# Patient Record
Sex: MALE | Race: WHITE | NOT HISPANIC OR LATINO | Employment: FULL TIME | URBAN - METROPOLITAN AREA
[De-identification: names, ages, dates, MRNs, and addresses within clinical notes are randomized per-mention and may not be internally consistent; named-entity substitution may affect disease eponyms.]

---

## 2017-01-03 ENCOUNTER — OFFICE VISIT (OUTPATIENT)
Dept: OPHTHALMOLOGY | Facility: CLINIC | Age: 67
End: 2017-01-03
Payer: COMMERCIAL

## 2017-01-03 DIAGNOSIS — H25.13 NUCLEAR SCLEROSIS, BILATERAL: ICD-10-CM

## 2017-01-03 DIAGNOSIS — H43.813 PVD (POSTERIOR VITREOUS DETACHMENT), BILATERAL: Primary | ICD-10-CM

## 2017-01-03 PROCEDURE — 92226 PR SPECIAL EYE EXAM, SUBSEQUENT: CPT | Mod: RT,S$GLB,, | Performed by: OPHTHALMOLOGY

## 2017-01-03 PROCEDURE — 99999 PR PBB SHADOW E&M-EST. PATIENT-LVL II: CPT | Mod: PBBFAC,,, | Performed by: OPHTHALMOLOGY

## 2017-01-03 PROCEDURE — 92014 COMPRE OPH EXAM EST PT 1/>: CPT | Mod: S$GLB,,, | Performed by: OPHTHALMOLOGY

## 2017-01-03 NOTE — MR AVS SNAPSHOT
[facility]  [address]  [facility] [address]  Phone: [phone]  Fax: [phone]                  Anthony Reynaga   1/3/2017 3:00 PM   Office Visit    Description:  Male : 1950   Provider:  CHRISTIANE Fam MD   Department:  [facility]           Reason for Visit     Eye Problem           Diagnoses this Visit        Comments    PVD (posterior vitreous detachment), bilateral    -  Primary     Nuclear sclerosis, bilateral                To Do List           Future Appointments        Provider Department Dept Phone    2017 8:45 AM Curt Pedro, PharmD [facility] [phone]      Goals (5 Years of Data)     None      Follow-Up and Disposition     Return in about 2 years (around 1/3/2019).      [facility]     [facility] On Call Nurse Care Line -  Assistance  Registered nurses in the [facility] On Call Center provide clinical advisement, health education, appointment booking, and other advisory services.  Call for this free service at [phone].             Medications           Message regarding Medications     Verify the changes and/or additions to your medication regime listed below are the same as discussed with your clinician today.  If any of these changes or additions are incorrect, please notify your healthcare provider.             Verify that the below list of medications is an accurate representation of the medications you are currently taking.  If none reported, the list may be blank. If incorrect, please contact your healthcare provider. Carry this list with you in case of emergency.           Current Medications     alfuzosin (UROXATRAL) 10 mg Tb24 Take 1 tablet by mouth once daily    aspirin (ECOTRIN) 81 MG EC tablet Take 81 mg by mouth once daily.    finasteride (PROSCAR) 5 mg tablet Take 1 tablet (5 mg total) by mouth once daily.    FLUVIRIN [id number] 45 mcg (15 mcg x 3)/0.5 mL Susp ADM 0.5ML IM UTD     lorazepam (ATIVAN) 1 MG tablet Take 1 mg by mouth every 6 (six) hours as needed (Patient states he takes this medication only a couple times a month, to sleep.).     metoprolol succinate (TOPROL-XL) 200 MG 24 hr tablet Take 200 mg by mouth once daily.     simvastatin (ZOCOR) 20 MG tablet Take 20 mg by mouth every evening.     warfarin (COUMADIN) 2 MG tablet Take 2 mg by mouth once daily. Resumption 8/25. Take as directed by the Coumadin Clinic.           Clinical Reference Information           Allergies as of 1/3/2017     No Known Allergies      Immunizations Administered on Date of Encounter - 1/3/2017     None

## 2017-02-01 ENCOUNTER — ANTI-COAG VISIT (OUTPATIENT)
Dept: CARDIOLOGY | Facility: CLINIC | Age: 67
End: 2017-02-01
Payer: COMMERCIAL

## 2017-02-01 DIAGNOSIS — Z79.01 LONG TERM CURRENT USE OF ANTICOAGULANT THERAPY: Primary | ICD-10-CM

## 2017-02-01 LAB — INR PPP: 2.2 (ref 2–3)

## 2017-02-01 PROCEDURE — 85610 PROTHROMBIN TIME: CPT | Mod: QW,S$GLB,, | Performed by: PHARMACIST

## 2017-02-01 NOTE — PROGRESS NOTES
Patient denies any changes in diet, medications, or health that would effect warfarin therapy.  Patient was re-educated on situations that would require placing a call to the coumadin clinic, including bleeding or unusual bruising issues, changes in health, diet or medications, upcoming procedures that require warfarin interruption, and missed coumadin dose(s). Patient expressed understanding that avoidance of consistency with these parameters could cause fluctuations in INR, leading to more frequent visits and increase risk of adverse events.

## 2017-03-16 RX ORDER — FINASTERIDE 5 MG/1
TABLET, FILM COATED ORAL
Qty: 30 TABLET | Refills: 0 | Status: SHIPPED | OUTPATIENT
Start: 2017-03-16 | End: 2017-04-25 | Stop reason: SDUPTHER

## 2017-03-29 ENCOUNTER — ANTI-COAG VISIT (OUTPATIENT)
Dept: CARDIOLOGY | Facility: CLINIC | Age: 67
End: 2017-03-29
Payer: COMMERCIAL

## 2017-03-29 DIAGNOSIS — Z79.01 LONG TERM CURRENT USE OF ANTICOAGULANT THERAPY: ICD-10-CM

## 2017-03-29 LAB — INR PPP: 2.4 (ref 2–3)

## 2017-03-29 PROCEDURE — 85610 PROTHROMBIN TIME: CPT | Mod: QW,S$GLB,, | Performed by: PHARMACIST

## 2017-04-25 RX ORDER — FINASTERIDE 5 MG/1
TABLET, FILM COATED ORAL
Qty: 30 TABLET | Refills: 0 | Status: SHIPPED | OUTPATIENT
Start: 2017-04-25 | End: 2017-05-21 | Stop reason: SDUPTHER

## 2017-05-21 RX ORDER — FINASTERIDE 5 MG/1
TABLET, FILM COATED ORAL
Qty: 30 TABLET | Refills: 0 | Status: SHIPPED | OUTPATIENT
Start: 2017-05-21 | End: 2017-06-27 | Stop reason: SDUPTHER

## 2017-05-24 ENCOUNTER — ANTI-COAG VISIT (OUTPATIENT)
Dept: CARDIOLOGY | Facility: CLINIC | Age: 67
End: 2017-05-24
Payer: COMMERCIAL

## 2017-05-24 DIAGNOSIS — Z79.01 LONG TERM CURRENT USE OF ANTICOAGULANT THERAPY: Primary | ICD-10-CM

## 2017-05-24 LAB — INR PPP: 2.5 (ref 2–3)

## 2017-05-24 PROCEDURE — 85610 PROTHROMBIN TIME: CPT | Mod: QW,S$GLB,, | Performed by: PHARMACIST

## 2017-05-24 PROCEDURE — 99211 OFF/OP EST MAY X REQ PHY/QHP: CPT | Mod: 25,S$GLB,, | Performed by: PHARMACIST

## 2017-05-24 NOTE — PROGRESS NOTES
Patient therapeutic. Patient confirmed dose. Denied changes in medication or health. Endorsed eating fewer salads recently but planned to restart his usual routine. Denied overt complications. Has upcoming dental work for crown, no expected bleeding. Patient instructed about situations in which to call clinic for bleeding concern. Patient will follow-up in 8 weeks.

## 2017-06-12 DIAGNOSIS — I35.1 AORTIC VALVE REGURGITATION, UNSPECIFIED ETIOLOGY: Primary | ICD-10-CM

## 2017-06-27 RX ORDER — FINASTERIDE 5 MG/1
5 TABLET, FILM COATED ORAL DAILY
Qty: 30 TABLET | Refills: 11 | Status: SHIPPED | OUTPATIENT
Start: 2017-06-27 | End: 2018-08-20

## 2017-06-27 RX ORDER — FINASTERIDE 5 MG/1
TABLET, FILM COATED ORAL
Qty: 30 TABLET | Refills: 0 | Status: SHIPPED | OUTPATIENT
Start: 2017-06-27 | End: 2017-06-27 | Stop reason: SDUPTHER

## 2017-07-16 ENCOUNTER — PATIENT MESSAGE (OUTPATIENT)
Dept: ELECTROPHYSIOLOGY | Facility: CLINIC | Age: 67
End: 2017-07-16

## 2017-07-19 ENCOUNTER — ANTI-COAG VISIT (OUTPATIENT)
Dept: CARDIOLOGY | Facility: CLINIC | Age: 67
End: 2017-07-19
Payer: COMMERCIAL

## 2017-07-19 DIAGNOSIS — Z79.01 LONG TERM CURRENT USE OF ANTICOAGULANT THERAPY: Primary | ICD-10-CM

## 2017-07-19 LAB — INR PPP: 2.1 (ref 2–3)

## 2017-07-19 PROCEDURE — 85610 PROTHROMBIN TIME: CPT | Mod: QW,S$GLB,,

## 2017-07-19 PROCEDURE — 99211 OFF/OP EST MAY X REQ PHY/QHP: CPT | Mod: 25,S$GLB,,

## 2017-07-19 NOTE — PROGRESS NOTES
INR remains therapeutic.  Patient reports medication changes of flonase and omeprazole; do not expect significant DDIs with warfarin.  He reports likely upcoming polyp removal from vocal cord and will keep us advised of date once scheduled.  I anticipate holding warfarin 3-5 days prior without lovenox (CHADS-VASC=2 for age and htn).  Continue warfarin dose and INR in 8 weeks.  Patient advised to contact clinic with any changes, questions, or concerns.

## 2017-08-03 ENCOUNTER — OFFICE VISIT (OUTPATIENT)
Dept: OTOLARYNGOLOGY | Facility: CLINIC | Age: 67
End: 2017-08-03
Payer: COMMERCIAL

## 2017-08-03 VITALS
WEIGHT: 185.44 LBS | TEMPERATURE: 98 F | DIASTOLIC BLOOD PRESSURE: 63 MMHG | SYSTOLIC BLOOD PRESSURE: 124 MMHG | BODY MASS INDEX: 25.15 KG/M2 | HEART RATE: 56 BPM

## 2017-08-03 DIAGNOSIS — J38.3 VOCAL FOLD ATROPHY: ICD-10-CM

## 2017-08-03 DIAGNOSIS — R49.9 VOICE DISTURBANCE: Primary | ICD-10-CM

## 2017-08-03 DIAGNOSIS — J38.3 GLOTTIC INSUFFICIENCY: ICD-10-CM

## 2017-08-03 DIAGNOSIS — F44.4 HYPERFUNCTIONAL DYSPHONIA: ICD-10-CM

## 2017-08-03 DIAGNOSIS — J38.3 VOCAL FOLD ATROPHY: Primary | ICD-10-CM

## 2017-08-03 PROCEDURE — 3008F BODY MASS INDEX DOCD: CPT | Mod: S$GLB,,, | Performed by: OTOLARYNGOLOGY

## 2017-08-03 PROCEDURE — 99203 OFFICE O/P NEW LOW 30 MIN: CPT | Mod: 25,S$GLB,, | Performed by: OTOLARYNGOLOGY

## 2017-08-03 PROCEDURE — 99999 PR PBB SHADOW E&M-EST. PATIENT-LVL II: CPT | Mod: PBBFAC,,, | Performed by: OTOLARYNGOLOGY

## 2017-08-03 PROCEDURE — 99499 UNLISTED E&M SERVICE: CPT | Mod: S$GLB,,, | Performed by: OTOLARYNGOLOGY

## 2017-08-03 PROCEDURE — 31579 LARYNGOSCOPY TELESCOPIC: CPT | Mod: S$GLB,,, | Performed by: OTOLARYNGOLOGY

## 2017-08-03 PROCEDURE — 3074F SYST BP LT 130 MM HG: CPT | Mod: S$GLB,,, | Performed by: OTOLARYNGOLOGY

## 2017-08-03 PROCEDURE — 3078F DIAST BP <80 MM HG: CPT | Mod: S$GLB,,, | Performed by: OTOLARYNGOLOGY

## 2017-08-03 PROCEDURE — 99999 PR PBB SHADOW E&M-EST. PATIENT-LVL III: CPT | Mod: PBBFAC,,, | Performed by: OTOLARYNGOLOGY

## 2017-08-03 NOTE — PATIENT INSTRUCTIONS
VOCAL CORD BOWING    What is bowing?    Bowed vocal folds are vocal folds that look like two bows joined at the front and the back. While the front and back of the two cords come together, the middle stays open. The opening in the middle allows air to leak out during voice use, causing symptoms including weak voice and vocal fatigue.    Bowing is most often caused by presbylarynx, which means an aging larynx. However, it can also occur in young persons. Often, bowing comes from a lack of nerve input to the vocal folds, resulting in poor closure in the middle part of the cords. Over time, the muscle bulk in the vocal folds can be lost, adding to greater difficulty with closure.     How is bowing treated?    The first line of treatment for bowing is voice therapy. Therapy is used to exercise the vocal folds and to bulk them up to improve the closure. Therapy exercises may also work to reduce over-activity of the surrounding muscles, which often get over-used to compensate when the vocal folds arent closing well.    In extreme cases of vocal fold bowing, one of both vocal folds may be injected with material to add bulk to improve closure.                           WHAT TO EXPECT FROM VOICE THERAPY    Purpose  The purpose of voice therapy is to help you find a better, more efficient way to use your voice or to reduce symptoms such as coughing, throat clearing, or difficulty breathing.  Depending on your symptoms, you may learn how to produce clearer voice quality, how to reduce fatigue or pain associated with speaking, how to take care of your voice, and how to eliminate chronic coughing or throat clearing.      Process: Evaluation  First, you may go through some initial testing.  In most cases, a videostroboscopy will be performed in order to allow your speech pathologist and your physician to look at your vocal cords to aid in deciding if you would benefit from voice therapy.  Next, you may work with the speech  pathologist to assess the current capabilities of your voice.  Following evaluation, your speech pathologist will design a therapeutic plan to improve your voice as well as other symptoms that may bother you.  At the time of evaluation, your speech pathologist may provide you with exercises to try at home.      Process: Therapy  Most patients benefit from 2-8 sessions over 1-3 months.  Voice therapy involves changing the behavior of your vocal cords and speaking habits, so it is very important that you attend your appointments and do home practices as instructed by your speech pathologist.  Home practice and participation in therapy are critical to meeting your desired voice goals, so of course, we are able to work with you to schedule appointments that are convenient for you.

## 2017-08-03 NOTE — PROGRESS NOTES
[facility]    Subjective:       Patient ID: Anthony Reynaga is a 67 y.o. male.    Chief Complaint: Consult (polyp on vocal cord)    HPI   Anthony Reynaga is a 67 y.o. male who presents for evaluation of a recently detected vocal fold polyp. He complains of dryness in his mouth that prompted a visit with Dr. Box. She noted a TVF polyp and started him on Prevacid. 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 He complains of postnasal drip, but denies sleep apnea although there is some snoring. He notes some irritation in the roof of his mouth. He does not discretely describe hoarseness or voice change. He notes a globus sensation and admits to vocal fatigue (a colleague noted that his voice weakened over a 2.5 hour lecture). He does not sing, and he ultimately states that his voice seems deeper than normal. There are not periodic pitch breaks or cracks. He denies dysphagia to solids or liquids. He admits that he is not particularly sensitive to changes in his voice. He notes some strain in which he would have to cough or take a sip of water during conversations this summer.  He denies odynophagia, throat pain, and otalgia. There is not hemoptysis or hematemesis. He does clear his throat frequently, and there is not a morning cough.    He denies heartburn and cathie reflux. He denies a globus sensation. He is currently taking prevacid for GERD/LPR, but he has not noticed a change in his symptoms. He thinks that the flonase is helping his postnasal drip.  He has not seen a Gastroenterologist and has not had an EGD. He does not occasionally experience a metallic taste in his mouth - he previously experienced a transient change in overall taste that resolved with a change in toothpaste. He drinks about 4 glasses of water a day, along with about 12-15 ounces of coffee daily (2-2.5 cups).    Past Medical History:   Diagnosis Date    Anticoagulant long-term use     Atrial fibrillation      BPH (benign prostatic hyperplasia)     Cataract     Elevated PSA     Floaters     Hernia     bilateral inquinal    Hypertension     Inguinal hernia     Kidney stone     Knee injury        Past Surgical History:   Procedure Laterality Date    APPENDECTOMY      BLADDER SURGERY      polyp removed benign    COLONOSCOPY N/A 6/22/2016    Procedure: COLONOSCOPY;  Surgeon: Joaquin Francis MD;  Location: [facility] ([address] [place] [address]);  Service: Endoscopy;  Laterality: N/A;    Thanks for letting us know.  I would approve him to hold coumadin x 3 days prior, without lovenox.  We will see him for an INR on 6/8 and will provide him with procedure instructions at that time., per Coumadin Clinic    CYSTOSCOPY      FINGER SURGERY      HERNIA REPAIR      KNEE SURGERY      LITHOTRIPSY      TONSILLECTOMY, ADENOIDECTOMY           Current Outpatient Prescriptions:     alfuzosin (UROXATRAL) 10 mg Tb24, Take 1 tablet by mouth once daily, Disp: 90 tablet, Rfl: 3    aspirin (ECOTRIN) 81 MG EC tablet, Take 81 mg by mouth once daily., Disp: , Rfl:     finasteride (PROSCAR) 5 mg tablet, Take 1 tablet (5 mg total) by mouth once daily., Disp: 30 tablet, Rfl: 11    FLUVIRIN [id number] 45 mcg (15 mcg x 3)/0.5 mL Susp, ADM 0.5ML IM UTD, Disp: , Rfl: 0    lorazepam (ATIVAN) 1 MG tablet, Take 1 mg by mouth every 6 (six) hours as needed (Patient states he takes this medication only a couple times a month, to sleep.). , Disp: , Rfl: 0    metoprolol succinate (TOPROL-XL) 200 MG 24 hr tablet, Take 200 mg by mouth once daily. , Disp: , Rfl:     simvastatin (ZOCOR) 20 MG tablet, Take 20 mg by mouth every evening. , Disp: , Rfl:     warfarin (COUMADIN) 2 MG tablet, Take 2 mg by mouth once daily. Resumption 8/25. Take as directed by the Coumadin Clinic., Disp: , Rfl:     Review of patient's allergies indicates:  No Known Allergies    Social History     Social History    Marital status: Single     Spouse name: N/A    Number of children:  N/A    Years of education: N/A     Occupational History    Not on file.     Social History Main Topics    Smoking status: Never Smoker    Smokeless tobacco: Never Used    Alcohol use 0.6 oz/week     1 Cans of beer per week      Comment: social    Drug use: No    Sexual activity: Yes     Partners: Female     Other Topics Concern    Not on file     Social History Narrative    No narrative on file       Family History   Problem Relation Age of Onset    Cancer Mother     Benign prostatic hyperplasia Neg Hx      Review of Systems   Constitutional: Negative for fatigue, fever and unexpected weight change.   HENT: Negative for ear discharge, facial swelling, hearing loss, mouth sores, rhinorrhea, sore throat, tinnitus, trouble swallowing and voice change.    Eyes: Negative for pain and visual disturbance.   Respiratory: Negative for cough and shortness of breath.    Cardiovascular: Negative for chest pain and palpitations.   Gastrointestinal: Negative for abdominal pain, constipation and diarrhea.   Genitourinary: Negative for difficulty urinating and dysuria.   Musculoskeletal: Positive for arthralgias. Negative for back pain and neck pain.   Skin: Negative for color change and rash.   Neurological: Negative for dizziness, seizures and headaches.   Hematological: Negative for adenopathy. Does not bruise/bleed easily.   Psychiatric/Behavioral: Negative for agitation. The patient is not nervous/anxious.        Objective:      Physical Exam    Assessment:       No diagnosis found.    Plan:     Dr. Moeller to see the patient, as this is more appropriate

## 2017-08-07 ENCOUNTER — CLINICAL SUPPORT (OUTPATIENT)
Dept: SPEECH THERAPY | Facility: HOSPITAL | Age: 67
End: 2017-08-07
Attending: OTOLARYNGOLOGY
Payer: COMMERCIAL

## 2017-08-07 DIAGNOSIS — R49.9 VOICE DISTURBANCE: ICD-10-CM

## 2017-08-07 DIAGNOSIS — F44.4 HYPERFUNCTIONAL DYSPHONIA: ICD-10-CM

## 2017-08-07 DIAGNOSIS — J38.3 GLOTTIC INSUFFICIENCY: Primary | ICD-10-CM

## 2017-08-07 DIAGNOSIS — J38.3 VOCAL FOLD ATROPHY: ICD-10-CM

## 2017-08-07 PROCEDURE — G9172 VOICE GOAL STATUS: HCPCS | Mod: GN,CH | Performed by: SPEECH-LANGUAGE PATHOLOGIST

## 2017-08-07 PROCEDURE — 92524 BEHAVRAL QUALIT ANALYS VOICE: CPT | Mod: GN | Performed by: SPEECH-LANGUAGE PATHOLOGIST

## 2017-08-07 PROCEDURE — 92520 LARYNGEAL FUNCTION STUDIES: CPT | Mod: GN | Performed by: SPEECH-LANGUAGE PATHOLOGIST

## 2017-08-07 PROCEDURE — G9171 VOICE CURRENT STATUS: HCPCS | Mod: GN,CJ | Performed by: SPEECH-LANGUAGE PATHOLOGIST

## 2017-08-07 NOTE — PROGRESS NOTES
Referring provider: Dr. Saqib Moeller  Reason for visit:  Behavioral and qualitative analysis of voice and resonance (CPT 84261)  Laryngeal function studies (CPT 59365)    Subjective / History    Anthony Reynaga is a 67 y.o. male referred for voice evaluation (CPT 37920, 93561) by Dr. Moeller.  He presented to Dr. Moeller with concerns that he may have a vocal cord polyp; Dr. 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  The patient also endorses: coughing with prolonged coughing and reduced volume.  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   Amplification: yes   Swallowing: mild intermittent   Breathing: no c/o    Smokin packs/day   Caffeine: 2-3 cups/day   Reflux: no  Water: 4-5 cups/day     Past Medical History:   Diagnosis Date    Anticoagulant long-term use     Atrial fibrillation     BPH (benign prostatic hyperplasia)     Cataract     Elevated PSA     Floaters     Hernia     bilateral inquinal    Hypertension     Inguinal hernia     Kidney stone     Knee injury      Current Outpatient Prescriptions on File Prior to Visit   Medication Sig Dispense Refill    alfuzosin (UROXATRAL) 10 mg Tb24 Take 1 tablet by mouth once daily 90 tablet 3    aspirin (ECOTRIN) 81 MG EC tablet Take 81 mg by mouth once daily.      finasteride (PROSCAR) 5 mg tablet Take 1 tablet (5 mg total) by mouth once daily. 30 tablet 11    FLUVIRIN [id number] 45 mcg (15 mcg x 3)/0.5 mL Susp ADM 0.5ML IM UTD  0    lorazepam (ATIVAN) 1 MG tablet Take 1 mg by mouth every 6 (six) hours as needed (Patient states he takes this medication only a couple times a month, to sleep.).   0     "metoprolol succinate (TOPROL-XL) 200 MG 24 hr tablet Take 200 mg by mouth once daily.       simvastatin (ZOCOR) 20 MG tablet Take 20 mg by mouth every evening.       warfarin (COUMADIN) 2 MG tablet Take 2 mg by mouth once daily. Resumption 8/25. Take as directed by the Coumadin Clinic.       No current facility-administered medications on file prior to visit.        Objective    Perceptual/behavioral assessment  -CAPE-V Overall Score: 2  -Quality: mildly strained  -Volume: appropriate for age and gender  -Pitch: appropriate for age and gender  -Flexibility: appropriate for age and gender  -Habitual respiratory pattern: diaphragmatic    Acoustic/aerodynamic assessment  Multi-Dimensional Voice Program (MDVP) Analysis (sustained "ah")   Baseline Gender-matched norms (M)   Average fundamental frequency (Fo) 126.316 Hz Norm/SD: 145.2 / 23.41     Relative average perturbation 0.557% Norm/SD: 0.345 / 0.33     Shimmer percent 6.161% Norm/SD: 2.52 / 0.997     Noise to harmonic ratio 0.17 Norm/SD: 0.12 / 0.014     Voice turbulence index 0.051 Norm/SD: 0.05 / 0.016       Phonatory Aerodynamic System (PAS) Analysis (running speech)  Max SPL 81.1 dB    (Calculated) Mean Airflow During Voicing 2.34/10.24 = 0.2285 liters/sec Total volume of expiratory airflow accompanied by voicing divided by the total time during which voicing occurred   Duration 20.8 seconds    Number of breaths 4      Education / Stimulability Trials   Discussed importance of vocal hygiene including: hydration. Patient was stimulable for improved voice using PhoRTE exercises.    Phonation resistance training exercises (PhoRTE [Carla & Laura, 2013]): to be practiced 2x/day, 7 days/week  - Sustain the vowel /a/ with a loud, energized voice for as long as possible.  - Glide from modal pitch to high pitch on /a/ using a loud, energized voice.  - Glide from modal pitch to low pitch on the vowel /a/ using a loud, energized voice.  - Shout selected functional " phrases using a loud and higher-pitched voice, like calling over a fence.  - Shout selected functional phrases using a strong, authoritative voice at a low pitch level.  Pt performed all these exercises correctly and was provided with a handout to guide his home practice.     Functional goals  Length Status Goal   Long term Initiated Patient and clinician will facilitate changes in vocal function in order to restore functional use of voice for daily occupational, social, and emotional demands.    Long term Initiated Patient will achieve improved/normal voice assessed with perceptual scales, acoustic and/or aerodynamic measures within 12 weeks.   Long term Initiated Patient will improve coordination of respiration and phonation for efficient vocal production at a conversational level.    Short term Initiated Patient will reduce vocal effort and fatigue by decreasing upper body tension as evidenced by a decrease in symptoms and lack of observable/palpable signs of hyperkinetic muscular behaviors.   Short term Initiated Patient will complete PhoRTE exercises 2x daily to strengthen and balance the intrinsic laryngeal musculature and maximize glottic closure without medial hyperfunction.   Short term Initiated Patient will improve the quality, efficiency, and ease of phonation through resonant and/or airflow exercises from the syllable to conversation level with 80% accuracy.   Short term Initiated Patient will discriminate between easy and strained phonation with 80% accuracy.    Short term Initiated Patient will identify the sensations associated with muscle relaxation in the abdominal, thoracic, neck and facial areas during efficient phonation with minimal clinician cue.        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 Moeller.  Prognosis for continued improvement is good.     G-Codes for Voice  Current status:   - CJ   Projected status:  -  CH     Recommendations / POC    Recommend 2-4 sessions of voice/speech therapy over 4-6 weeks with a speech-language pathologist to optimize glottal postures for improved vocal function, vocal efficiency, and ease of phonation.  He should continue the exercises as discussed in session and should contact me with any further questions.

## 2017-08-07 NOTE — PROGRESS NOTES
[facility]  Department of Otorhinolaryngology and Communication Sciences    Anthony Reynaga is a 67 y.o. male who presents to the [facility] for consultation at the kind request of Dr. Hamilton and Dr. Box for further management of dysphonia.    He complains of dryness in his mouth that prompted a visit with Dr. Box. She noted a very small vocal fold polyp and started him on Prevacid. 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 He complains of postnasal drip. He notes some irritation in the roof of his mouth. He does not discretely describe hoarseness or voice change. He notes a globus sensation and admits to vocal fatigue (a colleague noted that his voice weakened over a 2.5 hour lecture). He does not sing, and he ultimately states that his voice seems deeper than normal. There are not periodic pitch breaks or cracks. He denies dysphagia to solids or liquids. He admits that he is not particularly sensitive to changes in his voice. He notes some strain in which he would have to cough or take a sip of water during conversations this summer.  He denies odynophagia, throat pain, and otalgia. There is not hemoptysis or hematemesis. He does clear his throat frequently, and there is not a morning cough.     He denies heartburn and cathie reflux. He denies a globus sensation. He is currently taking prevacid for GERD/LPR, but he has not noticed a change in his symptoms. He thinks that the flonase is helping his postnasal drip.  He has not seen a Gastroenterologist and has not had an EGD. He drinks about 4 glasses of water a day, along with about 12-15 ounces of coffee daily (2-2.5 cups).    Past Medical History  He has a past medical history of Anticoagulant long-term use; Atrial fibrillation; BPH (benign prostatic hyperplasia); Cataract; Elevated PSA; Floaters; Hernia; Hypertension; Inguinal hernia; Kidney stone; and Knee injury.    Past Surgical History  He has a past surgical history that includes  TONSILLECTOMY, ADENOIDECTOMY; Appendectomy; Cystoscopy; Bladder surgery; Finger surgery; Lithotripsy; Colonoscopy (N/A, 6/22/2016); Knee surgery; and Hernia repair.    Family History  His family history includes Cancer in his mother.    Social History  He reports that he has never smoked. He has never used smokeless tobacco. He reports that he drinks about 0.6 oz of alcohol per week . He reports that he does not use drugs.    Allergies  He has No Known Allergies.    Medications  He has a current medication list which includes the following prescription(s): alfuzosin, aspirin, finasteride, fluvirin 3056-8350, lorazepam, metoprolol succinate, simvastatin, and warfarin.    Review of Systems  Constitutional: Negative for fatigue, fever and unexpected weight change.   HENT: Negative for ear discharge, facial swelling, hearing loss, mouth sores, rhinorrhea, sore throat, tinnitus, trouble swallowing and voice change.    Eyes: Negative for pain and visual disturbance.   Respiratory: Negative for cough and shortness of breath.    Cardiovascular: Negative for chest pain and palpitations.   Gastrointestinal: Negative for abdominal pain, constipation and diarrhea.   Genitourinary: Negative for difficulty urinating and dysuria.   Musculoskeletal: Positive for arthralgias. Negative for back pain and neck pain.   Skin: Negative for color change and rash.   Neurological: Negative for dizziness, seizures and headaches.   Hematological: Negative for adenopathy. Does not bruise/bleed easily.   Psychiatric/Behavioral: Negative for agitation. The patient is not nervous/anxious.       Objective:     VS reviewed, per RN/MA notes    Physical Exam    Constitutional: comfortable, well dressed  Psychiatric: appropriate affect  Respiratory: comfortably breathing, symmetric chest rise, no stridor  Voice: mild, variable breathiness and roughness; no strain; low volume  Cardiovascular: upper extremities non-edematous  Lymphatic: no cervical  lymphadenopathy  Neurologic: alert and oriented to time, place, person, and situation; cranial nerves 3-12 grossly intact  Head: normocephalic  Eyes: conjunctivae and sclerae clear  Ears: normal pinnae, normal external auditory canals, tympanic membranes intact  Nose: mucosa pink and noncongested, no masses, no mucopurulence, no polyps  Oral cavity / oropharynx: no mucosal lesions  Neck: soft, full range of motion, laryngotracheal complex palpable with appropriate landmarks, larynx elevates on swallowing  Indirect laryngoscopy: limited due to gag    Procedure  Rigid Laryngeal Videostroboscopy (35454): Laryngeal videostroboscopy is indicated to assess the laryngeal vibratory biomechanics and vocal fold oscillation, which cannot be assessed with a plain light examination. This was carried out with a 70 degree endoscope. After verbal consent was obtained, the patient was positioned and the tongue was gently secured with a gauze sponge. The endoscope was passed transorally and positioned to image the larynx and hypopharynx in detail. The following featured were examined: laryngeal and hypopharyngeal masses; vocal fold range and symmetry of motion; laryngeal mucosal edema, erythema, inflammation, and hydration; salivary pooling; and gross laryngeal sensation. During phonation, the vocal folds were assesses for glottal closure; mucosal wave; vocal fold lesions; vibratory periodicity, amplitude, and phase symmetry; and vertical height match. The equipment was removed. The patient tolerated the procedure well without complication. All findings were normal except:  - bilateral symmetric vocal fold atrophy, mild-moderate  - glottal insufficiency across all frequencies; increased open:closed phase of vibration  - compensatory supraglottic hyperfunction, primarily A/P      Assessment:     Anthony Reynaga is a 67 y.o. 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       Plan:        I had a discussion with the patient  regarding his condition and the further workup and management options. We discussed both SLP voice therapy and vocal fold injection augmentation. We decided on the former. Voice evaluation and subsequent therapy sessions are medically necessary for restoration of laryngeal function. We will arrange for this to occur here at the [facility] in the coming weeks.    He will follow up with me in 2 month(s), or sooner if needed.    All questions were answered, and the patient is in agreement with the above.     Saqib Moeller M.D.  [facility]  Department of Otorhinolaryngology and Communication Sciences

## 2017-08-16 ENCOUNTER — TELEPHONE (OUTPATIENT)
Dept: ELECTROPHYSIOLOGY | Facility: CLINIC | Age: 67
End: 2017-08-16

## 2017-08-16 NOTE — TELEPHONE ENCOUNTER
----- Message from Mariam Hickman RN sent at 8/15/2017  3:34 PM CDT -----  Contact: self      ----- Message -----  From: Kaylynn Marin MA  Sent: 8/15/2017   3:07 PM  To: Patricia SEN Staff    Pt. made his own appt. thru [facility] on 9/13@ 10 am. Thinks he  scheduled it wrong,new patient in slot  and he is an old pt. of  pt. Please call to correct it or change it.

## 2017-08-22 ENCOUNTER — PATIENT MESSAGE (OUTPATIENT)
Dept: SPEECH THERAPY | Facility: HOSPITAL | Age: 67
End: 2017-08-22

## 2017-08-22 ENCOUNTER — CLINICAL SUPPORT (OUTPATIENT)
Dept: SPEECH THERAPY | Facility: HOSPITAL | Age: 67
End: 2017-08-22
Attending: OTOLARYNGOLOGY
Payer: COMMERCIAL

## 2017-08-22 DIAGNOSIS — F44.4 HYPERFUNCTIONAL DYSPHONIA: ICD-10-CM

## 2017-08-22 DIAGNOSIS — J38.3 GLOTTIC INSUFFICIENCY: Primary | ICD-10-CM

## 2017-08-22 DIAGNOSIS — R49.9 VOICE DISTURBANCE: ICD-10-CM

## 2017-08-22 DIAGNOSIS — J38.3 VOCAL FOLD ATROPHY: ICD-10-CM

## 2017-08-22 PROCEDURE — 92507 TX SP LANG VOICE COMM INDIV: CPT | Mod: GN | Performed by: SPEECH-LANGUAGE PATHOLOGIST

## 2017-08-22 PROCEDURE — G9171 VOICE CURRENT STATUS: HCPCS | Mod: GN,CJ | Performed by: SPEECH-LANGUAGE PATHOLOGIST

## 2017-08-22 PROCEDURE — G9172 VOICE GOAL STATUS: HCPCS | Mod: GN,CH | Performed by: SPEECH-LANGUAGE PATHOLOGIST

## 2017-08-22 NOTE — PROGRESS NOTES
"Referring provider: Dr. Saqib Moeller  Reason for visit:  Voice treatment (CPT 64107)  Session #1    History / Subjective   I had the pleasure of seeing Anthony Reynaga for his first treatment session following complete voice evaluation on 8/7/17.  During that time, improvements were noted with PhoRTE exercises.  Since evaluation, he has been doing his exercises.  He does not endorse any issues with the exercises.  He does have concerns as classes begin next week, and he has noticed a "tickle" sensation in his throat that causes him to cough after about 15 minutes of talking. Sometimes roof of mouth feels dry/irritated.     Objective   The primary goal of todays session was to practice home program.  This was targeted using PhoRTE treatment modalities.    Perceptual/behavioral assessment  -CAPE-V Overall Score: 4  -Quality: appropriate for age and gender, mild strain  -Volume: appropriate for age and gender  -Pitch: appropriate for age and gender  -Flexibility: appropriate for age and gender  -Habitual respiratory pattern: breath holding    Data  Patient completed the following voice exercises.    Phonation resistance training exercises (PhoRTE [Carla & Laura, 2013]): to be practiced 2x/day, 7 days/week.  Patient demonstrated all of the following and was provided with feedback for adjustments of home practice  - Sustain the vowel /a/ with a loud, energized voice for as long as possible.  - Glide from modal pitch to high pitch on /a/ using a loud, energized voice.  - Glide from modal pitch to low pitch on the vowel /a/ using a loud, energized voice.  - Shout selected functional phrases using a loud and higher-pitched voice, like calling over a fence.  - Shout selected functional phrases using a strong, authoritative voice at a low pitch level.  For home practice, clinician reviewed home exercises as practiced during the session with the patient. Pt with several questions today on PND, increased sensation of " mucus, dry mouth.  Provided strategies for increasing hydration as well as for reducing throat clearing.      Functional goals  Length Status Goal   Long term Ongoing Patient and clinician will facilitate changes in vocal function in order to restore functional use of voice for daily occupational, social, and emotional demands.    Long term Ongoing Patient will achieve improved/normal voice assessed with perceptual scales, acoustic and/or aerodynamic measures within 12 weeks.   Long term Ongoing Patient will improve coordination of respiration and phonation for efficient vocal production at a conversational level.    Short term Ongoing Patient will reduce vocal effort and fatigue by decreasing upper body tension as evidenced by a decrease in symptoms and lack of observable/palpable signs of hyperkinetic muscular behaviors.   Short term Ongoing Patient will complete PhoRTE exercises 2x daily to strengthen and balance the intrinsic laryngeal musculature and maximize glottic closure without medial hyperfunction.   Short term Ongoing Patient will improve the quality, efficiency, and ease of phonation through resonant and/or airflow exercises from the syllable to conversation level with 80% accuracy.   Short term Ongoing Patient will discriminate between easy and strained phonation with 80% accuracy.    Short term Ongoing Patient will identify the sensations associated with muscle relaxation in the abdominal, thoracic, neck and facial areas during efficient phonation with minimal clinician cue.        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 Moeller.  Prognosis for continued improvement is good.     G-Codes for Voice  Current status:   - CJ   Projected status:  - CH     Recommendations / POC    Recommend 2-4 sessions of voice/speech therapy over 4-6 weeks with a speech-language pathologist to optimize glottal postures for improved vocal  function, vocal efficiency, and ease of phonation.  He should continue the exercises as discussed in session and should contact me with any further questions.

## 2017-08-24 ENCOUNTER — HOSPITAL ENCOUNTER (OUTPATIENT)
Dept: CARDIOLOGY | Facility: CLINIC | Age: 67
Discharge: HOME | End: 2017-08-24
Payer: COMMERCIAL

## 2017-08-24 ENCOUNTER — OFFICE VISIT (OUTPATIENT)
Dept: CARDIOLOGY | Facility: CLINIC | Age: 67
End: 2017-08-24
Payer: COMMERCIAL

## 2017-08-24 VITALS
BODY MASS INDEX: 25.53 KG/M2 | DIASTOLIC BLOOD PRESSURE: 65 MMHG | SYSTOLIC BLOOD PRESSURE: 159 MMHG | OXYGEN SATURATION: 97 % | HEART RATE: 63 BPM | WEIGHT: 188.5 LBS | HEIGHT: 72 IN

## 2017-08-24 DIAGNOSIS — I10 ESSENTIAL HYPERTENSION: ICD-10-CM

## 2017-08-24 DIAGNOSIS — E78.2 MIXED HYPERLIPIDEMIA: ICD-10-CM

## 2017-08-24 DIAGNOSIS — I35.1 NONRHEUMATIC AORTIC VALVE INSUFFICIENCY: ICD-10-CM

## 2017-08-24 DIAGNOSIS — I35.1 AORTIC VALVE REGURGITATION, UNSPECIFIED ETIOLOGY: ICD-10-CM

## 2017-08-24 DIAGNOSIS — I48.0 PAROXYSMAL ATRIAL FIBRILLATION: Primary | ICD-10-CM

## 2017-08-24 LAB
AORTIC VALVE REGURGITATION: NORMAL
ESTIMATED PA SYSTOLIC PRESSURE: 25
MITRAL VALVE REGURGITATION: NORMAL
RETIRED EF AND QEF - SEE NOTES: 55 (ref 55–65)
TRICUSPID VALVE REGURGITATION: NORMAL

## 2017-08-24 PROCEDURE — 99214 OFFICE O/P EST MOD 30 MIN: CPT | Mod: S$GLB,,, | Performed by: INTERNAL MEDICINE

## 2017-08-24 PROCEDURE — 3008F BODY MASS INDEX DOCD: CPT | Mod: S$GLB,,, | Performed by: INTERNAL MEDICINE

## 2017-08-24 PROCEDURE — 3078F DIAST BP <80 MM HG: CPT | Mod: S$GLB,,, | Performed by: INTERNAL MEDICINE

## 2017-08-24 PROCEDURE — 93320 DOPPLER ECHO COMPLETE: CPT | Mod: S$GLB,,, | Performed by: PEDIATRICS

## 2017-08-24 PROCEDURE — 3077F SYST BP >= 140 MM HG: CPT | Mod: S$GLB,,, | Performed by: INTERNAL MEDICINE

## 2017-08-24 PROCEDURE — 93325 DOPPLER ECHO COLOR FLOW MAPG: CPT | Mod: S$GLB,,, | Performed by: PEDIATRICS

## 2017-08-24 PROCEDURE — 93303 ECHO TRANSTHORACIC: CPT | Mod: S$GLB,,, | Performed by: PEDIATRICS

## 2017-08-24 PROCEDURE — 99999 PR PBB SHADOW E&M-EST. PATIENT-LVL III: CPT | Mod: PBBFAC,,, | Performed by: INTERNAL MEDICINE

## 2017-08-24 RX ORDER — CHOLECALCIFEROL (VITAMIN D3) 25 MCG
2000 TABLET ORAL DAILY
COMMUNITY

## 2017-08-24 NOTE — ASSESSMENT & PLAN NOTE
He is followed by Dr. Gomez. He has a CHADVasc2 of 1 and is on coumadin and metoprolol. He has not had an episode of A fib in ~ 2 yrs

## 2017-08-24 NOTE — ASSESSMENT & PLAN NOTE
Tricuspid AV with Stable on Echo today and normal size aortic root. We discussed there is no limitations for exercise.

## 2017-08-24 NOTE — PROGRESS NOTES
Subjective:   Patient ID:  Anthony Reynaga is a 67 y.o. male who presents for evaluation of Valvular Heart Disease      HPI   Patient is seen in our Adult Cong[facility]ital Heart Defect Clinic.  Patient last seen in our clinic in 2014. He presents today for ECHO and f/u of AI. He has PMHx of A Fib (on Toprol XL and coumadin) follows with Dr. Gomez and has not had an episode in ~2 yrs. He denies chest pain, syncope, palpitations, SOB, JANSEN. He does not do vigorous exercise for fear of going into A Fib, however, he does yoga regularly. He states he has white coat syndrome and that his BP is well controlled (SBP 120s) at home. He is cautious of adding another BP agent for fear of hypotension as he is on alfuzosin.     Congenital Heart History:  Orginally sent to clinic for evaluation of AI and aortic valve. ECHO in 2015 read as Bicuspid valve, after further review and with today ECHO AV is tricuspid.     Other Medical Problems  Paroxysmal atrial fibrillation    Cardiac Testing:  Echo 8/24/2017:  The tricuspid valve appears normal in structure with mild to moderate tricuspid insufficiency at velocity suggesting right ventricular pressure 25 mmHg plus right atrial pressure.  Right ventricle appears normal in size with qualitatively good function area  The left atrial volume index is mildly enlarged, measuring 35.90 cc/m2.   Mitral valve appears normal in structure with mild insufficiency.  LV mass index was increased at 122.8 g/m2 consistent with concentric left ventricular hypertrophy  Qualitatively good left ventricular systolic function with SF=39 % and EF estimated 55 - 60% from apical four chamber views  Difficult to distinguish structure of aortic valve secondary to 1-1.5 cm relatively immobile calcification most closely associated with the non-coronary cusp ? basic structure is trileaflet.  Peak velocity across the aortic valve <1.8 m/s with mean gradient <7 mmHg and mild estimated regurgitant volume arising from  small central jet      Review of Systems   Constitution: Negative for chills, diaphoresis, fever, weakness, weight gain and weight loss.   HENT: Negative for headaches and sore throat.    Eyes: Negative for blurred vision, vision loss in left eye, vision loss in right eye and visual disturbance.   Cardiovascular: Negative for chest pain, claudication, dyspnea on exertion, leg swelling, near-syncope, orthopnea, palpitations, paroxysmal nocturnal dyspnea and syncope.   Respiratory: Negative for cough, hemoptysis, shortness of breath, sputum production and wheezing.    Endocrine: Negative for cold intolerance and heat intolerance.   Hematologic/Lymphatic: Negative for adenopathy. Does not bruise/bleed easily.   Skin: Negative for rash.   Musculoskeletal: Negative for falls, muscle weakness and myalgias.   Gastrointestinal: Negative for abdominal pain, change in bowel habit, constipation, diarrhea, melena and nausea.   Genitourinary: Negative for bladder incontinence.   Neurological: Negative for dizziness, focal weakness, light-headedness and numbness.   Psychiatric/Behavioral: Negative for altered mental status.         Allergies and current medications updated and reviewed:  Review of patient's allergies indicates:  No Known Allergies  Current Outpatient Prescriptions   Medication Sig Dispense Refill    alfuzosin (UROXATRAL) 10 mg Tb24 Take 1 tablet by mouth once daily 90 tablet 3    aspirin (ECOTRIN) 81 MG EC tablet Take 81 mg by mouth once daily.      finasteride (PROSCAR) 5 mg tablet Take 1 tablet (5 mg total) by mouth once daily. 30 tablet 11    lorazepam (ATIVAN) 1 MG tablet Take 1 mg by mouth every 6 (six) hours as needed (Patient states he takes this medication only a couple times a month, to sleep.).   0    metoprolol succinate (TOPROL-XL) 200 MG 24 hr tablet Take 200 mg by mouth once daily.       simvastatin (ZOCOR) 20 MG tablet Take 20 mg by mouth every evening.       vitamin D 1000 units Tab Take  1,000 Units by mouth once daily.      warfarin (COUMADIN) 2 MG tablet Take 2 mg by mouth once daily. Resumption . Take as directed by the Coumadin Clinic.       No current facility-administered medications for this visit.        Objective:   Right Arm BP - Sittin/71 (17 0940)  Left Arm BP - Sittin/65 (17 0940)  BP (!) 159/65 (BP Location: Left arm, Patient Position: Sitting, BP Method: Large (Automatic))   Pulse 63   Ht 6' (1.829 m)   Wt 85.5 kg (188 lb 7.9 oz)   SpO2 97%   BMI 25.56 kg/m²     Body surface area is 2.08 meters squared.  Physical Exam   Constitutional: He is oriented to person, place, and time. Vital signs are normal. He appears well-developed and well-nourished.   BP (!) 159/65 (BP Location: Left arm, Patient Position: Sitting, BP Method: Large (Automatic))   Pulse 63   Ht 6' (1.829 m)   Wt 85.5 kg (188 lb 7.9 oz)   SpO2 97%   BMI 25.56 kg/m²   @FLOWWTIME([id number],[id number])@           HENT:   Head: Normocephalic and atraumatic.   Mouth/Throat: Uvula is midline and mucous membranes are normal. Mucous membranes are not pale, not dry and not cyanotic.   Eyes: Conjunctivae, EOM and lids are normal. Pupils are equal, round, and reactive to light.   Neck: Neck supple. No hepatojugular reflux and no JVD present. Carotid bruit is not present. No tracheal deviation present. No thyromegaly present.   Cardiovascular: Normal rate, regular rhythm, S1 normal, S2 normal, intact distal pulses and normal pulses.  PMI is not displaced.  Exam reveals no gallop and no friction rub.    Murmur heard.   Harsh midsystolic murmur is present with a grade of 1/6  at the upper right sternal border radiating to the neck  High-pitched blowing decrescendo early diastolic murmur is present with a grade of 1/6  at the upper right sternal border radiating to the apex  Pulses:       Carotid pulses are 2+ on the right side, and 2+ on the left side.        Radial pulses are 2+ on the right side, and 2+ on the left side.        Femoral pulses are 2+ on the right side, and 2+ on the left side.       Posterior tibial pulses are 2+ on the right side, and 2+ on the left side.   Pulmonary/Chest: Effort normal and breath sounds normal. No respiratory distress. He has no wheezes. He has no rales. He exhibits no tenderness.   No kyphosis or scloiosis   Abdominal: Soft. Normal appearance and bowel sounds are normal. He exhibits no distension and no mass. There is no hepatosplenomegaly or hepatomegaly. There is no tenderness.   Musculoskeletal: Normal range of motion. He exhibits no edema or tenderness.        Right upper leg: He exhibits no edema.        Left upper leg: He exhibits no edema.        Right lower leg: He exhibits no edema.        Left lower leg: He exhibits no edema.   No limitations in exercise ability.      Neurological: He is alert and oriented to person, place, and time. He has normal strength.   Skin: Skin is warm, dry and intact. No rash noted. No cyanosis. Nails show no clubbing.   Psychiatric: He has a normal mood and affect. His speech is normal and behavior is normal.       Chemistry        Component Value Date/Time     06/12/2015 0855    K 4.3 06/12/2015 0855     06/12/2015 0855    CO2 27 06/12/2015 0855    BUN 11 06/12/2015 0855    CREATININE 1.0 06/12/2015 0855     (H) 06/12/2015 0855        Component Value Date/Time    CALCIUM 9.8 06/12/2015 0855    ESTGFRAFRICA >60.0 06/12/2015 0855    EGFRNONAA >60.0 06/12/2015 0855                    Recent Labs  Lab 02/01/17  0845 03/29/17  0832 05/24/17  0839 07/19/17  0810   INR 2.2 2.4 2.5 2.1          Test(s) Reviewed  I have reviewed the following in detail:  [] Stress test   [] Angiography   [x] Echocardiogram   [x] Labs ; from PCP  Chol 130; TG 91; hdl 51; ldl 71;  Cr 0.9 k 4.4   [] Other:         Assessment/Plan:   Atrial fibrillation  He is followed by Dr. Gomez. He has a CHADVasc2 of  "1 and is on coumadin and metoprolol. He has not had an episode of A fib in ~ 2 yrs     Mixed hyperlipidemia  On zocor. He was placed on this many years ago for primary prevention.     Aortic regurgitation  Tricuspid AV with Stable on Echo today and normal size aortic root. We discussed there is no limitations for exercise.    HTN (hypertension)  On Toprol . Pt states he experiences "white coat syndrome" and though he does not have a BP log it is reportedly 120s at home after Metoprolol. We discussed on today's echo and prior echo there have been cardiac changes such as hypertrophy, LAE and increase in the TR. Could consider starting low dose amlodipine with his PCP so he can be followed up frequently for side effects he is concerned about such as hypotension.      Orders Placed This Encounter at follow up   Procedures    EKG 12-lead    2D Echo w/ Color Flow Doppler         Return in about 3 years (around 8/24/2020). with Echo and EKG    Please send this note and echo report to her PCP      30 minutes face to face time with Counseling More Than 50% of the Appointment Time     "

## 2017-08-24 NOTE — ASSESSMENT & PLAN NOTE
"On Toprol . Pt states he experiences "white coat syndrome" and though he does not have a BP log it is reportedly 120s at home after Metoprolol. We discussed on today's echo and prior echo there have been cardiac changes such as hypertrophy, LAE and increase in the TR. Could consider starting low dose amlodipine with his PCP so he can be followed up frequently for side effects he is concerned about such as hypotension.  "

## 2017-08-28 ENCOUNTER — PATIENT MESSAGE (OUTPATIENT)
Dept: CARDIOLOGY | Facility: CLINIC | Age: 67
End: 2017-08-28

## 2017-09-11 ENCOUNTER — PATIENT MESSAGE (OUTPATIENT)
Dept: SPEECH THERAPY | Facility: HOSPITAL | Age: 67
End: 2017-09-11

## 2017-09-13 ENCOUNTER — ANTI-COAG VISIT (OUTPATIENT)
Dept: CARDIOLOGY | Facility: CLINIC | Age: 67
End: 2017-09-13
Payer: COMMERCIAL

## 2017-09-13 DIAGNOSIS — Z79.01 LONG TERM CURRENT USE OF ANTICOAGULANT THERAPY: Primary | ICD-10-CM

## 2017-09-13 LAB — INR PPP: 2.3 (ref 2–3)

## 2017-09-13 PROCEDURE — 85610 PROTHROMBIN TIME: CPT | Mod: QW,S$GLB,, | Performed by: PHARMACIST

## 2017-09-20 ENCOUNTER — OFFICE VISIT (OUTPATIENT)
Dept: ELECTROPHYSIOLOGY | Facility: CLINIC | Age: 67
End: 2017-09-20
Payer: COMMERCIAL

## 2017-09-20 ENCOUNTER — LAB VISIT (OUTPATIENT)
Dept: LAB | Facility: HOSPITAL | Age: 67
End: 2017-09-20
Attending: UROLOGY
Payer: COMMERCIAL

## 2017-09-20 VITALS
BODY MASS INDEX: 25.06 KG/M2 | WEIGHT: 185 LBS | DIASTOLIC BLOOD PRESSURE: 61 MMHG | HEART RATE: 56 BPM | HEIGHT: 72 IN | SYSTOLIC BLOOD PRESSURE: 149 MMHG

## 2017-09-20 DIAGNOSIS — N40.1 BENIGN PROSTATIC HYPERPLASIA WITH URINARY OBSTRUCTION: ICD-10-CM

## 2017-09-20 DIAGNOSIS — Z79.01 LONG TERM CURRENT USE OF ANTICOAGULANT THERAPY: ICD-10-CM

## 2017-09-20 DIAGNOSIS — I48.0 PAROXYSMAL ATRIAL FIBRILLATION: Primary | ICD-10-CM

## 2017-09-20 DIAGNOSIS — N13.8 BENIGN PROSTATIC HYPERPLASIA WITH URINARY OBSTRUCTION: ICD-10-CM

## 2017-09-20 DIAGNOSIS — I10 ESSENTIAL HYPERTENSION: ICD-10-CM

## 2017-09-20 LAB — COMPLEXED PSA SERPL-MCNC: 2.1 NG/ML

## 2017-09-20 PROCEDURE — 93000 ELECTROCARDIOGRAM COMPLETE: CPT | Mod: S$GLB,,, | Performed by: INTERNAL MEDICINE

## 2017-09-20 PROCEDURE — 99999 PR PBB SHADOW E&M-EST. PATIENT-LVL III: CPT | Mod: PBBFAC,,, | Performed by: INTERNAL MEDICINE

## 2017-09-20 PROCEDURE — 84153 ASSAY OF PSA TOTAL: CPT

## 2017-09-20 PROCEDURE — 3077F SYST BP >= 140 MM HG: CPT | Mod: S$GLB,,, | Performed by: INTERNAL MEDICINE

## 2017-09-20 PROCEDURE — 36415 COLL VENOUS BLD VENIPUNCTURE: CPT

## 2017-09-20 PROCEDURE — 3078F DIAST BP <80 MM HG: CPT | Mod: S$GLB,,, | Performed by: INTERNAL MEDICINE

## 2017-09-20 PROCEDURE — 3008F BODY MASS INDEX DOCD: CPT | Mod: S$GLB,,, | Performed by: INTERNAL MEDICINE

## 2017-09-20 PROCEDURE — 99214 OFFICE O/P EST MOD 30 MIN: CPT | Mod: S$GLB,,, | Performed by: INTERNAL MEDICINE

## 2017-09-20 RX ORDER — FLECAINIDE ACETATE 150 MG/1
TABLET ORAL
Qty: 10 TABLET | Refills: 3 | Status: SHIPPED | OUTPATIENT
Start: 2017-09-20 | End: 2018-08-20 | Stop reason: SDUPTHER

## 2017-09-20 NOTE — PROGRESS NOTES
"Subjective:    Patient ID:  Anthony Reynaga is a 67 y.o. male who presents for evaluation of Atrial Fibrillation      Atrial Fibrillation   Symptoms include palpitations. Symptoms are negative for chest pain, dizziness, shortness of breath, syncope and weakness. Past medical history includes atrial fibrillation.   67 y.o. M neuroscience professor  Elevated PSA  MVP, AI  PAF on coumadin (declined NOACs in past)  HTN on meds  question of bicuspid AoV in past; reviewed by Dr Mcclellan; tristen.    "PAF" onset was in 1996. Spontaneously back to SR, after meds given.   1997: Required DCCV for another episode.  Since, seems that he gets an episode about q 6 months. Always seems to be triggered by episodes of fast HR: exertion, nightmare...  Was having AF q year; 2 episodes: 3/15 and 8/15. In 8/15, lasted 62 hours (longer than usual); ECG in EPIC confirms AF.    Hasn't had AF x 2 years. Occ palpitations; nothing longterm.  Last week, had a few episodes of 15 sec palpitations; none x 4 days. No LH with that. Uses biofeedback with some effect.    Echo 55%, mild LAE  Event monitor: SR, including with palps, few short NSAT.    My interpretation of today's ECG is SB at 56 bpm.    Review of Systems   Constitution: Negative. Negative for weakness and malaise/fatigue.   HENT: Negative.  Negative for ear pain and tinnitus.    Eyes: Negative for blurred vision.   Cardiovascular: Positive for palpitations. Negative for chest pain, dyspnea on exertion, near-syncope and syncope.   Respiratory: Negative.  Negative for shortness of breath.    Endocrine: Negative.  Negative for polyuria.   Hematologic/Lymphatic: Does not bruise/bleed easily.   Skin: Negative.  Negative for rash.   Musculoskeletal: Negative.  Negative for joint pain and muscle weakness.   Gastrointestinal: Negative.  Negative for abdominal pain and change in bowel habit.   Genitourinary: Negative for frequency.   Neurological: Negative.  Negative for dizziness. "   Psychiatric/Behavioral: Negative.  Negative for depression. The patient is not nervous/anxious.    Allergic/Immunologic: Negative for environmental allergies.        Objective:    Physical Exam   Constitutional: He is oriented to person, place, and time. He appears well-developed and well-nourished.   HENT:   Head: Normocephalic and atraumatic.   Eyes: Conjunctivae, EOM and lids are normal. No scleral icterus.   Neck: Normal range of motion. No JVD present. No tracheal deviation present. No thyromegaly present.   Cardiovascular: Normal rate, regular rhythm and intact distal pulses.   No extrasystoles are present. PMI is not displaced.  Exam reveals no gallop and no friction rub.    Murmur heard.   Harsh midsystolic murmur is present with a grade of 2/6  at the upper right sternal border radiating to the neck  Pulses:       Radial pulses are 2+ on the right side, and 2+ on the left side.   Pulmonary/Chest: Effort normal and breath sounds normal. No accessory muscle usage. No tachypnea. No respiratory distress. He has no wheezes. He has no rales.   Abdominal: Soft. Bowel sounds are normal. He exhibits no distension. There is no hepatosplenomegaly. There is no tenderness.   Musculoskeletal: Normal range of motion. He exhibits no edema.   Neurological: He is alert and oriented to person, place, and time. He has normal reflexes. He exhibits normal muscle tone.   Skin: Skin is warm and dry. No rash noted.   Psychiatric: He has a normal mood and affect. His behavior is normal.   Nursing note and vitals reviewed.        Assessment:       1. Paroxysmal atrial fibrillation    2. Essential hypertension    3. Long term current use of anticoagulant therapy         Plan:       AF:  Minimal sx and very infrequent.   Discussed options for anticoagulation, including ASA vs coumadin vs dabigatran/rivaroxaban (novel anticoagulants). Discussed risks and benefits of each, including dosing, side effects, risk (including no reversal  agent for dabigatran), and cost. Answered all questions. This process took about 15 minutes.  Given HTN and 67 years old, CHADS-VASc=2.  He'd like to continue coumadin for now, but is considering changing to pradaxa (likes the existence of reversal agent). He'll call future if he'd like to change.  No change to BP meds at present, given home readings.    Again discussed pill-in-the-pocket approach to AF. Gave pt instructions to present to ER if sustained AF is felt, for the first trial of that approach. Provided reference to [employer] [facility] article (Tabatha et al. N Engl J Med 2004;351:2384-91). If flecainide is tolerated and effective, thereafter patient would be able to self-direct pill-in-the-pocket treatment.    F/u 1 year with echo, or earlier prn.  Discussed the indications and possible side effects of the medications prescribed to the patient by me.

## 2017-10-05 ENCOUNTER — OFFICE VISIT (OUTPATIENT)
Dept: UROLOGY | Facility: CLINIC | Age: 67
End: 2017-10-05
Payer: COMMERCIAL

## 2017-10-05 VITALS
WEIGHT: 187.38 LBS | BODY MASS INDEX: 25.38 KG/M2 | HEIGHT: 72 IN | SYSTOLIC BLOOD PRESSURE: 155 MMHG | DIASTOLIC BLOOD PRESSURE: 72 MMHG | HEART RATE: 68 BPM

## 2017-10-05 DIAGNOSIS — N20.0 BILATERAL KIDNEY STONES: ICD-10-CM

## 2017-10-05 DIAGNOSIS — N13.8 BENIGN PROSTATIC HYPERPLASIA WITH URINARY OBSTRUCTION: Primary | ICD-10-CM

## 2017-10-05 DIAGNOSIS — R97.20 ELEVATED PSA: ICD-10-CM

## 2017-10-05 DIAGNOSIS — N40.1 BENIGN PROSTATIC HYPERPLASIA WITH URINARY OBSTRUCTION: Primary | ICD-10-CM

## 2017-10-05 PROCEDURE — 99999 PR PBB SHADOW E&M-EST. PATIENT-LVL III: CPT | Mod: PBBFAC,,, | Performed by: UROLOGY

## 2017-10-05 PROCEDURE — 99214 OFFICE O/P EST MOD 30 MIN: CPT | Mod: S$GLB,,, | Performed by: UROLOGY

## 2017-10-05 NOTE — PROGRESS NOTES
Clinic Note  10/5/2017      Subjective:         Chief Complaint:   HPI  Anthony Reynaga is a 67 y.o. male history of BPH and elevated PSA. S/p biopsy (B9) in 2 /11. Moderate symptoms, minimal bother.  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 History of kidney stones and prostatitis. Is on  Uroxatrol and proscar (started in June).Nocturia stable at 4x/noc.  Hernia repair in July 2016.  LUTS: bothered by nocturia 4-6x /noc. Also bothered by decreased ejaculate and decreased orgasm intensity.  PSA (corrected PSA 4.2).      Lab Results   Component Value Date    PSA 3.80 10/04/2012    PSA 4.10 (H) 03/01/2012    PSA 3.2 10/13/2011    PSADIAG 2.1 09/20/2017    PSADIAG 3.0 09/14/2016    PSADIAG 4.9 (H) 03/11/2016    PSADIAG 4.6 (H) 10/20/2015    PSADIAG 3.2 10/14/2014    PSADIAG 3.5 10/08/2013      Past Medical History:   Diagnosis Date    Anticoagulant long-term use     Atrial fibrillation     BPH (benign prostatic hyperplasia)     Cataract     Elevated PSA     Floaters     Hernia     bilateral inquinal    Hypertension     Inguinal hernia     Kidney stone     Knee injury      Family History   Problem Relation Age of Onset    Cancer Mother     Benign prostatic hyperplasia Neg Hx      Social History     Social History    Marital status: Single     Spouse name: N/A    Number of children: N/A    Years of education: N/A     Occupational History    Not on file.     Social History Main Topics    Smoking status: Never Smoker    Smokeless tobacco: Never Used    Alcohol use 0.6 oz/week     1 Cans of beer per week      Comment: social    Drug use: No    Sexual activity: Yes     Partners: Female     Other Topics Concern    Not on file     Social History Narrative    No narrative on file     Past Surgical History:   Procedure Laterality Date    APPENDECTOMY      BLADDER SURGERY      polyp removed benign    COLONOSCOPY N/A 6/22/2016    Procedure: COLONOSCOPY;  Surgeon: Joaquin Francis MD;  Location: [facility]  FERMIN (4TH FLR);  Service: Endoscopy;  Laterality: N/A;    Thanks for letting us know.  I would approve him to hold coumadin x 3 days prior, without lovenox.  We will see him for an INR on 6/8 and will provide him with procedure instructions at that time., per [facility]in Clinic    CYSTOSCOPY      FINGER SURGERY      HERNIA REPAIR      KNEE SURGERY      LITHOTRIPSY      TONSILLECTOMY, ADENOIDECTOMY       Patient Active Problem List   Diagnosis    Elevated PSA    Atrial fibrillation    PVD (posterior vitreous detachment)    High myopia    Nuclear sclerosis    BPH (benign prostatic hypertrophy) with urinary obstruction    Long term current use of anticoagulant therapy    Aortic regurgitation    Elevated blood pressure    HTN (hypertension)    Right ureteral stone    Bilateral kidney stones    Kidney stone on right side    Prostatitis    Voice disturbance    Vocal fold atrophy    Glottic insufficiency    Hyperfunctional dysphonia    Mixed hyperlipidemia     Review of Systems   Constitutional: Negative for appetite change, chills, fatigue, fever and unexpected weight change.   HENT: Negative for nosebleeds.    Respiratory: Negative for shortness of breath and wheezing.    Cardiovascular: Negative for chest pain, palpitations and leg swelling.   Gastrointestinal: Negative for abdominal distention, abdominal pain, constipation, diarrhea, nausea and vomiting.   Genitourinary: Positive for nocturia. Negative for dysuria and hematuria.   Musculoskeletal: Positive for arthralgias. Negative for back pain.   Skin: Negative for pallor.   Neurological: Negative for dizziness, seizures and syncope.   Hematological: Negative for adenopathy.   Psychiatric/Behavioral: Negative for dysphoric mood.         Objective:      BP (!) 155/72 (BP Location: Right arm, Patient Position: Sitting, BP Method: Medium (Automatic))   Pulse 68   Ht 6' (1.829 m)   Wt 85 kg (187 lb 6.3 oz)   BMI 25.41 kg/m²   Estimated body mass  index is 25.41 kg/m² as calculated from the following:    Height as of this encounter: 6' (1.829 m).    Weight as of this encounter: 85 kg (187 lb 6.3 oz).  Physical Exam   Constitutional: He is oriented to person, place, and time. He appears well-developed and well-nourished. No distress.   HENT:   Head: Atraumatic.   Neck: No tracheal deviation present.   Cardiovascular: Normal rate.    Pulmonary/Chest: Effort normal. No respiratory distress. He has no wheezes.   Abdominal: Soft. Bowel sounds are normal. He exhibits no distension and no mass. There is no tenderness. There is no rebound and no guarding.   Genitourinary: Rectum normal. Rectal exam shows no external hemorrhoid, no internal hemorrhoid, no mass and no tenderness. Prostate is enlarged.   Genitourinary Comments: 30 grams   Neurological: He is alert and oriented to person, place, and time.   Skin: Skin is warm and dry. He is not diaphoretic.     Psychiatric: He has a normal mood and affect. His behavior is normal. Judgment and thought content normal.         Assessment and Plan:           Problem List Items Addressed This Visit     None      Visit Diagnoses    None.         Follow up:   Reviewed BPH treatment options.  1 year from PSA.  KUB to F/U stone.    Livan Lowry

## 2017-10-12 DIAGNOSIS — N40.0 BENIGN NON-NODULAR PROSTATIC HYPERPLASIA WITHOUT LOWER URINARY TRACT SYMPTOMS: ICD-10-CM

## 2017-10-13 RX ORDER — ALFUZOSIN HYDROCHLORIDE 10 MG/1
TABLET, EXTENDED RELEASE ORAL
Qty: 90 TABLET | Refills: 3 | Status: SHIPPED | OUTPATIENT
Start: 2017-10-13 | End: 2018-09-16 | Stop reason: SDUPTHER

## 2017-11-08 ENCOUNTER — ANTI-COAG VISIT (OUTPATIENT)
Dept: CARDIOLOGY | Facility: CLINIC | Age: 67
End: 2017-11-08
Payer: COMMERCIAL

## 2017-11-08 DIAGNOSIS — Z79.01 LONG TERM CURRENT USE OF ANTICOAGULANT THERAPY: Primary | ICD-10-CM

## 2017-11-08 LAB — INR PPP: 2.4 (ref 2–3)

## 2017-11-08 PROCEDURE — 85610 PROTHROMBIN TIME: CPT | Mod: QW,S$GLB,, | Performed by: PHARMACIST

## 2018-01-01 ENCOUNTER — PATIENT MESSAGE (OUTPATIENT)
Dept: ELECTROPHYSIOLOGY | Facility: CLINIC | Age: 68
End: 2018-01-01

## 2018-01-02 NOTE — TELEPHONE ENCOUNTER
Called Pt to find out if he has used the pill in the pocket method to help with these episodes he has been experiencing. No answer. Left message.

## 2018-01-03 ENCOUNTER — TELEPHONE (OUTPATIENT)
Dept: ELECTROPHYSIOLOGY | Facility: CLINIC | Age: 68
End: 2018-01-03

## 2018-01-03 ENCOUNTER — ANTI-COAG VISIT (OUTPATIENT)
Dept: CARDIOLOGY | Facility: CLINIC | Age: 68
End: 2018-01-03
Payer: COMMERCIAL

## 2018-01-03 DIAGNOSIS — Z79.01 LONG TERM CURRENT USE OF ANTICOAGULANT THERAPY: Primary | ICD-10-CM

## 2018-01-03 LAB — INR PPP: 1.9 (ref 2–3)

## 2018-01-03 PROCEDURE — 99211 OFF/OP EST MAY X REQ PHY/QHP: CPT | Mod: 25,S$GLB,,

## 2018-01-03 PROCEDURE — 85610 PROTHROMBIN TIME: CPT | Mod: QW,S$GLB,,

## 2018-01-03 NOTE — PROGRESS NOTES
INR low today. Patient states that he had extra Vitamin K foods over the holidays. He also states that he took half a dose on 12/19 due to a nose bleed. Advised patient to notify us of any bleeding. Patient states that he is no longer taking finasteride. No DDI. He denies any bruising or other changes. We will gently boost his dose today then resume his weekly dosing. Follow up in 8 weeks. Advised patient to notify us of any changes or concerns.

## 2018-01-03 NOTE — TELEPHONE ENCOUNTER
Returned Pt's call in reference to e-mail he sent. He stated his heart has been going in an out of rhythm for the last 2 weeks. He stated for the last couple days it has been normal. When asked if he uses pill in the pockets he stated he never had because his fast heart rhythm never stays long enough to go to the ER. He stated at his last visit Dr. Gomez spoke of a monitor he could wear if his heart started acting up again. Advised that wouldn't be a problem. He wants to wait until his heart rate starts to give him problems again. Advised for him to just give us a call. Pt voiced understanding.      ----- Message from Shane Godwin sent at 1/2/2018  3:59 PM CST -----  Contact: patient  Please call pt at [phone]. Returning your call     Thank you

## 2018-01-04 ENCOUNTER — TELEPHONE (OUTPATIENT)
Dept: ELECTROPHYSIOLOGY | Facility: CLINIC | Age: 68
End: 2018-01-04

## 2018-01-04 ENCOUNTER — HOSPITAL ENCOUNTER (EMERGENCY)
Facility: HOSPITAL | Age: 68
Discharge: STILL ADMITTED OR ADMINISTRATIVE | End: 2018-01-04
Payer: COMMERCIAL

## 2018-01-04 VITALS
WEIGHT: 185 LBS | HEIGHT: 72 IN | SYSTOLIC BLOOD PRESSURE: 192 MMHG | OXYGEN SATURATION: 99 % | BODY MASS INDEX: 25.06 KG/M2 | TEMPERATURE: 99 F | RESPIRATION RATE: 20 BRPM | HEART RATE: 64 BPM | DIASTOLIC BLOOD PRESSURE: 80 MMHG

## 2018-01-04 DIAGNOSIS — Z53.21 PATIENT LEFT WITHOUT BEING SEEN: ICD-10-CM

## 2018-01-04 DIAGNOSIS — I48.19 PERSISTENT ATRIAL FIBRILLATION: Primary | ICD-10-CM

## 2018-01-04 DIAGNOSIS — R06.02 SOB (SHORTNESS OF BREATH): ICD-10-CM

## 2018-01-04 PROCEDURE — 99900041 HC LEFT WITHOUT BEING SEEN- EMERGENCY: Mod: 25

## 2018-01-04 PROCEDURE — 93005 ELECTROCARDIOGRAM TRACING: CPT

## 2018-01-04 PROCEDURE — 99900 PR LEFT WITHOUTBEING SEEN-EMERGENCY: CPT | Mod: ,,, | Performed by: EMERGENCY MEDICINE

## 2018-01-04 PROCEDURE — 93010 ELECTROCARDIOGRAM REPORT: CPT | Mod: ,,, | Performed by: INTERNAL MEDICINE

## 2018-01-04 NOTE — TELEPHONE ENCOUNTER
Pt came to the  today. He went to there ER this morning with an abnormal HR. He was in normal rhythm by the time it was captured. He is here wondering if he could get the 48 hour monitor that Dr Gomez recommended. Appt scheduled.

## 2018-01-04 NOTE — ED NOTES
"Pt returned from [facility] with instructions to wear holter monitor. Pt states "Cardiology took care of everything. I have to wear a monitor and I don't need to be seen here."   "

## 2018-01-04 NOTE — PROVIDER PROGRESS NOTES - EMERGENCY DEPT.
Encounter Date: 1/4/2018    ED Physician Progress Notes         EKG - STEMI Decision  Initial Reading: No STEMI present.    I, Jason Del Rio, am scribing for, and in the presence of, Dr. Armstrong. I performed the above scribed service and the documentation accurately describes the services I performed. I attest to the accuracy of the note.

## 2018-01-05 ENCOUNTER — CLINICAL SUPPORT (OUTPATIENT)
Dept: ELECTROPHYSIOLOGY | Facility: CLINIC | Age: 68
End: 2018-01-05
Payer: COMMERCIAL

## 2018-01-05 DIAGNOSIS — I48.19 PERSISTENT ATRIAL FIBRILLATION: ICD-10-CM

## 2018-01-05 PROCEDURE — 93224 XTRNL ECG REC UP TO 48 HRS: CPT | Mod: S$GLB,,, | Performed by: INTERNAL MEDICINE

## 2018-01-17 ENCOUNTER — PATIENT MESSAGE (OUTPATIENT)
Dept: ELECTROPHYSIOLOGY | Facility: CLINIC | Age: 68
End: 2018-01-17

## 2018-01-18 ENCOUNTER — HOSPITAL ENCOUNTER (OUTPATIENT)
Dept: CARDIOLOGY | Facility: CLINIC | Age: 68
Discharge: HOME | End: 2018-01-18
Payer: COMMERCIAL

## 2018-01-18 ENCOUNTER — OFFICE VISIT (OUTPATIENT)
Dept: ELECTROPHYSIOLOGY | Facility: CLINIC | Age: 68
End: 2018-01-18
Payer: COMMERCIAL

## 2018-01-18 VITALS
BODY MASS INDEX: 25.12 KG/M2 | SYSTOLIC BLOOD PRESSURE: 116 MMHG | DIASTOLIC BLOOD PRESSURE: 68 MMHG | HEIGHT: 72 IN | WEIGHT: 185.44 LBS | HEART RATE: 58 BPM

## 2018-01-18 DIAGNOSIS — I48.91 ATRIAL FIBRILLATION, UNSPECIFIED TYPE: ICD-10-CM

## 2018-01-18 DIAGNOSIS — Z79.01 LONG TERM CURRENT USE OF ANTICOAGULANT THERAPY: ICD-10-CM

## 2018-01-18 DIAGNOSIS — I48.91 ATRIAL FIBRILLATION, UNSPECIFIED TYPE: Primary | ICD-10-CM

## 2018-01-18 DIAGNOSIS — I35.1 NONRHEUMATIC AORTIC VALVE INSUFFICIENCY: ICD-10-CM

## 2018-01-18 DIAGNOSIS — I49.3 PVC (PREMATURE VENTRICULAR CONTRACTION): ICD-10-CM

## 2018-01-18 DIAGNOSIS — I48.19 PERSISTENT ATRIAL FIBRILLATION: Primary | ICD-10-CM

## 2018-01-18 DIAGNOSIS — E78.2 MIXED HYPERLIPIDEMIA: ICD-10-CM

## 2018-01-18 DIAGNOSIS — I10 ESSENTIAL HYPERTENSION: ICD-10-CM

## 2018-01-18 PROCEDURE — 93000 ELECTROCARDIOGRAM COMPLETE: CPT | Mod: S$GLB,,, | Performed by: INTERNAL MEDICINE

## 2018-01-18 PROCEDURE — 99999 PR PBB SHADOW E&M-EST. PATIENT-LVL III: CPT | Mod: PBBFAC,,, | Performed by: INTERNAL MEDICINE

## 2018-01-18 PROCEDURE — 99214 OFFICE O/P EST MOD 30 MIN: CPT | Mod: S$GLB,,, | Performed by: INTERNAL MEDICINE

## 2018-01-18 NOTE — PROGRESS NOTES
"Subjective:    Patient ID:  Anthony Reynaga is a 67 y.o. male who presents for evaluation of Paroxysmal atrial fibrillation      Atrial Fibrillation   Symptoms include palpitations. Symptoms are negative for chest pain, dizziness, shortness of breath, syncope and weakness. Past medical history includes atrial fibrillation.   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    "PAF" onset was in 1996. Spontaneously back to SR, after meds given.   1997: Required DCCV for another episode.  Since, seems that he gets an episode about q 6 months. Always seems to be triggered by episodes of fast HR: exertion, nightmare...  Was having AF q year; 2 episodes: 3/15 and 8/15. In 8/15, lasted 62 hours (longer than usual); ECG in EPIC confirms AF.    Hasn't had AF x 2 years. Occ palpitations; nothing longterm.  In past 2 wk, had a few episodes of 15 sec palpitations. No LH with that. Uses biofeedback with some effect. Episodes too short to go to ER to use Pill-In-Pocket.  48 hour Holter requested, which revealed SR,  bpm, with 514 PVCs. No sx reported.  Has had feelings of single skipped beats and extra beats.    Echo 55%, mild LAE  Event monitor: SR, including with palps, few short NSAT.    My interpretation of today's ECG is SB at 58 bpm. No ectopy.    Review of Systems   Constitution: Negative. Negative for weakness and malaise/fatigue.   HENT: Negative.  Negative for ear pain and tinnitus.    Eyes: Negative for blurred vision.   Cardiovascular: Positive for palpitations. Negative for chest pain, dyspnea on exertion, near-syncope and syncope.   Respiratory: Negative.  Negative for shortness of breath.    Endocrine: Negative.  Negative for polyuria.   Hematologic/Lymphatic: Does not bruise/bleed easily.   Skin: Negative.  Negative for rash.   Musculoskeletal: Negative.  Negative for " joint pain and muscle weakness.   Gastrointestinal: Negative.  Negative for abdominal pain and change in bowel habit.   Genitourinary: Negative for frequency.   Neurological: Negative.  Negative for dizziness.   Psychiatric/Behavioral: Negative.  Negative for depression. The patient is not nervous/anxious.    Allergic/Immunologic: Negative for environmental allergies.        Objective:    Physical Exam   Constitutional: He is oriented to person, place, and time. He appears well-developed and well-nourished.   HENT:   Head: Normocephalic and atraumatic.   Eyes: Conjunctivae, EOM and lids are normal. No scleral icterus.   Neck: Normal range of motion. No JVD present. No tracheal deviation present. No thyromegaly present.   Cardiovascular: Normal rate, regular rhythm and intact distal pulses.   No extrasystoles are present. PMI is not displaced.  Exam reveals no gallop and no friction rub.    Murmur heard.   Harsh midsystolic murmur is present with a grade of 2/6  at the upper right sternal border radiating to the neck  Pulses:       Radial pulses are 2+ on the right side, and 2+ on the left side.   Pulmonary/Chest: Effort normal and breath sounds normal. No accessory muscle usage. No tachypnea. No respiratory distress. He has no wheezes. He has no rales.   Abdominal: Soft. Bowel sounds are normal. He exhibits no distension. There is no hepatosplenomegaly. There is no tenderness.   Musculoskeletal: Normal range of motion. He exhibits no edema.   Neurological: He is alert and oriented to person, place, and time. He has normal reflexes. He exhibits normal muscle tone.   Skin: Skin is warm and dry. No rash noted.   Psychiatric: He has a normal mood and affect. His behavior is normal.   Nursing note and vitals reviewed.        Assessment:       1. Persistent atrial fibrillation    2. PVC (premature ventricular contraction)    3. Essential hypertension    4. Mixed hyperlipidemia    5. Nonrheumatic aortic valve insufficiency     6. Long term current use of anticoagulant therapy         Plan:       AF:  Minimal sx and very infrequent.   Discussed options for anticoagulation, including ASA vs coumadin vs dabigatran/rivaroxaban (novel anticoagulants). Discussed risks and benefits of each, including dosing, side effects, risk (including no reversal agent for dabigatran), and cost. Answered all questions. This process took about 15 minutes.  Given HTN and 67 years old, CHADS-VASc=2.  He'd like to continue coumadin for now, but is considering changing to pradaxa (likes the existence of reversal agent). He'll call future if he'd like to change.  No change to BP meds at present, given home readings.    Continue plans for pill-in-the-pocket approach to AF. I've given pt instructions to present to ER if sustained AF is felt, for the first trial of that approach. Provided reference to [facility] article (Tabatha et al. N Engl J Med 2004;351:2384-91). If flecainide is tolerated and effective, thereafter patient would be able to self-direct pill-in-the-pocket treatment.    PVCs:  Benign, but symptomatic at times.  Offered options, including watchful waiting, decreaseBB/addVerapamil, standing-dose flecainide... +/- more monitoring to be sure no other arrhythmia is present.  He opts for watchful waiting for now, but will call/write if that changes.    F/u as scheduled with echo, or earlier prn.

## 2018-01-19 DIAGNOSIS — I48.0 PAF (PAROXYSMAL ATRIAL FIBRILLATION): Primary | ICD-10-CM

## 2018-02-28 ENCOUNTER — ANTI-COAG VISIT (OUTPATIENT)
Dept: CARDIOLOGY | Facility: CLINIC | Age: 68
End: 2018-02-28
Payer: COMMERCIAL

## 2018-02-28 DIAGNOSIS — Z79.01 LONG TERM CURRENT USE OF ANTICOAGULANT THERAPY: Primary | ICD-10-CM

## 2018-02-28 LAB — INR PPP: 1.8 (ref 2–3)

## 2018-02-28 PROCEDURE — 99211 OFF/OP EST MAY X REQ PHY/QHP: CPT | Mod: 25,S$GLB,, | Performed by: PHARMACIST

## 2018-02-28 PROCEDURE — 85610 PROTHROMBIN TIME: CPT | Mod: QW,S$GLB,, | Performed by: PHARMACIST

## 2018-02-28 NOTE — PROGRESS NOTES
"INR continues to hover low. I will increase his dose and advise he return in 2 weeks but per patient, he cannot return sooner then 4 weeks. Patient was re-educated on situations that would require placing a call to the coumadin clinic, including bleeding or unusual bruising issues, changes in health, diet or medications, upcoming procedures that require warfarin interruption, and missed coumadin dose(s). Patient expressed understanding that avoidance of consistency with these parameters could cause fluctuations in INR, leading to more frequent visits and increase risk of adverse events.     "This note will not be shared with the patient."  "

## 2018-03-13 ENCOUNTER — ANTI-COAG VISIT (OUTPATIENT)
Dept: CARDIOLOGY | Facility: CLINIC | Age: 68
End: 2018-03-13
Payer: COMMERCIAL

## 2018-03-13 DIAGNOSIS — Z79.01 LONG TERM CURRENT USE OF ANTICOAGULANT THERAPY: Primary | ICD-10-CM

## 2018-03-13 LAB — INR PPP: 2.4 (ref 2–3)

## 2018-03-13 PROCEDURE — 99211 OFF/OP EST MAY X REQ PHY/QHP: CPT | Mod: 25,S$GLB,, | Performed by: PHARMACIST

## 2018-03-13 PROCEDURE — 85610 PROTHROMBIN TIME: CPT | Mod: QW,S$GLB,, | Performed by: PHARMACIST

## 2018-03-13 NOTE — PROGRESS NOTES
Patient called to report a slight nosebleed yesterday. No bleeding since. He recently had dose change and is concerned. He will come in for INR today. .

## 2018-03-13 NOTE — PROGRESS NOTES
Pt seen by Parvin BIGGS. I have reviewed her initial findings and agree with her assessment and plan

## 2018-03-13 NOTE — PROGRESS NOTES
INR within therapeutic range today. Patient reports slight nose bleed on 3/12. None today. He denies any bruising or other signs of bleeding. Will maintain current dose and follow up in 2 weeks. Advised patient to call coumadin clinic with any changes or concerns.

## 2018-04-02 ENCOUNTER — ANTI-COAG VISIT (OUTPATIENT)
Dept: CARDIOLOGY | Facility: CLINIC | Age: 68
End: 2018-04-02
Payer: COMMERCIAL

## 2018-04-02 DIAGNOSIS — Z79.01 LONG TERM CURRENT USE OF ANTICOAGULANT THERAPY: Primary | ICD-10-CM

## 2018-04-02 LAB — INR PPP: 3.3 (ref 2–3)

## 2018-04-02 PROCEDURE — 99211 OFF/OP EST MAY X REQ PHY/QHP: CPT | Mod: 25,S$GLB,, | Performed by: PHARMACIST

## 2018-04-02 PROCEDURE — 85610 PROTHROMBIN TIME: CPT | Mod: QW,S$GLB,, | Performed by: PHARMACIST

## 2018-04-02 NOTE — PROGRESS NOTES
No longer having nosebleeds. He did have a beer yesterday. Otherwise, Patient denies any changes in diet, medications, or health that would effect warfarin therapy. INR seems to be rising since his last coumadin dose increase. I will lower his dose slightly. Patient was re-educated on situations that would require placing a call to the coumadin clinic, including bleeding or unusual bruising issues, changes in health, diet or medications, upcoming procedures that require warfarin interruption, and missed coumadin dose(s). Patient expressed understanding that avoidance of consistency with these parameters could cause fluctuations in INR, leading to more frequent visits and increase risk of adverse events.

## 2018-04-19 ENCOUNTER — PATIENT MESSAGE (OUTPATIENT)
Dept: UROLOGY | Facility: CLINIC | Age: 68
End: 2018-04-19

## 2018-04-23 ENCOUNTER — TELEPHONE (OUTPATIENT)
Dept: UROLOGY | Facility: CLINIC | Age: 68
End: 2018-04-23

## 2018-04-23 NOTE — TELEPHONE ENCOUNTER
I spoke with patient, who stated he wants appt earlier due to frequency. Patient agreed to appt date and time.

## 2018-04-25 ENCOUNTER — ANTI-COAG VISIT (OUTPATIENT)
Dept: CARDIOLOGY | Facility: CLINIC | Age: 68
End: 2018-04-25
Payer: COMMERCIAL

## 2018-04-25 DIAGNOSIS — Z79.01 LONG TERM CURRENT USE OF ANTICOAGULANT THERAPY: ICD-10-CM

## 2018-04-25 LAB — INR PPP: 2.6 (ref 2–3)

## 2018-04-25 PROCEDURE — 85610 PROTHROMBIN TIME: CPT | Mod: QW,S$GLB,, | Performed by: INTERNAL MEDICINE

## 2018-04-25 NOTE — PROGRESS NOTES
Patient reports no changes to alter INR. Will maintain dose.  Advised to call Coumadin Clinic with any issues. I have reviewed the student's initial findings and agree with their assessment.  I have personally spoken with and assessed the patient in clinic to devise care plan.

## 2018-05-03 ENCOUNTER — OFFICE VISIT (OUTPATIENT)
Dept: UROLOGY | Facility: CLINIC | Age: 68
End: 2018-05-03
Payer: COMMERCIAL

## 2018-05-03 VITALS
RESPIRATION RATE: 15 BRPM | BODY MASS INDEX: 25.06 KG/M2 | HEIGHT: 72 IN | DIASTOLIC BLOOD PRESSURE: 70 MMHG | HEART RATE: 61 BPM | WEIGHT: 185 LBS | SYSTOLIC BLOOD PRESSURE: 138 MMHG

## 2018-05-03 DIAGNOSIS — R97.20 ELEVATED PSA: ICD-10-CM

## 2018-05-03 DIAGNOSIS — N40.1 BENIGN PROSTATIC HYPERPLASIA WITH URINARY OBSTRUCTION: Primary | ICD-10-CM

## 2018-05-03 DIAGNOSIS — N20.0 STONE IN KIDNEY: ICD-10-CM

## 2018-05-03 DIAGNOSIS — N13.8 BENIGN PROSTATIC HYPERPLASIA WITH URINARY OBSTRUCTION: Primary | ICD-10-CM

## 2018-05-03 PROCEDURE — 3078F DIAST BP <80 MM HG: CPT | Mod: CPTII,S$GLB,, | Performed by: UROLOGY

## 2018-05-03 PROCEDURE — 99999 PR PBB SHADOW E&M-EST. PATIENT-LVL III: CPT | Mod: PBBFAC,,, | Performed by: UROLOGY

## 2018-05-03 PROCEDURE — 3075F SYST BP GE 130 - 139MM HG: CPT | Mod: CPTII,S$GLB,, | Performed by: UROLOGY

## 2018-05-03 PROCEDURE — 99214 OFFICE O/P EST MOD 30 MIN: CPT | Mod: S$GLB,,, | Performed by: UROLOGY

## 2018-05-03 NOTE — PROGRESS NOTES
Clinic Note  5/3/2018      Subjective:         Chief Complaint:   HPI  Anthony Reynaga is a 67 y.o. male history of BPH and elevated PSA. S/p biopsy (B9) in 2 /11. Moderate symptoms, minimal bother.  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. History of kidney stones and prostatitis. Is on  Uroxatrol and proscar (started in June).Nocturia stable at 4x/noc.  Hernia repair in July 2016.  LUTS: bothered by nocturia 4-6x /noc. Also bothered by decreased ejaculate and decreased orgasm intensity.  Stopped finasteride 7 months ago.      Lab Results   Component Value Date    PSA 3.80 10/04/2012    PSA 4.10 (H) 03/01/2012    PSA 3.2 10/13/2011    PSADIAG 2.1 09/20/2017    PSADIAG 3.0 09/14/2016    PSADIAG 4.9 (H) 03/11/2016    PSADIAG 4.6 (H) 10/20/2015    PSADIAG 3.2 10/14/2014    PSADIAG 3.5 10/08/2013      Past Medical History:   Diagnosis Date    Anticoagulant long-term use     Atrial fibrillation     BPH (benign prostatic hyperplasia)     Cataract     Elevated PSA     Floaters     Hernia     bilateral inquinal    Hypertension     Inguinal hernia     Kidney stone     Knee injury      Family History   Problem Relation Age of Onset    Cancer Mother     Benign prostatic hyperplasia Neg Hx      Social History     Social History    Marital status: Single     Spouse name: N/A    Number of children: N/A    Years of education: N/A     Occupational History    Not on file.     Social History Main Topics    Smoking status: Never Smoker    Smokeless tobacco: Never Used    Alcohol use 0.6 oz/week     1 Cans of beer per week      Comment: social    Drug use: No    Sexual activity: Yes     Partners: Female     Other Topics Concern    Not on file     Social History Narrative    No narrative on file     Past Surgical History:   Procedure Laterality Date    APPENDECTOMY      BLADDER SURGERY      polyp removed benign    COLONOSCOPY N/A 6/22/2016    Procedure: COLONOSCOPY;  Surgeon: Joaquin Francis MD;  [facility] (4TH FLR);  Service: Endoscopy;  Laterality: N/A;    Thanks for letting us know.  I would approve him to hold coumadin x 3 days prior, without lovenox.  We will see him for an INR on 6/8 and will provide him with procedure instructions at that time., per Coumadin Clinic    CYSTOSCOPY      FINGER SURGERY      HERNIA REPAIR      KNEE SURGERY      LITHOTRIPSY      TONSILLECTOMY, ADENOIDECTOMY       Patient Active Problem List   Diagnosis    Elevated PSA    Atrial fibrillation    PVD (posterior vitreous detachment)    High myopia    Nuclear sclerosis    Benign prostatic hyperplasia with urinary obstruction    Long term current use of anticoagulant therapy    Aortic regurgitation    Elevated blood pressure    HTN (hypertension)    Right ureteral stone    Bilateral kidney stones    Kidney stone on right side    Voice disturbance    Vocal fold atrophy    Glottic insufficiency    Hyperfunctional dysphonia    Mixed hyperlipidemia    PVC (premature ventricular contraction)     Review of Systems   Constitutional: Negative for appetite change, chills, fatigue, fever and unexpected weight change.   HENT: Negative for nosebleeds.    Respiratory: Negative for shortness of breath and wheezing.    Cardiovascular: Negative for chest pain, palpitations and leg swelling.   Gastrointestinal: Negative for abdominal distention, abdominal pain, constipation, diarrhea, nausea and vomiting.   Genitourinary: Positive for difficulty urinating and nocturia. Negative for dysuria and hematuria.        4-6x/noc   Musculoskeletal: Negative for arthralgias and back pain.   Skin: Negative for pallor.   Neurological: Negative for dizziness, seizures and syncope.   Hematological: Negative for adenopathy.   Psychiatric/Behavioral: Negative for dysphoric mood.         Objective:      /70   Pulse 61   Resp 15   Ht 6' (1.829 m)   Wt 83.9 kg (185 lb)   BMI 25.09 kg/m²   Estimated body mass index is 25.09 kg/m²  as calculated from the following:    Height as of this encounter: 6' (1.829 m).    Weight as of this encounter: 83.9 kg (185 lb).  Physical Exam   Constitutional: He is oriented to person, place, and time. He appears well-developed and well-nourished. No distress.   HENT:   Head: Atraumatic.   Neck: No tracheal deviation present.   Cardiovascular: Normal rate.    Pulmonary/Chest: Effort normal. No respiratory distress. He has no wheezes.   Abdominal: Soft. Bowel sounds are normal. He exhibits no distension and no mass. There is no tenderness. There is no rebound and no guarding.   Genitourinary: Rectum normal. Rectal exam shows no external hemorrhoid, no internal hemorrhoid, no mass and no tenderness. Prostate is enlarged.   Genitourinary Comments: 50 ccs   Neurological: He is alert and oriented to person, place, and time.   Skin: Skin is warm and dry. He is not diaphoretic.     Psychiatric: He has a normal mood and affect. His behavior is normal. Judgment and thought content normal.         Assessment and Plan:           Problem List Items Addressed This Visit     Elevated PSA    Benign prostatic hyperplasia with urinary obstruction - Primary          Follow up:   Discussed surgical interventions: laser transurethral prostatectomy, Rezum, Urolift. Advantages/ disadvantages discussed.  I spent 25 minutes with the patient of which more than half was spent in direct consultation with the patient in regards to our treatment and plan.     appointment with Dr. Camejo early June.    Livan Lowry

## 2018-05-08 ENCOUNTER — HOSPITAL ENCOUNTER (OUTPATIENT)
Dept: RADIOLOGY | Facility: HOSPITAL | Age: 68
Discharge: HOME | End: 2018-05-08
Attending: UROLOGY
Payer: COMMERCIAL

## 2018-05-08 DIAGNOSIS — N20.0 STONE IN KIDNEY: ICD-10-CM

## 2018-05-08 PROCEDURE — 74018 RADEX ABDOMEN 1 VIEW: CPT | Mod: 26,,, | Performed by: RADIOLOGY

## 2018-05-08 PROCEDURE — 74018 RADEX ABDOMEN 1 VIEW: CPT | Mod: TC

## 2018-05-16 ENCOUNTER — ANTI-COAG VISIT (OUTPATIENT)
Dept: CARDIOLOGY | Facility: CLINIC | Age: 68
End: 2018-05-16
Payer: COMMERCIAL

## 2018-05-16 DIAGNOSIS — Z79.01 LONG TERM CURRENT USE OF ANTICOAGULANT THERAPY: Primary | ICD-10-CM

## 2018-05-16 LAB — INR PPP: 2.4 (ref 2–3)

## 2018-05-16 PROCEDURE — 85610 PROTHROMBIN TIME: CPT | Mod: QW,S$GLB,, | Performed by: INTERNAL MEDICINE

## 2018-05-16 NOTE — PROGRESS NOTES
INR within therapeutic range today. Patient denies any bleeding, bruising or other changes that would affect warfarin therapy. Will maintain current dose. Patient advised to call coumadin clinic with any changes or concerns.We would like to follow up in 6 weeks but patient will be out of the [place] on vacation.

## 2018-05-16 NOTE — PROGRESS NOTES
Pt seen by Parvin BIGGS . I have reviewed her initial findings and agree with her assessment and plan

## 2018-06-07 ENCOUNTER — OFFICE VISIT (OUTPATIENT)
Dept: UROLOGY | Facility: CLINIC | Age: 68
End: 2018-06-07
Payer: COMMERCIAL

## 2018-06-07 VITALS
HEIGHT: 72 IN | WEIGHT: 184.31 LBS | BODY MASS INDEX: 24.96 KG/M2 | DIASTOLIC BLOOD PRESSURE: 67 MMHG | SYSTOLIC BLOOD PRESSURE: 139 MMHG | HEART RATE: 63 BPM

## 2018-06-07 DIAGNOSIS — N40.1 BENIGN PROSTATIC HYPERPLASIA WITH URINARY OBSTRUCTION: Primary | ICD-10-CM

## 2018-06-07 DIAGNOSIS — N13.8 BENIGN PROSTATIC HYPERPLASIA WITH URINARY OBSTRUCTION: Primary | ICD-10-CM

## 2018-06-07 PROCEDURE — 99999 PR PBB SHADOW E&M-EST. PATIENT-LVL III: CPT | Mod: PBBFAC,,, | Performed by: UROLOGY

## 2018-06-07 PROCEDURE — 3078F DIAST BP <80 MM HG: CPT | Mod: CPTII,S$GLB,, | Performed by: UROLOGY

## 2018-06-07 PROCEDURE — 99214 OFFICE O/P EST MOD 30 MIN: CPT | Mod: S$GLB,,, | Performed by: UROLOGY

## 2018-06-07 PROCEDURE — 3075F SYST BP GE 130 - 139MM HG: CPT | Mod: CPTII,S$GLB,, | Performed by: UROLOGY

## 2018-06-07 RX ORDER — LIDOCAINE HYDROCHLORIDE 20 MG/ML
JELLY TOPICAL ONCE
Status: CANCELLED | OUTPATIENT
Start: 2018-06-07 | End: 2018-06-07

## 2018-06-07 NOTE — LETTER
June 7, 2018      Livan Lowry MD  [address]  [place] [address]           [facility]  [address]  [place] [address]  Phone: [phone]          Patient: Anthony Reynaga   MR Number: [id number]   YOB: 1950   Date of Visit: 6/7/2018       Dear Dr. Livan Lowry:    Thank you for referring Anthony Reynaga to me for evaluation. Attached you will find relevant portions of my assessment and plan of care.    If you have questions, please do not hesitate to call me. I look forward to following Anthony Reynaga along with you.    Sincerely,    Alcides Camejo MD    Enclosure  CC:  No Recipients    If you would like to receive this communication electronically, please contact [email or web address] or [phone] to request more information on [facility] access.    For providers and/or their staff who would like to refer a patient to [facility], please contact us through our one-stop-shop provider referral line, [facility], at [phone].    If you feel you have received this communication in error or would no longer like to receive these types of communications, please e-mail [email or web address]

## 2018-06-07 NOTE — PROGRESS NOTES
CHIEF COMPLAINT:    Mr. Reynaga is a 68 y.o. male presenting for a consultation at the request of Dr. Lowry. Patient presents with LUTS.    PRESENTING ILLNESS:    Anthony Reynaga is a 68 y.o. male who c/o LUTS.  He has nocturia x 2-6, + decreased FOS, + intermittency.  He's on uroxatral.  He stopped finasteride in 10/2017 as he didn't think it was giving him a benefit.    He has a history of an elevated PSA and is s/p a negative TRUS bx.    He denies ED.    REVIEW OF SYSTEMS:    Anthony Reynaga denies headache, blurred vision, fever, nausea, vomiting, chills, abdominal pain, bleeding per rectum, cough, SOB, recent loss of consciousness, recent mental status changes, seizures, dizziness, or upper or lower extremity weakness.    MELIDA  1. 5  2. 5  3. 5  4. 5  5. 5      PATIENT HISTORY:    Past Medical History:   Diagnosis Date    Anticoagulant long-term use     Atrial fibrillation     BPH (benign prostatic hyperplasia)     Cataract     Elevated PSA     Floaters     Hernia     bilateral inquinal    Hypertension     Inguinal hernia     Kidney stone     Knee injury        Past Surgical History:   Procedure Laterality Date    APPENDECTOMY      BLADDER SURGERY      polyp removed benign    COLONOSCOPY N/A 6/22/2016    Procedure: COLONOSCOPY;  Surgeon: Joaquin Francis MD;  Location: [facility] ([address] [place] [address]);  Service: Endoscopy;  Laterality: N/A;    Thanks for letting us know.  I would approve him to hold coumadin x 3 days prior, without lovenox.  We will see him for an INR on 6/8 and will provide him with procedure instructions at that time., per [facility]madin Clinic    CYSTOSCOPY      FINGER SURGERY      HERNIA REPAIR      KNEE SURGERY      LITHOTRIPSY      TONSILLECTOMY, ADENOIDECTOMY         Family History   Problem Relation Age of Onset    Cancer Mother     Benign prostatic hyperplasia Neg Hx        Social History     Social History    Marital status: Single     Spouse name: N/A    Number of children:  N/A    Years of education: N/A     Occupational History    Not on file.     Social History Main Topics    Smoking status: Never Smoker    Smokeless tobacco: Never Used    Alcohol use 0.6 oz/week     1 Cans of beer per week      Comment: social    Drug use: No    Sexual activity: Yes     Partners: Female     Other Topics Concern    Not on file     Social History Narrative    No narrative on file       Allergies:  Patient has no known allergies.    Medications:    Current Outpatient Prescriptions:     alfuzosin (UROXATRAL) 10 mg Tb24, TAKE 1 TABLET BY MOUTH ONCE DAILY, Disp: 90 tablet, Rfl: 3    aspirin (ECOTRIN) 81 MG EC tablet, Take 81 mg by mouth once daily., Disp: , Rfl:     finasteride (PROSCAR) 5 mg tablet, Take 1 tablet (5 mg total) by mouth once daily., Disp: 30 tablet, Rfl: 11    flecainide (TAMBOCOR) 150 MG Tab, Take 2 tabs PO prn for AF. Up to one dose per day., Disp: 10 tablet, Rfl: 3    lorazepam (ATIVAN) 1 MG tablet, Take 1 mg by mouth every 6 (six) hours as needed (Patient states he takes this medication only a couple times a month, to sleep.). , Disp: , Rfl: 0    metoprolol succinate (TOPROL-XL) 200 MG 24 hr tablet, Take 200 mg by mouth once daily. , Disp: , Rfl:     simvastatin (ZOCOR) 20 MG tablet, Take 20 mg by mouth every evening. , Disp: , Rfl:     vitamin D 1000 units Tab, Take 1,000 Units by mouth once daily., Disp: , Rfl:     warfarin (COUMADIN) 2 MG tablet, Take 2 mg by mouth once daily. Resumption 8/25. Take as directed by the Coumadin Clinic., Disp: , Rfl:     PHYSICAL EXAMINATION:    The patient generally appears in good health, is appropriately interactive, and is in no apparent distress.     Eyes: anicteric sclerae, moist conjunctivae; no lid-lag; PERRLA     HENT: Atraumatic; oropharynx clear with moist mucous membranes and no mucosal ulcerations;normal hard and soft palate.  No evidence of lymphadenopathy.    Neck: Trachea midline.  No thyromegaly.    Musculoskeletal:  No abnormal gait.    Skin: No lesions.    Mental: Cooperative with normal affect.  Is oriented to time, place, and person.    Neuro: Grossly intact.    Chest: Normal inspiratory effort.   No accessory muscles.  No audible wheezes.  Respirations symmetric on inspiration and expiration.    Heart: Regular rhythm.      Abdomen:  Soft, non-tender. No masses or organomegaly. Bladder is not palpable. No evidence of flank discomfort. No evidence of inguinal hernia.    Genitourinary: The penis is circumcised with no evidence of plaques or induration. The urethral meatus is normal. The testes, epididymides, and cord structures are normal in size and contour bilaterally. The scrotum is normal in size and contour.    Normal anal sphincter tone. No rectal mass.    The prostate is 50 g. Normal landmarks. Lateral sulci. Median furrow intact.  No nodularity or induration. Seminal vesicles are normal.    Extremities: No clubbing, cyanosis, or edema      LABS:      Lab Results   Component Value Date    PSA 3.80 10/04/2012    PSA 4.10 (H) 03/01/2012    PSA 3.2 10/13/2011    PSADIAG 2.1 09/20/2017    PSADIAG 3.0 09/14/2016    PSADIAG 4.9 (H) 03/11/2016       IMPRESSION:    Encounter Diagnoses   Name Primary?    Benign prostatic hyperplasia with urinary obstruction Yes         PLAN:    1. Will cysto to see if he is a good candidate for rezum.  2. Discussed risks/benefits of Rezum.  Discussed bleeding, frequency, failure amongst other risks.  Also discussed very small risk of EjD and ED.  He was given the chance to ask questions.  Alternatives discussed.    3. He will think about whether or not he wants rezum done.    Copy to: Alen

## 2018-06-12 ENCOUNTER — PATIENT MESSAGE (OUTPATIENT)
Dept: UROLOGY | Facility: CLINIC | Age: 68
End: 2018-06-12

## 2018-06-27 ENCOUNTER — TELEPHONE (OUTPATIENT)
Dept: ELECTROPHYSIOLOGY | Facility: CLINIC | Age: 68
End: 2018-06-27

## 2018-06-27 NOTE — TELEPHONE ENCOUNTER
----- Message from Alexa Clancy MA sent at 6/27/2018  3:56 PM CDT -----  Contact: Pt called       ----- Message -----  From: Roberta Matthews  Sent: 6/27/2018   3:52 PM  To: Patricia SEN Staff    Pt would like to schedule an appointment.Please call pt @ [phone]. Thank you.

## 2018-07-18 ENCOUNTER — ANTI-COAG VISIT (OUTPATIENT)
Dept: CARDIOLOGY | Facility: CLINIC | Age: 68
End: 2018-07-18
Payer: COMMERCIAL

## 2018-07-18 DIAGNOSIS — Z79.01 LONG TERM CURRENT USE OF ANTICOAGULANT THERAPY: Primary | ICD-10-CM

## 2018-07-18 LAB — INR PPP: 2.3 (ref 2–3)

## 2018-07-18 PROCEDURE — 99211 OFF/OP EST MAY X REQ PHY/QHP: CPT | Mod: 25,S$GLB,, | Performed by: PHARMACIST

## 2018-07-18 PROCEDURE — 85610 PROTHROMBIN TIME: CPT | Mod: QW,S$GLB,, | Performed by: INTERNAL MEDICINE

## 2018-07-18 NOTE — PROGRESS NOTES
INR within therapeutic range today. Bruising noted to arms from use Patient denies any bleeding or other changes that would affect warfarin therapy. Will maintain current dose. Patient advised to call coumadin clinic with any changes or concerns.

## 2018-08-20 ENCOUNTER — HOSPITAL ENCOUNTER (OUTPATIENT)
Dept: CARDIOLOGY | Facility: CLINIC | Age: 68
Discharge: HOME | End: 2018-08-20
Payer: COMMERCIAL

## 2018-08-20 ENCOUNTER — OFFICE VISIT (OUTPATIENT)
Dept: ELECTROPHYSIOLOGY | Facility: CLINIC | Age: 68
End: 2018-08-20
Payer: COMMERCIAL

## 2018-08-20 VITALS
DIASTOLIC BLOOD PRESSURE: 59 MMHG | WEIGHT: 187 LBS | HEIGHT: 72 IN | BODY MASS INDEX: 25.33 KG/M2 | HEART RATE: 59 BPM | SYSTOLIC BLOOD PRESSURE: 146 MMHG

## 2018-08-20 DIAGNOSIS — I10 ESSENTIAL HYPERTENSION: ICD-10-CM

## 2018-08-20 DIAGNOSIS — I35.1 NONRHEUMATIC AORTIC VALVE INSUFFICIENCY: ICD-10-CM

## 2018-08-20 DIAGNOSIS — Z79.01 LONG TERM CURRENT USE OF ANTICOAGULANT THERAPY: ICD-10-CM

## 2018-08-20 DIAGNOSIS — I49.3 PVC (PREMATURE VENTRICULAR CONTRACTION): ICD-10-CM

## 2018-08-20 DIAGNOSIS — I48.0 PAF (PAROXYSMAL ATRIAL FIBRILLATION): ICD-10-CM

## 2018-08-20 DIAGNOSIS — I48.0 PAROXYSMAL ATRIAL FIBRILLATION: Primary | ICD-10-CM

## 2018-08-20 DIAGNOSIS — I48.0 PAROXYSMAL ATRIAL FIBRILLATION: ICD-10-CM

## 2018-08-20 LAB
AORTIC VALVE REGURGITATION: ABNORMAL
DIASTOLIC DYSFUNCTION: YES
ESTIMATED PA SYSTOLIC PRESSURE: 26.63
MITRAL VALVE REGURGITATION: ABNORMAL
RETIRED EF AND QEF - SEE NOTES: 55 (ref 55–65)
TRICUSPID VALVE REGURGITATION: ABNORMAL

## 2018-08-20 PROCEDURE — 93306 TTE W/DOPPLER COMPLETE: CPT | Mod: S$GLB,,, | Performed by: INTERNAL MEDICINE

## 2018-08-20 PROCEDURE — 3078F DIAST BP <80 MM HG: CPT | Mod: CPTII,S$GLB,, | Performed by: INTERNAL MEDICINE

## 2018-08-20 PROCEDURE — 99214 OFFICE O/P EST MOD 30 MIN: CPT | Mod: S$GLB,,, | Performed by: INTERNAL MEDICINE

## 2018-08-20 PROCEDURE — 3077F SYST BP >= 140 MM HG: CPT | Mod: CPTII,S$GLB,, | Performed by: INTERNAL MEDICINE

## 2018-08-20 PROCEDURE — 93000 ELECTROCARDIOGRAM COMPLETE: CPT | Mod: S$GLB,,, | Performed by: INTERNAL MEDICINE

## 2018-08-20 PROCEDURE — 99999 PR PBB SHADOW E&M-EST. PATIENT-LVL III: CPT | Mod: PBBFAC,,, | Performed by: INTERNAL MEDICINE

## 2018-08-20 RX ORDER — FLECAINIDE ACETATE 150 MG/1
TABLET ORAL
Qty: 10 TABLET | Refills: 3 | Status: SHIPPED | OUTPATIENT
Start: 2018-08-20 | End: 2019-08-23 | Stop reason: SDUPTHER

## 2018-08-20 RX ORDER — CHLORTHALIDONE 25 MG/1
TABLET ORAL
Qty: 45 TABLET | Refills: 3 | Status: SHIPPED | OUTPATIENT
Start: 2018-08-20 | End: 2019-07-12 | Stop reason: SDUPTHER

## 2018-08-20 NOTE — PROGRESS NOTES
"Subjective:    Patient ID:  Anthony Reynaga is a 68 y.o. male who presents for evaluation of Atrial Fibrillation      Atrial Fibrillation   Symptoms are negative for chest pain, dizziness, shortness of breath, syncope and weakness. Past medical history includes atrial fibrillation.   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    "PAF" onset was in 1996. Spontaneously back to SR, after meds given.   1997: Required DCCV for another episode.  Since, seems that he gets an episode about q 6 months. Always seems to be triggered by episodes of fast HR: exertion, nightmare...  Was having AF q year; 2 episodes: 3/15 and 8/15. In 8/15, lasted 62 hours (longer than usual); ECG in EPIC confirms AF.    Hasn't had AF x 2 years. Occ palpitations; nothing longterm.  In early 2018, had a few episodes of 15 sec palpitations. No LH with that. Uses biofeedback with some effect. Episodes too short to go to ER to use Pill-In-Pocket.  48 hour Holter requested, which revealed SR,  bpm, with 514 PVCs. No sx reported.    Since then, feeling well. No long arrhythmia; maybe a PVC every now and then. Feels very well.    Echo 55%, mild LAE -> 55-60%, mod LAE  Event monitor: SR, including with palps, few short NSAT.    My interpretation of today's ECG is SB at 59 bpm. No ectopy.    Review of Systems   Constitution: Negative. Negative for weakness and malaise/fatigue.   HENT: Negative.  Negative for ear pain and tinnitus.    Eyes: Negative for blurred vision.   Cardiovascular: Negative for chest pain, dyspnea on exertion, near-syncope and syncope.   Respiratory: Negative.  Negative for shortness of breath.    Endocrine: Negative.  Negative for polyuria.   Hematologic/Lymphatic: Does not bruise/bleed easily.   Skin: Negative.  Negative for rash.   Musculoskeletal: Negative.  Negative for joint pain and " muscle weakness.   Gastrointestinal: Negative.  Negative for abdominal pain and change in bowel habit.   Genitourinary: Negative for frequency.   Neurological: Negative.  Negative for dizziness.   Psychiatric/Behavioral: Negative.  Negative for depression. The patient is not nervous/anxious.    Allergic/Immunologic: Negative for environmental allergies.        Objective:    Physical Exam   Constitutional: He is oriented to person, place, and time. He appears well-developed and well-nourished.   HENT:   Head: Normocephalic and atraumatic.   Eyes: Conjunctivae, EOM and lids are normal. No scleral icterus.   Neck: Normal range of motion. No JVD present. No tracheal deviation present. No thyromegaly present.   Cardiovascular: Normal rate, regular rhythm and intact distal pulses.  No extrasystoles are present. PMI is not displaced. Exam reveals no gallop and no friction rub.   Murmur heard.   Harsh midsystolic murmur is present with a grade of 2/6 at the upper right sternal border radiating to the neck.  Pulses:       Radial pulses are 2+ on the right side, and 2+ on the left side.   Pulmonary/Chest: Effort normal and breath sounds normal. No accessory muscle usage. No tachypnea. No respiratory distress. He has no wheezes. He has no rales.   Abdominal: Soft. Bowel sounds are normal. He exhibits no distension. There is no hepatosplenomegaly. There is no tenderness.   Musculoskeletal: Normal range of motion. He exhibits no edema.   Neurological: He is alert and oriented to person, place, and time. He has normal reflexes. He exhibits normal muscle tone.   Skin: Skin is warm and dry. No rash noted.   Psychiatric: He has a normal mood and affect. His behavior is normal.   Nursing note and vitals reviewed.        Assessment:       1. Paroxysmal atrial fibrillation    2. Essential hypertension    3. Nonrheumatic aortic valve insufficiency    4. PVC (premature ventricular contraction)    5. Long term current use of anticoagulant  therapy         Plan:       AF:  Minimal sx and very infrequent.   Continue plans for pill-in-the-pocket approach to AF. I've given pt instructions to present to ER if sustained AF is felt, for the first trial of that approach. Provided reference to [facility] article (Tabatha et al. N Engl J Med 2004;351:2384-91). If flecainide is tolerated and effective, thereafter patient would be able to self-direct pill-in-the-pocket treatment.    PVCs:  Benign, but symptomatic at times.  Continue BB.    HTN:  Add chlorthalidone.    Return in 1 year with echo, or earlier prn.

## 2018-08-21 ENCOUNTER — TELEPHONE (OUTPATIENT)
Dept: INTERNAL MEDICINE | Facility: CLINIC | Age: 68
End: 2018-08-21

## 2018-08-21 NOTE — TELEPHONE ENCOUNTER
Spoke with pt in regards to getting sooner appt with other provider, but pt states he wants to keep his appt with Dr. Harris.

## 2018-09-05 ENCOUNTER — ANTI-COAG VISIT (OUTPATIENT)
Dept: CARDIOLOGY | Facility: CLINIC | Age: 68
End: 2018-09-05
Payer: COMMERCIAL

## 2018-09-05 DIAGNOSIS — I48.0 PAROXYSMAL ATRIAL FIBRILLATION: ICD-10-CM

## 2018-09-05 DIAGNOSIS — Z79.01 LONG TERM CURRENT USE OF ANTICOAGULANT THERAPY: Primary | ICD-10-CM

## 2018-09-05 LAB — INR PPP: 3.2 (ref 2–3)

## 2018-09-05 PROCEDURE — 85610 PROTHROMBIN TIME: CPT | Mod: QW,S$GLB,, | Performed by: INTERNAL MEDICINE

## 2018-09-05 PROCEDURE — 99211 OFF/OP EST MAY X REQ PHY/QHP: CPT | Mod: 25,S$GLB,, | Performed by: INTERNAL MEDICINE

## 2018-09-05 NOTE — PROGRESS NOTES
INR elevated today. Patient states that he has not been much vitamin K foods as usual. He will resume his normal diet of vitamin K foods twice a week. He also reports a new medication chlorthalidone. No DDI.  He denies any other changes. Care plan made with A Rubio Pharm D.

## 2018-09-10 ENCOUNTER — TELEPHONE (OUTPATIENT)
Dept: UROLOGY | Facility: CLINIC | Age: 68
End: 2018-09-10

## 2018-09-10 DIAGNOSIS — R97.20 ELEVATED PSA: Primary | ICD-10-CM

## 2018-09-10 NOTE — TELEPHONE ENCOUNTER
----- Message from Ana Sanjeev sent at 9/10/2018  3:37 PM CDT -----  Contact: self   raisa - has appt with raisa on 10/23 - is asking for psa  - no orders in system - please call patient at

## 2018-09-16 DIAGNOSIS — N40.0 BENIGN NON-NODULAR PROSTATIC HYPERPLASIA WITHOUT LOWER URINARY TRACT SYMPTOMS: ICD-10-CM

## 2018-09-17 RX ORDER — ALFUZOSIN HYDROCHLORIDE 10 MG/1
TABLET, EXTENDED RELEASE ORAL
Qty: 90 TABLET | Refills: 3 | Status: SHIPPED | OUTPATIENT
Start: 2018-09-17 | End: 2019-10-29 | Stop reason: SDUPTHER

## 2018-09-19 ENCOUNTER — ANTI-COAG VISIT (OUTPATIENT)
Dept: CARDIOLOGY | Facility: CLINIC | Age: 68
End: 2018-09-19
Payer: COMMERCIAL

## 2018-09-19 DIAGNOSIS — I48.0 PAROXYSMAL ATRIAL FIBRILLATION: ICD-10-CM

## 2018-09-19 DIAGNOSIS — Z79.01 LONG TERM CURRENT USE OF ANTICOAGULANT THERAPY: Primary | ICD-10-CM

## 2018-09-19 LAB — INR PPP: 2.8 (ref 2–3)

## 2018-09-19 PROCEDURE — 99211 OFF/OP EST MAY X REQ PHY/QHP: CPT | Mod: 25,S$GLB,,

## 2018-09-19 PROCEDURE — 85610 PROTHROMBIN TIME: CPT | Mod: QW,S$GLB,, | Performed by: INTERNAL MEDICINE

## 2018-09-19 NOTE — PROGRESS NOTES
Quick follow up for elevated INR on 9/5. INR within therapeutic range today. Patient denies any bleeding, bruising or other changes. We will resume his weekly dose and follow up in 4 weeks. Patient reminded to call clinic with any changes or concerns.

## 2018-09-28 ENCOUNTER — OFFICE VISIT (OUTPATIENT)
Dept: INTERNAL MEDICINE | Facility: CLINIC | Age: 68
End: 2018-09-28
Payer: COMMERCIAL

## 2018-09-28 VITALS
DIASTOLIC BLOOD PRESSURE: 84 MMHG | BODY MASS INDEX: 24.96 KG/M2 | HEART RATE: 59 BPM | SYSTOLIC BLOOD PRESSURE: 150 MMHG | WEIGHT: 184.31 LBS | OXYGEN SATURATION: 97 % | HEIGHT: 72 IN

## 2018-09-28 DIAGNOSIS — I35.1 NONRHEUMATIC AORTIC VALVE INSUFFICIENCY: ICD-10-CM

## 2018-09-28 DIAGNOSIS — N40.1 BENIGN PROSTATIC HYPERPLASIA WITH URINARY OBSTRUCTION: ICD-10-CM

## 2018-09-28 DIAGNOSIS — E78.2 MIXED HYPERLIPIDEMIA: ICD-10-CM

## 2018-09-28 DIAGNOSIS — I48.0 PAROXYSMAL ATRIAL FIBRILLATION: ICD-10-CM

## 2018-09-28 DIAGNOSIS — Z79.01 LONG TERM CURRENT USE OF ANTICOAGULANT THERAPY: ICD-10-CM

## 2018-09-28 DIAGNOSIS — Z00.00 WELLNESS EXAMINATION: Primary | ICD-10-CM

## 2018-09-28 DIAGNOSIS — E55.9 VITAMIN D DEFICIENCY: ICD-10-CM

## 2018-09-28 DIAGNOSIS — I10 ESSENTIAL HYPERTENSION, BENIGN: ICD-10-CM

## 2018-09-28 DIAGNOSIS — Z11.59 ENCOUNTER FOR HEPATITIS C SCREENING TEST FOR LOW RISK PATIENT: ICD-10-CM

## 2018-09-28 DIAGNOSIS — N13.8 BENIGN PROSTATIC HYPERPLASIA WITH URINARY OBSTRUCTION: ICD-10-CM

## 2018-09-28 PROCEDURE — 99397 PER PM REEVAL EST PAT 65+ YR: CPT | Mod: S$GLB,,, | Performed by: INTERNAL MEDICINE

## 2018-09-28 PROCEDURE — 3079F DIAST BP 80-89 MM HG: CPT | Mod: CPTII,S$GLB,, | Performed by: INTERNAL MEDICINE

## 2018-09-28 PROCEDURE — 99999 PR PBB SHADOW E&M-EST. PATIENT-LVL III: CPT | Mod: PBBFAC,,, | Performed by: INTERNAL MEDICINE

## 2018-09-28 PROCEDURE — 3077F SYST BP >= 140 MM HG: CPT | Mod: CPTII,S$GLB,, | Performed by: INTERNAL MEDICINE

## 2018-09-28 RX ORDER — LISINOPRIL 10 MG/1
10 TABLET ORAL DAILY
Qty: 90 TABLET | Refills: 3 | Status: SHIPPED | OUTPATIENT
Start: 2018-09-28 | End: 2019-02-05

## 2018-09-28 NOTE — PROGRESS NOTES
"Subjective:       Patient ID: Anthony Reynaga is a 68 y.o. male.    Chief Complaint: Immunizations and Annual Exam    Pt here to est care and for annual exam. Pt's BP is not well controlled. Tolerating meds well. Pt denies cp/sob/ha/vision or neuro changes. Checking at home and is not well controlled.     HLD is tx with simvastatin which he tolerates well.     He has pAfib for which he takes metoprolol and is anticoag with coumadin. He sees cardiology and EP regularly. This is currently stable and he denies cp/sob/palpitations. Coumadin is followed by couumadin clinic at [facility]. He uses flecainide for "pill in pocket" approach to a-fib episodes but has not needed this. He is aware of NOAC but opts not to use these and stay with coumadin instead.    He has BPH with LUTS for which he takes uroxatral with success. He previously had elevated PSA and had biopsy that was negative. He sees urology regularly.     He has vit d deficiency on prior labs and is now on otc vit d 1000 units daily.       Review of Systems   Constitutional: Negative for appetite change, fatigue, fever and unexpected weight change.   HENT: Negative for congestion, rhinorrhea and sore throat.    Eyes: Negative for visual disturbance.   Respiratory: Negative for cough and shortness of breath.    Cardiovascular: Negative for chest pain, palpitations and leg swelling.   Gastrointestinal: Negative for abdominal pain, blood in stool, constipation and diarrhea.   Genitourinary: Negative for difficulty urinating and dysuria.   Skin: Negative for color change and rash.   Neurological: Negative for dizziness, syncope, light-headedness and headaches.   Hematological: Negative for adenopathy.   Psychiatric/Behavioral: Negative for dysphoric mood.       Objective:      Physical Exam   Constitutional: He is oriented to person, place, and time. He appears well-developed and well-nourished.   HENT:   Head: Normocephalic and atraumatic.   Right Ear: External ear " normal.   Left Ear: External ear normal.   Mouth/Throat: Oropharynx is clear and moist. No oropharyngeal exudate.   Eyes: Conjunctivae and EOM are normal. Pupils are equal, round, and reactive to light.   Neck: Neck supple. Carotid bruit is not present. No thyromegaly present.   Cardiovascular: Normal rate, regular rhythm, S1 normal, S2 normal, normal heart sounds and intact distal pulses.   Pulmonary/Chest: Effort normal and breath sounds normal.   Abdominal: Soft. Bowel sounds are normal. He exhibits no mass. There is no hepatosplenomegaly. There is no tenderness.   Musculoskeletal: He exhibits no edema.   Lymphadenopathy:     He has no cervical adenopathy.   Neurological: He is alert and oriented to person, place, and time. No cranial nerve deficit.   Psychiatric: He has a normal mood and affect. His behavior is normal.       Assessment:       1. Wellness examination    2. Mixed hyperlipidemia    3. Paroxysmal atrial fibrillation    4. Benign prostatic hyperplasia with urinary obstruction    5. Long term current use of anticoagulant therapy    6. Nonrheumatic aortic valve insufficiency    7. Essential hypertension, benign    8. Encounter for hepatitis C screening test for low risk patient    9. Vitamin D deficiency        Plan:       1. Appropriate labs  2. Home BP monitoring  3. Keep f/u with specialists  4. Lisinopril 10mg daily  5. 4 wks nursing BP check

## 2018-10-17 ENCOUNTER — ANTI-COAG VISIT (OUTPATIENT)
Dept: CARDIOLOGY | Facility: CLINIC | Age: 68
End: 2018-10-17
Payer: COMMERCIAL

## 2018-10-17 DIAGNOSIS — I48.0 PAROXYSMAL ATRIAL FIBRILLATION: ICD-10-CM

## 2018-10-17 DIAGNOSIS — Z79.01 LONG TERM CURRENT USE OF ANTICOAGULANT THERAPY: Primary | ICD-10-CM

## 2018-10-17 LAB — INR PPP: 2.6 (ref 2–3)

## 2018-10-17 PROCEDURE — 99211 OFF/OP EST MAY X REQ PHY/QHP: CPT | Mod: 25,S$GLB,,

## 2018-10-17 PROCEDURE — 85610 PROTHROMBIN TIME: CPT | Mod: QW,,, | Performed by: INTERNAL MEDICINE

## 2018-10-17 NOTE — PROGRESS NOTES
I have reviewed the chart and recent INR.  I agree with the plan outlined by KACEY Dougherty LPN.  Ok with longer INR check, fairly stable over the past few months (no subtherapeutic INRs).

## 2018-10-17 NOTE — PROGRESS NOTES
INR within therapeutic range today. Patient reports new medication: Lisinopril. No DDI Patient denies any bleeding, bruising or other changes that would affect warfarin therapy. Will maintain current dose and follow up in 6 weeks. We would like to follow up in 4-5 weeks but it is Thanksgiving week and the patient states that time will be too hectic.  Patient advised to call coumadin clinic with any changes or concerns. Care plan made with ERIC CAMARILLO

## 2018-10-19 ENCOUNTER — LAB VISIT (OUTPATIENT)
Dept: LAB | Facility: HOSPITAL | Age: 68
End: 2018-10-19
Attending: UROLOGY
Payer: COMMERCIAL

## 2018-10-19 DIAGNOSIS — R97.20 ELEVATED PSA: ICD-10-CM

## 2018-10-19 LAB — COMPLEXED PSA SERPL-MCNC: 3.6 NG/ML

## 2018-10-19 PROCEDURE — 36415 COLL VENOUS BLD VENIPUNCTURE: CPT

## 2018-10-19 PROCEDURE — 84153 ASSAY OF PSA TOTAL: CPT

## 2018-10-23 ENCOUNTER — OFFICE VISIT (OUTPATIENT)
Dept: UROLOGY | Facility: CLINIC | Age: 68
End: 2018-10-23
Payer: COMMERCIAL

## 2018-10-23 VITALS
WEIGHT: 185 LBS | RESPIRATION RATE: 15 BRPM | SYSTOLIC BLOOD PRESSURE: 128 MMHG | DIASTOLIC BLOOD PRESSURE: 63 MMHG | HEIGHT: 72 IN | BODY MASS INDEX: 25.06 KG/M2 | HEART RATE: 67 BPM

## 2018-10-23 DIAGNOSIS — N40.1 BENIGN PROSTATIC HYPERPLASIA WITH URINARY OBSTRUCTION: Primary | ICD-10-CM

## 2018-10-23 DIAGNOSIS — N13.8 BENIGN PROSTATIC HYPERPLASIA WITH URINARY OBSTRUCTION: Primary | ICD-10-CM

## 2018-10-23 PROCEDURE — 3078F DIAST BP <80 MM HG: CPT | Mod: CPTII,S$GLB,, | Performed by: UROLOGY

## 2018-10-23 PROCEDURE — 3074F SYST BP LT 130 MM HG: CPT | Mod: CPTII,S$GLB,, | Performed by: UROLOGY

## 2018-10-23 PROCEDURE — 99214 OFFICE O/P EST MOD 30 MIN: CPT | Mod: S$GLB,,, | Performed by: UROLOGY

## 2018-10-23 PROCEDURE — 99999 PR PBB SHADOW E&M-EST. PATIENT-LVL III: CPT | Mod: PBBFAC,,, | Performed by: UROLOGY

## 2018-10-23 PROCEDURE — 1101F PT FALLS ASSESS-DOCD LE1/YR: CPT | Mod: CPTII,S$GLB,, | Performed by: UROLOGY

## 2018-10-23 NOTE — PROGRESS NOTES
Clinic Note  10/23/2018      Subjective:         Chief Complaint:   HPI  Anthony Reynaga is a 68 y.o. male history of BPH and elevated PSA. S/p biopsy (B9) in 2 /11. Moderate symptoms, minimal bother.  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 History of kidney stones and prostatitis. Is on  Uroxatrol and proscar (started in June).Nocturia stable at 4x/noc.  Hernia repair in July 2016.  LUTS: bothered by nocturia 4-6x /noc. Also bothered by decreased ejaculate and decreased orgasm intensity.  Stopped finasteride 7 months ago. Is thinking about longterm.      Lab Results   Component Value Date    PSA 3.80 10/04/2012    PSA 4.10 (H) 03/01/2012    PSA 3.2 10/13/2011    PSADIAG 3.6 10/19/2018    PSADIAG 2.1 09/20/2017    PSADIAG 3.0 09/14/2016    PSADIAG 4.9 (H) 03/11/2016    PSADIAG 4.6 (H) 10/20/2015    PSADIAG 3.2 10/14/2014    PSADIAG 3.5 10/08/2013      Past Medical History:   Diagnosis Date    Anticoagulant long-term use     Atrial fibrillation     BPH (benign prostatic hyperplasia)     Cataract     Elevated PSA     Floaters     Hernia     bilateral inquinal    Hypertension     Inguinal hernia     Kidney stone     Knee injury      Family History   Problem Relation Age of Onset    Cancer Mother         ureteral/renal cancer    Hypertension Mother     Dementia Father     Benign prostatic hyperplasia Neg Hx      Social History     Socioeconomic History    Marital status: Single     Spouse name: Not on file    Number of children: Not on file    Years of education: Not on file    Highest education level: Not on file   Social Needs    Financial resource strain: Not on file    Food insecurity - worry: Not on file    Food insecurity - inability: Not on file    Transportation needs - medical: Not on file    Transportation needs - non-medical: Not on file   Occupational History    Not on file   Tobacco Use    Smoking status: Never Smoker    Smokeless tobacco: Never Used   Substance and Sexual  Activity    Alcohol use: Yes     Comment: rarely    Drug use: No    Sexual activity: Yes     Partners: Female   Other Topics Concern    Not on file   Social History Narrative    Not on file     Past Surgical History:   Procedure Laterality Date    APPENDECTOMY      BLADDER SURGERY      polyp removed benign    COLONOSCOPY N/A 6/22/2016    Procedure: COLONOSCOPY;  Surgeon: Joaquin Francis MD;  [facility] (4[facility] F[facility]);  Service: Endoscopy;  Laterality: N/A;    Thanks for letting us know.  I would approve him to hold coumadin x 3 days prior, without lovenox.  We will see him for an INR on 6/8 and will provide him with procedure instructions at that time., per Coumadin Clinic    COLONOSCOPY N/A 6/22/2016    Performed by Joaquin Francis MD at [facility] ([facility])    CYSTOSCOPY      benign bladder papilloma    FINGER SURGERY      HERNIA REPAIR Left 2016    inguinal    LITHOTRIPSY      LITHOTRIPSY-EXTRACORPOREAL SHOCK WAVE Right 7/1/2015    Performed by Justyn Regalado MD at [facility]    TONSILLECTOMY, ADENOIDECTOMY       Patient Active Problem List   Diagnosis    Paroxysmal atrial fibrillation    PVD (posterior vitreous detachment)    High myopia    Nuclear sclerosis    Benign prostatic hyperplasia with urinary obstruction    Long term current use of anticoagulant therapy    Aortic regurgitation    Essential hypertension, benign    Right ureteral stone    Bilateral kidney stones    Kidney stone    Voice disturbance    Vocal fold atrophy    Glottic insufficiency    Hyperfunctional dysphonia    Mixed hyperlipidemia    PVC (premature ventricular contraction)     Review of Systems   Constitutional: Negative for appetite change, chills, fatigue, fever and unexpected weight change.   HENT: Negative for nosebleeds.    Respiratory: Negative for shortness of breath and wheezing.    Cardiovascular: Negative for chest pain, palpitations and leg swelling.   Gastrointestinal: Negative for  abdominal distention, abdominal pain, constipation, diarrhea, nausea and vomiting.   Genitourinary: Negative for dysuria and hematuria.   Musculoskeletal: Negative for arthralgias and back pain.   Skin: Negative for pallor.   Neurological: Negative for dizziness, seizures and syncope.   Hematological: Negative for adenopathy.   Psychiatric/Behavioral: Negative for dysphoric mood.         Objective:      /63   Pulse 67   Resp 15   Ht 6' (1.829 m)   Wt 83.9 kg (185 lb)   BMI 25.09 kg/m²   Estimated body mass index is 25.09 kg/m² as calculated from the following:    Height as of this encounter: 6' (1.829 m).    Weight as of this encounter: 83.9 kg (185 lb).  Physical Exam   Constitutional: He is oriented to person, place, and time. He appears well-developed and well-nourished. No distress.   HENT:   Head: Atraumatic.   Neck: No tracheal deviation present.   Cardiovascular: Normal rate.    Pulmonary/Chest: Effort normal. No respiratory distress. He has no wheezes.   Abdominal: Soft. Bowel sounds are normal. He exhibits no distension and no mass. There is no tenderness. There is no rebound and no guarding.   Neurological: He is alert and oriented to person, place, and time.   Skin: Skin is warm and dry. He is not diaphoretic.     Psychiatric: He has a normal mood and affect. His behavior is normal. Judgment and thought content normal.         Assessment and Plan:           Problem List Items Addressed This Visit     Benign prostatic hyperplasia with urinary obstruction - Primary          Follow up:   1 year with PSA.    Livan Lowry

## 2018-11-28 ENCOUNTER — ANTI-COAG VISIT (OUTPATIENT)
Dept: CARDIOLOGY | Facility: CLINIC | Age: 68
End: 2018-11-28
Payer: COMMERCIAL

## 2018-11-28 DIAGNOSIS — Z79.01 LONG TERM CURRENT USE OF ANTICOAGULANT THERAPY: Primary | ICD-10-CM

## 2018-11-28 DIAGNOSIS — I48.0 PAROXYSMAL ATRIAL FIBRILLATION: ICD-10-CM

## 2018-11-28 LAB — INR PPP: 3.2 (ref 2–3)

## 2018-11-28 PROCEDURE — 99211 OFF/OP EST MAY X REQ PHY/QHP: CPT | Mod: 25,S$GLB,, | Performed by: PHARMACIST

## 2018-11-28 PROCEDURE — 85610 PROTHROMBIN TIME: CPT | Mod: QW,S$GLB,, | Performed by: INTERNAL MEDICINE

## 2018-11-28 NOTE — PROGRESS NOTES
INR slightly elevated today. Patient states that he has had a poor appetite recently and has had a cold. He denies any bleeding, bruising or other changes. We will hold his coumadin today then resume. Follow up in 3 weeks. Patient reminded to call clinic with any changes or concerns. Care plan made with J Cordaro Pharm D.

## 2018-12-10 ENCOUNTER — TELEPHONE (OUTPATIENT)
Dept: INTERNAL MEDICINE | Facility: CLINIC | Age: 68
End: 2018-12-10

## 2018-12-10 NOTE — TELEPHONE ENCOUNTER
----- Message from Krista Bhatt sent at 12/10/2018  4:05 PM CST -----  Contact: TRAN GILLILAND [[id number]]  Can the clinic reply in [facility]: N                           Please refill the medication(s) listed below.     Please call the patient when the prescription(s) is ready for  at the phone number   [phone]    Medication #1: alfuzosin (UROXATRAL) 10 mg Tb24                             Medication #2: simvastatin (ZOCOR) 20 MG tablet    Medication #3:  warfarin (COUMADIN) 2 MG tablet    Medication #4:  metoprolol succinate (TOPROL-XL) 200 MG 24 hr tablet      Preferred Pharmacy: [facility] - [address] [phone]

## 2018-12-19 ENCOUNTER — ANTI-COAG VISIT (OUTPATIENT)
Dept: CARDIOLOGY | Facility: CLINIC | Age: 68
End: 2018-12-19
Payer: COMMERCIAL

## 2018-12-19 DIAGNOSIS — Z79.01 LONG TERM CURRENT USE OF ANTICOAGULANT THERAPY: Primary | ICD-10-CM

## 2018-12-19 DIAGNOSIS — I48.0 PAROXYSMAL ATRIAL FIBRILLATION: ICD-10-CM

## 2018-12-19 LAB — INR PPP: 2.8 (ref 2–3)

## 2018-12-19 PROCEDURE — 99211 OFF/OP EST MAY X REQ PHY/QHP: CPT | Mod: 25,S$GLB,, | Performed by: PHARMACIST

## 2018-12-19 PROCEDURE — 85610 PROTHROMBIN TIME: CPT | Mod: QW,S$GLB,, | Performed by: INTERNAL MEDICINE

## 2019-01-25 ENCOUNTER — OFFICE VISIT (OUTPATIENT)
Dept: OPHTHALMOLOGY | Facility: CLINIC | Age: 69
End: 2019-01-25
Payer: COMMERCIAL

## 2019-01-25 DIAGNOSIS — H43.813 POSTERIOR VITREOUS DETACHMENT OF BOTH EYES: Primary | ICD-10-CM

## 2019-01-25 PROCEDURE — 99999 PR PBB SHADOW E&M-EST. PATIENT-LVL III: ICD-10-PCS | Mod: PBBFAC,,, | Performed by: OPHTHALMOLOGY

## 2019-01-25 PROCEDURE — 92014 COMPRE OPH EXAM EST PT 1/>: CPT | Mod: S$GLB,,, | Performed by: OPHTHALMOLOGY

## 2019-01-25 PROCEDURE — 92226 PR SPECIAL EYE EXAM, SUBSEQUENT: CPT | Mod: RT,S$GLB,, | Performed by: OPHTHALMOLOGY

## 2019-01-25 PROCEDURE — 92014 PR EYE EXAM, EST PATIENT,COMPREHESV: ICD-10-PCS | Mod: S$GLB,,, | Performed by: OPHTHALMOLOGY

## 2019-01-25 PROCEDURE — 99999 PR PBB SHADOW E&M-EST. PATIENT-LVL III: CPT | Mod: PBBFAC,,, | Performed by: OPHTHALMOLOGY

## 2019-01-25 PROCEDURE — 92226 PR SPECIAL EYE EXAM, SUBSEQUENT: ICD-10-PCS | Mod: LT,S$GLB,, | Performed by: OPHTHALMOLOGY

## 2019-01-25 NOTE — PROGRESS NOTES
HPI     Concerns About Ocular Health      Additional comments: 2 yr PVD chk              Comments     Patient states that vision seems about the same as last visit in both eyes.    A/P      1. PVD OU    2. High Myopia    3. Early NS OU      RD precautions      2 yr

## 2019-01-30 ENCOUNTER — ANTI-COAG VISIT (OUTPATIENT)
Dept: CARDIOLOGY | Facility: CLINIC | Age: 69
End: 2019-01-30
Payer: COMMERCIAL

## 2019-01-30 DIAGNOSIS — Z79.01 LONG TERM CURRENT USE OF ANTICOAGULANT THERAPY: Primary | ICD-10-CM

## 2019-01-30 DIAGNOSIS — I48.0 PAROXYSMAL ATRIAL FIBRILLATION: ICD-10-CM

## 2019-01-30 LAB — INR PPP: 2.9 (ref 2–3)

## 2019-01-30 PROCEDURE — 85610 POCT INR: ICD-10-PCS | Mod: QW,S$GLB,, | Performed by: INTERNAL MEDICINE

## 2019-01-30 PROCEDURE — 85610 PROTHROMBIN TIME: CPT | Mod: QW,S$GLB,, | Performed by: INTERNAL MEDICINE

## 2019-02-04 ENCOUNTER — PATIENT MESSAGE (OUTPATIENT)
Dept: INTERNAL MEDICINE | Facility: CLINIC | Age: 69
End: 2019-02-04

## 2019-02-05 ENCOUNTER — PATIENT MESSAGE (OUTPATIENT)
Dept: INTERNAL MEDICINE | Facility: CLINIC | Age: 69
End: 2019-02-05

## 2019-02-05 RX ORDER — LOSARTAN POTASSIUM 25 MG/1
25 TABLET ORAL DAILY
Qty: 90 TABLET | Refills: 3 | Status: SHIPPED | OUTPATIENT
Start: 2019-02-05 | End: 2019-09-18

## 2019-02-06 ENCOUNTER — PATIENT MESSAGE (OUTPATIENT)
Dept: ELECTROPHYSIOLOGY | Facility: CLINIC | Age: 69
End: 2019-02-06

## 2019-02-08 ENCOUNTER — LAB VISIT (OUTPATIENT)
Dept: LAB | Facility: OTHER | Age: 69
End: 2019-02-08
Attending: INTERNAL MEDICINE
Payer: COMMERCIAL

## 2019-02-08 DIAGNOSIS — I10 ESSENTIAL HYPERTENSION, BENIGN: ICD-10-CM

## 2019-02-08 DIAGNOSIS — Z11.59 ENCOUNTER FOR HEPATITIS C SCREENING TEST FOR LOW RISK PATIENT: ICD-10-CM

## 2019-02-08 DIAGNOSIS — E78.2 MIXED HYPERLIPIDEMIA: ICD-10-CM

## 2019-02-08 DIAGNOSIS — E55.9 VITAMIN D DEFICIENCY: ICD-10-CM

## 2019-02-08 LAB
25(OH)D3+25(OH)D2 SERPL-MCNC: 24 NG/ML
ALBUMIN SERPL BCP-MCNC: 3.9 G/DL
ALP SERPL-CCNC: 46 U/L
ALT SERPL W/O P-5'-P-CCNC: 20 U/L
ANION GAP SERPL CALC-SCNC: 6 MMOL/L
AST SERPL-CCNC: 22 U/L
BASOPHILS # BLD AUTO: 0.06 K/UL
BASOPHILS NFR BLD: 1 %
BILIRUB SERPL-MCNC: 0.8 MG/DL
BUN SERPL-MCNC: 11 MG/DL
CALCIUM SERPL-MCNC: 9.8 MG/DL
CHLORIDE SERPL-SCNC: 108 MMOL/L
CHOLEST SERPL-MCNC: 127 MG/DL
CHOLEST/HDLC SERPL: 3 {RATIO}
CO2 SERPL-SCNC: 27 MMOL/L
CREAT SERPL-MCNC: 0.8 MG/DL
DIFFERENTIAL METHOD: ABNORMAL
EOSINOPHIL # BLD AUTO: 0.4 K/UL
EOSINOPHIL NFR BLD: 6.5 %
ERYTHROCYTE [DISTWIDTH] IN BLOOD BY AUTOMATED COUNT: 14.1 %
EST. GFR  (AFRICAN AMERICAN): >60 ML/MIN/1.73 M^2
EST. GFR  (NON AFRICAN AMERICAN): >60 ML/MIN/1.73 M^2
GLUCOSE SERPL-MCNC: 90 MG/DL
HCT VFR BLD AUTO: 42.1 %
HCV AB SERPL QL IA: NEGATIVE
HDLC SERPL-MCNC: 42 MG/DL
HDLC SERPL: 33.1 %
HGB BLD-MCNC: 13.6 G/DL
LDLC SERPL CALC-MCNC: 64.4 MG/DL
LYMPHOCYTES # BLD AUTO: 1.4 K/UL
LYMPHOCYTES NFR BLD: 22 %
MCH RBC QN AUTO: 27.4 PG
MCHC RBC AUTO-ENTMCNC: 32.3 G/DL
MCV RBC AUTO: 85 FL
MONOCYTES # BLD AUTO: 0.6 K/UL
MONOCYTES NFR BLD: 10 %
NEUTROPHILS # BLD AUTO: 3.8 K/UL
NEUTROPHILS NFR BLD: 60.2 %
NONHDLC SERPL-MCNC: 85 MG/DL
PLATELET # BLD AUTO: 165 K/UL
PMV BLD AUTO: 11.8 FL
POTASSIUM SERPL-SCNC: 4 MMOL/L
PROT SERPL-MCNC: 6.7 G/DL
RBC # BLD AUTO: 4.96 M/UL
SODIUM SERPL-SCNC: 141 MMOL/L
TRIGL SERPL-MCNC: 103 MG/DL
WBC # BLD AUTO: 6.27 K/UL

## 2019-02-08 PROCEDURE — 80053 COMPREHEN METABOLIC PANEL: CPT

## 2019-02-08 PROCEDURE — 86803 HEPATITIS C AB TEST: CPT

## 2019-02-08 PROCEDURE — 85025 COMPLETE CBC W/AUTO DIFF WBC: CPT

## 2019-02-08 PROCEDURE — 80061 LIPID PANEL: CPT

## 2019-02-08 PROCEDURE — 82306 VITAMIN D 25 HYDROXY: CPT

## 2019-02-08 PROCEDURE — 36415 COLL VENOUS BLD VENIPUNCTURE: CPT

## 2019-02-12 ENCOUNTER — OFFICE VISIT (OUTPATIENT)
Dept: INTERNAL MEDICINE | Facility: CLINIC | Age: 69
End: 2019-02-12
Payer: COMMERCIAL

## 2019-02-12 VITALS
WEIGHT: 181.19 LBS | SYSTOLIC BLOOD PRESSURE: 148 MMHG | HEIGHT: 72 IN | BODY MASS INDEX: 24.54 KG/M2 | DIASTOLIC BLOOD PRESSURE: 80 MMHG | HEART RATE: 72 BPM

## 2019-02-12 DIAGNOSIS — I10 ESSENTIAL HYPERTENSION, BENIGN: Primary | Chronic | ICD-10-CM

## 2019-02-12 DIAGNOSIS — R05.9 COUGH: ICD-10-CM

## 2019-02-12 PROCEDURE — 3079F DIAST BP 80-89 MM HG: CPT | Mod: CPTII,S$GLB,, | Performed by: INTERNAL MEDICINE

## 2019-02-12 PROCEDURE — 99999 PR PBB SHADOW E&M-EST. PATIENT-LVL III: CPT | Mod: PBBFAC,,, | Performed by: INTERNAL MEDICINE

## 2019-02-12 PROCEDURE — 3077F SYST BP >= 140 MM HG: CPT | Mod: CPTII,S$GLB,, | Performed by: INTERNAL MEDICINE

## 2019-02-12 PROCEDURE — 99213 OFFICE O/P EST LOW 20 MIN: CPT | Mod: S$GLB,,, | Performed by: INTERNAL MEDICINE

## 2019-02-12 PROCEDURE — 3077F PR MOST RECENT SYSTOLIC BLOOD PRESSURE >= 140 MM HG: ICD-10-PCS | Mod: CPTII,S$GLB,, | Performed by: INTERNAL MEDICINE

## 2019-02-12 PROCEDURE — 99999 PR PBB SHADOW E&M-EST. PATIENT-LVL III: ICD-10-PCS | Mod: PBBFAC,,, | Performed by: INTERNAL MEDICINE

## 2019-02-12 PROCEDURE — 1101F PR PT FALLS ASSESS DOC 0-1 FALLS W/OUT INJ PAST YR: ICD-10-PCS | Mod: CPTII,S$GLB,, | Performed by: INTERNAL MEDICINE

## 2019-02-12 PROCEDURE — 3079F PR MOST RECENT DIASTOLIC BLOOD PRESSURE 80-89 MM HG: ICD-10-PCS | Mod: CPTII,S$GLB,, | Performed by: INTERNAL MEDICINE

## 2019-02-12 PROCEDURE — 99213 PR OFFICE/OUTPT VISIT, EST, LEVL III, 20-29 MIN: ICD-10-PCS | Mod: S$GLB,,, | Performed by: INTERNAL MEDICINE

## 2019-02-12 PROCEDURE — 1101F PT FALLS ASSESS-DOCD LE1/YR: CPT | Mod: CPTII,S$GLB,, | Performed by: INTERNAL MEDICINE

## 2019-02-12 NOTE — PROGRESS NOTES
Subjective:       Patient ID: Anthony Reynaga is a 68 y.o. male.    Chief Complaint: Hypertension    Pt reports cough for the last  2 wks. He thought it was due to lisinopril so stopped this but then developed URI symptoms. He is feeling better today. Cough was initially dry and non-productive but became looser as URI symptoms developed including post nasal drip and congestion. No SOB/wheezing.     Pt's BP is well controlled at home but a little high here today. Pt denies cp/sob/ha/vision or neuro changes.         Review of Systems   Constitutional: Negative for fever.   HENT: Negative for congestion, rhinorrhea and sore throat.    Eyes: Negative for visual disturbance.   Respiratory: Positive for cough. Negative for shortness of breath.    Cardiovascular: Negative for chest pain.   Neurological: Negative for headaches.   Psychiatric/Behavioral: Negative for dysphoric mood.       Objective:      Physical Exam   Constitutional: He is oriented to person, place, and time. He appears well-developed and well-nourished.   Eyes: EOM are normal. Pupils are equal, round, and reactive to light.   Neck: Neck supple. No thyromegaly present.   Cardiovascular: Normal rate, regular rhythm and normal heart sounds.   Pulmonary/Chest: Effort normal and breath sounds normal.   Musculoskeletal: He exhibits no edema.   Lymphadenopathy:     He has no cervical adenopathy.   Neurological: He is alert and oriented to person, place, and time. No cranial nerve deficit.   Psychiatric: He has a normal mood and affect. His behavior is normal.       Assessment:       1. Essential hypertension, benign    2. Cough        Plan:       1. Start losartan as recommended; initial cough may have been due to lisinopril but appears subsequent symptoms were viral URI  2. otc meds prn--proper use d/w pt  3. Home BP monitoring and f/u 2 wks nursing BP check

## 2019-02-27 ENCOUNTER — PATIENT MESSAGE (OUTPATIENT)
Dept: INTERNAL MEDICINE | Facility: CLINIC | Age: 69
End: 2019-02-27

## 2019-03-07 ENCOUNTER — PATIENT OUTREACH (OUTPATIENT)
Dept: ADMINISTRATIVE | Facility: HOSPITAL | Age: 69
End: 2019-03-07

## 2019-03-07 ENCOUNTER — PATIENT MESSAGE (OUTPATIENT)
Dept: ADMINISTRATIVE | Facility: HOSPITAL | Age: 69
End: 2019-03-07

## 2019-03-15 ENCOUNTER — TELEPHONE (OUTPATIENT)
Dept: INTERNAL MEDICINE | Facility: CLINIC | Age: 69
End: 2019-03-15

## 2019-03-15 RX ORDER — WARFARIN 2 MG/1
2 TABLET ORAL
Status: CANCELLED | OUTPATIENT
Start: 2019-03-15

## 2019-03-15 NOTE — TELEPHONE ENCOUNTER
----- Message from Sheron Nair sent at 3/15/2019 11:48 AM CDT -----  Contact: Pt   Can the clinic reply in [facility]: No     Please refill the medication(s) listed below. Please call the patient when the prescription(s) is ready for  at the phone number [phone]    Medication #1: metoprolol succinate (TOPROL-XL) 200 MG 24 hr tablet    Medication #2: warfarin (COUMADIN) 2 MG tablet    Preferred Pharmacy: [facility]

## 2019-03-18 DIAGNOSIS — I48.0 PAROXYSMAL ATRIAL FIBRILLATION: ICD-10-CM

## 2019-03-18 DIAGNOSIS — I10 ESSENTIAL HYPERTENSION, BENIGN: Primary | Chronic | ICD-10-CM

## 2019-03-19 RX ORDER — WARFARIN 2 MG/1
TABLET ORAL
Qty: 30 TABLET | Refills: 5 | Status: SHIPPED | OUTPATIENT
Start: 2019-03-19 | End: 2019-03-20 | Stop reason: SDUPTHER

## 2019-03-19 RX ORDER — METOPROLOL SUCCINATE 200 MG/1
200 TABLET, EXTENDED RELEASE ORAL DAILY
Qty: 90 TABLET | Refills: 3 | Status: SHIPPED | OUTPATIENT
Start: 2019-03-19 | End: 2019-03-20 | Stop reason: SDUPTHER

## 2019-03-19 NOTE — TELEPHONE ENCOUNTER
----- Message from Tamiko Elaine sent at 3/19/2019  9:41 AM CDT -----  Can the clinic reply in [facility]: no    Please refill the medication(s) listed below. Please call the patient when the prescription(s) is ready for  at this phone number      [phone]    Medication #1 metoprolol succinate (TOPROL-XL) 200 MG 24 hr tablet  Medication #2 warfarin (COUMADIN) 2 MG tablet    Preferred Pharmacy: [facility]

## 2019-03-19 NOTE — TELEPHONE ENCOUNTER
----- Message from Fransisca Ozuna sent at 3/19/2019  1:17 PM CDT -----  Contact: angie  Name of Who is Calling: angie at [facility]       What is the request in detail: Per Angie is requesting a call back to get clarifications on dosage for the patient warfarin (COUMADIN) 2 MG tablet medication       Can the clinic reply by [facility]: no      What Number to Call Back if not in [facility]: [phone]

## 2019-03-20 DIAGNOSIS — I10 ESSENTIAL HYPERTENSION, BENIGN: Chronic | ICD-10-CM

## 2019-03-20 DIAGNOSIS — I48.0 PAROXYSMAL ATRIAL FIBRILLATION: ICD-10-CM

## 2019-03-20 RX ORDER — WARFARIN 2 MG/1
TABLET ORAL
Qty: 30 TABLET | Refills: 5 | Status: SHIPPED | OUTPATIENT
Start: 2019-03-20 | End: 2019-03-21 | Stop reason: SDUPTHER

## 2019-03-20 RX ORDER — METOPROLOL SUCCINATE 200 MG/1
200 TABLET, EXTENDED RELEASE ORAL DAILY
Qty: 90 TABLET | Refills: 3 | Status: SHIPPED | OUTPATIENT
Start: 2019-03-20 | End: 2020-02-03

## 2019-03-20 NOTE — TELEPHONE ENCOUNTER
----- Message from Jill Guy sent at 3/20/2019 10:57 AM CDT -----  Contact: Self      Can the clinic reply in [facility]: Y      Please refill the medication(s) listed below. Please call the patient when the prescription(s) is ready for  at this phone number  [phone]. Pt has a weeks worth of medication left and wants it sent to [facility] instead of [facility].      Medication #1 metoprolol succinate (TOPROL-XL) 200 MG 24 hr tablet    Medication #2 warfarin (COUMADIN) 2 MG tablet      Preferred Pharmacy: [facility] at [phone] fax [phone]

## 2019-03-21 DIAGNOSIS — I48.0 PAROXYSMAL ATRIAL FIBRILLATION: ICD-10-CM

## 2019-03-21 RX ORDER — WARFARIN 2 MG/1
TABLET ORAL
Qty: 30 TABLET | Refills: 5 | Status: SHIPPED | OUTPATIENT
Start: 2019-03-21 | End: 2019-03-25 | Stop reason: SDUPTHER

## 2019-03-21 NOTE — TELEPHONE ENCOUNTER
----- Message from Nelly Delaney sent at 3/21/2019 10:09 AM CDT -----  Contact: Grisel enriquez [facility]   Name of Who is Calling: Grisel enriquez [facility]     What is the request in detail: Grisel enriquez [facility] is request instructions on Rx warfarin (COUMADIN) 2 MG tablet..... Please contact to further discuss and advise       Can the clinic reply by [facility]:     What Number to Call Back if not in [facility]: [phone]

## 2019-03-21 NOTE — TELEPHONE ENCOUNTER
----- Message from Nelly Delaney sent at 3/21/2019 10:09 AM CDT -----  Contact: Grisel enriquez [facility]   Name of Who is Calling: Grisel enriquez [facility]     What is the request in detail: Grisel enriquez [facility] is request instructions on Rx warfarin (COUMADIN) 2 MG tablet..... Please contact to further discuss and advise       Can the clinic reply by [facility]:     What Number to Call Back if not in [facility]: [phone]

## 2019-03-25 ENCOUNTER — PATIENT MESSAGE (OUTPATIENT)
Dept: INTERNAL MEDICINE | Facility: CLINIC | Age: 69
End: 2019-03-25

## 2019-03-25 DIAGNOSIS — I48.0 PAROXYSMAL ATRIAL FIBRILLATION: ICD-10-CM

## 2019-03-25 RX ORDER — WARFARIN 2 MG/1
TABLET ORAL
Qty: 90 TABLET | Refills: 3 | Status: SHIPPED | OUTPATIENT
Start: 2019-03-25 | End: 2020-07-14

## 2019-03-26 ENCOUNTER — PATIENT OUTREACH (OUTPATIENT)
Dept: ADMINISTRATIVE | Facility: HOSPITAL | Age: 69
End: 2019-03-26

## 2019-03-26 ENCOUNTER — ANTI-COAG VISIT (OUTPATIENT)
Dept: CARDIOLOGY | Facility: CLINIC | Age: 69
End: 2019-03-26
Payer: COMMERCIAL

## 2019-03-26 DIAGNOSIS — Z79.01 LONG TERM CURRENT USE OF ANTICOAGULANT THERAPY: Primary | ICD-10-CM

## 2019-03-26 DIAGNOSIS — I48.0 PAROXYSMAL ATRIAL FIBRILLATION: ICD-10-CM

## 2019-03-26 LAB — INR PPP: 2.6 (ref 2–3)

## 2019-03-26 PROCEDURE — 99211 OFF/OP EST MAY X REQ PHY/QHP: CPT | Mod: 25,S$GLB,,

## 2019-03-26 PROCEDURE — 85610 PROTHROMBIN TIME: CPT | Mod: QW,S$GLB,, | Performed by: INTERNAL MEDICINE

## 2019-03-26 PROCEDURE — 99211 PR OFFICE/OUTPT VISIT, EST, LEVL I: ICD-10-PCS | Mod: 25,S$GLB,,

## 2019-03-26 PROCEDURE — 85610 POCT INR: ICD-10-PCS | Mod: QW,S$GLB,, | Performed by: INTERNAL MEDICINE

## 2019-03-26 NOTE — PROGRESS NOTES
INR within range, denies change to diet, reports he was taken off Lisinopril and will be starting Losartan soon, he has had cold like symptoms off and on for the past 2 months, and he had one episode this week of scant nasal bleeding from right nare that occurred after blowing his nose, will maintain and follow up in 8 weeks

## 2019-03-27 ENCOUNTER — PATIENT MESSAGE (OUTPATIENT)
Dept: INTERNAL MEDICINE | Facility: CLINIC | Age: 69
End: 2019-03-27

## 2019-03-27 ENCOUNTER — OFFICE VISIT (OUTPATIENT)
Dept: INTERNAL MEDICINE | Facility: CLINIC | Age: 69
End: 2019-03-27
Payer: COMMERCIAL

## 2019-03-27 ENCOUNTER — HOSPITAL ENCOUNTER (OUTPATIENT)
Dept: RADIOLOGY | Facility: OTHER | Age: 69
Discharge: HOME | End: 2019-03-27
Attending: INTERNAL MEDICINE
Payer: COMMERCIAL

## 2019-03-27 VITALS
DIASTOLIC BLOOD PRESSURE: 80 MMHG | HEIGHT: 72 IN | SYSTOLIC BLOOD PRESSURE: 140 MMHG | WEIGHT: 181.69 LBS | HEART RATE: 70 BPM | BODY MASS INDEX: 24.61 KG/M2

## 2019-03-27 DIAGNOSIS — R05.9 COUGH: ICD-10-CM

## 2019-03-27 DIAGNOSIS — J06.9 VIRAL URI: Primary | ICD-10-CM

## 2019-03-27 PROCEDURE — 71046 XR CHEST PA AND LATERAL: ICD-10-PCS | Mod: 26,,, | Performed by: RADIOLOGY

## 2019-03-27 PROCEDURE — 3077F PR MOST RECENT SYSTOLIC BLOOD PRESSURE >= 140 MM HG: ICD-10-PCS | Mod: CPTII,S$GLB,, | Performed by: INTERNAL MEDICINE

## 2019-03-27 PROCEDURE — 1101F PT FALLS ASSESS-DOCD LE1/YR: CPT | Mod: CPTII,S$GLB,, | Performed by: INTERNAL MEDICINE

## 2019-03-27 PROCEDURE — 1101F PR PT FALLS ASSESS DOC 0-1 FALLS W/OUT INJ PAST YR: ICD-10-PCS | Mod: CPTII,S$GLB,, | Performed by: INTERNAL MEDICINE

## 2019-03-27 PROCEDURE — 99999 PR PBB SHADOW E&M-EST. PATIENT-LVL IV: ICD-10-PCS | Mod: PBBFAC,,, | Performed by: INTERNAL MEDICINE

## 2019-03-27 PROCEDURE — 3077F SYST BP >= 140 MM HG: CPT | Mod: CPTII,S$GLB,, | Performed by: INTERNAL MEDICINE

## 2019-03-27 PROCEDURE — 71046 X-RAY EXAM CHEST 2 VIEWS: CPT | Mod: TC,FY

## 2019-03-27 PROCEDURE — 99213 PR OFFICE/OUTPT VISIT, EST, LEVL III, 20-29 MIN: ICD-10-PCS | Mod: S$GLB,,, | Performed by: INTERNAL MEDICINE

## 2019-03-27 PROCEDURE — 71046 X-RAY EXAM CHEST 2 VIEWS: CPT | Mod: 26,,, | Performed by: RADIOLOGY

## 2019-03-27 PROCEDURE — 3079F PR MOST RECENT DIASTOLIC BLOOD PRESSURE 80-89 MM HG: ICD-10-PCS | Mod: CPTII,S$GLB,, | Performed by: INTERNAL MEDICINE

## 2019-03-27 PROCEDURE — 99999 PR PBB SHADOW E&M-EST. PATIENT-LVL IV: CPT | Mod: PBBFAC,,, | Performed by: INTERNAL MEDICINE

## 2019-03-27 PROCEDURE — 3079F DIAST BP 80-89 MM HG: CPT | Mod: CPTII,S$GLB,, | Performed by: INTERNAL MEDICINE

## 2019-03-27 PROCEDURE — 99213 OFFICE O/P EST LOW 20 MIN: CPT | Mod: S$GLB,,, | Performed by: INTERNAL MEDICINE

## 2019-03-27 RX ORDER — LORAZEPAM 1 MG/1
1 TABLET ORAL NIGHTLY PRN
Qty: 20 TABLET | Refills: 0 | Status: SHIPPED | OUTPATIENT
Start: 2019-03-27 | End: 2020-03-17

## 2019-03-27 NOTE — PROGRESS NOTES
Subjective:       Patient ID: Anthony Reynaga is a 68 y.o. male.    Chief Complaint: Cough and Chest Congestion    Pt c/o 7 days of nasal congestion with clear rhinorrhea with occasional blood in mucus. Cough is productive of white sputum. He had fever initially but resolved. Sore throat for the first 2 days. Used nyquil the first 2 nights but no meds since then. No sob/wheezing. He has had cough for 3 months that was thought first to be due to lisinopril so this was changed. He then developed an URI which improved but cough never fully resolved. He has had 3-5lbs of wt loss. No night sweats. No hemoptysis.       Review of Systems   Constitutional: Negative for fever.   HENT: Positive for congestion and rhinorrhea. Negative for sore throat.    Respiratory: Positive for cough. Negative for shortness of breath.    Cardiovascular: Negative for chest pain.       Objective:      Physical Exam   Constitutional: He is oriented to person, place, and time. He appears well-developed and well-nourished.   HENT:   Right Ear: Tympanic membrane, external ear and ear canal normal.   Left Ear: Tympanic membrane, external ear and ear canal normal.   Nose: Mucosal edema and rhinorrhea present.   Mouth/Throat: No oropharyngeal exudate or posterior oropharyngeal erythema.   Neck: Neck supple. No thyromegaly present.   Cardiovascular: Normal rate, regular rhythm and normal heart sounds.   Pulmonary/Chest: Effort normal and breath sounds normal.   Lymphadenopathy:     He has no cervical adenopathy.   Neurological: He is alert and oriented to person, place, and time.   Psychiatric: He has a normal mood and affect.       Assessment:       1. Viral URI        Plan:       1. Likely viral URI so have recommend otc meds prn--proper use d/w pt  2. Considering duration of cough, it is prudent to obtain CXR  3. If symptoms aren't improving and cxr is unrevealing, then consider ENT

## 2019-05-20 ENCOUNTER — PATIENT MESSAGE (OUTPATIENT)
Dept: INTERNAL MEDICINE | Facility: CLINIC | Age: 69
End: 2019-05-20

## 2019-05-20 DIAGNOSIS — R93.89 ABNORMAL CHEST X-RAY: ICD-10-CM

## 2019-05-20 DIAGNOSIS — M25.531 RIGHT WRIST PAIN: Primary | ICD-10-CM

## 2019-05-21 ENCOUNTER — TELEPHONE (OUTPATIENT)
Dept: INTERNAL MEDICINE | Facility: CLINIC | Age: 69
End: 2019-05-21

## 2019-05-21 NOTE — TELEPHONE ENCOUNTER
Left voice message for patient to schedule appointment from referral to Hand clinic.  Bob BRADY  [phone]

## 2019-05-23 ENCOUNTER — ANTI-COAG VISIT (OUTPATIENT)
Dept: CARDIOLOGY | Facility: CLINIC | Age: 69
End: 2019-05-23
Payer: COMMERCIAL

## 2019-05-23 DIAGNOSIS — Z79.01 LONG TERM CURRENT USE OF ANTICOAGULANT THERAPY: Primary | ICD-10-CM

## 2019-05-23 DIAGNOSIS — I48.0 PAROXYSMAL ATRIAL FIBRILLATION: ICD-10-CM

## 2019-05-23 LAB — INR PPP: 3.1 (ref 2–3)

## 2019-05-23 PROCEDURE — 85610 PROTHROMBIN TIME: CPT | Mod: QW,S$GLB,, | Performed by: INTERNAL MEDICINE

## 2019-05-23 PROCEDURE — 93793 ANTICOAG MGMT PT WARFARIN: CPT | Mod: S$GLB,,,

## 2019-05-23 PROCEDURE — 85610 POCT INR: ICD-10-PCS | Mod: QW,S$GLB,, | Performed by: INTERNAL MEDICINE

## 2019-05-23 PROCEDURE — 93793 PR ANTICOAGULANT MGMT FOR PT TAKING WARFARIN: ICD-10-PCS | Mod: S$GLB,,,

## 2019-05-23 NOTE — PROGRESS NOTES
INR not at goal. Medications, chart, and patient findings reviewed. See calendar for adjustments to dose and follow up plan.  Pt findings: Pt states he has not had as much greens as usual & he denies any other changes.  Pt instructed to have a serving of greens today.  Will re-assess in 5 weeks. Patient was re-educated on situations that would require placing a call to the Coumadin Clinic, including bleeding or unusual bruising issues, changes in health, diet or medications,upcoming procedures that require warfarin interruption, and missed Coumadin dose(s). Patient expressed understanding that avoidance of consistency with these parameters could cause fluctuations in INR, leading to more frequent visits and increase risk of adverse events.

## 2019-05-29 ENCOUNTER — PATIENT MESSAGE (OUTPATIENT)
Dept: INTERNAL MEDICINE | Facility: CLINIC | Age: 69
End: 2019-05-29

## 2019-05-30 ENCOUNTER — TELEPHONE (OUTPATIENT)
Dept: ORTHOPEDICS | Facility: CLINIC | Age: 69
End: 2019-05-30

## 2019-05-30 DIAGNOSIS — R52 PAIN: Primary | ICD-10-CM

## 2019-05-31 ENCOUNTER — OFFICE VISIT (OUTPATIENT)
Dept: ORTHOPEDICS | Facility: CLINIC | Age: 69
End: 2019-05-31
Payer: COMMERCIAL

## 2019-05-31 ENCOUNTER — HOSPITAL ENCOUNTER (OUTPATIENT)
Dept: RADIOLOGY | Facility: OTHER | Age: 69
Discharge: HOME | End: 2019-05-31
Attending: PLASTIC SURGERY
Payer: COMMERCIAL

## 2019-05-31 VITALS
HEIGHT: 72 IN | HEART RATE: 64 BPM | WEIGHT: 181.69 LBS | DIASTOLIC BLOOD PRESSURE: 80 MMHG | BODY MASS INDEX: 24.61 KG/M2 | SYSTOLIC BLOOD PRESSURE: 162 MMHG

## 2019-05-31 DIAGNOSIS — M19.131 SNAC (SCAPHOID NON-UNION ADVANCED COLLAPSE) OF WRIST, RIGHT: Primary | ICD-10-CM

## 2019-05-31 DIAGNOSIS — M20.032 SWAN-NECK DEFORMITY OF FINGER, LEFT: ICD-10-CM

## 2019-05-31 DIAGNOSIS — R52 PAIN: ICD-10-CM

## 2019-05-31 DIAGNOSIS — S62.001S SNAC (SCAPHOID NON-UNION ADVANCED COLLAPSE) OF WRIST, RIGHT: Primary | ICD-10-CM

## 2019-05-31 PROCEDURE — 3079F PR MOST RECENT DIASTOLIC BLOOD PRESSURE 80-89 MM HG: ICD-10-PCS | Mod: CPTII,S$GLB,, | Performed by: PLASTIC SURGERY

## 2019-05-31 PROCEDURE — 99203 OFFICE O/P NEW LOW 30 MIN: CPT | Mod: S$GLB,,, | Performed by: PLASTIC SURGERY

## 2019-05-31 PROCEDURE — 99203 PR OFFICE/OUTPT VISIT, NEW, LEVL III, 30-44 MIN: ICD-10-PCS | Mod: S$GLB,,, | Performed by: PLASTIC SURGERY

## 2019-05-31 PROCEDURE — 1101F PT FALLS ASSESS-DOCD LE1/YR: CPT | Mod: CPTII,S$GLB,, | Performed by: PLASTIC SURGERY

## 2019-05-31 PROCEDURE — 73130 X-RAY EXAM OF HAND: CPT | Mod: 26,RT,, | Performed by: RADIOLOGY

## 2019-05-31 PROCEDURE — 73130 X-RAY EXAM OF HAND: CPT | Mod: 26,LT,, | Performed by: RADIOLOGY

## 2019-05-31 PROCEDURE — 99999 PR PBB SHADOW E&M-EST. PATIENT-LVL III: CPT | Mod: PBBFAC,,, | Performed by: PLASTIC SURGERY

## 2019-05-31 PROCEDURE — 1101F PR PT FALLS ASSESS DOC 0-1 FALLS W/OUT INJ PAST YR: ICD-10-PCS | Mod: CPTII,S$GLB,, | Performed by: PLASTIC SURGERY

## 2019-05-31 PROCEDURE — 99999 PR PBB SHADOW E&M-EST. PATIENT-LVL III: ICD-10-PCS | Mod: PBBFAC,,, | Performed by: PLASTIC SURGERY

## 2019-05-31 PROCEDURE — 73130 PR  X-RAY HAND 3+ VW: ICD-10-PCS | Mod: 26,LT,, | Performed by: RADIOLOGY

## 2019-05-31 PROCEDURE — 3077F SYST BP >= 140 MM HG: CPT | Mod: CPTII,S$GLB,, | Performed by: PLASTIC SURGERY

## 2019-05-31 PROCEDURE — 73130 X-RAY EXAM OF HAND: CPT | Mod: 50,TC,FY

## 2019-05-31 PROCEDURE — 3077F PR MOST RECENT SYSTOLIC BLOOD PRESSURE >= 140 MM HG: ICD-10-PCS | Mod: CPTII,S$GLB,, | Performed by: PLASTIC SURGERY

## 2019-05-31 PROCEDURE — 3079F DIAST BP 80-89 MM HG: CPT | Mod: CPTII,S$GLB,, | Performed by: PLASTIC SURGERY

## 2019-05-31 NOTE — PROGRESS NOTES
Chief Complaint: Pain of the Right Wrist and Pain of the Left Hand      HPI:    Anthony Reynaga is a right hand dominant 69 y.o. male presenting today for several year history of right wrist pain and left ring finger deformity.  Patient states that he suffered a scaphoid fracture within the right wrist approximately 50 years ago.  He never underwent surgical repair of the injury as it was diagnosed late.  He states that he is beginning to experience more and more pain during activities such as writing.  He states that he would like to discuss possible treatment options if any.  He demonstrates that he has very limited extension of the right wrist.  He is also interested in beginning a workout routine in which she will be lifting weights he also takes part in yoga frequently.  He further explains that he developed hyperextension deformity at the ring finger PIP joint. Upon further questioning he may have injured the distal phalanx resulting in a mallet fracture that was untreated.  He does have a history of a mallet fracture of the right ring finger for which she had surgery. He denies any numbness or tingling no other complaints    Past Medical History:   Diagnosis Date    Anticoagulant long-term use     Atrial fibrillation     BPH (benign prostatic hyperplasia)     Cataract     Elevated PSA     Floaters     Hernia     bilateral inquinal    Hypertension     Inguinal hernia     Kidney stone     Knee injury        Past Surgical History:   Procedure Laterality Date    APPENDECTOMY      BLADDER SURGERY      polyp removed benign    COLONOSCOPY N/A 6/22/2016    Performed by [facility]quin Francis MD at [facility] (4TH FLR)    CYSTOSCOPY      benign bladder papilloma    FINGER SURGERY      HERNIA REPAIR Left 2016    inguinal    LITHOTRIPSY      LITHOTRIPSY-EXTRACORPOREAL SHOCK WAVE Right 7/1/2015    Performed by [facility]yn Regalado MD at [facility] OR 1ST FLR    TONSILLECTOMY, ADENOIDECTOMY          Family History    Problem Relation Age of Onset    Cancer Mother         ureteral/renal cancer    Hypertension Mother     Dementia Father     Benign prostatic hyperplasia Neg Hx        Social History     Socioeconomic History    Marital status: Single     Spouse name: Not on file    Number of children: Not on file    Years of education: Not on file    Highest education level: Not on file   Occupational History    Not on file   Social Needs    Financial resource strain: Not on file    Food insecurity:     Worry: Not on file     Inability: Not on file    Transportation needs:     Medical: Not on file     Non-medical: Not on file   Tobacco Use    Smoking status: Never Smoker    Smokeless tobacco: Never Used   Substance and Sexual Activity    Alcohol use: Yes     Comment: rarely    Drug use: No    Sexual activity: Yes     Partners: Female   Lifestyle    Physical activity:     Days per week: Not on file     Minutes per session: Not on file    Stress: Not on file   Relationships    Social connections:     Talks on phone: Not on file     Gets together: Not on file     Attends Hinduism service: Not on file     Active member of club or organization: Not on file     Attends meetings of clubs or organizations: Not on file     Relationship status: Not on file   Other Topics Concern    Not on file   Social History Narrative    Not on file       Review of patient's allergies indicates:  No Known Allergies      Current Outpatient Medications:     alfuzosin (UROXATRAL) 10 mg Tb24, TAKE 1 TABLET BY MOUTH ONCE DAILY, Disp: 90 tablet, Rfl: 3    aspirin (ECOTRIN) 81 MG EC tablet, Take 81 mg by mouth once daily., Disp: , Rfl:     chlorthalidone (HYGROTEN) 25 MG Tab, Take 1/2 tab (12.5 mg) PO QD., Disp: 45 tablet, Rfl: 3    flecainide (TAMBOCOR) 150 MG Tab, Take 2 tabs PO prn for AF. Up to one dose per day., Disp: 10 tablet, Rfl: 3    LORazepam (ATIVAN) 1 MG tablet, Take 1 tablet (1 mg total) by mouth nightly as needed  (insomnia)., Disp: 20 tablet, Rfl: 0    losartan (COZAAR) 25 MG tablet, Take 1 tablet (25 mg total) by mouth once daily., Disp: 90 tablet, Rfl: 3    metoprolol succinate (TOPROL-XL) 200 MG 24 hr tablet, Take 1 tablet (200 mg total) by mouth once daily., Disp: 90 tablet, Rfl: 3    simvastatin (ZOCOR) 20 MG tablet, Take 20 mg by mouth every evening. , Disp: , Rfl:     vitamin D 1000 units Tab, Take 1,000 Units by mouth once daily., Disp: , Rfl:     warfarin (COUMADIN) 2 MG tablet, Take 1/2 tablet every M/W/F and 1 tablet daily all other days or as directed by coumadin clinic, Disp: 90 tablet, Rfl: 3        Review of Systems:  Constitutional: no fever or chills  ENT: no nasal congestion or sore throat  Respiratory: no cough or shortness of breath  Cardiovascular: no chest pain or palpitations  Gastrointestinal: no nausea or vomiting, PUD, GERD, NSAID intolerance  Genitourinary: no hematuria or dysuria  Integument/Breast: no rash or pruritis  Hematologic/Lymphatic: no easy bruising or lymphadenopathy  Musculoskeletal: see HPI  Neurological: no seizures or tremors  Behavioral/Psych: no auditory or visual hallucinations      Objective:      PHYSICAL EXAM:  Vitals:    05/31/19 1346   BP: (!) 162/80   Pulse: 64   Weight: 82.4 kg (181 lb 10.5 oz)   Height: 6' (1.829 m)   PainSc:   4     General Appearance: WDWN, NAD  Neuro/Psych: Mood & affect appropriate  Lungs: Respirations equal and unlabored.   CV: No apparent CV dysfunction, distal pulses intact, RRR  Skin: Intact throughout  Extremities:   Right Hand Exam     Range of Motion   Wrist   Extension: 5   Flexion: 40   Pronation: normal   Supination: normal     Tests   Phalens Sign: negative    Other   Erythema: absent  Sensation: normal  Pulse: present      Left Hand Exam     Range of Motion   Hand   PIP Ring: abnormal   DIP Ring: abnormal     Other   Sensation: normal  Pulse: present              DIAGNOSTICS/IMAGING:    Radiologist's Impression:  EXAMINATION:  XR  HAND COMPLETE 3 VIEWS BILATERAL    CLINICAL HISTORY:  Pain, unspecified    TECHNIQUE:  PA, lateral, and oblique views of the hand were performed.    COMPARISON:  None    FINDINGS:  Osseous structures appear intact without evidence fracture or focal osseous destructive lesion.    No overt dislocation, but there is some dorsal angulation at the proximal interphalangeal joint of the left 4th finger.  Similar findings, but to a lesser degree on the left 3rd finger.    Mild scattered osteoarthritic change.  This is most notable at the right radiocarpal joint.    No focal soft tissue swelling or radiopaque foreign body.       Comments: I have personnaly reviewed the imaging and I agree with the above radiologist's report.          Assessment:     Encounter Diagnoses   Name Primary?   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 I discussed the pathophysiology progression treatment of this condition with the patient detail. It appears that his arthritis is primarily found within the scaphoid fossa in between the scaphoid and the capitate.  I discussed the possible progression of this and worsening symptoms. Further discussed conservative treatment options versus operative including PRC versus a lunate capitate fusion.  The patient remained in on interested in injection or surgery at this time. I have further discussed that he likely had a previous mallet injury to the left ring finger and subsequently developed a swan neck deformity with hyperextension of the PIP joint. I discussed the possibility of figure-eight ring splints to help avoid hyperextension  at the PIP joint. He was offered referral to occupational therapy.  He was also offered a modabber splint for the right wrist to use while exercising.  The patient states that he will return to clinic if he decides to pursue any medical interventions to these areas.  All questions and concerns were addressed prior to discharge

## 2019-05-31 NOTE — LETTER
May 31, 2019      Gonzalo Harris MD  [address]  [facility] [address]           [facility] [place] [facility] 920  [address], [address]  [facility] [address]  Phone: [phone]          Patient: Anthony Reynaga   MR Number: [id number]   YOB: 1950   Date of Visit: 5/31/2019       Dear Dr. Gonzalo Harris:    Thank you for referring Anthony Reynaga to me for evaluation. Attached you will find relevant portions of my assessment and plan of care.    If you have questions, please do not hesitate to call me. I look forward to following Anthony Reynaga along with you.    Sincerely,    Jose Salazar Jr., MD    Enclosure  CC:  No Recipients    If you would like to receive this communication electronically, please contact [email or web address] or [phone] to request more information on [employer] access.    For providers and/or their staff who would like to refer a patient to [facility], please contact us through our one-stop-shop provider referral line, [facility], at [phone].    If you feel you have received this communication in error or would no longer like to receive these types of communications, please e-mail [email or web address]

## 2019-06-06 ENCOUNTER — PATIENT MESSAGE (OUTPATIENT)
Dept: CARDIOLOGY | Facility: CLINIC | Age: 69
End: 2019-06-06

## 2019-06-06 ENCOUNTER — PATIENT MESSAGE (OUTPATIENT)
Dept: ELECTROPHYSIOLOGY | Facility: CLINIC | Age: 69
End: 2019-06-06

## 2019-06-26 ENCOUNTER — ANTI-COAG VISIT (OUTPATIENT)
Dept: CARDIOLOGY | Facility: CLINIC | Age: 69
End: 2019-06-26
Payer: COMMERCIAL

## 2019-06-26 DIAGNOSIS — Z79.01 LONG TERM CURRENT USE OF ANTICOAGULANT THERAPY: Primary | ICD-10-CM

## 2019-06-26 DIAGNOSIS — I48.0 PAROXYSMAL ATRIAL FIBRILLATION: ICD-10-CM

## 2019-06-26 LAB — INR PPP: 2.9 (ref 2–3)

## 2019-06-26 PROCEDURE — 85610 POCT INR: ICD-10-PCS | Mod: QW,S$GLB,, | Performed by: INTERNAL MEDICINE

## 2019-06-26 PROCEDURE — 85610 PROTHROMBIN TIME: CPT | Mod: QW,S$GLB,, | Performed by: INTERNAL MEDICINE

## 2019-06-26 PROCEDURE — 93793 ANTICOAG MGMT PT WARFARIN: CPT | Mod: S$GLB,,,

## 2019-06-26 PROCEDURE — 93793 PR ANTICOAGULANT MGMT FOR PT TAKING WARFARIN: ICD-10-PCS | Mod: S$GLB,,,

## 2019-06-26 NOTE — PROGRESS NOTES
INR at goal. Medications and chart reviewed. No changes noted to necessitate adjustment of warfarin or follow-up plan. See calendar.  Pt findings: Pt states that he has been increasing his intake of alcohol, having 2-3 beers/week and pt denies any other changes.  Reminded pt he has to remain consistent w/ alcohol intake to keep INR consistent or else his levels w/ fluctuate.  Will maintain current regimen & re-assess pt in 6 weeks.  Patient was re-educated on situations that would require placing a call to the [facility], including bleeding or unusual bruising issues, changes in health, diet or medications,upcoming procedures that require warfarin interruption, and missed Coumadin dose(s). Patient expressed understanding that avoidance of consistency with these parameters could cause fluctuations in INR, leading to more frequent visits and increase risk of adverse events.

## 2019-07-12 RX ORDER — CHLORTHALIDONE 25 MG/1
TABLET ORAL
Qty: 45 TABLET | Refills: 3 | OUTPATIENT
Start: 2019-07-12 | End: 2019-09-18

## 2019-07-23 ENCOUNTER — TELEPHONE (OUTPATIENT)
Dept: ELECTROPHYSIOLOGY | Facility: CLINIC | Age: 69
End: 2019-07-23

## 2019-07-23 DIAGNOSIS — I48.91 ATRIAL FIBRILLATION, UNSPECIFIED TYPE: Primary | ICD-10-CM

## 2019-07-23 DIAGNOSIS — I49.8 OTHER SPECIFIED CARDIAC ARRHYTHMIAS: Primary | ICD-10-CM

## 2019-07-23 NOTE — TELEPHONE ENCOUNTER
Spoke w/ pt to let him know that he needed an echo prior to appt on 9/18. Scheduled & pt confirmed.

## 2019-08-12 ENCOUNTER — ANTI-COAG VISIT (OUTPATIENT)
Dept: CARDIOLOGY | Facility: CLINIC | Age: 69
End: 2019-08-12
Payer: COMMERCIAL

## 2019-08-12 DIAGNOSIS — I48.0 PAROXYSMAL ATRIAL FIBRILLATION: ICD-10-CM

## 2019-08-12 DIAGNOSIS — Z79.01 LONG TERM CURRENT USE OF ANTICOAGULANT THERAPY: Primary | ICD-10-CM

## 2019-08-12 LAB — INR PPP: 2.6 (ref 2–3)

## 2019-08-12 PROCEDURE — 85610 PROTHROMBIN TIME: CPT | Mod: QW,S$GLB,, | Performed by: INTERNAL MEDICINE

## 2019-08-12 PROCEDURE — 93793 PR ANTICOAGULANT MGMT FOR PT TAKING WARFARIN: ICD-10-PCS | Mod: S$GLB,,,

## 2019-08-12 PROCEDURE — 93793 ANTICOAG MGMT PT WARFARIN: CPT | Mod: S$GLB,,,

## 2019-08-12 PROCEDURE — 85610 POCT INR: ICD-10-PCS | Mod: QW,S$GLB,, | Performed by: INTERNAL MEDICINE

## 2019-08-12 NOTE — PROGRESS NOTES
INR at goal. Medications and chart reviewed. No changes noted to necessitate adjustment of warfarin or follow-up plan. See calendar.  Pt denies any changes.  Will maintain current regimen & re-assess in 8 weeks.   Patient was re-educated on situations that would require placing a call to the Coumadin Clinic, including bleeding or unusual bruising issues, changes in health, diet or medications,upcoming procedures that require warfarin interruption, and missed Coumadin dose(s). Patient expressed understanding that avoidance of consistency with these parameters could cause fluctuations in INR, leading to more frequent visits and increase risk of adverse events.

## 2019-08-14 RX ORDER — CHLORTHALIDONE 25 MG/1
TABLET ORAL
Qty: 45 TABLET | Refills: 3 | Status: SHIPPED | OUTPATIENT
Start: 2019-08-14 | End: 2019-09-18 | Stop reason: SDUPTHER

## 2019-08-23 ENCOUNTER — HOSPITAL ENCOUNTER (EMERGENCY)
Facility: OTHER | Age: 69
Discharge: HOME | End: 2019-08-23
Attending: EMERGENCY MEDICINE
Payer: COMMERCIAL

## 2019-08-23 VITALS
BODY MASS INDEX: 25.06 KG/M2 | OXYGEN SATURATION: 96 % | RESPIRATION RATE: 17 BRPM | HEIGHT: 72 IN | TEMPERATURE: 98 F | HEART RATE: 76 BPM | DIASTOLIC BLOOD PRESSURE: 62 MMHG | SYSTOLIC BLOOD PRESSURE: 101 MMHG | WEIGHT: 185 LBS

## 2019-08-23 DIAGNOSIS — Z79.01 ANTICOAGULANT LONG-TERM USE: ICD-10-CM

## 2019-08-23 DIAGNOSIS — I48.0 PAROXYSMAL ATRIAL FIBRILLATION: Primary | ICD-10-CM

## 2019-08-23 DIAGNOSIS — R00.2 PALPITATIONS: ICD-10-CM

## 2019-08-23 LAB
ALBUMIN SERPL BCP-MCNC: 3.9 G/DL (ref 3.5–5.2)
ALP SERPL-CCNC: 49 U/L (ref 55–135)
ALT SERPL W/O P-5'-P-CCNC: 31 U/L (ref 10–44)
ANION GAP SERPL CALC-SCNC: 8 MMOL/L (ref 8–16)
APTT BLDCRRT: 38.8 SEC (ref 21–32)
AST SERPL-CCNC: 30 U/L (ref 10–40)
BASOPHILS # BLD AUTO: 0.09 K/UL (ref 0–0.2)
BASOPHILS NFR BLD: 1.4 % (ref 0–1.9)
BILIRUB SERPL-MCNC: 0.7 MG/DL (ref 0.1–1)
BUN SERPL-MCNC: 17 MG/DL (ref 8–23)
CALCIUM SERPL-MCNC: 10.3 MG/DL (ref 8.7–10.5)
CHLORIDE SERPL-SCNC: 108 MMOL/L (ref 95–110)
CO2 SERPL-SCNC: 25 MMOL/L (ref 23–29)
CREAT SERPL-MCNC: 1 MG/DL (ref 0.5–1.4)
DIFFERENTIAL METHOD: ABNORMAL
EOSINOPHIL # BLD AUTO: 0.3 K/UL (ref 0–0.5)
EOSINOPHIL NFR BLD: 4.1 % (ref 0–8)
ERYTHROCYTE [DISTWIDTH] IN BLOOD BY AUTOMATED COUNT: 14.1 % (ref 11.5–14.5)
EST. GFR  (AFRICAN AMERICAN): >60 ML/MIN/1.73 M^2
EST. GFR  (NON AFRICAN AMERICAN): >60 ML/MIN/1.73 M^2
GLUCOSE SERPL-MCNC: 114 MG/DL (ref 70–110)
HCT VFR BLD AUTO: 46.1 % (ref 40–54)
HGB BLD-MCNC: 15 G/DL (ref 14–18)
IMM GRANULOCYTES # BLD AUTO: 0.02 K/UL (ref 0–0.04)
IMM GRANULOCYTES NFR BLD AUTO: 0.3 % (ref 0–0.5)
INR PPP: 2.4 (ref 0.8–1.2)
LYMPHOCYTES # BLD AUTO: 1.4 K/UL (ref 1–4.8)
LYMPHOCYTES NFR BLD: 20.8 % (ref 18–48)
MAGNESIUM SERPL-MCNC: 2.4 MG/DL (ref 1.6–2.6)
MCH RBC QN AUTO: 27.2 PG (ref 27–31)
MCHC RBC AUTO-ENTMCNC: 32.5 G/DL (ref 32–36)
MCV RBC AUTO: 84 FL (ref 82–98)
MONOCYTES # BLD AUTO: 0.7 K/UL (ref 0.3–1)
MONOCYTES NFR BLD: 10.1 % (ref 4–15)
NEUTROPHILS # BLD AUTO: 4.2 K/UL (ref 1.8–7.7)
NEUTROPHILS NFR BLD: 63.3 % (ref 38–73)
NRBC BLD-RTO: 0 /100 WBC
PHOSPHATE SERPL-MCNC: 2.7 MG/DL (ref 2.7–4.5)
PLATELET # BLD AUTO: 149 K/UL (ref 150–350)
PMV BLD AUTO: 12 FL (ref 9.2–12.9)
POTASSIUM SERPL-SCNC: 3.9 MMOL/L (ref 3.5–5.1)
PROT SERPL-MCNC: 7.1 G/DL (ref 6–8.4)
PROTHROMBIN TIME: 26.4 SEC (ref 9–12.5)
RBC # BLD AUTO: 5.52 M/UL (ref 4.6–6.2)
SODIUM SERPL-SCNC: 141 MMOL/L (ref 136–145)
WBC # BLD AUTO: 6.64 K/UL (ref 3.9–12.7)

## 2019-08-23 PROCEDURE — 93010 EKG 12-LEAD: ICD-10-PCS | Mod: ,,, | Performed by: INTERNAL MEDICINE

## 2019-08-23 PROCEDURE — 80053 COMPREHEN METABOLIC PANEL: CPT

## 2019-08-23 PROCEDURE — 93010 ELECTROCARDIOGRAM REPORT: CPT | Mod: ,,, | Performed by: INTERNAL MEDICINE

## 2019-08-23 PROCEDURE — 25000003 PHARM REV CODE 250: Performed by: EMERGENCY MEDICINE

## 2019-08-23 PROCEDURE — 85730 THROMBOPLASTIN TIME PARTIAL: CPT

## 2019-08-23 PROCEDURE — 93005 ELECTROCARDIOGRAM TRACING: CPT

## 2019-08-23 PROCEDURE — 85025 COMPLETE CBC W/AUTO DIFF WBC: CPT

## 2019-08-23 PROCEDURE — 84100 ASSAY OF PHOSPHORUS: CPT

## 2019-08-23 PROCEDURE — 99284 EMERGENCY DEPT VISIT MOD MDM: CPT | Mod: 25

## 2019-08-23 PROCEDURE — 85610 PROTHROMBIN TIME: CPT

## 2019-08-23 PROCEDURE — 36415 COLL VENOUS BLD VENIPUNCTURE: CPT

## 2019-08-23 PROCEDURE — 83735 ASSAY OF MAGNESIUM: CPT

## 2019-08-23 RX ORDER — FLECAINIDE ACETATE 100 MG/1
300 TABLET ORAL
Status: COMPLETED | OUTPATIENT
Start: 2019-08-23 | End: 2019-08-23

## 2019-08-23 RX ORDER — FLECAINIDE ACETATE 150 MG/1
TABLET ORAL
Qty: 10 TABLET | Refills: 3 | Status: SHIPPED | OUTPATIENT
Start: 2019-08-23 | End: 2020-09-30 | Stop reason: SDUPTHER

## 2019-08-23 RX ORDER — CHLORTHALIDONE 25 MG/1
25 TABLET ORAL
Status: DISCONTINUED | OUTPATIENT
Start: 2019-08-23 | End: 2019-08-23 | Stop reason: HOSPADM

## 2019-08-23 RX ADMIN — FLECAINIDE ACETATE 300 MG: 100 TABLET ORAL at 08:08

## 2019-08-23 NOTE — ED NOTES
Rounding on pt performed. Pt and family updated on plan of care. Pt took home dose of chlorthalidone 12.5 mg. Provider said it was ok. Pt denies any chest pain or SOB. Currently in a. Fib. Monitoring rhythm.

## 2019-08-23 NOTE — ED PROVIDER NOTES
"Encounter Date: 8/23/2019    SCRIBE #1 NOTE: Sharmaine REINA, sukh scribing for, and in the presence of, Dr. Christine.       History     Chief Complaint   Patient presents with    Palpitations     Pt came to the ED tonight c/o afib, pt not always in afib and cardiologist Alfonso Oliver told pt to come to ED if go into Afib after taking pill Flecainide 150mg     Time seen by provider: 7:17 AM    This is a 69 y.o. male with hx of paroxysmal atrial fibrillation on coumadin and metoprolol, aortic insufficiency, and HTN who presents with complaint of palpitations which were present upon waking this morning. He states that he woke up and he felt "abnormal beats" and had an elevated heart rate. He states that he had another "indescribable sensation", which has been present in prior episodes of A-Fib. He has been prescribed Flecainide by his cardiologist, Dr. Gomez, which he was advised to take prn with episodes of A-Fib. He states that he was advised to come to ED after taking this medication, though he felt uncomfortable doing so and presented here prior to taking the medication. His last episode of A-Fib was four years ago, though had episodes every six months prior to that. He states that prior episodes of A-Fib have resolved after 48 hours without intervention. He denies any SOB, cough, chest pain, leg swelling, dizziness, lightheadedness, fatigue, urinary sx, fever, chills, and rhinorrhea. He has no allergies. He does not smoke, drink excessively, or use illicit drugs. He had a hernia repair and lithotripsy a few years ago. He did not take his antihypertensives this morning.    The history is provided by the patient.     Review of patient's allergies indicates:  No Known Allergies  Past Medical History:   Diagnosis Date    Anticoagulant long-term use     Atrial fibrillation     BPH (benign prostatic hyperplasia)     Cataract     Elevated PSA     Floaters     Hernia     bilateral inquinal    Hypertension     " Inguinal hernia     Kidney stone     Knee injury      Past Surgical History:   Procedure Laterality Date    APPENDECTOMY      BLADDER SURGERY      polyp removed benign    COLONOSCOPY N/A 6/22/2016    Performed by Jo[facility]quin Francis MD at [facility] ENDO (4TH FLR)    CYSTOSCOPY      benign bladder papilloma    FINGER SURGERY      HERNIA REPAIR Left 2016    inguinal    LITHOTRIPSY      LITHOTRIPSY-EXTRACORPOREAL SHOCK WAVE Right 7/1/2015    Performed by Justyn Regalado MD at [facility] OR 1ST FLR    TONSILLECTOMY, ADENOIDECTOMY       Family History   Problem Relation Age of Onset    Cancer Mother         ureteral/renal cancer    Hypertension Mother     Dementia Father     Benign prostatic hyperplasia Neg Hx      Social History     Tobacco Use    Smoking status: Never Smoker    Smokeless tobacco: Never Used   Substance Use Topics    Alcohol use: Yes     Comment: rarely    Drug use: No     Review of Systems   Constitutional: Negative for chills, fatigue and fever.   HENT: Negative for congestion, rhinorrhea and sore throat.    Eyes: Negative for visual disturbance.   Respiratory: Negative for cough and shortness of breath.    Cardiovascular: Positive for palpitations. Negative for chest pain.   Gastrointestinal: Negative for abdominal pain, diarrhea, nausea and vomiting.   Genitourinary: Negative for decreased urine volume, dysuria and frequency.   Musculoskeletal: Negative for joint swelling, neck pain and neck stiffness.   Skin: Negative for rash and wound.   Neurological: Negative for weakness, light-headedness, numbness and headaches.   Psychiatric/Behavioral: Negative for behavioral problems and confusion.       Physical Exam     Initial Vitals [08/23/19 0638]   BP Pulse Resp Temp SpO2   136/61 95 16 97.6 °F (36.4 °C) 97 %      MAP       --         Physical Exam    Nursing note and vitals reviewed.  Constitutional: He appears well-developed and well-nourished. No distress.   HENT:   Head: Normocephalic and  atraumatic.   Mouth/Throat: Oropharynx is clear and moist.   Eyes: Conjunctivae and EOM are normal. Pupils are equal, round, and reactive to light.   Neck: Normal range of motion. Neck supple.   Cardiovascular: Normal rate and normal heart sounds. An irregularly irregular rhythm present.    No murmur heard.  Pulmonary/Chest: Breath sounds normal. No respiratory distress. He has no wheezes. He has no rhonchi. He has no rales.   Abdominal: Soft. Bowel sounds are normal. There is no tenderness.   Musculoskeletal: Normal range of motion.   Neurological: He is alert and oriented to person, place, and time. He has normal strength. No cranial nerve deficit or sensory deficit.   Skin: Skin is warm and dry. No rash noted.   Psychiatric: He has a normal mood and affect. His behavior is normal.         ED Course   Procedures  Labs Reviewed   CBC W/ AUTO DIFFERENTIAL - Abnormal; Notable for the following components:       Result Value    Platelets 149 (*)     All other components within normal limits   COMPREHENSIVE METABOLIC PANEL - Abnormal; Notable for the following components:    Glucose 114 (*)     Alkaline Phosphatase 49 (*)     All other components within normal limits   PROTIME-INR - Abnormal; Notable for the following components:    Prothrombin Time 26.4 (*)     INR 2.4 (*)     All other components within normal limits   APTT - Abnormal; Notable for the following components:    aPTT 38.8 (*)     All other components within normal limits   MAGNESIUM   PHOSPHORUS     EKG Readings: (Independently Interpreted)   Atrial fibrillation at a rate of 82. No STEMI.        Medical Decision Making:   History:   Old Medical Records: I decided to obtain old medical records.  Independently Interpreted Test(s):   I have ordered and independently interpreted EKG Reading(s) - see prior notes  Clinical Tests:   Lab Tests: Ordered and Reviewed  Medical Tests: Ordered and Reviewed  ED Management:  Emergent evaluation of 69-year-old male who  presents with complaint of palpitations, history paroxysmal atrial fibrillation.  Vital signs are benign, afebrile.  Physical exam reveals an irregularly irregular heart rhythm, no other major abnormality.  EKG shows rate controlled atrial fibrillation.  Patient had been instructed by his cardiologist to take 300 mg flecainide in present to the ED should he experience recurrent symptoms of atrial fibrillation.  He was given the medication in the ED and monitored.  He did convert back to a sinus rhythm. I spoke with the  for his cardiologist, Dr. Gomez, who reviewed the case.  He agreed that the patient could take flecainide daily as needed for recurrent symptoms of atrial fibrillation and should follow up in the next few weeks at his office.  Patient is already scheduled in 2 weeks.  He is encouraged to return to the ED at any time for recurrent new or worsening symptoms and also to follow up with his cardiologist.  I am comfortable with discharge home.  Patient is therapeutic on his Coumadin level, and other labs are benign.  He has no other symptoms and is discharged in improved condition.            Scribe Attestation:   Scribe #1: I performed the above scribed service and the documentation accurately describes the services I performed. I attest to the accuracy of the note.    Attending Attestation:           Physician Attestation for Scribe:  Physician Attestation Statement for Scribe #1: I, Dr. Christine, reviewed documentation, as scribed by Sharmaine Holloway in my presence, and it is both accurate and complete.                 ED Course as of Aug 23 1437   Fri Aug 23, 2019   0932 Pt now in sinus rhythm.    [CA]      ED Course User Index  [CA] Sharmaine Holloway     Clinical Impression:     1. Paroxysmal atrial fibrillation    2. Palpitations    3. Anticoagulant long-term use                                 Yesica Christine MD  08/23/19 4450

## 2019-08-23 NOTE — ED TRIAGE NOTES
Pt reports hx of a fib. Reports waking up today feeling his heart rate being irregular. Pt reports his cardiologist told him to come to ER to take his first dose of flecainide. Pt reports he has never taken the flecainide before and his cardiologist wanted him to take it while being monitored. Pt has home does of flecainide. Denies any chest pain or SOB. Denies any dizziness but reports feeling off.

## 2019-08-23 NOTE — DISCHARGE INSTRUCTIONS
If you experience recurrent symptoms, is fine to take your prescribed dosage of flecainide up to once daily.  If symptoms persist or for any other concerns, report to the ED.

## 2019-08-23 NOTE — ED NOTES
Pt converted to normal sinus rhythm. Pt asymptomatic. Denies any chest pain/Sob. Provider notified

## 2019-09-18 ENCOUNTER — OFFICE VISIT (OUTPATIENT)
Dept: ELECTROPHYSIOLOGY | Facility: CLINIC | Age: 69
End: 2019-09-18
Payer: COMMERCIAL

## 2019-09-18 ENCOUNTER — HOSPITAL ENCOUNTER (OUTPATIENT)
Dept: CARDIOLOGY | Facility: CLINIC | Age: 69
Discharge: HOME | End: 2019-09-18
Payer: COMMERCIAL

## 2019-09-18 ENCOUNTER — PATIENT MESSAGE (OUTPATIENT)
Dept: ELECTROPHYSIOLOGY | Facility: CLINIC | Age: 69
End: 2019-09-18

## 2019-09-18 ENCOUNTER — HOSPITAL ENCOUNTER (OUTPATIENT)
Dept: CARDIOLOGY | Facility: CLINIC | Age: 69
Discharge: HOME | End: 2019-09-18
Attending: INTERNAL MEDICINE
Payer: COMMERCIAL

## 2019-09-18 VITALS
DIASTOLIC BLOOD PRESSURE: 84 MMHG | SYSTOLIC BLOOD PRESSURE: 130 MMHG | WEIGHT: 184.75 LBS | BODY MASS INDEX: 25.02 KG/M2 | HEIGHT: 72 IN | HEART RATE: 53 BPM

## 2019-09-18 VITALS
HEART RATE: 60 BPM | HEIGHT: 72 IN | DIASTOLIC BLOOD PRESSURE: 70 MMHG | SYSTOLIC BLOOD PRESSURE: 138 MMHG | BODY MASS INDEX: 25.06 KG/M2 | WEIGHT: 185 LBS

## 2019-09-18 DIAGNOSIS — I48.0 PAROXYSMAL ATRIAL FIBRILLATION: ICD-10-CM

## 2019-09-18 DIAGNOSIS — H43.813 POSTERIOR VITREOUS DETACHMENT OF BOTH EYES: Primary | ICD-10-CM

## 2019-09-18 DIAGNOSIS — I48.91 ATRIAL FIBRILLATION, UNSPECIFIED TYPE: ICD-10-CM

## 2019-09-18 DIAGNOSIS — I49.3 PVC (PREMATURE VENTRICULAR CONTRACTION): ICD-10-CM

## 2019-09-18 DIAGNOSIS — I10 ESSENTIAL HYPERTENSION, BENIGN: Chronic | ICD-10-CM

## 2019-09-18 DIAGNOSIS — I49.8 OTHER SPECIFIED CARDIAC ARRHYTHMIAS: ICD-10-CM

## 2019-09-18 DIAGNOSIS — Z79.01 LONG TERM CURRENT USE OF ANTICOAGULANT THERAPY: ICD-10-CM

## 2019-09-18 LAB
ASCENDING AORTA: 4.06 CM
AV INDEX (PROSTH): 0.41
AV MEAN GRADIENT: 8 MMHG
AV PEAK GRADIENT: 17 MMHG
AV VALVE AREA: 1.95 CM2
AV VELOCITY RATIO: 0.33
BSA FOR ECHO PROCEDURE: 2.06 M2
CV ECHO LV RWT: 0.34 CM
DOP CALC AO PEAK VEL: 2.06 M/S
DOP CALC AO VTI: 42.5 CM
DOP CALC LVOT AREA: 4.8 CM2
DOP CALC LVOT DIAMETER: 2.46 CM
DOP CALC LVOT PEAK VEL: 0.68 M/S
DOP CALC LVOT STROKE VOLUME: 82.9 CM3
DOP CALCLVOT PEAK VEL VTI: 17.45 CM
E WAVE DECELERATION TIME: 311.1 MSEC
E/A RATIO: 0.49
E/E' RATIO: 5.88 M/S
ECHO LV POSTERIOR WALL: 0.96 CM (ref 0.6–1.1)
FRACTIONAL SHORTENING: 29 % (ref 28–44)
INTERVENTRICULAR SEPTUM: 0.89 CM (ref 0.6–1.1)
IVRT: 0.11 MSEC
LA MAJOR: 5.54 CM
LA MINOR: 5.76 CM
LA WIDTH: 4.95 CM
LEFT ATRIUM SIZE: 4.57 CM
LEFT ATRIUM VOLUME INDEX: 52.7 ML/M2
LEFT ATRIUM VOLUME: 108.6 CM3
LEFT INTERNAL DIMENSION IN SYSTOLE: 3.97 CM (ref 2.1–4)
LEFT VENTRICLE DIASTOLIC VOLUME INDEX: 74.93 ML/M2
LEFT VENTRICLE DIASTOLIC VOLUME: 154.42 ML
LEFT VENTRICLE MASS INDEX: 97 G/M2
LEFT VENTRICLE SYSTOLIC VOLUME INDEX: 33.4 ML/M2
LEFT VENTRICLE SYSTOLIC VOLUME: 68.77 ML
LEFT VENTRICULAR INTERNAL DIMENSION IN DIASTOLE: 5.61 CM (ref 3.5–6)
LEFT VENTRICULAR MASS: 199.12 G
LV LATERAL E/E' RATIO: 5.56 M/S
LV SEPTAL E/E' RATIO: 6.25 M/S
MV PEAK A VEL: 1.03 M/S
MV PEAK E VEL: 0.5 M/S
PISA TR MAX VEL: 2.4 M/S
PULM VEIN S/D RATIO: 1.22
PV PEAK D VEL: 0.36 M/S
PV PEAK S VEL: 0.44 M/S
RA MAJOR: 4.97 CM
RA PRESSURE: 3 MMHG
RA WIDTH: 4.07 CM
RIGHT VENTRICULAR END-DIASTOLIC DIMENSION: 4.05 CM
RV TISSUE DOPPLER FREE WALL SYSTOLIC VELOCITY 1 (APICAL 4 CHAMBER VIEW): 11.19 CM/S
SINUS: 3.98 CM
STJ: 3.73 CM
TDI LATERAL: 0.09 M/S
TDI SEPTAL: 0.08 M/S
TDI: 0.09 M/S
TR MAX PG: 23 MMHG
TRICUSPID ANNULAR PLANE SYSTOLIC EXCURSION: 2.31 CM
TV REST PULMONARY ARTERY PRESSURE: 26 MMHG

## 2019-09-18 PROCEDURE — 3079F PR MOST RECENT DIASTOLIC BLOOD PRESSURE 80-89 MM HG: ICD-10-PCS | Mod: CPTII,S$GLB,, | Performed by: INTERNAL MEDICINE

## 2019-09-18 PROCEDURE — 1101F PR PT FALLS ASSESS DOC 0-1 FALLS W/OUT INJ PAST YR: ICD-10-PCS | Mod: CPTII,S$GLB,, | Performed by: INTERNAL MEDICINE

## 2019-09-18 PROCEDURE — 3075F PR MOST RECENT SYSTOLIC BLOOD PRESS GE 130-139MM HG: ICD-10-PCS | Mod: CPTII,S$GLB,, | Performed by: INTERNAL MEDICINE

## 2019-09-18 PROCEDURE — 93306 TTE W/DOPPLER COMPLETE: CPT

## 2019-09-18 PROCEDURE — 93010 RHYTHM STRIP: ICD-10-PCS | Mod: S$GLB,,, | Performed by: INTERNAL MEDICINE

## 2019-09-18 PROCEDURE — 93306 TTE W/DOPPLER COMPLETE: CPT | Mod: 26,,, | Performed by: INTERNAL MEDICINE

## 2019-09-18 PROCEDURE — 99999 PR PBB SHADOW E&M-EST. PATIENT-LVL III: CPT | Mod: PBBFAC,,, | Performed by: INTERNAL MEDICINE

## 2019-09-18 PROCEDURE — 93005 RHYTHM STRIP: ICD-10-PCS | Mod: S$GLB,,, | Performed by: INTERNAL MEDICINE

## 2019-09-18 PROCEDURE — 99214 PR OFFICE/OUTPT VISIT, EST, LEVL IV, 30-39 MIN: ICD-10-PCS | Mod: S$GLB,,, | Performed by: INTERNAL MEDICINE

## 2019-09-18 PROCEDURE — 99999 PR PBB SHADOW E&M-EST. PATIENT-LVL III: ICD-10-PCS | Mod: PBBFAC,,, | Performed by: INTERNAL MEDICINE

## 2019-09-18 PROCEDURE — 3075F SYST BP GE 130 - 139MM HG: CPT | Mod: CPTII,S$GLB,, | Performed by: INTERNAL MEDICINE

## 2019-09-18 PROCEDURE — 3079F DIAST BP 80-89 MM HG: CPT | Mod: CPTII,S$GLB,, | Performed by: INTERNAL MEDICINE

## 2019-09-18 PROCEDURE — 93306 TRANSTHORACIC ECHO (TTE) LIMITED (CUPID ONLY): ICD-10-PCS | Mod: 26,,, | Performed by: INTERNAL MEDICINE

## 2019-09-18 PROCEDURE — 93010 ELECTROCARDIOGRAM REPORT: CPT | Mod: S$GLB,,, | Performed by: INTERNAL MEDICINE

## 2019-09-18 PROCEDURE — 99214 OFFICE O/P EST MOD 30 MIN: CPT | Mod: S$GLB,,, | Performed by: INTERNAL MEDICINE

## 2019-09-18 PROCEDURE — 1101F PT FALLS ASSESS-DOCD LE1/YR: CPT | Mod: CPTII,S$GLB,, | Performed by: INTERNAL MEDICINE

## 2019-09-18 PROCEDURE — 93005 ELECTROCARDIOGRAM TRACING: CPT | Mod: S$GLB,,, | Performed by: INTERNAL MEDICINE

## 2019-09-18 NOTE — PROGRESS NOTES
"Subjective:    Patient ID:  Anthony Reynaga is a 69 y.o. male who presents for evaluation of Irregular Heart Beat      Atrial Fibrillation   Symptoms are negative for chest pain, dizziness, shortness of breath, syncope and weakness. Past medical history includes atrial fibrillation.   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  HTN on meds  question of bicuspid AoV in past; reviewed by Dr Mcclellan; tristen.    "PAF" onset was in 1996. Spontaneously back to SR, after meds given.   1997: Required DCCV for another episode.  Since, seems that he gets an episode about q 6 months. Always seems to be triggered by episodes of fast HR: exertion, nightmare...  Was having AF q year; 2 episodes: 3/15 and 8/15. In 8/15, lasted 62 hours (longer than usual); ECG in [facility] confirms AF.    Hadn't had AF x 4 years. Occ palpitations; nothing longterm.  Since last seen, had one episode of AF. Went to OSH ER, where pill in pocket flecainide was successful.    Since then, feeling well. No long arrhythmia; maybe a PVC every now and then. Feels very well.    Echo 60%; severe LAE    My interpretation of today's ECG is SB at 53 bpm. No ectopy.    Review of Systems   Constitution: Negative. Negative for malaise/fatigue.   HENT: Negative.  Negative for ear pain and tinnitus.    Eyes: Negative for blurred vision.   Cardiovascular: Negative for chest pain, dyspnea on exertion, near-syncope and syncope.   Respiratory: Negative.  Negative for shortness of breath.    Endocrine: Negative.  Negative for polyuria.   Hematologic/Lymphatic: Does not bruise/bleed easily.   Skin: Negative.  Negative for rash.   Musculoskeletal: Negative.  Negative for joint pain and muscle weakness.   Gastrointestinal: Negative.  Negative for abdominal pain and change in bowel habit.   Genitourinary: Negative for frequency.   Neurological: Negative.  Negative for dizziness and weakness. "   Psychiatric/Behavioral: Negative.  Negative for depression. The patient is not nervous/anxious.    Allergic/Immunologic: Negative for environmental allergies.        Objective:    Physical Exam   Constitutional: He is oriented to person, place, and time. He appears well-developed and well-nourished.   HENT:   Head: Normocephalic and atraumatic.   Eyes: Conjunctivae, EOM and lids are normal. No scleral icterus.   Neck: Normal range of motion. No JVD present. No tracheal deviation present. No thyromegaly present.   Cardiovascular: Regular rhythm and intact distal pulses.  No extrasystoles are present. Bradycardia present. PMI is not displaced. Exam reveals no gallop and no friction rub.   Murmur heard.   Harsh midsystolic murmur is present with a grade of 2/6 at the upper right sternal border radiating to the neck.  Pulses:       Radial pulses are 2+ on the right side, and 2+ on the left side.   Pulmonary/Chest: Effort normal and breath sounds normal. No accessory muscle usage. No tachypnea. No respiratory distress. He has no wheezes. He has no rales.   Abdominal: Soft. Bowel sounds are normal. He exhibits no distension. There is no hepatosplenomegaly. There is no tenderness.   Musculoskeletal: Normal range of motion. He exhibits no edema.   Neurological: He is alert and oriented to person, place, and time. He has normal reflexes. He exhibits normal muscle tone.   Skin: Skin is warm and dry. No rash noted.   Psychiatric: He has a normal mood and affect. His behavior is normal.   Nursing note and vitals reviewed.        Assessment:       1. Posterior vitreous detachment of both eyes    2. Essential hypertension, benign    3. Paroxysmal atrial fibrillation    4. PVC (premature ventricular contraction)    5. Long term current use of anticoagulant therapy         Plan:       AF:  Minimal sx and very infrequent.   Continue plans for pill-in-the-pocket approach to AF. Provided reference to [facility] article (Tabatha meneses al. N  Engl J Med 2004;351:2384-91). Patient now able to self-direct pill-in-the-pocket treatment: 300 mg PO x1 prn.    PVCs:  Benign, but symptomatic at times.  Continue BB.    HTN:  increase chlorthalidone.    Return in 1 year with echo, or earlier prn.

## 2019-10-01 ENCOUNTER — PATIENT OUTREACH (OUTPATIENT)
Dept: ADMINISTRATIVE | Facility: OTHER | Age: 69
End: 2019-10-01

## 2019-10-01 ENCOUNTER — TELEPHONE (OUTPATIENT)
Dept: INTERNAL MEDICINE | Facility: CLINIC | Age: 69
End: 2019-10-01

## 2019-10-01 NOTE — PROGRESS NOTES
[facility]  Department of Otorhinolaryngology and Communication Sciences    Subjective:      Anthony Reynaga is a 69 y.o. male who presents for follow-up.    Underwent voice therapy for presbyphonia and made great progress. Today he comes in reporting excess phlegm in the throat in the am, which used to be seasonal but is now persistent/perennial. Symptoms are worse in the am and get better as the day goes on. Some nights, he will wake up with a coughing fit. Complains of dryness in his throat, particularly at bedtime. Suspects he may sleep with his mouth open. Does not know he has reflux. Gets a little indigestion from time to time. Also had 3 months of coughing in the spring due to ACE-inhibitor, then got a cold and a second cold, then finally went away. 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 Also reports postnasal drip and some blunting of his sense of smell, as well as some hypogeusia/dysgeusia. Does reports a bad smell when he blows his nose. He does not have any known seasonal/environmental/ allergies. Roe any atopic symptoms. Denies facial pain/pressure. Denies history of sinusitis. Has not tried any OTC remedies. Every 1-1.5 years, he gets his ears cleaned, usually at another clinic. Does not seem to be giving him a problem. Reports his symptoms are a nuisance moreso than a real problem.    Voice Handicap Index - 10 (VHI-10) score is 6.  Reflux symptom Index (RSI) score is 15.  Eating Assessment Tool - 10 (EAT-10) score is 0.  Dyspnea Index (DI) score is 0.  Cough Severity Index (CSI) score is 0.    The patient's medications, allergies, past medical, surgical, social and family histories were reviewed and updated as appropriate.    A detailed review of systems was obtained with pertinent positives as per the above HPI, and otherwise negative.      Objective:     BP (!) 152/69   Pulse 65   Temp 98.2 °F (36.8 °C)   Wt 82.7 kg (182 lb 5.1 oz)   BMI 24.73 kg/m²       Constitutional: comfortable, well dressed  Psychiatric: appropriate affect  Respiratory: comfortably breathing, symmetric chest rise, no stridor  Voice: marked improvements; faint variable asthenia/roughness  Head: normocephalic  Eyes: conjunctivae and sclerae clear  Nose: septal deviation to right  Ears: moderate cerumen in medial EAC's bilaterally; partially obstructive  Indirect laryngoscopy is limited due to gag    Procedure  Flexible Laryngoscopy (72745): Laryngoscopy is indicated for assessment of upper aerodigestive structure and function. This was carried out transnasally with a distal chip videoendoscope. After verbal consent was obtained, the patient was positioned and the nose was topically decongested with 1% phenylephrine and topically anesthetized with 4% lidocaine. The endoscope was passed through the most patent nasal cavity and positioned to image the nasopharynx, larynx, and hypopharynx in detail. The following features were examined: nasopharyngeal, laryngeal, hypopharyngeal masses; velopharyngeal strength, closure, and symmetry of motion; vocal fold range and symmetry of motion; laryngeal mucosal edema, erythema, inflammation, and hydration; salivary pooling; and gross laryngeal sensation. The equipment was removed. The patient tolerated the procedure well without complication. All findings were normal except:  - posterior inferior nasal turbinate hypertrophy  - mild diffuse pharyngeal cobblestoning      Assessment:     Anthony Reynaga is a 69 y.o. male with multiple nuisance aerodigestive symptoms. I suspect contributions from nasal obstruction with nocturnal mouth breathing and chronic rhinitis, presumed allergic.     Plan:     The patient is reassured that these symptoms do not appear to represent a serious or threatening condition. I recommended routine use of a humidifier in the bedroom. I also recommended consistent use of a topical nasal steroid spray and nasal sinus irrigations. He  may benefit from fexofenadine. We also discussed trial of Pastilles lozenges. Depending on his progress, he may benefit from a trial of Astelin or Singulair.    He will follow up with me in the future on an as-needed basis.    All questions were answered, and the patient is in agreement with the plan.    Saqib Moeller M.D.  [facility]  Department of Otorhinolaryngology and Communication Sciences

## 2019-10-02 ENCOUNTER — OFFICE VISIT (OUTPATIENT)
Dept: INTERNAL MEDICINE | Facility: CLINIC | Age: 69
End: 2019-10-02
Payer: COMMERCIAL

## 2019-10-02 ENCOUNTER — OFFICE VISIT (OUTPATIENT)
Dept: OTOLARYNGOLOGY | Facility: CLINIC | Age: 69
End: 2019-10-02
Payer: COMMERCIAL

## 2019-10-02 ENCOUNTER — CLINICAL SUPPORT (OUTPATIENT)
Dept: INTERNAL MEDICINE | Facility: CLINIC | Age: 69
End: 2019-10-02
Payer: COMMERCIAL

## 2019-10-02 VITALS
WEIGHT: 182.31 LBS | SYSTOLIC BLOOD PRESSURE: 152 MMHG | HEART RATE: 65 BPM | DIASTOLIC BLOOD PRESSURE: 69 MMHG | TEMPERATURE: 98 F | BODY MASS INDEX: 24.73 KG/M2

## 2019-10-02 VITALS
HEIGHT: 72 IN | SYSTOLIC BLOOD PRESSURE: 124 MMHG | WEIGHT: 181 LBS | OXYGEN SATURATION: 96 % | BODY MASS INDEX: 24.52 KG/M2 | DIASTOLIC BLOOD PRESSURE: 79 MMHG | HEART RATE: 58 BPM

## 2019-10-02 DIAGNOSIS — E78.2 MIXED HYPERLIPIDEMIA: ICD-10-CM

## 2019-10-02 DIAGNOSIS — N40.1 BENIGN PROSTATIC HYPERPLASIA WITH URINARY OBSTRUCTION: ICD-10-CM

## 2019-10-02 DIAGNOSIS — R49.0 DYSPHONIA: ICD-10-CM

## 2019-10-02 DIAGNOSIS — R09.82 POSTNASAL DRIP: ICD-10-CM

## 2019-10-02 DIAGNOSIS — J38.3 VOCAL FOLD ATROPHY: Primary | ICD-10-CM

## 2019-10-02 DIAGNOSIS — Z23 IMMUNIZATION DUE: Primary | ICD-10-CM

## 2019-10-02 DIAGNOSIS — N13.8 BENIGN PROSTATIC HYPERPLASIA WITH URINARY OBSTRUCTION: ICD-10-CM

## 2019-10-02 DIAGNOSIS — E55.9 VITAMIN D DEFICIENCY: ICD-10-CM

## 2019-10-02 DIAGNOSIS — I35.0 MILD AORTIC STENOSIS: ICD-10-CM

## 2019-10-02 DIAGNOSIS — Z00.00 WELLNESS EXAMINATION: Primary | ICD-10-CM

## 2019-10-02 DIAGNOSIS — I48.0 PAROXYSMAL ATRIAL FIBRILLATION: ICD-10-CM

## 2019-10-02 DIAGNOSIS — I10 ESSENTIAL HYPERTENSION, BENIGN: Chronic | ICD-10-CM

## 2019-10-02 DIAGNOSIS — J31.0 CHRONIC RHINITIS: ICD-10-CM

## 2019-10-02 DIAGNOSIS — I35.1 NONRHEUMATIC AORTIC VALVE INSUFFICIENCY: ICD-10-CM

## 2019-10-02 DIAGNOSIS — Z79.01 LONG TERM CURRENT USE OF ANTICOAGULANT THERAPY: ICD-10-CM

## 2019-10-02 PROCEDURE — 3074F SYST BP LT 130 MM HG: CPT | Mod: CPTII,S$GLB,, | Performed by: INTERNAL MEDICINE

## 2019-10-02 PROCEDURE — 99213 PR OFFICE/OUTPT VISIT, EST, LEVL III, 20-29 MIN: ICD-10-PCS | Mod: 25,S$GLB,, | Performed by: OTOLARYNGOLOGY

## 2019-10-02 PROCEDURE — 1101F PT FALLS ASSESS-DOCD LE1/YR: CPT | Mod: CPTII,S$GLB,, | Performed by: OTOLARYNGOLOGY

## 2019-10-02 PROCEDURE — 3078F PR MOST RECENT DIASTOLIC BLOOD PRESSURE < 80 MM HG: ICD-10-PCS | Mod: CPTII,S$GLB,, | Performed by: INTERNAL MEDICINE

## 2019-10-02 PROCEDURE — 31575 PR LARYNGOSCOPY, FLEXIBLE; DIAGNOSTIC: ICD-10-PCS | Mod: S$GLB,,, | Performed by: OTOLARYNGOLOGY

## 2019-10-02 PROCEDURE — 3078F DIAST BP <80 MM HG: CPT | Mod: CPTII,S$GLB,, | Performed by: OTOLARYNGOLOGY

## 2019-10-02 PROCEDURE — 99999 PR PBB SHADOW E&M-EST. PATIENT-LVL I: CPT | Mod: PBBFAC,,,

## 2019-10-02 PROCEDURE — 99213 OFFICE O/P EST LOW 20 MIN: CPT | Mod: 25,S$GLB,, | Performed by: OTOLARYNGOLOGY

## 2019-10-02 PROCEDURE — 90471 FLU VACCINE - HIGH DOSE (65+) PRESERVATIVE FREE IM: ICD-10-PCS | Mod: S$GLB,,, | Performed by: INTERNAL MEDICINE

## 2019-10-02 PROCEDURE — 99397 PR PREVENTIVE VISIT,EST,65 & OVER: ICD-10-PCS | Mod: 25,S$GLB,, | Performed by: INTERNAL MEDICINE

## 2019-10-02 PROCEDURE — 99999 PR PBB SHADOW E&M-EST. PATIENT-LVL I: ICD-10-PCS | Mod: PBBFAC,,,

## 2019-10-02 PROCEDURE — 99999 PR PBB SHADOW E&M-EST. PATIENT-LVL III: ICD-10-PCS | Mod: PBBFAC,,, | Performed by: OTOLARYNGOLOGY

## 2019-10-02 PROCEDURE — 90471 IMMUNIZATION ADMIN: CPT | Mod: S$GLB,,, | Performed by: INTERNAL MEDICINE

## 2019-10-02 PROCEDURE — 3074F PR MOST RECENT SYSTOLIC BLOOD PRESSURE < 130 MM HG: ICD-10-PCS | Mod: CPTII,S$GLB,, | Performed by: OTOLARYNGOLOGY

## 2019-10-02 PROCEDURE — 1101F PR PT FALLS ASSESS DOC 0-1 FALLS W/OUT INJ PAST YR: ICD-10-PCS | Mod: CPTII,S$GLB,, | Performed by: OTOLARYNGOLOGY

## 2019-10-02 PROCEDURE — 90662 IIV NO PRSV INCREASED AG IM: CPT | Mod: S$GLB,,, | Performed by: INTERNAL MEDICINE

## 2019-10-02 PROCEDURE — 3074F PR MOST RECENT SYSTOLIC BLOOD PRESSURE < 130 MM HG: ICD-10-PCS | Mod: CPTII,S$GLB,, | Performed by: INTERNAL MEDICINE

## 2019-10-02 PROCEDURE — 99999 PR PBB SHADOW E&M-EST. PATIENT-LVL III: ICD-10-PCS | Mod: PBBFAC,,, | Performed by: INTERNAL MEDICINE

## 2019-10-02 PROCEDURE — 31575 DIAGNOSTIC LARYNGOSCOPY: CPT | Mod: S$GLB,,, | Performed by: OTOLARYNGOLOGY

## 2019-10-02 PROCEDURE — 3078F DIAST BP <80 MM HG: CPT | Mod: CPTII,S$GLB,, | Performed by: INTERNAL MEDICINE

## 2019-10-02 PROCEDURE — 90662 FLU VACCINE - HIGH DOSE (65+) PRESERVATIVE FREE IM: ICD-10-PCS | Mod: S$GLB,,, | Performed by: INTERNAL MEDICINE

## 2019-10-02 PROCEDURE — 99999 PR PBB SHADOW E&M-EST. PATIENT-LVL III: CPT | Mod: PBBFAC,,, | Performed by: OTOLARYNGOLOGY

## 2019-10-02 PROCEDURE — 99397 PER PM REEVAL EST PAT 65+ YR: CPT | Mod: 25,S$GLB,, | Performed by: INTERNAL MEDICINE

## 2019-10-02 PROCEDURE — 3078F PR MOST RECENT DIASTOLIC BLOOD PRESSURE < 80 MM HG: ICD-10-PCS | Mod: CPTII,S$GLB,, | Performed by: OTOLARYNGOLOGY

## 2019-10-02 PROCEDURE — 99999 PR PBB SHADOW E&M-EST. PATIENT-LVL III: CPT | Mod: PBBFAC,,, | Performed by: INTERNAL MEDICINE

## 2019-10-02 PROCEDURE — 3074F SYST BP LT 130 MM HG: CPT | Mod: CPTII,S$GLB,, | Performed by: OTOLARYNGOLOGY

## 2019-10-02 NOTE — PROGRESS NOTES
"Subjective:       Patient ID: Anthony Reynaga is a 69 y.o. male.    Chief Complaint: Annual Exam    Pt here for annual exam. Pt's BP is high here but home readings are very well controlled. Tolerating meds well. Pt denies cp/sob/ha/vision or neuro changes.     HLD is tx with simvastatin which he tolerates well.     He has pAfib for which he takes metoprolol and is anticoag with coumadin. He sees cardiology and EP regularly. This is currently stable and he denies cp/sob/palpitations. Coumadin is followed by coumadin clinic at [facility]. He is aware of NOAC but opts not to use these and stay with coumadin instead. He uses flecainide for "pill in pocket" approach to a-fib episodes; he required this a few weeks ago and went to ED. Record reviewed. This worked well and he has since f/u with cardiology. Most recent ECHO did show mild aortic stenosis which is being monitored by cardiology.     He has BPH with LUTS for which he takes uroxatral with success. He previously had elevated PSA and had biopsy that was negative. He sees urology regularly.     He has vit d deficiency on prior labs and is now on otc vit d 1000 units daily.     Review of Systems   Constitutional: Negative for appetite change, fatigue, fever and unexpected weight change.   HENT: Negative for congestion, rhinorrhea and sore throat.    Eyes: Negative for visual disturbance.   Respiratory: Negative for cough and shortness of breath.    Cardiovascular: Negative for chest pain, palpitations and leg swelling.   Gastrointestinal: Negative for abdominal pain, blood in stool, constipation and diarrhea.   Genitourinary: Negative for difficulty urinating and dysuria.   Skin: Negative for color change and rash.   Neurological: Negative for dizziness, syncope, light-headedness and headaches.   Hematological: Negative for adenopathy.   Psychiatric/Behavioral: Negative for dysphoric mood.       Objective:      Physical Exam   Constitutional: He is oriented to person, " place, and time. He appears well-developed and well-nourished.   HENT:   Head: Normocephalic and atraumatic.   Right Ear: External ear normal.   Left Ear: External ear normal.   Mouth/Throat: Oropharynx is clear and moist. No oropharyngeal exudate.   Eyes: Pupils are equal, round, and reactive to light. Conjunctivae and EOM are normal.   Neck: Neck supple. Carotid bruit is not present. No thyromegaly present.   Cardiovascular: Normal rate, regular rhythm, S1 normal, S2 normal, normal heart sounds and intact distal pulses.   Pulmonary/Chest: Effort normal and breath sounds normal.   Abdominal: Soft. Bowel sounds are normal. He exhibits no mass. There is no hepatosplenomegaly. There is no tenderness.   Musculoskeletal: He exhibits no edema.   Lymphadenopathy:     He has no cervical adenopathy.   Neurological: He is alert and oriented to person, place, and time. No cranial nerve deficit.   Psychiatric: He has a normal mood and affect. His behavior is normal.       Assessment:       1. Wellness examination    2. Essential hypertension, benign    3. Mixed hyperlipidemia    4. Paroxysmal atrial fibrillation    5. Nonrheumatic aortic valve insufficiency    6. Benign prostatic hyperplasia with urinary obstruction    7. Long term current use of anticoagulant therapy    8. Vitamin D deficiency    9. Mild aortic stenosis        Plan:       1. Appropriate labs  2. Home BP monitoring  3. Keep f/u with specialists

## 2019-10-02 NOTE — PROGRESS NOTES
"Patient was given vaccine information sheet for the Flu Vaccine. The area of injection was palpated using the acromion process as a landmark. This area was cleaned with alcohol. Using a 25g 1" safety needle, 0.5mL of the vaccine was placed into the left deltoid muscle. The injection site was dressed with a bandage. Patient experienced no complications and was discharged in stable condition. Fluzone High Dose vaccine Lot: [id number] Exp: 04/18/2020.    "

## 2019-10-02 NOTE — PATIENT INSTRUCTIONS
Humidifier in the bedroom    FARHEEN MED nasal sinus irrigations  Flonase nasal spray  Fexofenadine    PASTILLES lozenges    Other prescription medications to consider - ASTELIN nasal spray, SINGULAIR tablet

## 2019-10-09 ENCOUNTER — ANTI-COAG VISIT (OUTPATIENT)
Dept: CARDIOLOGY | Facility: CLINIC | Age: 69
End: 2019-10-09
Payer: COMMERCIAL

## 2019-10-09 DIAGNOSIS — I48.0 PAROXYSMAL ATRIAL FIBRILLATION: ICD-10-CM

## 2019-10-09 DIAGNOSIS — Z79.01 LONG TERM CURRENT USE OF ANTICOAGULANT THERAPY: Primary | ICD-10-CM

## 2019-10-09 LAB — INR PPP: 3.2 (ref 2–3)

## 2019-10-09 PROCEDURE — 93793 PR ANTICOAGULANT MGMT FOR PT TAKING WARFARIN: ICD-10-PCS | Mod: S$GLB,,,

## 2019-10-09 PROCEDURE — 93793 ANTICOAG MGMT PT WARFARIN: CPT | Mod: S$GLB,,,

## 2019-10-09 PROCEDURE — 85610 POCT INR: ICD-10-PCS | Mod: QW,S$GLB,,

## 2019-10-09 PROCEDURE — 85610 PROTHROMBIN TIME: CPT | Mod: QW,S$GLB,,

## 2019-10-09 NOTE — PROGRESS NOTES
INR not at goal. Medications, chart, and patient findings reviewed. See calendar for adjustments to dose and follow up plan.  Findings: Pt states he may have missed some of vit k containing foods within the past week or so, he has lost a little weight due to being under stress, he has had a loss of appetite as well & denies any other changes.  Will decrease pt's maintenance regimen & re-assess in 3 weeks.

## 2019-10-16 DIAGNOSIS — E78.2 MIXED HYPERLIPIDEMIA: Primary | ICD-10-CM

## 2019-10-16 RX ORDER — SIMVASTATIN 20 MG/1
20 TABLET, FILM COATED ORAL NIGHTLY
Qty: 90 TABLET | Refills: 3 | Status: SHIPPED | OUTPATIENT
Start: 2019-10-16 | End: 2019-10-23 | Stop reason: SDUPTHER

## 2019-10-16 NOTE — TELEPHONE ENCOUNTER
----- Message from Lucius Briggs sent at 10/16/2019  3:45 PM CDT -----  Contact: Pt   Can the clinic reply in [facility]: Yes     Please refill the medication(s) listed below. The patient can be reached at this phone number [phone] once it is called into the pharmacy.    Medication #1simvastatin (ZOCOR) 20 MG tablet    Medication #2    Preferred Pharmacy:[facility] - [facility] [address] [place] [address]

## 2019-10-23 DIAGNOSIS — E78.2 MIXED HYPERLIPIDEMIA: ICD-10-CM

## 2019-10-23 RX ORDER — SIMVASTATIN 20 MG/1
20 TABLET, FILM COATED ORAL NIGHTLY
Qty: 90 TABLET | Refills: 3 | Status: SHIPPED | OUTPATIENT
Start: 2019-10-23 | End: 2020-07-21

## 2019-10-23 NOTE — TELEPHONE ENCOUNTER
----- Message from Bettina Christianson sent at 10/21/2019 11:14 AM CDT -----  Contact: pt  Name of Who is Calling: pt    What is the request in detail: states his Rx (  simvastatin (ZOCOR) 20 MG tablet 90 tablet)       went to the wrong pharmacy. Pt states he ask for his Rx be sent to: [facility] - [address] [phone] (Phone)  [phone] (Fax     Please contact to further discuss and advise      Can the clinic reply by [facility]: no    What Number to Call Back [facility] not [facility]: [phone]

## 2019-10-29 DIAGNOSIS — N40.0 BENIGN NON-NODULAR PROSTATIC HYPERPLASIA WITHOUT LOWER URINARY TRACT SYMPTOMS: ICD-10-CM

## 2019-10-29 RX ORDER — ALFUZOSIN HYDROCHLORIDE 10 MG/1
10 TABLET, EXTENDED RELEASE ORAL DAILY
Qty: 90 TABLET | Refills: 3 | Status: SHIPPED | OUTPATIENT
Start: 2019-10-29 | End: 2020-09-29

## 2019-10-30 ENCOUNTER — ANTI-COAG VISIT (OUTPATIENT)
Dept: CARDIOLOGY | Facility: CLINIC | Age: 69
End: 2019-10-30
Payer: COMMERCIAL

## 2019-10-30 ENCOUNTER — LAB VISIT (OUTPATIENT)
Dept: LAB | Facility: HOSPITAL | Age: 69
End: 2019-10-30
Attending: UROLOGY
Payer: COMMERCIAL

## 2019-10-30 DIAGNOSIS — Z79.01 LONG TERM CURRENT USE OF ANTICOAGULANT THERAPY: ICD-10-CM

## 2019-10-30 DIAGNOSIS — I10 ESSENTIAL HYPERTENSION, BENIGN: Chronic | ICD-10-CM

## 2019-10-30 DIAGNOSIS — N40.1 BENIGN PROSTATIC HYPERPLASIA WITH URINARY OBSTRUCTION: ICD-10-CM

## 2019-10-30 DIAGNOSIS — N13.8 BENIGN PROSTATIC HYPERPLASIA WITH URINARY OBSTRUCTION: ICD-10-CM

## 2019-10-30 DIAGNOSIS — I48.0 PAROXYSMAL ATRIAL FIBRILLATION: ICD-10-CM

## 2019-10-30 DIAGNOSIS — E55.9 VITAMIN D DEFICIENCY: ICD-10-CM

## 2019-10-30 LAB
25(OH)D3+25(OH)D2 SERPL-MCNC: 23 NG/ML (ref 30–96)
ANION GAP SERPL CALC-SCNC: 9 MMOL/L (ref 8–16)
BUN SERPL-MCNC: 13 MG/DL (ref 8–23)
CALCIUM SERPL-MCNC: 9.5 MG/DL (ref 8.7–10.5)
CHLORIDE SERPL-SCNC: 105 MMOL/L (ref 95–110)
CO2 SERPL-SCNC: 26 MMOL/L (ref 23–29)
COMPLEXED PSA SERPL-MCNC: 5.7 NG/ML (ref 0–4)
CREAT SERPL-MCNC: 0.8 MG/DL (ref 0.5–1.4)
EST. GFR  (AFRICAN AMERICAN): >60 ML/MIN/1.73 M^2
EST. GFR  (NON AFRICAN AMERICAN): >60 ML/MIN/1.73 M^2
GLUCOSE SERPL-MCNC: 97 MG/DL (ref 70–110)
INR PPP: 2.3 (ref 0.8–1.2)
POTASSIUM SERPL-SCNC: 3.7 MMOL/L (ref 3.5–5.1)
PROTHROMBIN TIME: 24.2 SEC (ref 9–12.5)
SODIUM SERPL-SCNC: 140 MMOL/L (ref 136–145)

## 2019-10-30 PROCEDURE — 84153 ASSAY OF PSA TOTAL: CPT

## 2019-10-30 PROCEDURE — 85610 PROTHROMBIN TIME: CPT

## 2019-10-30 PROCEDURE — 36415 COLL VENOUS BLD VENIPUNCTURE: CPT

## 2019-10-30 PROCEDURE — 80048 BASIC METABOLIC PNL TOTAL CA: CPT

## 2019-10-30 PROCEDURE — 93793 ANTICOAG MGMT PT WARFARIN: CPT | Mod: S$GLB,,,

## 2019-10-30 PROCEDURE — 93793 PR ANTICOAGULANT MGMT FOR PT TAKING WARFARIN: ICD-10-PCS | Mod: S$GLB,,,

## 2019-10-30 PROCEDURE — 82306 VITAMIN D 25 HYDROXY: CPT

## 2019-11-08 ENCOUNTER — HOSPITAL ENCOUNTER (OUTPATIENT)
Dept: RADIOLOGY | Facility: OTHER | Age: 69
Discharge: HOME | End: 2019-11-08
Attending: INTERNAL MEDICINE
Payer: COMMERCIAL

## 2019-11-08 DIAGNOSIS — R93.89 ABNORMAL CHEST X-RAY: ICD-10-CM

## 2019-11-08 PROCEDURE — 71250 CT THORAX DX C-: CPT | Mod: 26,,, | Performed by: RADIOLOGY

## 2019-11-08 PROCEDURE — 71250 CT CHEST WITHOUT CONTRAST: ICD-10-PCS | Mod: 26,,, | Performed by: RADIOLOGY

## 2019-11-08 PROCEDURE — 71250 CT THORAX DX C-: CPT | Mod: TC

## 2019-11-12 ENCOUNTER — ANTI-COAG VISIT (OUTPATIENT)
Dept: CARDIOLOGY | Facility: CLINIC | Age: 69
End: 2019-11-12
Payer: COMMERCIAL

## 2019-11-12 ENCOUNTER — OFFICE VISIT (OUTPATIENT)
Dept: UROLOGY | Facility: CLINIC | Age: 69
End: 2019-11-12
Payer: COMMERCIAL

## 2019-11-12 VITALS
HEIGHT: 72 IN | RESPIRATION RATE: 16 BRPM | WEIGHT: 183 LBS | SYSTOLIC BLOOD PRESSURE: 123 MMHG | HEART RATE: 82 BPM | BODY MASS INDEX: 24.79 KG/M2 | DIASTOLIC BLOOD PRESSURE: 72 MMHG

## 2019-11-12 DIAGNOSIS — N40.1 BENIGN PROSTATIC HYPERPLASIA WITH URINARY OBSTRUCTION: Primary | ICD-10-CM

## 2019-11-12 DIAGNOSIS — N13.8 BENIGN PROSTATIC HYPERPLASIA WITH URINARY OBSTRUCTION: Primary | ICD-10-CM

## 2019-11-12 DIAGNOSIS — Z79.01 LONG TERM CURRENT USE OF ANTICOAGULANT THERAPY: Primary | ICD-10-CM

## 2019-11-12 DIAGNOSIS — I48.0 PAROXYSMAL ATRIAL FIBRILLATION: ICD-10-CM

## 2019-11-12 LAB — INR PPP: 2.6 (ref 2–3)

## 2019-11-12 PROCEDURE — 1101F PR PT FALLS ASSESS DOC 0-1 FALLS W/OUT INJ PAST YR: ICD-10-PCS | Mod: CPTII,S$GLB,, | Performed by: UROLOGY

## 2019-11-12 PROCEDURE — 99999 PR PBB SHADOW E&M-EST. PATIENT-LVL III: CPT | Mod: PBBFAC,,, | Performed by: UROLOGY

## 2019-11-12 PROCEDURE — 99999 PR PBB SHADOW E&M-EST. PATIENT-LVL III: ICD-10-PCS | Mod: PBBFAC,,, | Performed by: UROLOGY

## 2019-11-12 PROCEDURE — 3074F PR MOST RECENT SYSTOLIC BLOOD PRESSURE < 130 MM HG: ICD-10-PCS | Mod: CPTII,S$GLB,, | Performed by: UROLOGY

## 2019-11-12 PROCEDURE — 99214 PR OFFICE/OUTPT VISIT, EST, LEVL IV, 30-39 MIN: ICD-10-PCS | Mod: S$GLB,,, | Performed by: UROLOGY

## 2019-11-12 PROCEDURE — 93793 PR ANTICOAGULANT MGMT FOR PT TAKING WARFARIN: ICD-10-PCS | Mod: S$GLB,,, | Performed by: PHARMACIST

## 2019-11-12 PROCEDURE — 1101F PT FALLS ASSESS-DOCD LE1/YR: CPT | Mod: CPTII,S$GLB,, | Performed by: UROLOGY

## 2019-11-12 PROCEDURE — 3078F DIAST BP <80 MM HG: CPT | Mod: CPTII,S$GLB,, | Performed by: UROLOGY

## 2019-11-12 PROCEDURE — 93793 ANTICOAG MGMT PT WARFARIN: CPT | Mod: S$GLB,,, | Performed by: PHARMACIST

## 2019-11-12 PROCEDURE — 99214 OFFICE O/P EST MOD 30 MIN: CPT | Mod: S$GLB,,, | Performed by: UROLOGY

## 2019-11-12 PROCEDURE — 85610 POCT INR: ICD-10-PCS | Mod: QW,S$GLB,, | Performed by: INTERNAL MEDICINE

## 2019-11-12 PROCEDURE — 3078F PR MOST RECENT DIASTOLIC BLOOD PRESSURE < 80 MM HG: ICD-10-PCS | Mod: CPTII,S$GLB,, | Performed by: UROLOGY

## 2019-11-12 PROCEDURE — 85610 PROTHROMBIN TIME: CPT | Mod: QW,S$GLB,, | Performed by: INTERNAL MEDICINE

## 2019-11-12 PROCEDURE — 3074F SYST BP LT 130 MM HG: CPT | Mod: CPTII,S$GLB,, | Performed by: UROLOGY

## 2019-11-12 NOTE — PROGRESS NOTES
Clinic Note  11/12/2019      Subjective:         Chief Complaint:   HPI  Anthony Reynaga is a 69 y.o. male history of BPH and elevated PSA. S/p biopsy (B9) in 2 /11. Moderate symptoms, minimal bother.  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 History of kidney stones and prostatitis. Is on  Uroxatrol..Nocturia stable at 4x/noc.  Hernia repair in July 2016.  LUTS: bothered by nocturia 4-6x /noc. Also bothered by decreased ejaculate and decreased orgasm intensity.  Stopped finasteride 1 year ago. Is thinking about care home.  [facility] risk:      Lab Results   Component Value Date    PSA 3.80 10/04/2012    PSA 4.10 (H) 03/01/2012    PSA 3.2 10/13/2011    PSADIAG 5.7 (H) 10/30/2019    PSADIAG 3.6 10/19/2018    PSADIAG 2.1 09/20/2017    PSADIAG 3.0 09/14/2016    PSADIAG 4.9 (H) 03/11/2016    PSADIAG 4.6 (H) 10/20/2015    PSADIAG 3.2 10/14/2014    PSADIAG 3.5 10/08/2013      Past Medical History:   Diagnosis Date    Anticoagulant long-term use     Atrial fibrillation     BPH (benign prostatic hyperplasia)     Cataract     Elevated PSA     Floaters     Hernia     bilateral inquinal    Hypertension     Inguinal hernia     Kidney stone     Knee injury      Family History   Problem Relation Age of Onset    Cancer Mother         ureteral/renal cancer    Hypertension Mother     Dementia Father     Benign prostatic hyperplasia Neg Hx      Social History     Socioeconomic History    Marital status: Single     Spouse name: Not on file    Number of children: Not on file    Years of education: Not on file    Highest education level: Not on file   Occupational History    Not on file   Social Needs    Financial resource strain: Not on file    Food insecurity:     Worry: Not on file     Inability: Not on file    Transportation needs:     Medical: Not on file     Non-medical: Not on file   Tobacco Use    Smoking status: Never Smoker    Smokeless tobacco: Never Used   Substance and Sexual Activity    Alcohol use:  Yes     Comment: rarely    Drug use: No    Sexual activity: Yes     Partners: Female   Lifestyle    Physical activity:     Days per week: Not on file     Minutes per session: Not on file    Stress: Not on file   Relationships    Social connections:     Talks on phone: Not on file     Gets together: Not on file     Attends Alevism service: Not on file     Active member of club or organization: Not on file     Attends meetings of clubs or organizations: Not on file     Relationship status: Not on file   Other Topics Concern    Not on file   Social History Narrative    Not on file     Past Surgical History:   Procedure Laterality Date    APPENDECTOMY      BLADDER SURGERY      polyp removed benign    COLONOSCOPY N/A 6/22/2016    Procedure: COLONOSCOPY;  Surgeon: Joaquin Francis MD;  Location: [facility] ([address] [place] [address]);  Service: Endoscopy;  Laterality: N/A;    Thanks for letting us know.  I would approve him to hold coumadin x 3 days prior, without lovenox.  We will see him for an INR on 6/8 and will provide him with procedure instructions at that time., per Coumadin Clinic    CYSTOSCOPY      benign bladder papilloma    FINGER SURGERY      HERNIA REPAIR Left 2016    inguinal    LITHOTRIPSY      TONSILLECTOMY, ADENOIDECTOMY       Patient Active Problem List   Diagnosis    Paroxysmal atrial fibrillation    PVD (posterior vitreous detachment)    High myopia    Nuclear sclerosis    Benign prostatic hyperplasia with urinary obstruction    Long term current use of anticoagulant therapy    Aortic regurgitation    Essential hypertension, benign    Right ureteral stone    Bilateral kidney stones    Kidney stone    Voice disturbance    Vocal fold atrophy    Glottic insufficiency    Hyperfunctional dysphonia    Mixed hyperlipidemia    PVC (premature ventricular contraction)    Mild aortic stenosis     Review of Systems   Constitutional: Negative for appetite change, chills, fatigue, fever and  unexpected weight change.   HENT: Negative for nosebleeds.    Respiratory: Negative for shortness of breath and wheezing.    Cardiovascular: Negative for chest pain, palpitations and leg swelling.   Gastrointestinal: Negative for abdominal distention, abdominal pain, constipation, diarrhea, nausea and vomiting.   Genitourinary: Positive for nocturia. Negative for dysuria and hematuria.   Musculoskeletal: Negative for arthralgias and back pain.   Skin: Negative for pallor.   Neurological: Negative for dizziness, seizures and syncope.   Hematological: Negative for adenopathy.   Psychiatric/Behavioral: Negative for dysphoric mood.         Objective:      There were no vitals taken for this visit.  Estimated body mass index is 24.55 kg/m² as calculated from the following:    Height as of 10/2/19: 6' (1.829 m).    Weight as of 10/2/19: 82.1 kg (181 lb).  Physical Exam   Constitutional: He is oriented to person, place, and time. He appears well-developed and well-nourished. No distress.   HENT:   Head: Atraumatic.   Neck: No tracheal deviation present.   Cardiovascular: Normal rate.    Pulmonary/Chest: Effort normal. No respiratory distress. He has no wheezes.   Abdominal: Soft. Bowel sounds are normal. He exhibits no distension and no mass. There is no tenderness. There is no rebound and no guarding.   Genitourinary: Rectum normal. Rectal exam shows no external hemorrhoid, no internal hemorrhoid, no mass and no tenderness. Prostate is enlarged.   Genitourinary Comments: 50 ccs   Neurological: He is alert and oriented to person, place, and time.   Skin: Skin is warm and dry. He is not diaphoretic.     Psychiatric: He has a normal mood and affect. His behavior is normal. Judgment and thought content normal.         Assessment and Plan:   Discussed elevated PSA, PHI, mp MRI, biopsy.        Problem List Items Addressed This Visit     Benign prostatic hyperplasia with urinary obstruction - Primary          Follow up:        Livan Lowry

## 2019-11-12 NOTE — PROGRESS NOTES
INR at goal. Medications and chart reviewed. No changes noted to necessitate adjustment of warfarin or follow-up plan. See calendar.    Patient requesting 8 week intervals. Patient was re-educated on situations that would require placing a call to the coumadin clinic, including bleeding or unusual bruising issues, changes in health, diet or medications, upcoming procedures that require warfarin interruption, and missed coumadin dose(s). Patient expressed understanding that avoidance of consistency with these parameters could cause fluctuations in INR, leading to more frequent visits and increase risk of adverse events.

## 2019-12-12 ENCOUNTER — PATIENT MESSAGE (OUTPATIENT)
Dept: ELECTROPHYSIOLOGY | Facility: CLINIC | Age: 69
End: 2019-12-12

## 2019-12-13 ENCOUNTER — PATIENT MESSAGE (OUTPATIENT)
Dept: ELECTROPHYSIOLOGY | Facility: CLINIC | Age: 69
End: 2019-12-13

## 2020-01-08 ENCOUNTER — ANTI-COAG VISIT (OUTPATIENT)
Dept: CARDIOLOGY | Facility: CLINIC | Age: 70
End: 2020-01-08
Payer: COMMERCIAL

## 2020-01-08 DIAGNOSIS — Z79.01 LONG TERM CURRENT USE OF ANTICOAGULANT THERAPY: Primary | ICD-10-CM

## 2020-01-08 DIAGNOSIS — I48.0 PAROXYSMAL ATRIAL FIBRILLATION: ICD-10-CM

## 2020-01-08 LAB — INR PPP: 2.1 (ref 2–3)

## 2020-01-08 PROCEDURE — 93793 ANTICOAG MGMT PT WARFARIN: CPT | Mod: S$GLB,,,

## 2020-01-08 PROCEDURE — 93793 PR ANTICOAGULANT MGMT FOR PT TAKING WARFARIN: ICD-10-PCS | Mod: S$GLB,,,

## 2020-01-08 PROCEDURE — 85610 PROTHROMBIN TIME: CPT | Mod: QW,S$GLB,, | Performed by: INTERNAL MEDICINE

## 2020-01-08 PROCEDURE — 85610 POCT INR: ICD-10-PCS | Mod: QW,S$GLB,, | Performed by: INTERNAL MEDICINE

## 2020-01-08 NOTE — PROGRESS NOTES
INR at goal. Medications and chart reviewed. No changes noted to necessitate adjustment of warfarin or follow-up plan. See calendar.  Pt denies any changes.  Maintain current regimen & re-assess in 8 weeks.   Patient was re-educated on situations that would require placing a call to the Coumadin Clinic, including bleeding or unusual bruising issues, changes in health, diet or medications,upcoming procedures that require warfarin interruption, and missed Coumadin dose(s). Patient expressed understanding that avoidance of consistency with these parameters could cause fluctuations in INR, leading to more frequent visits and increase risk of adverse events.

## 2020-02-01 DIAGNOSIS — I10 ESSENTIAL HYPERTENSION, BENIGN: Chronic | ICD-10-CM

## 2020-02-03 RX ORDER — METOPROLOL SUCCINATE 200 MG/1
TABLET, EXTENDED RELEASE ORAL
Qty: 90 TABLET | Refills: 2 | Status: SHIPPED | OUTPATIENT
Start: 2020-02-03 | End: 2020-09-29

## 2020-02-16 ENCOUNTER — PATIENT MESSAGE (OUTPATIENT)
Dept: UROLOGY | Facility: CLINIC | Age: 70
End: 2020-02-16

## 2020-02-26 ENCOUNTER — PATIENT MESSAGE (OUTPATIENT)
Dept: UROLOGY | Facility: CLINIC | Age: 70
End: 2020-02-26

## 2020-03-06 ENCOUNTER — LAB VISIT (OUTPATIENT)
Dept: LAB | Facility: HOSPITAL | Age: 70
End: 2020-03-06
Attending: UROLOGY
Payer: COMMERCIAL

## 2020-03-06 ENCOUNTER — ANTI-COAG VISIT (OUTPATIENT)
Dept: CARDIOLOGY | Facility: CLINIC | Age: 70
End: 2020-03-06
Payer: COMMERCIAL

## 2020-03-06 DIAGNOSIS — N40.1 BENIGN PROSTATIC HYPERPLASIA WITH URINARY OBSTRUCTION: ICD-10-CM

## 2020-03-06 DIAGNOSIS — Z79.01 LONG TERM CURRENT USE OF ANTICOAGULANT THERAPY: Primary | ICD-10-CM

## 2020-03-06 DIAGNOSIS — I48.0 PAROXYSMAL ATRIAL FIBRILLATION: ICD-10-CM

## 2020-03-06 DIAGNOSIS — N13.8 BENIGN PROSTATIC HYPERPLASIA WITH URINARY OBSTRUCTION: ICD-10-CM

## 2020-03-06 LAB — INR PPP: 2.4 (ref 2–3)

## 2020-03-06 PROCEDURE — 85610 POCT INR: ICD-10-PCS | Mod: QW,S$GLB,, | Performed by: INTERNAL MEDICINE

## 2020-03-06 PROCEDURE — 85610 PROTHROMBIN TIME: CPT | Mod: QW,S$GLB,, | Performed by: INTERNAL MEDICINE

## 2020-03-06 PROCEDURE — 84153 ASSAY OF PSA TOTAL: CPT

## 2020-03-06 PROCEDURE — 36415 COLL VENOUS BLD VENIPUNCTURE: CPT

## 2020-03-06 PROCEDURE — 84154 ASSAY OF PSA FREE: CPT

## 2020-03-06 PROCEDURE — 93793 PR ANTICOAGULANT MGMT FOR PT TAKING WARFARIN: ICD-10-PCS | Mod: S$GLB,,,

## 2020-03-06 PROCEDURE — 93793 ANTICOAG MGMT PT WARFARIN: CPT | Mod: S$GLB,,,

## 2020-03-07 LAB
-2 PROPSA (PHI): 13.4 PG/ML
FREE PSA (PHI): 1.2 NG/ML
PROSTATE HEALTH INDEX VALUE (PHI): 25
PSA FREE MFR SERPL: 24 %
PSA SERPL-MCNC: 5 NG/ML

## 2020-03-17 RX ORDER — LORAZEPAM 1 MG/1
TABLET ORAL
Qty: 20 TABLET | Refills: 0 | Status: SHIPPED | OUTPATIENT
Start: 2020-03-17 | End: 2020-11-23 | Stop reason: SDUPTHER

## 2020-04-03 NOTE — PROGRESS NOTES
Called Patient regarding 4/29 appointment - No answer, unable to leave a message due to voice mail being full, called alt # (Melanie) and spoke with Patient and informed him coumadin clinic is closed and his 4/29 appointment needed to be rescheduled to 2nd floor Lab, verbalized understandng

## 2020-05-04 ENCOUNTER — PATIENT MESSAGE (OUTPATIENT)
Dept: OTOLARYNGOLOGY | Facility: CLINIC | Age: 70
End: 2020-05-04

## 2020-05-04 DIAGNOSIS — R43.0 ANOSMIA: Primary | ICD-10-CM

## 2020-05-07 ENCOUNTER — LAB VISIT (OUTPATIENT)
Dept: INTERNAL MEDICINE | Facility: CLINIC | Age: 70
End: 2020-05-07
Payer: COMMERCIAL

## 2020-05-07 DIAGNOSIS — R43.0 ANOSMIA: ICD-10-CM

## 2020-05-07 LAB — SARS-COV-2 RNA RESP QL NAA+PROBE: NOT DETECTED

## 2020-05-07 PROCEDURE — U0002 COVID-19 LAB TEST NON-CDC: HCPCS

## 2020-05-12 ENCOUNTER — OFFICE VISIT (OUTPATIENT)
Dept: UROLOGY | Facility: CLINIC | Age: 70
End: 2020-05-12
Payer: COMMERCIAL

## 2020-05-12 DIAGNOSIS — N13.8 BENIGN PROSTATIC HYPERPLASIA WITH URINARY OBSTRUCTION: Primary | ICD-10-CM

## 2020-05-12 DIAGNOSIS — N40.1 BENIGN PROSTATIC HYPERPLASIA WITH URINARY OBSTRUCTION: Primary | ICD-10-CM

## 2020-05-12 PROCEDURE — 99213 PR OFFICE/OUTPT VISIT, EST, LEVL III, 20-29 MIN: ICD-10-PCS | Mod: 95,,, | Performed by: UROLOGY

## 2020-05-12 PROCEDURE — 1101F PR PT FALLS ASSESS DOC 0-1 FALLS W/OUT INJ PAST YR: ICD-10-PCS | Mod: CPTII,,, | Performed by: UROLOGY

## 2020-05-12 PROCEDURE — 1159F PR MEDICATION LIST DOCUMENTED IN MEDICAL RECORD: ICD-10-PCS | Mod: ,,, | Performed by: UROLOGY

## 2020-05-12 PROCEDURE — 99213 OFFICE O/P EST LOW 20 MIN: CPT | Mod: 95,,, | Performed by: UROLOGY

## 2020-05-12 PROCEDURE — 1101F PT FALLS ASSESS-DOCD LE1/YR: CPT | Mod: CPTII,,, | Performed by: UROLOGY

## 2020-05-12 PROCEDURE — 1159F MED LIST DOCD IN RCRD: CPT | Mod: ,,, | Performed by: UROLOGY

## 2020-05-12 NOTE — PROGRESS NOTES
Clinic Note  5/12/2020      Subjective:         Chief Complaint:   HPI  Anthony Reynaga is a 70 y.o. male history of BPH and elevated PSA. S/p biopsy (B9) in 2 /11. Moderate symptoms, minimal bother.  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 History of kidney stones and prostatitis. Is on  Uroxatrol..Nocturia stable at 4x/noc.  Hernia repair in July 2016.  LUTS: bothered by nocturia 4-6x /noc. Also bothered by decreased ejaculate and decreased orgasm intensity.  Stopped finasteride 1 year ago. Is thinking about care home.  NIH risk:    PHI- PSA 5, 24% free, PHI score- 25    The patient location is: home  The chief complaint leading to consultation is: BPH  Visit type: audiovisual  Total time spent with patient: 20  Each patient to whom he or she provides medical services by telemedicine is:  (1) informed of the relationship between the physician and patient and the respective role of any other health care provider with respect to management of the patient; and (2) notified that he or she may decline to receive medical services by telemedicine and may withdraw from such care at any time.          Lab Results   Component Value Date    PSA 3.80 10/04/2012    PSA 4.10 (H) 03/01/2012    PSA 3.2 10/13/2011    PSADIAG 5.7 (H) 10/30/2019    PSADIAG 3.6 10/19/2018    PSADIAG 2.1 09/20/2017    PSADIAG 3.0 09/14/2016    PSADIAG 4.9 (H) 03/11/2016    PSADIAG 4.6 (H) 10/20/2015    PSADIAG 3.2 10/14/2014    PSADIAG 3.5 10/08/2013      Past Medical History:   Diagnosis Date    Anticoagulant long-term use     Atrial fibrillation     BPH (benign prostatic hyperplasia)     Cataract     Elevated PSA     Floaters     Hernia     bilateral inquinal    Hypertension     Inguinal hernia     Kidney stone     Knee injury      Family History   Problem Relation Age of Onset    Cancer Mother         ureteral/renal cancer    Hypertension Mother     Dementia Father     Benign prostatic hyperplasia Neg Hx      Social History      Socioeconomic History    Marital status: Single     Spouse name: Not on file    Number of children: Not on file    Years of education: Not on file    Highest education level: Not on file   Occupational History    Not on file   Social Needs    Financial resource strain: Not on file    Food insecurity:     Worry: Not on file     Inability: Not on file    Transportation needs:     Medical: Not on file     Non-medical: Not on file   Tobacco Use    Smoking status: Never Smoker    Smokeless tobacco: Never Used   Substance and Sexual Activity    Alcohol use: Yes     Comment: rarely    Drug use: No    Sexual activity: Yes     Partners: Female   Lifestyle    Physical activity:     Days per week: Not on file     Minutes per session: Not on file    Stress: Not on file   Relationships    Social connections:     Talks on phone: Not on file     Gets together: Not on file     Attends Zoroastrianism service: Not on file     Active member of club or organization: Not on file     Attends meetings of clubs or organizations: Not on file     Relationship status: Not on file   Other Topics Concern    Not on file   Social History Narrative    Not on file     Past Surgical History:   Procedure Laterality Date    APPENDECTOMY      BLADDER SURGERY      polyp removed benign    COLONOSCOPY N/A 6/22/2016    Procedure: COLONOSCOPY;  Surgeon: Joaquin Francis MD;  Location: [address];  Service: Endoscopy;  Laterality: N/A;    Thanks for letting us know.  I would approve him to hold coumadin x 3 days prior, without lovenox.  We will see him for an INR on 6/8 and will provide him with procedure instructions at that time., per Coumadin Clinic    CYSTOSCOPY      benign bladder papilloma    FINGER SURGERY      HERNIA REPAIR Left 2016    inguinal    LITHOTRIPSY      TONSILLECTOMY, ADENOIDECTOMY       Patient Active Problem List   Diagnosis    Paroxysmal atrial fibrillation    PVD (posterior vitreous detachment)     High myopia    Nuclear sclerosis    Benign prostatic hyperplasia with urinary obstruction    Long term current use of anticoagulant therapy    Aortic regurgitation    Essential hypertension, benign    Right ureteral stone    Bilateral kidney stones    Kidney stone    Voice disturbance    Vocal fold atrophy    Glottic insufficiency    Hyperfunctional dysphonia    Mixed hyperlipidemia    PVC (premature ventricular contraction)    Mild aortic stenosis     Review of Systems   Constitutional: Negative for appetite change, chills, fatigue, fever and unexpected weight change.   HENT: Negative for nosebleeds.    Respiratory: Negative for shortness of breath and wheezing.    Cardiovascular: Negative for chest pain, palpitations and leg swelling.   Gastrointestinal: Negative for abdominal distention, abdominal pain, constipation, diarrhea, nausea and vomiting.   Genitourinary: Positive for difficulty urinating and nocturia. Negative for dysuria and hematuria.   Musculoskeletal: Negative for arthralgias and back pain.   Skin: Negative for pallor.   Neurological: Negative for dizziness, seizures and syncope.   Hematological: Negative for adenopathy.   Psychiatric/Behavioral: Negative for dysphoric mood.         Objective:      There were no vitals taken for this visit.  Estimated body mass index is 24.82 kg/m² as calculated from the following:    Height as of 11/12/19: 6' (1.829 m).    Weight as of 11/12/19: 83 kg (183 lb).  Physical Exam   Constitutional: He is oriented to person, place, and time. He appears well-developed and well-nourished. No distress.   Pulmonary/Chest: Effort normal. No stridor. No respiratory distress. He has no wheezes.   Neurological: He is alert and oriented to person, place, and time.   Psychiatric: He has a normal mood and affect. His behavior is normal. Judgment and thought content normal.         Assessment and Plan:           Problem List Items Addressed This Visit     None           Follow up:   6 months with EAMON Lowry

## 2020-06-03 ENCOUNTER — OFFICE VISIT (OUTPATIENT)
Dept: OTOLARYNGOLOGY | Facility: CLINIC | Age: 70
End: 2020-06-03
Payer: COMMERCIAL

## 2020-06-03 DIAGNOSIS — R43.8 REDUCED SENSE OF SMELL: ICD-10-CM

## 2020-06-03 DIAGNOSIS — R43.1 PAROSMIA: Primary | ICD-10-CM

## 2020-06-03 PROCEDURE — 1159F MED LIST DOCD IN RCRD: CPT | Mod: ,,, | Performed by: OTOLARYNGOLOGY

## 2020-06-03 PROCEDURE — 1159F PR MEDICATION LIST DOCUMENTED IN MEDICAL RECORD: ICD-10-PCS | Mod: ,,, | Performed by: OTOLARYNGOLOGY

## 2020-06-03 PROCEDURE — 1101F PT FALLS ASSESS-DOCD LE1/YR: CPT | Mod: CPTII,,, | Performed by: OTOLARYNGOLOGY

## 2020-06-03 PROCEDURE — 1101F PR PT FALLS ASSESS DOC 0-1 FALLS W/OUT INJ PAST YR: ICD-10-PCS | Mod: CPTII,,, | Performed by: OTOLARYNGOLOGY

## 2020-06-03 PROCEDURE — 99214 OFFICE O/P EST MOD 30 MIN: CPT | Mod: 95,,, | Performed by: OTOLARYNGOLOGY

## 2020-06-03 PROCEDURE — 99214 PR OFFICE/OUTPT VISIT, EST, LEVL IV, 30-39 MIN: ICD-10-PCS | Mod: 95,,, | Performed by: OTOLARYNGOLOGY

## 2020-06-03 RX ORDER — FLUTICASONE PROPIONATE 50 MCG
2 SPRAY, SUSPENSION (ML) NASAL DAILY
Qty: 16 G | Refills: 2 | Status: SHIPPED | OUTPATIENT
Start: 2020-06-03 | End: 2020-09-01

## 2020-06-03 NOTE — PROGRESS NOTES
Subjective:      Anthony Reynaga is a 70 y.o. male who was referred to me by Dr. Saqib Moeller in consultation for altered sense of smell.    He was in his usual state of health until mid-March 2020 when he had the acute alteration of the sense of smell, described as both a reduction of smell acuity and the perception of phantom smells.  These are described as often cement-like or bland bread-like odors, stimulated by normal odors.  He does think he had a transient episode of this late last year, though cannot be certain.  He denies associated nasal blockage, facial pressure, headaches or rhinorrhea.  He has used saline but no other sprays.  He denies notable sinus infections.  He had a Covid swab last month that was negative.    Current sinonasal medications include saline spray.  The last course of antibiotics was a long time ago.  He does not regularly use nasal decongestant sprays.    He does not recall previously having allergy testing.    He denies a history of asthma.    He denies a history of reflux symptoms.  He has not previously had an EGD.    He denies a diagnosis of obstructive sleep apnea.     He has not had sinonasal surgery.  He has not had a tonsillectomy.    He does not recall a prior history of nasal trauma.    SNOT-22 score: : (P) 44  NOSE score:: (P) 10%  ETDQ-7 score:: (P) 1.9      Past Medical History  He has a past medical history of Anticoagulant long-term use, Atrial fibrillation, BPH (benign prostatic hyperplasia), Cataract, Elevated PSA, Floaters, Hernia, Hypertension, Inguinal hernia, Kidney stone, and Knee injury.    Past Surgical History  He has a past surgical history that includes TONSILLECTOMY, ADENOIDECTOMY; Appendectomy; Cystoscopy; Bladder surgery; Finger surgery; Lithotripsy; Colonoscopy (N/A, 6/22/2016); and Hernia repair (Left, 2016).    Family History  His family history includes Cancer in his mother; Dementia in his father; Hypertension in his mother.    Social  History  He reports that he has never smoked. He has never used smokeless tobacco. He reports that he drinks alcohol. He reports that he does not use drugs.    Allergies  He has No Known Allergies.    Medications   He has a current medication list which includes the following prescription(s): alfuzosin, aspirin, inv chlorthalidone, flecainide, fluticasone propionate, lorazepam, metoprolol succinate, simvastatin, vitamin d, and warfarin.    Review of Systems  Review of Systems   Constitutional: Positive for appetite change. Negative for fatigue, fever and unexpected weight change.   HENT: Positive for postnasal drip. Negative for congestion, dental problem, ear discharge, ear pain, facial swelling, hearing loss, hoarse voice, nosebleeds, rhinorrhea, sinus pressure, sore throat, tinnitus, trouble swallowing and voice change.    Eyes: Negative for photophobia, discharge, itching and visual disturbance.   Respiratory: Positive for cough. Negative for apnea, shortness of breath and wheezing.    Cardiovascular: Negative for chest pain and palpitations.   Gastrointestinal: Negative for abdominal pain, nausea and vomiting.   Endocrine: Negative for cold intolerance and heat intolerance.   Genitourinary: Positive for difficulty urinating and frequency.   Musculoskeletal: Positive for back pain. Negative for arthralgias, myalgias and neck pain.   Skin: Negative for rash.   Allergic/Immunologic: Negative for environmental allergies and food allergies.   Neurological: Negative for dizziness, seizures, syncope, weakness and headaches.   Hematological: Negative for adenopathy. Does not bruise/bleed easily.   Psychiatric/Behavioral: Positive for decreased concentration and sleep disturbance. Negative for dysphoric mood. The patient is nervous/anxious.           Objective:     There were no vitals taken for this visit.       Constitutional:   He appears well-developed. He is cooperative. Normal speech.  No hoarse voice.       Head:  Normocephalic. Facial strength is normal.      Ears:    Right Ear: No drainage or tenderness. No decreased hearing is noted.   Left Ear: No drainage or tenderness. No decreased hearing is noted.     Nose:  No epistaxis. Right sinus exhibits no maxillary sinus tenderness and no frontal sinus tenderness. Left sinus exhibits no maxillary sinus tenderness and no frontal sinus tenderness.     Mouth/Throat  He does not have dentures. Normal dentition.     Neck:  Neck normal without thyromegaly masses, asymmetry, normal tracheal structure, crepitus, and tenderness and no adenopathy. Normal range of motion and no stridor present.     Pulmonary/Chest:   Effort normal. No stridor. No respiratory distress.     Psychiatric:   He has a normal mood and affect. His speech is normal and behavior is normal.     Neurological:   No cranial nerve deficit.     Skin:   No rash noted.       Procedure    None        Data Reviewed    WBC (K/uL)   Date Value   08/23/2019 6.64     Eosinophil% (%)   Date Value   08/23/2019 4.1     Eos # (K/uL)   Date Value   08/23/2019 0.3     Platelets (K/uL)   Date Value   08/23/2019 149 (L)     Glucose (mg/dL)   Date Value   10/30/2019 97     No results found for: IGE    No sinus imaging available.       Assessment:     1. Parosmia    2. Reduced sense of smell         Plan:     I had a long discussion with the patient regarding his condition and the further workup and management options.  His findings are not typical for viral-induced olfactory loss, but may represent an abnormal variant.  The possibility of an exposure to coronavirus is not ruled out, and therefore I am ordering an antibody test for this purpose.  We discussed the potential role of steroids in facilitating recovery, as well as other off-label options, none of which are backed by more than anecdotal evidence.  We discussed the potential role of MRI to rule out a neuraxial process, though he declines based on expected lack of  clinical utility.  For now I prescribed the daily use of Flonase to treat presumed inflammation of the olfactory neuroepithelium.    Follow up for test results.     I spent 30 minutes face-to-face with Anthony Reynaga today; greater than 50% was spent in counseling regarding his diagnosis and management, as detailed above.    This encounter was completed via telemedicine virtual visit with the patient at home using synchronous real-time video and audio communication.

## 2020-06-05 ENCOUNTER — ANTI-COAG VISIT (OUTPATIENT)
Dept: CARDIOLOGY | Facility: CLINIC | Age: 70
End: 2020-06-05
Payer: COMMERCIAL

## 2020-06-05 ENCOUNTER — LAB VISIT (OUTPATIENT)
Dept: LAB | Facility: HOSPITAL | Age: 70
End: 2020-06-05
Attending: INTERNAL MEDICINE
Payer: COMMERCIAL

## 2020-06-05 ENCOUNTER — PATIENT MESSAGE (OUTPATIENT)
Dept: INTERNAL MEDICINE | Facility: CLINIC | Age: 70
End: 2020-06-05

## 2020-06-05 DIAGNOSIS — I48.0 PAROXYSMAL ATRIAL FIBRILLATION: ICD-10-CM

## 2020-06-05 DIAGNOSIS — Z79.01 LONG TERM CURRENT USE OF ANTICOAGULANT THERAPY: ICD-10-CM

## 2020-06-05 DIAGNOSIS — R43.8 REDUCED SENSE OF SMELL: ICD-10-CM

## 2020-06-05 LAB
INR PPP: 2.4 (ref 0.8–1.2)
PROTHROMBIN TIME: 23 SEC (ref 9–12.5)
SARS-COV-2 IGG SERPLBLD QL IA.RAPID: NEGATIVE

## 2020-06-05 PROCEDURE — 85610 PROTHROMBIN TIME: CPT

## 2020-06-05 PROCEDURE — 86769 SARS-COV-2 COVID-19 ANTIBODY: CPT

## 2020-06-05 PROCEDURE — 93793 ANTICOAG MGMT PT WARFARIN: CPT | Mod: S$GLB,,,

## 2020-06-05 PROCEDURE — 36415 COLL VENOUS BLD VENIPUNCTURE: CPT

## 2020-06-05 PROCEDURE — 93793 PR ANTICOAGULANT MGMT FOR PT TAKING WARFARIN: ICD-10-PCS | Mod: S$GLB,,,

## 2020-06-29 ENCOUNTER — CLINICAL SUPPORT (OUTPATIENT)
Dept: CARDIOLOGY | Facility: HOSPITAL | Age: 70
End: 2020-06-29
Attending: INTERNAL MEDICINE
Payer: COMMERCIAL

## 2020-06-29 DIAGNOSIS — I48.3 TYPICAL ATRIAL FLUTTER: ICD-10-CM

## 2020-06-29 DIAGNOSIS — I50.42 CHRONIC COMBINED SYSTOLIC AND DIASTOLIC HEART FAILURE: ICD-10-CM

## 2020-06-29 DIAGNOSIS — I48.0 PAROXYSMAL ATRIAL FIBRILLATION: ICD-10-CM

## 2020-06-29 DIAGNOSIS — I49.02 VENTRICULAR FLUTTER: ICD-10-CM

## 2020-06-29 DIAGNOSIS — I49.02 VENTRICULAR FLUTTER: Primary | ICD-10-CM

## 2020-06-29 PROCEDURE — 93272 CARDIAC EVENT MONITOR (CUPID ONLY): ICD-10-PCS | Mod: ,,, | Performed by: INTERNAL MEDICINE

## 2020-06-29 PROCEDURE — 93272 ECG/REVIEW INTERPRET ONLY: CPT | Mod: ,,, | Performed by: INTERNAL MEDICINE

## 2020-06-29 PROCEDURE — 93271 ECG/MONITORING AND ANALYSIS: CPT

## 2020-07-30 ENCOUNTER — LAB VISIT (OUTPATIENT)
Dept: LAB | Facility: HOSPITAL | Age: 70
End: 2020-07-30
Attending: INTERNAL MEDICINE
Payer: COMMERCIAL

## 2020-07-30 ENCOUNTER — ANTI-COAG VISIT (OUTPATIENT)
Dept: CARDIOLOGY | Facility: CLINIC | Age: 70
End: 2020-07-30
Payer: COMMERCIAL

## 2020-07-30 DIAGNOSIS — I48.0 PAROXYSMAL ATRIAL FIBRILLATION: ICD-10-CM

## 2020-07-30 DIAGNOSIS — Z79.01 LONG TERM CURRENT USE OF ANTICOAGULANT THERAPY: ICD-10-CM

## 2020-07-30 LAB
INR PPP: 2.2 (ref 0.8–1.2)
PROTHROMBIN TIME: 23.5 SEC (ref 9–12.5)

## 2020-07-30 PROCEDURE — 36415 COLL VENOUS BLD VENIPUNCTURE: CPT

## 2020-07-30 PROCEDURE — 93793 ANTICOAG MGMT PT WARFARIN: CPT | Mod: S$GLB,,,

## 2020-07-30 PROCEDURE — 93793 PR ANTICOAGULANT MGMT FOR PT TAKING WARFARIN: ICD-10-PCS | Mod: S$GLB,,,

## 2020-07-30 PROCEDURE — 85610 PROTHROMBIN TIME: CPT

## 2020-08-13 ENCOUNTER — OFFICE VISIT (OUTPATIENT)
Dept: CARDIOLOGY | Facility: CLINIC | Age: 70
End: 2020-08-13
Payer: COMMERCIAL

## 2020-08-13 ENCOUNTER — HOSPITAL ENCOUNTER (OUTPATIENT)
Dept: CARDIOLOGY | Facility: HOSPITAL | Age: 70
Discharge: HOME | End: 2020-08-13
Attending: INTERNAL MEDICINE
Payer: COMMERCIAL

## 2020-08-13 ENCOUNTER — HOSPITAL ENCOUNTER (OUTPATIENT)
Dept: CARDIOLOGY | Facility: CLINIC | Age: 70
Discharge: HOME | End: 2020-08-13
Payer: COMMERCIAL

## 2020-08-13 VITALS
HEART RATE: 53 BPM | WEIGHT: 176.38 LBS | OXYGEN SATURATION: 97 % | DIASTOLIC BLOOD PRESSURE: 65 MMHG | HEIGHT: 72 IN | BODY MASS INDEX: 23.89 KG/M2 | SYSTOLIC BLOOD PRESSURE: 148 MMHG

## 2020-08-13 VITALS — WEIGHT: 183 LBS | HEIGHT: 72 IN | BODY MASS INDEX: 24.79 KG/M2 | HEART RATE: 60 BPM

## 2020-08-13 DIAGNOSIS — E78.2 MIXED HYPERLIPIDEMIA: ICD-10-CM

## 2020-08-13 DIAGNOSIS — Z79.01 LONG TERM CURRENT USE OF ANTICOAGULANT THERAPY: ICD-10-CM

## 2020-08-13 DIAGNOSIS — I49.3 PVC (PREMATURE VENTRICULAR CONTRACTION): ICD-10-CM

## 2020-08-13 DIAGNOSIS — I48.0 PAROXYSMAL ATRIAL FIBRILLATION: ICD-10-CM

## 2020-08-13 DIAGNOSIS — I10 ESSENTIAL HYPERTENSION, BENIGN: Chronic | ICD-10-CM

## 2020-08-13 DIAGNOSIS — I35.1 NONRHEUMATIC AORTIC VALVE INSUFFICIENCY: Primary | ICD-10-CM

## 2020-08-13 LAB
ASCENDING AORTA: 3.56 CM
AV INDEX (PROSTH): 0.44
AV MEAN GRADIENT: 10 MMHG
AV PEAK GRADIENT: 17 MMHG
AV VALVE AREA: 2.29 CM2
AV VELOCITY RATIO: 0.39
BSA FOR ECHO PROCEDURE: 2.05 M2
CV ECHO LV RWT: 0.32 CM
DOP CALC AO PEAK VEL: 2.08 M/S
DOP CALC AO VTI: 47.58 CM
DOP CALC LVOT AREA: 5.2 CM2
DOP CALC LVOT DIAMETER: 2.57 CM
DOP CALC LVOT PEAK VEL: 0.82 M/S
DOP CALC LVOT STROKE VOLUME: 109.04 CM3
DOP CALC RVOT PEAK VEL: 0.55 M/S
DOP CALC RVOT VTI: 13.56 CM
DOP CALCLVOT PEAK VEL VTI: 21.03 CM
E WAVE DECELERATION TIME: 284.69 MSEC
E/A RATIO: 0.56
E/E' RATIO: 8.86 M/S
ECHO LV POSTERIOR WALL: 0.9 CM (ref 0.6–1.1)
FRACTIONAL SHORTENING: 31 % (ref 28–44)
INTERVENTRICULAR SEPTUM: 0.9 CM (ref 0.6–1.1)
LA MAJOR: 4.76 CM
LA MINOR: 4.85 CM
LA WIDTH: 3.71 CM
LEFT ATRIUM SIZE: 4.08 CM
LEFT ATRIUM VOLUME INDEX: 30.1 ML/M2
LEFT ATRIUM VOLUME: 61.82 CM3
LEFT INTERNAL DIMENSION IN SYSTOLE: 3.9 CM (ref 2.1–4)
LEFT VENTRICLE DIASTOLIC VOLUME INDEX: 77.38 ML/M2
LEFT VENTRICLE DIASTOLIC VOLUME: 158.75 ML
LEFT VENTRICLE MASS INDEX: 96 G/M2
LEFT VENTRICLE SYSTOLIC VOLUME INDEX: 32.1 ML/M2
LEFT VENTRICLE SYSTOLIC VOLUME: 65.92 ML
LEFT VENTRICULAR INTERNAL DIMENSION IN DIASTOLE: 5.68 CM (ref 3.5–6)
LEFT VENTRICULAR MASS: 196.34 G
LV LATERAL E/E' RATIO: 6.2 M/S
LV SEPTAL E/E' RATIO: 15.5 M/S
MV PEAK A VEL: 1.11 M/S
MV PEAK E VEL: 0.62 M/S
MV STENOSIS PRESSURE HALF TIME: 82.56 MS
MV VALVE AREA P 1/2 METHOD: 2.66 CM2
PISA TR MAX VEL: 2.21 M/S
PV MEAN GRADIENT: 0.85 MMHG
PV PEAK VELOCITY: 0.67 CM/S
RA MAJOR: 4.66 CM
RA WIDTH: 2.57 CM
RV TISSUE DOPPLER FREE WALL SYSTOLIC VELOCITY 1 (APICAL 4 CHAMBER VIEW): 7.77 CM/S
SINUS: 3.71 CM
STJ: 3.05 CM
TDI LATERAL: 0.1 M/S
TDI SEPTAL: 0.04 M/S
TDI: 0.07 M/S
TR MAX PG: 20 MMHG

## 2020-08-13 PROCEDURE — 93303 ECHO (CUPID ONLY): ICD-10-PCS | Mod: 26,,, | Performed by: PEDIATRICS

## 2020-08-13 PROCEDURE — 93320 ECHO (CUPID ONLY): ICD-10-PCS | Mod: 26,,, | Performed by: PEDIATRICS

## 2020-08-13 PROCEDURE — 3008F PR BODY MASS INDEX (BMI) DOCUMENTED: ICD-10-PCS | Mod: CPTII,S$GLB,, | Performed by: INTERNAL MEDICINE

## 2020-08-13 PROCEDURE — 93325 DOPPLER ECHO COLOR FLOW MAPG: CPT | Mod: 26,,, | Performed by: PEDIATRICS

## 2020-08-13 PROCEDURE — 3078F PR MOST RECENT DIASTOLIC BLOOD PRESSURE < 80 MM HG: ICD-10-PCS | Mod: CPTII,S$GLB,, | Performed by: INTERNAL MEDICINE

## 2020-08-13 PROCEDURE — 93325 DOPPLER ECHO COLOR FLOW MAPG: CPT | Mod: 59

## 2020-08-13 PROCEDURE — 1101F PT FALLS ASSESS-DOCD LE1/YR: CPT | Mod: CPTII,S$GLB,, | Performed by: INTERNAL MEDICINE

## 2020-08-13 PROCEDURE — 3078F DIAST BP <80 MM HG: CPT | Mod: CPTII,S$GLB,, | Performed by: INTERNAL MEDICINE

## 2020-08-13 PROCEDURE — 99999 PR PBB SHADOW E&M-EST. PATIENT-LVL III: CPT | Mod: PBBFAC,,, | Performed by: INTERNAL MEDICINE

## 2020-08-13 PROCEDURE — 93010 EKG 12-LEAD: ICD-10-PCS | Mod: S$GLB,,, | Performed by: INTERNAL MEDICINE

## 2020-08-13 PROCEDURE — 93010 ELECTROCARDIOGRAM REPORT: CPT | Mod: S$GLB,,, | Performed by: INTERNAL MEDICINE

## 2020-08-13 PROCEDURE — 99999 PR PBB SHADOW E&M-EST. PATIENT-LVL III: ICD-10-PCS | Mod: PBBFAC,,, | Performed by: INTERNAL MEDICINE

## 2020-08-13 PROCEDURE — 1126F AMNT PAIN NOTED NONE PRSNT: CPT | Mod: S$GLB,,, | Performed by: INTERNAL MEDICINE

## 2020-08-13 PROCEDURE — 93005 ELECTROCARDIOGRAM TRACING: CPT | Mod: S$GLB,,, | Performed by: INTERNAL MEDICINE

## 2020-08-13 PROCEDURE — 3077F PR MOST RECENT SYSTOLIC BLOOD PRESSURE >= 140 MM HG: ICD-10-PCS | Mod: CPTII,S$GLB,, | Performed by: INTERNAL MEDICINE

## 2020-08-13 PROCEDURE — 93320 DOPPLER ECHO COMPLETE: CPT | Mod: 26,,, | Performed by: PEDIATRICS

## 2020-08-13 PROCEDURE — 93325 ECHO (CUPID ONLY): ICD-10-PCS | Mod: 26,,, | Performed by: PEDIATRICS

## 2020-08-13 PROCEDURE — 93306 TTE W/DOPPLER COMPLETE: CPT

## 2020-08-13 PROCEDURE — 1101F PR PT FALLS ASSESS DOC 0-1 FALLS W/OUT INJ PAST YR: ICD-10-PCS | Mod: CPTII,S$GLB,, | Performed by: INTERNAL MEDICINE

## 2020-08-13 PROCEDURE — 1159F MED LIST DOCD IN RCRD: CPT | Mod: S$GLB,,, | Performed by: INTERNAL MEDICINE

## 2020-08-13 PROCEDURE — 99214 OFFICE O/P EST MOD 30 MIN: CPT | Mod: S$GLB,,, | Performed by: INTERNAL MEDICINE

## 2020-08-13 PROCEDURE — 1159F PR MEDICATION LIST DOCUMENTED IN MEDICAL RECORD: ICD-10-PCS | Mod: S$GLB,,, | Performed by: INTERNAL MEDICINE

## 2020-08-13 PROCEDURE — 93303 ECHO TRANSTHORACIC: CPT | Mod: 26,,, | Performed by: PEDIATRICS

## 2020-08-13 PROCEDURE — 1126F PR PAIN SEVERITY QUANTIFIED, NO PAIN PRESENT: ICD-10-PCS | Mod: S$GLB,,, | Performed by: INTERNAL MEDICINE

## 2020-08-13 PROCEDURE — 3008F BODY MASS INDEX DOCD: CPT | Mod: CPTII,S$GLB,, | Performed by: INTERNAL MEDICINE

## 2020-08-13 PROCEDURE — 3077F SYST BP >= 140 MM HG: CPT | Mod: CPTII,S$GLB,, | Performed by: INTERNAL MEDICINE

## 2020-08-13 PROCEDURE — 99214 PR OFFICE/OUTPT VISIT, EST, LEVL IV, 30-39 MIN: ICD-10-PCS | Mod: S$GLB,,, | Performed by: INTERNAL MEDICINE

## 2020-08-13 PROCEDURE — 93005 EKG 12-LEAD: ICD-10-PCS | Mod: S$GLB,,, | Performed by: INTERNAL MEDICINE

## 2020-08-13 NOTE — PROGRESS NOTES
"  Subjective:   Patient ID:  Anthony Reynaga is a 70 y.o. male who presents for follow-up of functional bicuspid AV with AI.     Patient is seen in our [facility].    HPI  Mr. Reynaga is a 70 YOM with PMHx of tricuspid AV with mild AR, HTN, HLD, and pAFib (on OAC, BB, PIP flecainide and follows with EP). He presents to clinic today for follow up of functionally bicuspid valve. He reports overall doing well. He denies shortness of breath, chest pain, JANSEN, light headiness, dizziness, syncope, or LE swelling. He does report Afib episode over a year ago that resolved with PIP flecainide. He also had recent "fluttering" sensations and wore a 30 day event monitor prescribed by EP but notes that he had no episodes while on monitor. He takes his BP at home and it is stable, see log below. He walks 4x a week for exercise, currently not doing yoga due to shoulder bursitis and studio closure. He is currently working from home and denies any significant increase in life stressors. He is independent in all ADLs.     Congenital Heart History:  Tricuspid AV with AI    Other Medical Problems  Paroxysmal atrial fibrillation    Cardiac Testing:  TTE 08/13/2020:  The tricuspid valve appears normal in structure with mild tricuspid insufficiency at velocity suggesting right ventricular pressure 20 mmHg plus right atrial pressure.   Right ventricle appears normal in size with qualitatively good function area   The left atrial volume index is mildly enlarged, measuring 35.90 cc/m2.   Mitral valve appears normal in structure with mild insufficiency.   Normal left ventricular size and structure.  Qualitatively good left ventricular systolic function with SF= 36% and EF estimated 60 -65% from apical four chamber views.  Normal indices of left ventricular diastolic function.    Difficult to distinguish structure of aortic valve secondary to 1-1.5 cm relatively immobile calcification most closely associated with the " non-coronary cusp - native structure is probably trileaflet with functionally bicuspid valve secondary to scarring and sclerosis.  Peak velocity across the aortic valve <2.1 m/s with mean gradient <11 mmHg and mild estimated regurgitant volume arising from small central jet.  (ascending aorta peak velocity 2.1 m/sec.).   Aortic dimensions:  Sinuses of Valsalva = 37 mm.  ST junction             = 30 mm.  Ascending aorta     = 38 mm.    EKG 08/13/2020:  Sinus Librado     TTE 09/18/2019:  · Normal left ventricular systolic function. The estimated ejection fraction is 60%  · Normal right ventricular systolic function.  · Indeterminate left ventricular diastolic function.  · Severe left atrial enlargement.  · Mild right atrial enlargement.  · The aortic valve has partially fused right and non-coronary cusp leaflets.  · Mild aortic valve stenosis. Aortic valve area is 1.95 cm2; peak velocity is 2.06 m/s; mean gradient is 8 mmHg.  · The estimated PA systolic pressure is 26 mm Hg  · Normal central venous pressure (3 mm Hg).    Review of Systems   Constitution: Negative for decreased appetite, malaise/fatigue, weight gain and weight loss.   Cardiovascular: Positive for irregular heartbeat and palpitations. Negative for chest pain, dyspnea on exertion, leg swelling, near-syncope, orthopnea, paroxysmal nocturnal dyspnea and syncope.   Respiratory: Negative for cough, shortness of breath and sleep disturbances due to breathing.    Musculoskeletal: Positive for joint pain (shoulder bursitis, improving ).   Gastrointestinal: Negative for bloating.   Neurological: Negative for dizziness, headaches, light-headedness and weakness.     Allergies and current medications updated and reviewed:  Review of patient's allergies indicates:  No Known Allergies     Current Outpatient Medications   Medication Sig Dispense Refill    alfuzosin (UROXATRAL) 10 mg Tb24 Take 1 tablet (10 mg total) by mouth once daily. 90 tablet 3    aspirin (ECOTRIN)  81 MG EC tablet Take 81 mg by mouth once daily.      chlorthalidone (HYGROTEN) 25 MG Tab Take 1 tablet (25 mg total) by mouth once daily. 90 tablet 3    flecainide (TAMBOCOR) 150 MG Tab Take 2 tabs PO prn for AF. Up to one dose per day. (Patient not taking: Reported on 11/12/2019) 10 tablet 3    fluticasone propionate (FLONASE) 50 mcg/actuation nasal spray 2 sprays (100 mcg total) by Each Nostril route once daily. 16 g 2    LORazepam (ATIVAN) 1 MG tablet TAKE 1 TABLET(1 MG) BY MOUTH EVERY NIGHT AS NEEDED FOR INSOMNIA 20 tablet 0    metoprolol succinate (TOPROL-XL) 200 MG 24 hr tablet TAKE 1 TABLET BY MOUTH ONCE DAILY 90 tablet 2    simvastatin (ZOCOR) 20 MG tablet TAKE 1 TABLET BY MOUTH  EVERY EVENING 90 tablet 0    vitamin D 1000 units Tab Take 2,000 Units by mouth once daily.       warfarin (COUMADIN) 2 MG tablet TAKE ONE-HALF TABLET BY  MOUTH EVERY MONDAY,  WEDNESDAY, AND FRIDAY AND 1 TABLET DAILY ALL OTHER DAYS OR AS DIRECTED BY CLINIC 90 tablet 3     No current facility-administered medications for this visit.      Objective:      BP (!) 148/65 (BP Location: Left arm, Patient Position: Sitting, BP Method: Large (Automatic))   Pulse (!) 53   Ht 6' (1.829 m)   Wt 80 kg (176 lb 5.9 oz)   SpO2 97%   BMI 23.92 kg/m²     Body surface area is 2.02 meters squared.     Physical Exam   Constitutional: He is oriented to person, place, and time. He appears well-developed and well-nourished. No distress.   HENT:   Head: Normocephalic and atraumatic.   Eyes: Conjunctivae and EOM are normal. No scleral icterus.   Neck: Normal range of motion. Neck supple.   Cardiovascular: Normal rate and regular rhythm.   Murmur heard.  Pulmonary/Chest: Effort normal and breath sounds normal. No respiratory distress. He has no wheezes.   Abdominal: Soft.   Musculoskeletal: Normal range of motion.         General: No edema.   Neurological: He is alert and oriented to person, place, and time.   Skin: Skin is warm and dry. No rash  noted. He is not diaphoretic. No erythema.   Psychiatric: He has a normal mood and affect. His behavior is normal. Judgment normal.   Nursing note and vitals reviewed.       Chemistry        Component Value Date/Time     10/30/2019 0828    K 3.7 10/30/2019 0828     10/30/2019 0828    CO2 26 10/30/2019 0828    BUN 13 10/30/2019 0828    CREATININE 0.8 10/30/2019 0828    GLU 97 10/30/2019 0828        Component Value Date/Time    CALCIUM 9.5 10/30/2019 0828    ALKPHOS 49 (L) 08/23/2019 0737    AST 30 08/23/2019 0737    ALT 31 08/23/2019 0737    BILITOT 0.7 08/23/2019 0737    ESTGFRAFRICA >60 10/30/2019 0828    EGFRNONAA >60 10/30/2019 0828        Recent Labs   Lab 02/08/19  0804 08/23/19  0737   WBC 6.27 6.64   Hemoglobin 13.6 L 15.0   Hematocrit 42.1 46.1   Mean Corpuscular Volume 85 84   Platelets 165 149 L   Cholesterol 127  --    HDL 42  --    LDL Cholesterol 64.4  --    Triglycerides 103  --    Hdl/Cholesterol Ratio 33.1  --      Recent Labs   Lab 01/08/20  0835 03/06/20  0847 06/05/20  0939 07/30/20  1003   INR 2.1 2.4 2.4 H 2.2 H      Test(s) Reviewed  I have reviewed the following in detail:  [] Stress test   [] Angiography   [x] Echocardiogram   [] Labs   [x] Other:  EKG           Assessment:   Aortic regurgitation  -functionally bicuspid valve  -see TTE above >> reviewed TTE with staff and patient  -NYHA 1, CCS  -on ASA daily  -BPs well controlled on alfuzosin, chlorthalidone, and metoprolol  -see staff attestation for further details  -likely will need to establish with General Cardiology    Tricuspid Regurgitation, mild  -continue good BP control  -see HTN     Paroxysmal atrial fibrillation  -chronic, well controlled at current  -EKG with SB  -on metoprolol and warfarin   -declines NOAC usage  -has PIP flecainide  -recent 30 day event monitor without alarm  -continue routine care with EP    Long term current use of anticoagulant therapy  -see above  -follows with coumadin clinic    Essential  "hypertension, benign  -chronic, well controlled at home >> see log above  -has "white coat" syndrome  -on home therapies alfuzosin, chlorthalidone, and metoprolol  -understands needs for good BP control due to valvular dysfunction  -TR remains mild  -likely will need to establish with General Cardiology    Mixed hyperlipidemia  -chronic  -on statin therapy     Plan:   -see staff attestation for further details  -ANEL volume calculations per staff >>> patient requested information  -likely will need to establish with General Cardiology for ongoing care in setting of Dr. Mcclellan's departure  -will need surveillance TTEs, BP/lipid monitoring      Soniya Chavira, DNP, AG-ACNP, BC  Structural Cardiology   [facility]  "

## 2020-08-14 ENCOUNTER — TELEPHONE (OUTPATIENT)
Dept: OTOLARYNGOLOGY | Facility: CLINIC | Age: 70
End: 2020-08-14

## 2020-08-17 ENCOUNTER — OFFICE VISIT (OUTPATIENT)
Dept: OTOLARYNGOLOGY | Facility: CLINIC | Age: 70
End: 2020-08-17
Payer: COMMERCIAL

## 2020-08-17 VITALS — SYSTOLIC BLOOD PRESSURE: 143 MMHG | DIASTOLIC BLOOD PRESSURE: 77 MMHG | HEART RATE: 66 BPM

## 2020-08-17 DIAGNOSIS — H61.23 BILATERAL IMPACTED CERUMEN: Primary | ICD-10-CM

## 2020-08-17 PROCEDURE — 99999 PR PBB SHADOW E&M-EST. PATIENT-LVL III: CPT | Mod: PBBFAC,,, | Performed by: NURSE PRACTITIONER

## 2020-08-17 PROCEDURE — 99999 PR PBB SHADOW E&M-EST. PATIENT-LVL III: ICD-10-PCS | Mod: PBBFAC,,, | Performed by: NURSE PRACTITIONER

## 2020-08-17 PROCEDURE — 99499 NO LOS: ICD-10-PCS | Mod: S$GLB,,, | Performed by: NURSE PRACTITIONER

## 2020-08-17 PROCEDURE — 69210 EAR CERUMEN REMOVAL: ICD-10-PCS | Mod: S$GLB,,, | Performed by: NURSE PRACTITIONER

## 2020-08-17 PROCEDURE — 99499 UNLISTED E&M SERVICE: CPT | Mod: S$GLB,,, | Performed by: NURSE PRACTITIONER

## 2020-08-17 PROCEDURE — 69210 REMOVE IMPACTED EAR WAX UNI: CPT | Mod: S$GLB,,, | Performed by: NURSE PRACTITIONER

## 2020-08-17 NOTE — PROCEDURES
Ear Cerumen Removal    Date/Time: 8/17/2020 8:30 AM  Performed by: Jairo Rutherford NP  Authorized by: Jairo Rutherford NP     Consent Done?:  Yes (Verbal)  Location details:  Both ears  Procedure type: curette    Cerumen  Removal Results:  Cerumen completely removed  Patient tolerance:  Patient tolerated the procedure well with no immediate complications     Procedure Note:    The patient was brought to the minor procedure room and placed under the operating microscope of the left ear canal which was cleaned of ceruminous debris. Using a combination of suction, curettes and cup forceps the patient's cerumen impaction was removed. The tympanic membrane was evaluated and was unremarkable. The patient tolerated the procedure well. There were no complications.  Procedure Note:    Patient was brought to the minor procedure room and using the operating microscope of the right ear canal which was cleaned of ceruminous debris. There was a significant cerumen impaction.  Using a combination of suction, curettes and cup forceps the patient's cerumen impaction was removed. Tympanic membrane intact. Pt tolerated well. There were no complications.

## 2020-08-17 NOTE — LETTER
August 17, 2020      Wilfredo Hendrix MD  [address]  [facility] [address]           [facility] - Otorhinolaryngology  [address]  [facility] [address]  Phone: [phone]  Fax: [phone]          Patient: Anthony Reynaga   MR Number: [id number]   YOB: 1950   Date of Visit: 8/17/2020       Dear Dr. Wilfredo Hendrix:    Thank you for referring Anthony Reynaga to me for evaluation. Attached you will find relevant portions of my assessment and plan of care.    If you have questions, please do not hesitate to call me. I look forward to following Anthony Reynaga along with you.    Sincerely,    Jairo Rutherford, NP    Enclosure  CC:  No Recipients    If you would like to receive this communication electronically, please contact [email or web address] or [phone] to request more information on [employer] Link access.    For providers and/or their staff who would like to refer a patient to [facility], please contact us through our one-stop-shop provider referral line, [facility], at [phone].    If you feel you have received this communication in error or would no longer like to receive these types of communications, please e-mail [email or web address]

## 2020-09-24 ENCOUNTER — LAB VISIT (OUTPATIENT)
Dept: LAB | Facility: HOSPITAL | Age: 70
End: 2020-09-24
Attending: INTERNAL MEDICINE
Payer: COMMERCIAL

## 2020-09-24 ENCOUNTER — ANTI-COAG VISIT (OUTPATIENT)
Dept: CARDIOLOGY | Facility: CLINIC | Age: 70
End: 2020-09-24
Payer: COMMERCIAL

## 2020-09-24 DIAGNOSIS — Z79.01 LONG TERM CURRENT USE OF ANTICOAGULANT THERAPY: ICD-10-CM

## 2020-09-24 DIAGNOSIS — I48.0 PAROXYSMAL ATRIAL FIBRILLATION: ICD-10-CM

## 2020-09-24 LAB
INR PPP: 2.5 (ref 0.8–1.2)
PROTHROMBIN TIME: 26.2 SEC (ref 9–12.5)

## 2020-09-24 PROCEDURE — 93793 ANTICOAG MGMT PT WARFARIN: CPT | Mod: S$GLB,,,

## 2020-09-24 PROCEDURE — 36415 COLL VENOUS BLD VENIPUNCTURE: CPT

## 2020-09-24 PROCEDURE — 85610 PROTHROMBIN TIME: CPT

## 2020-09-24 PROCEDURE — 93793 PR ANTICOAGULANT MGMT FOR PT TAKING WARFARIN: ICD-10-PCS | Mod: S$GLB,,,

## 2020-09-30 RX ORDER — FLECAINIDE ACETATE 150 MG/1
TABLET ORAL
Qty: 10 TABLET | Refills: 3 | Status: SHIPPED | OUTPATIENT
Start: 2020-09-30 | End: 2020-10-02 | Stop reason: SDUPTHER

## 2020-10-02 RX ORDER — FLECAINIDE ACETATE 150 MG/1
TABLET ORAL
Qty: 10 TABLET | Refills: 3 | Status: SHIPPED | OUTPATIENT
Start: 2020-10-02 | End: 2020-10-02 | Stop reason: SDUPTHER

## 2020-10-02 RX ORDER — FLECAINIDE ACETATE 150 MG/1
TABLET ORAL
Qty: 10 TABLET | Refills: 3 | Status: SHIPPED | OUTPATIENT
Start: 2020-10-02 | End: 2021-02-12 | Stop reason: SDUPTHER

## 2020-10-28 ENCOUNTER — OFFICE VISIT (OUTPATIENT)
Dept: INTERNAL MEDICINE | Facility: CLINIC | Age: 70
End: 2020-10-28
Payer: COMMERCIAL

## 2020-10-28 VITALS
DIASTOLIC BLOOD PRESSURE: 64 MMHG | SYSTOLIC BLOOD PRESSURE: 126 MMHG | OXYGEN SATURATION: 96 % | WEIGHT: 179 LBS | BODY MASS INDEX: 22.97 KG/M2 | HEART RATE: 61 BPM | HEIGHT: 74 IN

## 2020-10-28 DIAGNOSIS — Z79.01 LONG TERM CURRENT USE OF ANTICOAGULANT THERAPY: ICD-10-CM

## 2020-10-28 DIAGNOSIS — N13.8 BENIGN PROSTATIC HYPERPLASIA WITH URINARY OBSTRUCTION: ICD-10-CM

## 2020-10-28 DIAGNOSIS — N40.1 BENIGN PROSTATIC HYPERPLASIA WITH URINARY OBSTRUCTION: ICD-10-CM

## 2020-10-28 DIAGNOSIS — I35.0 MILD AORTIC STENOSIS: ICD-10-CM

## 2020-10-28 DIAGNOSIS — E55.9 VITAMIN D DEFICIENCY: ICD-10-CM

## 2020-10-28 DIAGNOSIS — Z00.00 WELLNESS EXAMINATION: Primary | ICD-10-CM

## 2020-10-28 DIAGNOSIS — I35.1 NONRHEUMATIC AORTIC VALVE INSUFFICIENCY: ICD-10-CM

## 2020-10-28 DIAGNOSIS — E78.2 MIXED HYPERLIPIDEMIA: ICD-10-CM

## 2020-10-28 DIAGNOSIS — I48.0 PAROXYSMAL ATRIAL FIBRILLATION: ICD-10-CM

## 2020-10-28 DIAGNOSIS — I10 ESSENTIAL HYPERTENSION, BENIGN: ICD-10-CM

## 2020-10-28 PROCEDURE — 3074F PR MOST RECENT SYSTOLIC BLOOD PRESSURE < 130 MM HG: ICD-10-PCS | Mod: CPTII,S$GLB,, | Performed by: INTERNAL MEDICINE

## 2020-10-28 PROCEDURE — 3074F SYST BP LT 130 MM HG: CPT | Mod: CPTII,S$GLB,, | Performed by: INTERNAL MEDICINE

## 2020-10-28 PROCEDURE — 3078F PR MOST RECENT DIASTOLIC BLOOD PRESSURE < 80 MM HG: ICD-10-PCS | Mod: CPTII,S$GLB,, | Performed by: INTERNAL MEDICINE

## 2020-10-28 PROCEDURE — 99999 PR PBB SHADOW E&M-EST. PATIENT-LVL IV: ICD-10-PCS | Mod: PBBFAC,,, | Performed by: INTERNAL MEDICINE

## 2020-10-28 PROCEDURE — 3078F DIAST BP <80 MM HG: CPT | Mod: CPTII,S$GLB,, | Performed by: INTERNAL MEDICINE

## 2020-10-28 PROCEDURE — 99397 PR PREVENTIVE VISIT,EST,65 & OVER: ICD-10-PCS | Mod: S$GLB,,, | Performed by: INTERNAL MEDICINE

## 2020-10-28 PROCEDURE — 99999 PR PBB SHADOW E&M-EST. PATIENT-LVL IV: CPT | Mod: PBBFAC,,, | Performed by: INTERNAL MEDICINE

## 2020-10-28 PROCEDURE — 99397 PER PM REEVAL EST PAT 65+ YR: CPT | Mod: S$GLB,,, | Performed by: INTERNAL MEDICINE

## 2020-10-28 RX ORDER — FLUTICASONE PROPIONATE 50 MCG
SPRAY, SUSPENSION (ML) NASAL
COMMUNITY
Start: 2020-10-19 | End: 2020-11-23 | Stop reason: SDUPTHER

## 2020-10-28 NOTE — PROGRESS NOTES
"Subjective:       Patient ID: Anthony Reynaga is a 70 y.o. male.    Chief Complaint: Annual Exam    Pt here for annual exam. Counseled on exercise goals.     Pt's BP is well controlled. Tolerating meds well. Pt denies cp/sob/ha/vision or neuro changes. Checking at home and is well controlled.     HLD is tx with simvastatin which he tolerates well.     He has pAfib for which he takes metoprolol and is anticoag with coumadin. He sees cardiology and EP regularly. This is currently stable and he denies cp/sob/palpitations. Coumadin is followed by coumadin clinic at [facility]. He is aware of NOAC but opts not to use these and stay with coumadin instead. He uses flecainide for "pill in pocket" approach to a-fib episodes. Most recent ECHO showed mild aortic stenosis which is being monitored by cardiology.     He has BPH with LUTS for which he takes uroxatral with success. He previously had elevated PSA and had biopsy that was negative. He sees urology regularly.     He has vit d deficiency on prior labs and is now on otc vit d 1000 units daily.     He is working with ENT re: dysosmia he has been having. Not a foul smell but difficult to describe. Occurring for 7 mos; used flonase regularly for 3 mos with some improvement but has worsened again to original presentation in spite of continued use of flonase. He has not had f/u with ENT again yet. He has chronic nasal congestion with post nasal drip but no other symptoms of sinus infection.       Review of Systems   Constitutional: Negative for appetite change, fatigue, fever and unexpected weight change.   HENT: Negative for congestion, rhinorrhea and sore throat.    Eyes: Negative for visual disturbance.   Respiratory: Negative for cough and shortness of breath.    Cardiovascular: Negative for chest pain, palpitations and leg swelling.   Gastrointestinal: Negative for abdominal pain, blood in stool, constipation and diarrhea.   Genitourinary: Negative for difficulty urinating and " dysuria.   Skin: Negative for color change and rash.   Neurological: Negative for dizziness, syncope, light-headedness and headaches.   Hematological: Negative for adenopathy.   Psychiatric/Behavioral: Negative for dysphoric mood.       Objective:      Physical Exam  Constitutional:       Appearance: He is well-developed.   HENT:      Head: Normocephalic and atraumatic.      Right Ear: External ear normal.      Left Ear: External ear normal.      Mouth/Throat:      Pharynx: No oropharyngeal exudate.   Eyes:      Conjunctiva/sclera: Conjunctivae normal.      Pupils: Pupils are equal, round, and reactive to light.   Neck:      Musculoskeletal: Neck supple.      Thyroid: No thyromegaly.      Vascular: No carotid bruit.   Cardiovascular:      Rate and Rhythm: Normal rate and regular rhythm.      Heart sounds: Normal heart sounds, S1 normal and S2 normal.   Pulmonary:      Effort: Pulmonary effort is normal.      Breath sounds: Normal breath sounds.   Abdominal:      General: Bowel sounds are normal.      Palpations: Abdomen is soft. There is no mass.      Tenderness: There is no abdominal tenderness.   Lymphadenopathy:      Cervical: No cervical adenopathy.   Neurological:      Mental Status: He is alert and oriented to person, place, and time.      Cranial Nerves: No cranial nerve deficit.   Psychiatric:         Behavior: Behavior normal.         Assessment:       1. Wellness examination    2. Paroxysmal atrial fibrillation    3. Benign prostatic hyperplasia with urinary obstruction    4. Essential hypertension, benign    5. Nonrheumatic aortic valve insufficiency    6. Mixed hyperlipidemia    7. Mild aortic stenosis    8. Long term current use of anticoagulant therapy    9. Vitamin D deficiency        Plan:       1. Appropriate labs  2. Home BP monitoring  3. Keep f/u with specialists

## 2020-10-29 ENCOUNTER — PATIENT MESSAGE (OUTPATIENT)
Dept: OTOLARYNGOLOGY | Facility: CLINIC | Age: 70
End: 2020-10-29

## 2020-11-02 DIAGNOSIS — Z01.812 PRE-PROCEDURE LAB EXAM: ICD-10-CM

## 2020-11-11 ENCOUNTER — PATIENT MESSAGE (OUTPATIENT)
Dept: INTERNAL MEDICINE | Facility: CLINIC | Age: 70
End: 2020-11-11

## 2020-11-12 ENCOUNTER — PATIENT MESSAGE (OUTPATIENT)
Dept: INTERNAL MEDICINE | Facility: CLINIC | Age: 70
End: 2020-11-12

## 2020-11-12 ENCOUNTER — TELEPHONE (OUTPATIENT)
Dept: OBSTETRICS AND GYNECOLOGY | Facility: CLINIC | Age: 70
End: 2020-11-12

## 2020-11-12 ENCOUNTER — LAB VISIT (OUTPATIENT)
Dept: LAB | Facility: OTHER | Age: 70
End: 2020-11-12
Attending: INTERNAL MEDICINE
Payer: COMMERCIAL

## 2020-11-12 DIAGNOSIS — E87.6 HYPOKALEMIA: Primary | ICD-10-CM

## 2020-11-12 DIAGNOSIS — E55.9 VITAMIN D DEFICIENCY: ICD-10-CM

## 2020-11-12 DIAGNOSIS — I48.0 PAROXYSMAL ATRIAL FIBRILLATION: ICD-10-CM

## 2020-11-12 DIAGNOSIS — Z00.00 WELLNESS EXAMINATION: ICD-10-CM

## 2020-11-12 DIAGNOSIS — Z79.01 LONG TERM CURRENT USE OF ANTICOAGULANT THERAPY: ICD-10-CM

## 2020-11-12 LAB
25(OH)D3+25(OH)D2 SERPL-MCNC: 35 NG/ML (ref 30–96)
ALBUMIN SERPL BCP-MCNC: 4.1 G/DL (ref 3.5–5.2)
ALP SERPL-CCNC: 43 U/L (ref 55–135)
ALT SERPL W/O P-5'-P-CCNC: 24 U/L (ref 10–44)
ANION GAP SERPL CALC-SCNC: 9 MMOL/L (ref 8–16)
AST SERPL-CCNC: 22 U/L (ref 10–40)
BASOPHILS # BLD AUTO: 0.09 K/UL (ref 0–0.2)
BASOPHILS NFR BLD: 1.2 % (ref 0–1.9)
BILIRUB SERPL-MCNC: 1 MG/DL (ref 0.1–1)
BUN SERPL-MCNC: 13 MG/DL (ref 8–23)
CALCIUM SERPL-MCNC: 9.7 MG/DL (ref 8.7–10.5)
CHLORIDE SERPL-SCNC: 105 MMOL/L (ref 95–110)
CHOLEST SERPL-MCNC: 137 MG/DL (ref 120–199)
CHOLEST/HDLC SERPL: 3.3 {RATIO} (ref 2–5)
CO2 SERPL-SCNC: 27 MMOL/L (ref 23–29)
CREAT SERPL-MCNC: 0.9 MG/DL (ref 0.5–1.4)
DIFFERENTIAL METHOD: ABNORMAL
EOSINOPHIL # BLD AUTO: 0.2 K/UL (ref 0–0.5)
EOSINOPHIL NFR BLD: 3.2 % (ref 0–8)
ERYTHROCYTE [DISTWIDTH] IN BLOOD BY AUTOMATED COUNT: 13.7 % (ref 11.5–14.5)
EST. GFR  (AFRICAN AMERICAN): >60 ML/MIN/1.73 M^2
EST. GFR  (NON AFRICAN AMERICAN): >60 ML/MIN/1.73 M^2
GLUCOSE SERPL-MCNC: 101 MG/DL (ref 70–110)
HCT VFR BLD AUTO: 45.5 % (ref 40–54)
HDLC SERPL-MCNC: 41 MG/DL (ref 40–75)
HDLC SERPL: 29.9 % (ref 20–50)
HGB BLD-MCNC: 14.7 G/DL (ref 14–18)
IMM GRANULOCYTES # BLD AUTO: 0.01 K/UL (ref 0–0.04)
IMM GRANULOCYTES NFR BLD AUTO: 0.1 % (ref 0–0.5)
INR PPP: 2.6 (ref 0.8–1.2)
LDLC SERPL CALC-MCNC: 63.8 MG/DL (ref 63–159)
LYMPHOCYTES # BLD AUTO: 1.6 K/UL (ref 1–4.8)
LYMPHOCYTES NFR BLD: 22.2 % (ref 18–48)
MCH RBC QN AUTO: 26.9 PG (ref 27–31)
MCHC RBC AUTO-ENTMCNC: 32.3 G/DL (ref 32–36)
MCV RBC AUTO: 83 FL (ref 82–98)
MONOCYTES # BLD AUTO: 0.6 K/UL (ref 0.3–1)
MONOCYTES NFR BLD: 8.3 % (ref 4–15)
NEUTROPHILS # BLD AUTO: 4.8 K/UL (ref 1.8–7.7)
NEUTROPHILS NFR BLD: 65 % (ref 38–73)
NONHDLC SERPL-MCNC: 96 MG/DL
NRBC BLD-RTO: 0 /100 WBC
PLATELET # BLD AUTO: 159 K/UL (ref 150–350)
PMV BLD AUTO: 11.6 FL (ref 9.2–12.9)
POTASSIUM SERPL-SCNC: 3.2 MMOL/L (ref 3.5–5.1)
PROT SERPL-MCNC: 6.9 G/DL (ref 6–8.4)
PROTHROMBIN TIME: 26.8 SEC (ref 9–12.5)
RBC # BLD AUTO: 5.46 M/UL (ref 4.6–6.2)
SODIUM SERPL-SCNC: 141 MMOL/L (ref 136–145)
TRIGL SERPL-MCNC: 161 MG/DL (ref 30–150)
WBC # BLD AUTO: 7.39 K/UL (ref 3.9–12.7)

## 2020-11-12 PROCEDURE — 82306 VITAMIN D 25 HYDROXY: CPT

## 2020-11-12 PROCEDURE — 36415 COLL VENOUS BLD VENIPUNCTURE: CPT

## 2020-11-12 PROCEDURE — 80061 LIPID PANEL: CPT

## 2020-11-12 PROCEDURE — 85025 COMPLETE CBC W/AUTO DIFF WBC: CPT

## 2020-11-12 PROCEDURE — 80053 COMPREHEN METABOLIC PANEL: CPT

## 2020-11-12 PROCEDURE — 85610 PROTHROMBIN TIME: CPT

## 2020-11-12 RX ORDER — POTASSIUM CHLORIDE 20 MEQ/1
20 TABLET, EXTENDED RELEASE ORAL DAILY
Qty: 90 TABLET | Refills: 3 | Status: SHIPPED | OUTPATIENT
Start: 2020-11-12 | End: 2021-01-03

## 2020-11-13 ENCOUNTER — OFFICE VISIT (OUTPATIENT)
Dept: ELECTROPHYSIOLOGY | Facility: CLINIC | Age: 70
End: 2020-11-13
Payer: COMMERCIAL

## 2020-11-13 ENCOUNTER — HOSPITAL ENCOUNTER (OUTPATIENT)
Dept: CARDIOLOGY | Facility: CLINIC | Age: 70
Discharge: HOME | End: 2020-11-13
Payer: COMMERCIAL

## 2020-11-13 VITALS
SYSTOLIC BLOOD PRESSURE: 125 MMHG | DIASTOLIC BLOOD PRESSURE: 68 MMHG | HEIGHT: 72 IN | HEART RATE: 57 BPM | WEIGHT: 183.44 LBS | BODY MASS INDEX: 24.85 KG/M2

## 2020-11-13 DIAGNOSIS — N13.8 BENIGN PROSTATIC HYPERPLASIA WITH URINARY OBSTRUCTION: ICD-10-CM

## 2020-11-13 DIAGNOSIS — E78.2 MIXED HYPERLIPIDEMIA: ICD-10-CM

## 2020-11-13 DIAGNOSIS — I49.8 OTHER SPECIFIED CARDIAC ARRHYTHMIAS: ICD-10-CM

## 2020-11-13 DIAGNOSIS — N40.1 BENIGN PROSTATIC HYPERPLASIA WITH URINARY OBSTRUCTION: ICD-10-CM

## 2020-11-13 DIAGNOSIS — I48.0 PAROXYSMAL ATRIAL FIBRILLATION: ICD-10-CM

## 2020-11-13 DIAGNOSIS — I10 ESSENTIAL HYPERTENSION, BENIGN: Primary | Chronic | ICD-10-CM

## 2020-11-13 DIAGNOSIS — Z79.01 LONG TERM CURRENT USE OF ANTICOAGULANT THERAPY: ICD-10-CM

## 2020-11-13 PROCEDURE — 3008F PR BODY MASS INDEX (BMI) DOCUMENTED: ICD-10-PCS | Mod: CPTII,S$GLB,, | Performed by: INTERNAL MEDICINE

## 2020-11-13 PROCEDURE — 3074F SYST BP LT 130 MM HG: CPT | Mod: CPTII,S$GLB,, | Performed by: INTERNAL MEDICINE

## 2020-11-13 PROCEDURE — 1101F PT FALLS ASSESS-DOCD LE1/YR: CPT | Mod: CPTII,S$GLB,, | Performed by: INTERNAL MEDICINE

## 2020-11-13 PROCEDURE — 3008F BODY MASS INDEX DOCD: CPT | Mod: CPTII,S$GLB,, | Performed by: INTERNAL MEDICINE

## 2020-11-13 PROCEDURE — 99214 OFFICE O/P EST MOD 30 MIN: CPT | Mod: S$GLB,,, | Performed by: INTERNAL MEDICINE

## 2020-11-13 PROCEDURE — 3078F PR MOST RECENT DIASTOLIC BLOOD PRESSURE < 80 MM HG: ICD-10-PCS | Mod: CPTII,S$GLB,, | Performed by: INTERNAL MEDICINE

## 2020-11-13 PROCEDURE — 1159F PR MEDICATION LIST DOCUMENTED IN MEDICAL RECORD: ICD-10-PCS | Mod: S$GLB,,, | Performed by: INTERNAL MEDICINE

## 2020-11-13 PROCEDURE — 99999 PR PBB SHADOW E&M-EST. PATIENT-LVL IV: CPT | Mod: PBBFAC,,, | Performed by: INTERNAL MEDICINE

## 2020-11-13 PROCEDURE — 99999 PR PBB SHADOW E&M-EST. PATIENT-LVL IV: ICD-10-PCS | Mod: PBBFAC,,, | Performed by: INTERNAL MEDICINE

## 2020-11-13 PROCEDURE — 93005 ELECTROCARDIOGRAM TRACING: CPT | Mod: S$GLB,,, | Performed by: INTERNAL MEDICINE

## 2020-11-13 PROCEDURE — 1101F PR PT FALLS ASSESS DOC 0-1 FALLS W/OUT INJ PAST YR: ICD-10-PCS | Mod: CPTII,S$GLB,, | Performed by: INTERNAL MEDICINE

## 2020-11-13 PROCEDURE — 93010 RHYTHM STRIP: ICD-10-PCS | Mod: S$GLB,,, | Performed by: INTERNAL MEDICINE

## 2020-11-13 PROCEDURE — 93010 ELECTROCARDIOGRAM REPORT: CPT | Mod: S$GLB,,, | Performed by: INTERNAL MEDICINE

## 2020-11-13 PROCEDURE — 99214 PR OFFICE/OUTPT VISIT, EST, LEVL IV, 30-39 MIN: ICD-10-PCS | Mod: S$GLB,,, | Performed by: INTERNAL MEDICINE

## 2020-11-13 PROCEDURE — 3078F DIAST BP <80 MM HG: CPT | Mod: CPTII,S$GLB,, | Performed by: INTERNAL MEDICINE

## 2020-11-13 PROCEDURE — 3074F PR MOST RECENT SYSTOLIC BLOOD PRESSURE < 130 MM HG: ICD-10-PCS | Mod: CPTII,S$GLB,, | Performed by: INTERNAL MEDICINE

## 2020-11-13 PROCEDURE — 1126F AMNT PAIN NOTED NONE PRSNT: CPT | Mod: S$GLB,,, | Performed by: INTERNAL MEDICINE

## 2020-11-13 PROCEDURE — 1126F PR PAIN SEVERITY QUANTIFIED, NO PAIN PRESENT: ICD-10-PCS | Mod: S$GLB,,, | Performed by: INTERNAL MEDICINE

## 2020-11-13 PROCEDURE — 3288F FALL RISK ASSESSMENT DOCD: CPT | Mod: CPTII,S$GLB,, | Performed by: INTERNAL MEDICINE

## 2020-11-13 PROCEDURE — 93005 RHYTHM STRIP: ICD-10-PCS | Mod: S$GLB,,, | Performed by: INTERNAL MEDICINE

## 2020-11-13 PROCEDURE — 3288F PR FALLS RISK ASSESSMENT DOCUMENTED: ICD-10-PCS | Mod: CPTII,S$GLB,, | Performed by: INTERNAL MEDICINE

## 2020-11-13 PROCEDURE — 1159F MED LIST DOCD IN RCRD: CPT | Mod: S$GLB,,, | Performed by: INTERNAL MEDICINE

## 2020-11-13 NOTE — PROGRESS NOTES
"Subjective:    Patient ID:  Anthony Reynaga is a 70 y.o. male who presents for evaluation of No chief complaint on file.      Atrial Fibrillation  Symptoms are negative for chest pain, dizziness, shortness of breath, syncope and weakness. Past medical history includes atrial fibrillation.   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  HTN on meds  question of bicuspid AoV in past; reviewed by Dr Mcclellan; tristen.    "PAF" onset was in 1996. Spontaneously back to SR, after meds given.   1997: Required DCCV for another episode.  Since, seems that he gets an episode about q 6 months. Always seems to be triggered by episodes of fast HR: exertion, nightmare...  Was having AF q year; 2 episodes: 3/15 and 8/15. In 8/15, lasted 62 hours (longer than usual); ECG in [facility] confirms AF.    Hadn't had AF x 4 years. Occ palpitations; nothing longterm.  Since last seen, had one episode of AF. Went to OSH ER, where pill in pocket flecainide was successful.    Had AF last in 12/2019. PIP flecainide successful. In summer, had different, shorter palpitations. Resolved when monitor placed, and hasn't had any since.    Echo 60%; severe LAE    My interpretation of today's ECG is SB at 57 bpm. No ectopy.    Review of Systems   Constitution: Negative. Negative for malaise/fatigue.   HENT: Negative.  Negative for ear pain and tinnitus.    Eyes: Negative for blurred vision.   Cardiovascular: Negative for chest pain, dyspnea on exertion, near-syncope and syncope.   Respiratory: Negative.  Negative for shortness of breath.    Endocrine: Negative.  Negative for polyuria.   Hematologic/Lymphatic: Does not bruise/bleed easily.   Skin: Negative.  Negative for rash.   Musculoskeletal: Negative.  Negative for joint pain and muscle weakness.   Gastrointestinal: Negative.  Negative for abdominal pain and change in bowel habit.   Genitourinary: Negative for " frequency.   Neurological: Negative.  Negative for dizziness and weakness.   Psychiatric/Behavioral: Negative.  Negative for depression. The patient is not nervous/anxious.    Allergic/Immunologic: Negative for environmental allergies.        Objective:    Physical Exam   Constitutional: He is oriented to person, place, and time. He appears well-developed and well-nourished.   HENT:   Head: Normocephalic and atraumatic.   Eyes: Conjunctivae, EOM and lids are normal. No scleral icterus.   Neck: Normal range of motion. No JVD present. No tracheal deviation present. No thyromegaly present.   Cardiovascular: Regular rhythm. Bradycardia present.   Murmur heard.   Harsh midsystolic murmur is present with a grade of 2/6 at the upper right sternal border radiating to the neck.  Pulses:       Radial pulses are 2+ on the right side and 2+ on the left side.   Pulmonary/Chest: Effort normal. No accessory muscle usage. No tachypnea. No respiratory distress. He has no wheezes.   Abdominal: Bowel sounds are normal. He exhibits no distension. There is no hepatosplenomegaly.   Musculoskeletal: Normal range of motion.         General: No edema.   Neurological: He is alert and oriented to person, place, and time. He has normal reflexes. He exhibits normal muscle tone.   Skin: Skin is warm and dry. No rash noted.   Psychiatric: He has a normal mood and affect. His behavior is normal.   Nursing note and vitals reviewed.        Assessment:       1. Essential hypertension, benign    2. Paroxysmal atrial fibrillation    3. Mixed hyperlipidemia    4. Benign prostatic hyperplasia with urinary obstruction    5. Long term current use of anticoagulant therapy         Plan:       AF:  Minimal sx and very infrequent.   Continue plans for pill-in-the-pocket approach to AF. Provided reference to NE article (Tabatha et al. N Engl J Med 2004;351:2384-91). Patient now able to self-direct pill-in-the-pocket treatment: 300 mg PO x1  prn.    PVCs:  Benign, but symptomatic at times.  Continue BB.    HTN:  Continue chlorthalidone. He'll d/w his PCP the recent finding of mild hypokalemia. If that continues, consider change to norvasc.    Ao valve abnormality:  Dr. Mcclellan has left; will refer to new cardiologist.    Return in 1 year with echo, or earlier prn.

## 2020-11-18 RX ORDER — FLUTICASONE PROPIONATE 50 MCG
SPRAY, SUSPENSION (ML) NASAL
Status: CANCELLED | OUTPATIENT
Start: 2020-11-18

## 2020-11-23 ENCOUNTER — ANTI-COAG VISIT (OUTPATIENT)
Dept: CARDIOLOGY | Facility: CLINIC | Age: 70
End: 2020-11-23
Payer: COMMERCIAL

## 2020-11-23 DIAGNOSIS — Z79.01 LONG TERM CURRENT USE OF ANTICOAGULANT THERAPY: ICD-10-CM

## 2020-11-23 DIAGNOSIS — I48.0 PAROXYSMAL ATRIAL FIBRILLATION: ICD-10-CM

## 2020-11-23 PROCEDURE — 93793 PR ANTICOAGULANT MGMT FOR PT TAKING WARFARIN: ICD-10-PCS | Mod: S$GLB,,,

## 2020-11-23 PROCEDURE — 93793 ANTICOAG MGMT PT WARFARIN: CPT | Mod: S$GLB,,,

## 2020-11-23 RX ORDER — LORAZEPAM 1 MG/1
1 TABLET ORAL NIGHTLY
Qty: 20 TABLET | Refills: 0 | Status: SHIPPED | OUTPATIENT
Start: 2020-11-23 | End: 2024-03-28

## 2020-11-23 NOTE — PROGRESS NOTES
Pt states he was having some blood drawn for his annual. He states his PCP added the PT/INR on  11/12/2020.

## 2020-11-24 RX ORDER — FLUTICASONE PROPIONATE 50 MCG
2 SPRAY, SUSPENSION (ML) NASAL DAILY
Qty: 16 G | Refills: 5 | Status: SHIPPED | OUTPATIENT
Start: 2020-11-24 | End: 2021-05-26 | Stop reason: SDUPTHER

## 2020-11-30 ENCOUNTER — LAB VISIT (OUTPATIENT)
Dept: LAB | Facility: HOSPITAL | Age: 70
End: 2020-11-30
Attending: INTERNAL MEDICINE
Payer: COMMERCIAL

## 2020-11-30 ENCOUNTER — PATIENT MESSAGE (OUTPATIENT)
Dept: INTERNAL MEDICINE | Facility: CLINIC | Age: 70
End: 2020-11-30

## 2020-11-30 DIAGNOSIS — E87.6 HYPOKALEMIA: ICD-10-CM

## 2020-11-30 LAB
ANION GAP SERPL CALC-SCNC: 6 MMOL/L (ref 8–16)
BUN SERPL-MCNC: 12 MG/DL (ref 8–23)
CALCIUM SERPL-MCNC: 9.7 MG/DL (ref 8.7–10.5)
CHLORIDE SERPL-SCNC: 105 MMOL/L (ref 95–110)
CO2 SERPL-SCNC: 27 MMOL/L (ref 23–29)
CREAT SERPL-MCNC: 0.9 MG/DL (ref 0.5–1.4)
EST. GFR  (AFRICAN AMERICAN): >60 ML/MIN/1.73 M^2
EST. GFR  (NON AFRICAN AMERICAN): >60 ML/MIN/1.73 M^2
GLUCOSE SERPL-MCNC: 112 MG/DL (ref 70–110)
POTASSIUM SERPL-SCNC: 3.4 MMOL/L (ref 3.5–5.1)
SODIUM SERPL-SCNC: 138 MMOL/L (ref 136–145)

## 2020-11-30 PROCEDURE — 80048 BASIC METABOLIC PNL TOTAL CA: CPT

## 2020-11-30 PROCEDURE — 36415 COLL VENOUS BLD VENIPUNCTURE: CPT

## 2020-12-07 ENCOUNTER — HOSPITAL ENCOUNTER (OUTPATIENT)
Dept: PREADMISSION TESTING | Facility: OTHER | Age: 70
Discharge: HOME | End: 2020-12-07
Attending: OTOLARYNGOLOGY
Payer: COMMERCIAL

## 2020-12-07 DIAGNOSIS — Z01.812 PRE-PROCEDURE LAB EXAM: ICD-10-CM

## 2020-12-07 PROCEDURE — U0003 INFECTIOUS AGENT DETECTION BY NUCLEIC ACID (DNA OR RNA); SEVERE ACUTE RESPIRATORY SYNDROME CORONAVIRUS 2 (SARS-COV-2) (CORONAVIRUS DISEASE [COVID-19]), AMPLIFIED PROBE TECHNIQUE, MAKING USE OF HIGH THROUGHPUT TECHNOLOGIES AS DESCRIBED BY CMS-2020-01-R: HCPCS

## 2020-12-08 LAB — SARS-COV-2 RNA RESP QL NAA+PROBE: NOT DETECTED

## 2020-12-10 ENCOUNTER — OFFICE VISIT (OUTPATIENT)
Dept: OTOLARYNGOLOGY | Facility: CLINIC | Age: 70
End: 2020-12-10
Payer: COMMERCIAL

## 2020-12-10 VITALS — DIASTOLIC BLOOD PRESSURE: 64 MMHG | SYSTOLIC BLOOD PRESSURE: 133 MMHG | HEART RATE: 61 BPM

## 2020-12-10 DIAGNOSIS — J34.2 DEVIATED NASAL SEPTUM: ICD-10-CM

## 2020-12-10 DIAGNOSIS — R43.1 PAROSMIA: Primary | ICD-10-CM

## 2020-12-10 DIAGNOSIS — H61.23 IMPACTED CERUMEN OF BOTH EARS: ICD-10-CM

## 2020-12-10 DIAGNOSIS — J31.0 CHRONIC RHINITIS: ICD-10-CM

## 2020-12-10 PROCEDURE — 99213 PR OFFICE/OUTPT VISIT, EST, LEVL III, 20-29 MIN: ICD-10-PCS | Mod: 25,S$GLB,, | Performed by: OTOLARYNGOLOGY

## 2020-12-10 PROCEDURE — 3288F PR FALLS RISK ASSESSMENT DOCUMENTED: ICD-10-PCS | Mod: CPTII,S$GLB,, | Performed by: OTOLARYNGOLOGY

## 2020-12-10 PROCEDURE — 99999 PR PBB SHADOW E&M-EST. PATIENT-LVL III: ICD-10-PCS | Mod: PBBFAC,,, | Performed by: OTOLARYNGOLOGY

## 2020-12-10 PROCEDURE — 3288F FALL RISK ASSESSMENT DOCD: CPT | Mod: CPTII,S$GLB,, | Performed by: OTOLARYNGOLOGY

## 2020-12-10 PROCEDURE — 3078F PR MOST RECENT DIASTOLIC BLOOD PRESSURE < 80 MM HG: ICD-10-PCS | Mod: CPTII,S$GLB,, | Performed by: OTOLARYNGOLOGY

## 2020-12-10 PROCEDURE — 3078F DIAST BP <80 MM HG: CPT | Mod: CPTII,S$GLB,, | Performed by: OTOLARYNGOLOGY

## 2020-12-10 PROCEDURE — 31231 NASAL/SINUS ENDOSCOPY: ICD-10-PCS | Mod: S$GLB,,, | Performed by: OTOLARYNGOLOGY

## 2020-12-10 PROCEDURE — 31231 NASAL ENDOSCOPY DX: CPT | Mod: S$GLB,,, | Performed by: OTOLARYNGOLOGY

## 2020-12-10 PROCEDURE — 1101F PT FALLS ASSESS-DOCD LE1/YR: CPT | Mod: CPTII,S$GLB,, | Performed by: OTOLARYNGOLOGY

## 2020-12-10 PROCEDURE — 3075F PR MOST RECENT SYSTOLIC BLOOD PRESS GE 130-139MM HG: ICD-10-PCS | Mod: CPTII,S$GLB,, | Performed by: OTOLARYNGOLOGY

## 2020-12-10 PROCEDURE — 1159F MED LIST DOCD IN RCRD: CPT | Mod: S$GLB,,, | Performed by: OTOLARYNGOLOGY

## 2020-12-10 PROCEDURE — 1159F PR MEDICATION LIST DOCUMENTED IN MEDICAL RECORD: ICD-10-PCS | Mod: S$GLB,,, | Performed by: OTOLARYNGOLOGY

## 2020-12-10 PROCEDURE — 69210 REMOVE IMPACTED EAR WAX UNI: CPT | Mod: 51,S$GLB,, | Performed by: OTOLARYNGOLOGY

## 2020-12-10 PROCEDURE — 3075F SYST BP GE 130 - 139MM HG: CPT | Mod: CPTII,S$GLB,, | Performed by: OTOLARYNGOLOGY

## 2020-12-10 PROCEDURE — 99213 OFFICE O/P EST LOW 20 MIN: CPT | Mod: 25,S$GLB,, | Performed by: OTOLARYNGOLOGY

## 2020-12-10 PROCEDURE — 1126F PR PAIN SEVERITY QUANTIFIED, NO PAIN PRESENT: ICD-10-PCS | Mod: S$GLB,,, | Performed by: OTOLARYNGOLOGY

## 2020-12-10 PROCEDURE — 69210 EAR CERUMEN REMOVAL: ICD-10-PCS | Mod: 51,S$GLB,, | Performed by: OTOLARYNGOLOGY

## 2020-12-10 PROCEDURE — 1101F PR PT FALLS ASSESS DOC 0-1 FALLS W/OUT INJ PAST YR: ICD-10-PCS | Mod: CPTII,S$GLB,, | Performed by: OTOLARYNGOLOGY

## 2020-12-10 PROCEDURE — 99999 PR PBB SHADOW E&M-EST. PATIENT-LVL III: CPT | Mod: PBBFAC,,, | Performed by: OTOLARYNGOLOGY

## 2020-12-10 PROCEDURE — 1126F AMNT PAIN NOTED NONE PRSNT: CPT | Mod: S$GLB,,, | Performed by: OTOLARYNGOLOGY

## 2020-12-10 NOTE — PROCEDURES
Nasal/sinus endoscopy    Date/Time: 12/10/2020 11:00 AM  Performed by: Wilfredo Hendrix MD  Authorized by: Wilfredo Hendrix MD     Consent Done?:  Yes (Verbal)  Anesthesia:     Local anesthetic:  Lidocaine 4% and Joby-Synephrine 1/2%    Patient tolerance:  Patient tolerated the procedure well with no immediate complications  Nose:     Procedure Performed:  Nasal Endoscopy  External:      No external nasal deformity  Intranasal:      Mucosa no polyps     Mucosa ulcers not present     No mucosa lesions present     Turbinates not enlarged     Septum gross deformity  Nasopharynx:      No mucosa lesions     Adenoids not present     Posterior choanae patent     Eustachian tube patent     Prominent leftward septal deviation  Prominent right MT erythema  Olfactory clefts wnl bilaterally  No postnasal mucus  Larynx wnl

## 2020-12-10 NOTE — PROCEDURES
Ear Cerumen Removal    Date/Time: 12/10/2020 11:00 AM  Performed by: Wilfredo Hendrix MD  Authorized by: Wilfredo Hendrix MD     Consent Done?:  Yes (Verbal)    Local anesthetic:  None  Location details:  Both ears  Procedure type: curette    Cerumen  Removal Results:  Cerumen completely removed  Patient tolerance:  Patient tolerated the procedure well with no immediate complications

## 2020-12-10 NOTE — PROGRESS NOTES
Subjective:      Anthony is a 70 y.o. male who comes for follow-up of olfactory disturbance.  His last visit with me was on 6/3/2020.  Some improvement after using flonase for a couple of weeks, improved taste and hyposmia, less frequent phantom smells.  Recur occasionally, less frequently, now no longer cement-like and more like a leathery suitcase smell.  Also describes lifelong nasal congestion and stuffiness, especially at night.  Recurrent cerumen impaction, notes aural fullness today.        %       The patient's medications, allergies, past medical, surgical, social and family histories were reviewed and updated as appropriate.    A detailed review of systems was obtained with pertinent positives as per the above HPI, and otherwise negative.        Objective:     /64   Pulse 61        Constitutional:   He appears well-developed. He is cooperative. Normal speech.  No hoarse voice.      Head:  Normocephalic. Salivary glands normal.  Facial strength is normal.      Ears:    Right Ear: No drainage or tenderness. Tympanic membrane is not perforated. Tympanic membrane mobility is normal. No middle ear effusion. No decreased hearing is noted.   Left Ear: No drainage or tenderness. Tympanic membrane is not perforated. Tympanic membrane mobility is normal.  No middle ear effusion. No decreased hearing is noted.     Nose:  Septal deviation present. No mucosal edema, rhinorrhea or polyps. No epistaxis. Turbinates normal, no turbinate masses and no turbinate hypertrophy.  Right sinus exhibits no maxillary sinus tenderness and no frontal sinus tenderness. Left sinus exhibits no maxillary sinus tenderness and no frontal sinus tenderness.     Mouth/Throat  Oropharynx clear and moist without lesions or asymmetry and normal uvula midline. He does not have dentures. Normal dentition. No oral lesions or mucous membrane lesions. No oropharyngeal exudate or posterior oropharyngeal erythema. Mirror exam not performed due to  patient tolerance.  Mirror exam not performed due to patient tolerance.      Neck:  Neck normal without thyromegaly masses, asymmetry, normal tracheal structure, crepitus, and tenderness, thyroid normal, trachea normal and no adenopathy. Normal range of motion present.     He has no cervical adenopathy.     Cardiovascular:   Regular rhythm.      Pulmonary/Chest:   Effort normal.     Psychiatric:   He has a normal mood and affect. His speech is normal and behavior is normal.     Neurological:   No cranial nerve deficit.     Skin:   No rash noted.       Procedure    Cerumen impaction removed.  See procedure note.    Nasal endoscopy performed.  See procedure note.        Data Reviewed    WBC (K/uL)   Date Value   11/12/2020 7.39     Eosinophil % (%)   Date Value   11/12/2020 3.2     Eos # (K/uL)   Date Value   11/12/2020 0.2     Platelets (K/uL)   Date Value   11/12/2020 159     Glucose (mg/dL)   Date Value   11/30/2020 112 (H)     No results found for: IGE        Assessment:     1. Parosmia    2. Chronic rhinitis    3. Deviated nasal septum    4. Impacted cerumen of both ears         Plan:     Discussed application of olfactory training to parosmia, given printed information.  Refer to allergy clinic for testing.  Continue flonase.  May consider MRI if symptoms become intractable.  Also discussed briefly option of septoplasty in future.  Follow up if symptoms worsen or fail to improve.

## 2020-12-31 ENCOUNTER — PATIENT MESSAGE (OUTPATIENT)
Dept: INTERNAL MEDICINE | Facility: CLINIC | Age: 70
End: 2020-12-31

## 2020-12-31 DIAGNOSIS — E87.6 HYPOKALEMIA: Primary | ICD-10-CM

## 2021-01-03 RX ORDER — POTASSIUM CHLORIDE 20 MEQ/1
20 TABLET, EXTENDED RELEASE ORAL 2 TIMES DAILY
Qty: 180 TABLET | Refills: 3 | Status: SHIPPED | OUTPATIENT
Start: 2021-01-03 | End: 2022-01-18 | Stop reason: SDUPTHER

## 2021-01-07 ENCOUNTER — ANTI-COAG VISIT (OUTPATIENT)
Dept: CARDIOLOGY | Facility: CLINIC | Age: 71
End: 2021-01-07
Payer: COMMERCIAL

## 2021-01-07 ENCOUNTER — LAB VISIT (OUTPATIENT)
Dept: LAB | Facility: HOSPITAL | Age: 71
End: 2021-01-07
Attending: INTERNAL MEDICINE
Payer: COMMERCIAL

## 2021-01-07 DIAGNOSIS — Z79.01 LONG TERM CURRENT USE OF ANTICOAGULANT THERAPY: ICD-10-CM

## 2021-01-07 DIAGNOSIS — I48.0 PAROXYSMAL ATRIAL FIBRILLATION: ICD-10-CM

## 2021-01-07 DIAGNOSIS — E87.6 HYPOKALEMIA: ICD-10-CM

## 2021-01-07 LAB
ANION GAP SERPL CALC-SCNC: 9 MMOL/L (ref 8–16)
BUN SERPL-MCNC: 19 MG/DL (ref 8–23)
CALCIUM SERPL-MCNC: 10 MG/DL (ref 8.7–10.5)
CHLORIDE SERPL-SCNC: 106 MMOL/L (ref 95–110)
CO2 SERPL-SCNC: 26 MMOL/L (ref 23–29)
CREAT SERPL-MCNC: 0.9 MG/DL (ref 0.5–1.4)
EST. GFR  (AFRICAN AMERICAN): >60 ML/MIN/1.73 M^2
EST. GFR  (NON AFRICAN AMERICAN): >60 ML/MIN/1.73 M^2
GLUCOSE SERPL-MCNC: 92 MG/DL (ref 70–110)
INR PPP: 2.2 (ref 0.8–1.2)
POTASSIUM SERPL-SCNC: 4.1 MMOL/L (ref 3.5–5.1)
PROTHROMBIN TIME: 22.5 SEC (ref 9–12.5)
SODIUM SERPL-SCNC: 141 MMOL/L (ref 136–145)

## 2021-01-07 PROCEDURE — 93793 ANTICOAG MGMT PT WARFARIN: CPT | Mod: S$GLB,,,

## 2021-01-07 PROCEDURE — 80048 BASIC METABOLIC PNL TOTAL CA: CPT

## 2021-01-07 PROCEDURE — 93793 PR ANTICOAGULANT MGMT FOR PT TAKING WARFARIN: ICD-10-PCS | Mod: S$GLB,,,

## 2021-01-07 PROCEDURE — 85610 PROTHROMBIN TIME: CPT

## 2021-01-07 PROCEDURE — 36415 COLL VENOUS BLD VENIPUNCTURE: CPT

## 2021-02-10 ENCOUNTER — PATIENT MESSAGE (OUTPATIENT)
Dept: ELECTROPHYSIOLOGY | Facility: CLINIC | Age: 71
End: 2021-02-10

## 2021-02-12 RX ORDER — FLECAINIDE ACETATE 150 MG/1
TABLET ORAL
Qty: 10 TABLET | Refills: 3 | Status: SHIPPED | OUTPATIENT
Start: 2021-02-12 | End: 2022-11-14

## 2021-03-15 ENCOUNTER — ANTI-COAG VISIT (OUTPATIENT)
Dept: CARDIOLOGY | Facility: CLINIC | Age: 71
End: 2021-03-15
Payer: COMMERCIAL

## 2021-03-15 ENCOUNTER — LAB VISIT (OUTPATIENT)
Dept: LAB | Facility: HOSPITAL | Age: 71
End: 2021-03-15
Attending: INTERNAL MEDICINE
Payer: COMMERCIAL

## 2021-03-15 DIAGNOSIS — I48.0 PAROXYSMAL ATRIAL FIBRILLATION: Primary | ICD-10-CM

## 2021-03-15 DIAGNOSIS — Z79.01 LONG TERM CURRENT USE OF ANTICOAGULANT THERAPY: ICD-10-CM

## 2021-03-15 DIAGNOSIS — I48.0 PAROXYSMAL ATRIAL FIBRILLATION: ICD-10-CM

## 2021-03-15 LAB
INR PPP: 2.4 (ref 0.8–1.2)
PROTHROMBIN TIME: 24.8 SEC (ref 9–12.5)

## 2021-03-15 PROCEDURE — 93793 ANTICOAG MGMT PT WARFARIN: CPT | Mod: S$GLB,,,

## 2021-03-15 PROCEDURE — 36415 COLL VENOUS BLD VENIPUNCTURE: CPT | Performed by: INTERNAL MEDICINE

## 2021-03-15 PROCEDURE — 93793 PR ANTICOAGULANT MGMT FOR PT TAKING WARFARIN: ICD-10-PCS | Mod: S$GLB,,,

## 2021-03-15 PROCEDURE — 85610 PROTHROMBIN TIME: CPT | Performed by: INTERNAL MEDICINE

## 2021-03-16 ENCOUNTER — PATIENT MESSAGE (OUTPATIENT)
Dept: ELECTROPHYSIOLOGY | Facility: CLINIC | Age: 71
End: 2021-03-16

## 2021-04-12 ENCOUNTER — PATIENT MESSAGE (OUTPATIENT)
Dept: UROLOGY | Facility: CLINIC | Age: 71
End: 2021-04-12

## 2021-04-19 ENCOUNTER — PATIENT MESSAGE (OUTPATIENT)
Dept: ELECTROPHYSIOLOGY | Facility: CLINIC | Age: 71
End: 2021-04-19

## 2021-04-19 ENCOUNTER — TELEPHONE (OUTPATIENT)
Dept: ELECTROPHYSIOLOGY | Facility: CLINIC | Age: 71
End: 2021-04-19

## 2021-04-19 DIAGNOSIS — I48.0 PAROXYSMAL ATRIAL FIBRILLATION: Primary | ICD-10-CM

## 2021-05-04 ENCOUNTER — OFFICE VISIT (OUTPATIENT)
Dept: OPHTHALMOLOGY | Facility: CLINIC | Age: 71
End: 2021-05-04
Payer: COMMERCIAL

## 2021-05-04 DIAGNOSIS — H25.13 NS (NUCLEAR SCLEROSIS), BILATERAL: ICD-10-CM

## 2021-05-04 DIAGNOSIS — H43.813 POSTERIOR VITREOUS DETACHMENT, BILATERAL: Primary | ICD-10-CM

## 2021-05-04 PROCEDURE — 92014 COMPRE OPH EXAM EST PT 1/>: CPT | Mod: S$GLB,,, | Performed by: OPHTHALMOLOGY

## 2021-05-04 PROCEDURE — 3288F FALL RISK ASSESSMENT DOCD: CPT | Mod: CPTII,S$GLB,, | Performed by: OPHTHALMOLOGY

## 2021-05-04 PROCEDURE — 3288F PR FALLS RISK ASSESSMENT DOCUMENTED: ICD-10-PCS | Mod: CPTII,S$GLB,, | Performed by: OPHTHALMOLOGY

## 2021-05-04 PROCEDURE — 92202 OPSCPY EXTND ON/MAC DRAW: CPT | Mod: S$GLB,,, | Performed by: OPHTHALMOLOGY

## 2021-05-04 PROCEDURE — 99999 PR PBB SHADOW E&M-EST. PATIENT-LVL III: ICD-10-PCS | Mod: PBBFAC,,, | Performed by: OPHTHALMOLOGY

## 2021-05-04 PROCEDURE — 1126F AMNT PAIN NOTED NONE PRSNT: CPT | Mod: S$GLB,,, | Performed by: OPHTHALMOLOGY

## 2021-05-04 PROCEDURE — 1101F PR PT FALLS ASSESS DOC 0-1 FALLS W/OUT INJ PAST YR: ICD-10-PCS | Mod: CPTII,S$GLB,, | Performed by: OPHTHALMOLOGY

## 2021-05-04 PROCEDURE — 1126F PR PAIN SEVERITY QUANTIFIED, NO PAIN PRESENT: ICD-10-PCS | Mod: S$GLB,,, | Performed by: OPHTHALMOLOGY

## 2021-05-04 PROCEDURE — 1101F PT FALLS ASSESS-DOCD LE1/YR: CPT | Mod: CPTII,S$GLB,, | Performed by: OPHTHALMOLOGY

## 2021-05-04 PROCEDURE — 92014 PR EYE EXAM, EST PATIENT,COMPREHESV: ICD-10-PCS | Mod: S$GLB,,, | Performed by: OPHTHALMOLOGY

## 2021-05-04 PROCEDURE — 99999 PR PBB SHADOW E&M-EST. PATIENT-LVL III: CPT | Mod: PBBFAC,,, | Performed by: OPHTHALMOLOGY

## 2021-05-04 PROCEDURE — 92202 PR OPHTHALMOSCOPY, EXT, W/DRAW OPTIC NERVE/MACULA, I&R, UNI/BI: ICD-10-PCS | Mod: S$GLB,,, | Performed by: OPHTHALMOLOGY

## 2021-05-11 ENCOUNTER — LAB VISIT (OUTPATIENT)
Dept: LAB | Facility: HOSPITAL | Age: 71
End: 2021-05-11
Attending: INTERNAL MEDICINE
Payer: COMMERCIAL

## 2021-05-11 ENCOUNTER — ANTI-COAG VISIT (OUTPATIENT)
Dept: CARDIOLOGY | Facility: CLINIC | Age: 71
End: 2021-05-11
Payer: COMMERCIAL

## 2021-05-11 DIAGNOSIS — N40.1 BENIGN PROSTATIC HYPERPLASIA WITH URINARY OBSTRUCTION: ICD-10-CM

## 2021-05-11 DIAGNOSIS — I48.0 PAROXYSMAL ATRIAL FIBRILLATION: Primary | ICD-10-CM

## 2021-05-11 DIAGNOSIS — I48.0 PAROXYSMAL ATRIAL FIBRILLATION: ICD-10-CM

## 2021-05-11 DIAGNOSIS — Z79.01 LONG TERM CURRENT USE OF ANTICOAGULANT THERAPY: ICD-10-CM

## 2021-05-11 DIAGNOSIS — N13.8 BENIGN PROSTATIC HYPERPLASIA WITH URINARY OBSTRUCTION: ICD-10-CM

## 2021-05-11 LAB
INR PPP: 2.4 (ref 0.8–1.2)
PROTHROMBIN TIME: 25.3 SEC (ref 9–12.5)

## 2021-05-11 PROCEDURE — 84153 ASSAY OF PSA TOTAL: CPT | Performed by: UROLOGY

## 2021-05-11 PROCEDURE — 85610 PROTHROMBIN TIME: CPT | Performed by: INTERNAL MEDICINE

## 2021-05-11 PROCEDURE — 36415 COLL VENOUS BLD VENIPUNCTURE: CPT | Performed by: UROLOGY

## 2021-05-11 PROCEDURE — 86316 IMMUNOASSAY TUMOR OTHER: CPT | Performed by: UROLOGY

## 2021-05-11 PROCEDURE — 93793 PR ANTICOAGULANT MGMT FOR PT TAKING WARFARIN: ICD-10-PCS | Mod: S$GLB,,,

## 2021-05-11 PROCEDURE — 93793 ANTICOAG MGMT PT WARFARIN: CPT | Mod: S$GLB,,,

## 2021-05-12 LAB
-2 PROPSA (PHI): 16.8 PG/ML
FREE PSA (PHI): 1.3 NG/ML
PROSTATE HEALTH INDEX VALUE (PHI): 29.8
PSA FREE MFR SERPL: 25 %
PSA SERPL-MCNC: 5.3 NG/ML

## 2021-05-18 ENCOUNTER — OFFICE VISIT (OUTPATIENT)
Dept: UROLOGY | Facility: CLINIC | Age: 71
End: 2021-05-18
Payer: COMMERCIAL

## 2021-05-18 VITALS
WEIGHT: 183 LBS | BODY MASS INDEX: 24.79 KG/M2 | HEART RATE: 60 BPM | DIASTOLIC BLOOD PRESSURE: 104 MMHG | SYSTOLIC BLOOD PRESSURE: 155 MMHG | HEIGHT: 72 IN

## 2021-05-18 DIAGNOSIS — N13.8 BENIGN PROSTATIC HYPERPLASIA WITH URINARY OBSTRUCTION: Primary | ICD-10-CM

## 2021-05-18 DIAGNOSIS — N40.1 BENIGN PROSTATIC HYPERPLASIA WITH URINARY OBSTRUCTION: Primary | ICD-10-CM

## 2021-05-18 PROCEDURE — 1101F PT FALLS ASSESS-DOCD LE1/YR: CPT | Mod: CPTII,S$GLB,, | Performed by: UROLOGY

## 2021-05-18 PROCEDURE — 1159F MED LIST DOCD IN RCRD: CPT | Mod: S$GLB,,, | Performed by: UROLOGY

## 2021-05-18 PROCEDURE — 1101F PR PT FALLS ASSESS DOC 0-1 FALLS W/OUT INJ PAST YR: ICD-10-PCS | Mod: CPTII,S$GLB,, | Performed by: UROLOGY

## 2021-05-18 PROCEDURE — 1126F AMNT PAIN NOTED NONE PRSNT: CPT | Mod: S$GLB,,, | Performed by: UROLOGY

## 2021-05-18 PROCEDURE — 3008F PR BODY MASS INDEX (BMI) DOCUMENTED: ICD-10-PCS | Mod: CPTII,S$GLB,, | Performed by: UROLOGY

## 2021-05-18 PROCEDURE — 3008F BODY MASS INDEX DOCD: CPT | Mod: CPTII,S$GLB,, | Performed by: UROLOGY

## 2021-05-18 PROCEDURE — 99213 OFFICE O/P EST LOW 20 MIN: CPT | Mod: S$GLB,,, | Performed by: UROLOGY

## 2021-05-18 PROCEDURE — 99999 PR PBB SHADOW E&M-EST. PATIENT-LVL III: CPT | Mod: PBBFAC,,, | Performed by: UROLOGY

## 2021-05-18 PROCEDURE — 99999 PR PBB SHADOW E&M-EST. PATIENT-LVL III: ICD-10-PCS | Mod: PBBFAC,,, | Performed by: UROLOGY

## 2021-05-18 PROCEDURE — 1159F PR MEDICATION LIST DOCUMENTED IN MEDICAL RECORD: ICD-10-PCS | Mod: S$GLB,,, | Performed by: UROLOGY

## 2021-05-18 PROCEDURE — 1126F PR PAIN SEVERITY QUANTIFIED, NO PAIN PRESENT: ICD-10-PCS | Mod: S$GLB,,, | Performed by: UROLOGY

## 2021-05-18 PROCEDURE — 3288F PR FALLS RISK ASSESSMENT DOCUMENTED: ICD-10-PCS | Mod: CPTII,S$GLB,, | Performed by: UROLOGY

## 2021-05-18 PROCEDURE — 3288F FALL RISK ASSESSMENT DOCD: CPT | Mod: CPTII,S$GLB,, | Performed by: UROLOGY

## 2021-05-18 PROCEDURE — 99213 PR OFFICE/OUTPT VISIT, EST, LEVL III, 20-29 MIN: ICD-10-PCS | Mod: S$GLB,,, | Performed by: UROLOGY

## 2021-05-26 ENCOUNTER — OFFICE VISIT (OUTPATIENT)
Dept: CARDIOLOGY | Facility: CLINIC | Age: 71
End: 2021-05-26
Payer: COMMERCIAL

## 2021-05-26 VITALS
HEIGHT: 72 IN | DIASTOLIC BLOOD PRESSURE: 70 MMHG | BODY MASS INDEX: 24.75 KG/M2 | OXYGEN SATURATION: 97 % | HEART RATE: 60 BPM | SYSTOLIC BLOOD PRESSURE: 148 MMHG | WEIGHT: 182.75 LBS

## 2021-05-26 DIAGNOSIS — J34.2 DEVIATED NASAL SEPTUM: ICD-10-CM

## 2021-05-26 DIAGNOSIS — J31.0 CHRONIC RHINITIS: Primary | ICD-10-CM

## 2021-05-26 DIAGNOSIS — I10 ESSENTIAL HYPERTENSION, BENIGN: ICD-10-CM

## 2021-05-26 DIAGNOSIS — I35.1 NONRHEUMATIC AORTIC VALVE INSUFFICIENCY: ICD-10-CM

## 2021-05-26 DIAGNOSIS — Z79.01 LONG TERM CURRENT USE OF ANTICOAGULANT THERAPY: ICD-10-CM

## 2021-05-26 DIAGNOSIS — I35.0 MILD AORTIC STENOSIS: Primary | ICD-10-CM

## 2021-05-26 DIAGNOSIS — R43.8 REDUCED SENSE OF SMELL: ICD-10-CM

## 2021-05-26 DIAGNOSIS — I48.0 PAROXYSMAL ATRIAL FIBRILLATION: ICD-10-CM

## 2021-05-26 DIAGNOSIS — E78.2 MIXED HYPERLIPIDEMIA: ICD-10-CM

## 2021-05-26 PROCEDURE — 3008F BODY MASS INDEX DOCD: CPT | Mod: CPTII,S$GLB,, | Performed by: INTERNAL MEDICINE

## 2021-05-26 PROCEDURE — 3008F PR BODY MASS INDEX (BMI) DOCUMENTED: ICD-10-PCS | Mod: CPTII,S$GLB,, | Performed by: INTERNAL MEDICINE

## 2021-05-26 PROCEDURE — 99214 PR OFFICE/OUTPT VISIT, EST, LEVL IV, 30-39 MIN: ICD-10-PCS | Mod: S$GLB,,, | Performed by: INTERNAL MEDICINE

## 2021-05-26 PROCEDURE — 1159F PR MEDICATION LIST DOCUMENTED IN MEDICAL RECORD: ICD-10-PCS | Mod: S$GLB,,, | Performed by: INTERNAL MEDICINE

## 2021-05-26 PROCEDURE — 99999 PR PBB SHADOW E&M-EST. PATIENT-LVL III: ICD-10-PCS | Mod: PBBFAC,,, | Performed by: INTERNAL MEDICINE

## 2021-05-26 PROCEDURE — 99999 PR PBB SHADOW E&M-EST. PATIENT-LVL III: CPT | Mod: PBBFAC,,, | Performed by: INTERNAL MEDICINE

## 2021-05-26 PROCEDURE — 99214 OFFICE O/P EST MOD 30 MIN: CPT | Mod: S$GLB,,, | Performed by: INTERNAL MEDICINE

## 2021-05-26 PROCEDURE — 1159F MED LIST DOCD IN RCRD: CPT | Mod: S$GLB,,, | Performed by: INTERNAL MEDICINE

## 2021-05-26 RX ORDER — FLUTICASONE PROPIONATE 50 MCG
2 SPRAY, SUSPENSION (ML) NASAL DAILY
Qty: 16 G | Refills: 5 | Status: SHIPPED | OUTPATIENT
Start: 2021-05-26 | End: 2022-08-31

## 2021-05-26 RX ORDER — CHLORTHALIDONE 25 MG/1
25 TABLET ORAL DAILY
Qty: 90 TABLET | Refills: 3 | Status: SHIPPED | OUTPATIENT
Start: 2021-05-26 | End: 2022-03-25 | Stop reason: SDUPTHER

## 2021-06-07 ENCOUNTER — ANTI-COAG VISIT (OUTPATIENT)
Dept: CARDIOLOGY | Facility: CLINIC | Age: 71
End: 2021-06-07
Payer: COMMERCIAL

## 2021-06-07 ENCOUNTER — LAB VISIT (OUTPATIENT)
Dept: LAB | Facility: HOSPITAL | Age: 71
End: 2021-06-07
Attending: UROLOGY
Payer: COMMERCIAL

## 2021-06-07 ENCOUNTER — PATIENT MESSAGE (OUTPATIENT)
Dept: CARDIOLOGY | Facility: CLINIC | Age: 71
End: 2021-06-07

## 2021-06-07 DIAGNOSIS — Z79.01 LONG TERM CURRENT USE OF ANTICOAGULANT THERAPY: ICD-10-CM

## 2021-06-07 DIAGNOSIS — I48.0 PAROXYSMAL ATRIAL FIBRILLATION: ICD-10-CM

## 2021-06-07 DIAGNOSIS — I48.0 PAROXYSMAL ATRIAL FIBRILLATION: Primary | ICD-10-CM

## 2021-06-07 LAB
INR PPP: 1.5 (ref 0.8–1.2)
PROTHROMBIN TIME: 15.8 SEC (ref 9–12.5)

## 2021-06-07 PROCEDURE — 36415 COLL VENOUS BLD VENIPUNCTURE: CPT | Performed by: INTERNAL MEDICINE

## 2021-06-07 PROCEDURE — 93793 ANTICOAG MGMT PT WARFARIN: CPT | Mod: S$GLB,,,

## 2021-06-07 PROCEDURE — 93793 PR ANTICOAGULANT MGMT FOR PT TAKING WARFARIN: ICD-10-PCS | Mod: S$GLB,,,

## 2021-06-07 PROCEDURE — 85610 PROTHROMBIN TIME: CPT | Performed by: INTERNAL MEDICINE

## 2021-07-23 ENCOUNTER — PATIENT OUTREACH (OUTPATIENT)
Dept: ADMINISTRATIVE | Facility: OTHER | Age: 71
End: 2021-07-23

## 2021-07-26 ENCOUNTER — LAB VISIT (OUTPATIENT)
Dept: LAB | Facility: HOSPITAL | Age: 71
End: 2021-07-26
Attending: ALLERGY & IMMUNOLOGY
Payer: COMMERCIAL

## 2021-07-26 ENCOUNTER — OFFICE VISIT (OUTPATIENT)
Dept: ALLERGY | Facility: CLINIC | Age: 71
End: 2021-07-26
Payer: COMMERCIAL

## 2021-07-26 VITALS — WEIGHT: 180.13 LBS | BODY MASS INDEX: 24.4 KG/M2 | HEIGHT: 72 IN

## 2021-07-26 DIAGNOSIS — I10 ESSENTIAL HYPERTENSION, BENIGN: Chronic | ICD-10-CM

## 2021-07-26 DIAGNOSIS — I35.1 NONRHEUMATIC AORTIC VALVE INSUFFICIENCY: ICD-10-CM

## 2021-07-26 DIAGNOSIS — N13.8 BENIGN PROSTATIC HYPERPLASIA WITH URINARY OBSTRUCTION: ICD-10-CM

## 2021-07-26 DIAGNOSIS — J31.0 CHRONIC RHINITIS: Primary | ICD-10-CM

## 2021-07-26 DIAGNOSIS — N40.1 BENIGN PROSTATIC HYPERPLASIA WITH URINARY OBSTRUCTION: ICD-10-CM

## 2021-07-26 DIAGNOSIS — N20.0 BILATERAL KIDNEY STONES: ICD-10-CM

## 2021-07-26 DIAGNOSIS — I48.0 PAROXYSMAL ATRIAL FIBRILLATION: ICD-10-CM

## 2021-07-26 DIAGNOSIS — I35.0 MILD AORTIC STENOSIS: ICD-10-CM

## 2021-07-26 DIAGNOSIS — J31.0 CHRONIC RHINITIS: ICD-10-CM

## 2021-07-26 DIAGNOSIS — R43.1 PAROSMIA: ICD-10-CM

## 2021-07-26 LAB — IGE SERPL-ACNC: <35 IU/ML (ref 0–100)

## 2021-07-26 PROCEDURE — 99999 PR PBB SHADOW E&M-EST. PATIENT-LVL III: ICD-10-PCS | Mod: PBBFAC,,, | Performed by: ALLERGY & IMMUNOLOGY

## 2021-07-26 PROCEDURE — 1159F PR MEDICATION LIST DOCUMENTED IN MEDICAL RECORD: ICD-10-PCS | Mod: CPTII,S$GLB,, | Performed by: ALLERGY & IMMUNOLOGY

## 2021-07-26 PROCEDURE — 99999 PR PBB SHADOW E&M-EST. PATIENT-LVL III: CPT | Mod: PBBFAC,,, | Performed by: ALLERGY & IMMUNOLOGY

## 2021-07-26 PROCEDURE — 1160F RVW MEDS BY RX/DR IN RCRD: CPT | Mod: CPTII,S$GLB,, | Performed by: ALLERGY & IMMUNOLOGY

## 2021-07-26 PROCEDURE — 3008F BODY MASS INDEX DOCD: CPT | Mod: CPTII,S$GLB,, | Performed by: ALLERGY & IMMUNOLOGY

## 2021-07-26 PROCEDURE — 36415 COLL VENOUS BLD VENIPUNCTURE: CPT | Performed by: ALLERGY & IMMUNOLOGY

## 2021-07-26 PROCEDURE — 82785 ASSAY OF IGE: CPT | Performed by: ALLERGY & IMMUNOLOGY

## 2021-07-26 PROCEDURE — 86003 ALLG SPEC IGE CRUDE XTRC EA: CPT | Performed by: ALLERGY & IMMUNOLOGY

## 2021-07-26 PROCEDURE — 1126F AMNT PAIN NOTED NONE PRSNT: CPT | Mod: CPTII,S$GLB,, | Performed by: ALLERGY & IMMUNOLOGY

## 2021-07-26 PROCEDURE — 99204 PR OFFICE/OUTPT VISIT, NEW, LEVL IV, 45-59 MIN: ICD-10-PCS | Mod: S$GLB,,, | Performed by: ALLERGY & IMMUNOLOGY

## 2021-07-26 PROCEDURE — 1126F PR PAIN SEVERITY QUANTIFIED, NO PAIN PRESENT: ICD-10-PCS | Mod: CPTII,S$GLB,, | Performed by: ALLERGY & IMMUNOLOGY

## 2021-07-26 PROCEDURE — 99204 OFFICE O/P NEW MOD 45 MIN: CPT | Mod: S$GLB,,, | Performed by: ALLERGY & IMMUNOLOGY

## 2021-07-26 PROCEDURE — 1160F PR REVIEW ALL MEDS BY PRESCRIBER/CLIN PHARMACIST DOCUMENTED: ICD-10-PCS | Mod: CPTII,S$GLB,, | Performed by: ALLERGY & IMMUNOLOGY

## 2021-07-26 PROCEDURE — 3008F PR BODY MASS INDEX (BMI) DOCUMENTED: ICD-10-PCS | Mod: CPTII,S$GLB,, | Performed by: ALLERGY & IMMUNOLOGY

## 2021-07-26 PROCEDURE — 86003 ALLG SPEC IGE CRUDE XTRC EA: CPT | Mod: 59 | Performed by: ALLERGY & IMMUNOLOGY

## 2021-07-26 PROCEDURE — 1159F MED LIST DOCD IN RCRD: CPT | Mod: CPTII,S$GLB,, | Performed by: ALLERGY & IMMUNOLOGY

## 2021-07-29 LAB
A ALTERNATA IGE QN: <0.1 KU/L
A FUMIGATUS IGE QN: <0.1 KU/L
BERMUDA GRASS IGE QN: <0.1 KU/L
CAT DANDER IGE QN: <0.1 KU/L
CEDAR IGE QN: <0.1 KU/L
D FARINAE IGE QN: <0.1 KU/L
D PTERONYSS IGE QN: <0.1 KU/L
DEPRECATED A ALTERNATA IGE RAST QL: NORMAL
DEPRECATED A FUMIGATUS IGE RAST QL: NORMAL
DEPRECATED BERMUDA GRASS IGE RAST QL: NORMAL
DEPRECATED CAT DANDER IGE RAST QL: NORMAL
DEPRECATED CEDAR IGE RAST QL: NORMAL
DEPRECATED D FARINAE IGE RAST QL: NORMAL
DEPRECATED D PTERONYSS IGE RAST QL: NORMAL
DEPRECATED DOG DANDER IGE RAST QL: NORMAL
DEPRECATED ELDER IGE RAST QL: NORMAL
DEPRECATED ENGL PLANTAIN IGE RAST QL: NORMAL
DEPRECATED PECAN/HICK TREE IGE RAST QL: NORMAL
DEPRECATED ROACH IGE RAST QL: NORMAL
DEPRECATED TIMOTHY IGE RAST QL: NORMAL
DEPRECATED WEST RAGWEED IGE RAST QL: NORMAL
DEPRECATED WHITE OAK IGE RAST QL: NORMAL
DOG DANDER IGE QN: <0.1 KU/L
ELDER IGE QN: <0.1 KU/L
ENGL PLANTAIN IGE QN: <0.1 KU/L
PECAN/HICK TREE IGE QN: <0.1 KU/L
ROACH IGE QN: <0.1 KU/L
TIMOTHY IGE QN: <0.1 KU/L
WEST RAGWEED IGE QN: <0.1 KU/L
WHITE OAK IGE QN: <0.1 KU/L

## 2021-08-12 ENCOUNTER — PATIENT MESSAGE (OUTPATIENT)
Dept: INTERNAL MEDICINE | Facility: CLINIC | Age: 71
End: 2021-08-12

## 2021-08-19 ENCOUNTER — PATIENT MESSAGE (OUTPATIENT)
Dept: ELECTROPHYSIOLOGY | Facility: CLINIC | Age: 71
End: 2021-08-19

## 2021-08-24 ENCOUNTER — PATIENT MESSAGE (OUTPATIENT)
Dept: INTERNAL MEDICINE | Facility: CLINIC | Age: 71
End: 2021-08-24

## 2021-09-15 ENCOUNTER — PATIENT MESSAGE (OUTPATIENT)
Dept: ELECTROPHYSIOLOGY | Facility: CLINIC | Age: 71
End: 2021-09-15

## 2021-09-15 DIAGNOSIS — I48.0 PAROXYSMAL ATRIAL FIBRILLATION: Primary | ICD-10-CM

## 2021-09-21 ENCOUNTER — PATIENT MESSAGE (OUTPATIENT)
Dept: CARDIOLOGY | Facility: CLINIC | Age: 71
End: 2021-09-21

## 2021-09-28 ENCOUNTER — PATIENT OUTREACH (OUTPATIENT)
Dept: ADMINISTRATIVE | Facility: HOSPITAL | Age: 71
End: 2021-09-28

## 2021-09-28 ENCOUNTER — PATIENT MESSAGE (OUTPATIENT)
Dept: ADMINISTRATIVE | Facility: HOSPITAL | Age: 71
End: 2021-09-28

## 2021-09-29 ENCOUNTER — OFFICE VISIT (OUTPATIENT)
Dept: INTERNAL MEDICINE | Facility: CLINIC | Age: 71
End: 2021-09-29
Payer: COMMERCIAL

## 2021-09-29 VITALS
HEIGHT: 72 IN | DIASTOLIC BLOOD PRESSURE: 64 MMHG | HEART RATE: 69 BPM | SYSTOLIC BLOOD PRESSURE: 113 MMHG | WEIGHT: 176.56 LBS | BODY MASS INDEX: 23.92 KG/M2 | OXYGEN SATURATION: 97 %

## 2021-09-29 DIAGNOSIS — F41.1 GENERALIZED ANXIETY DISORDER: ICD-10-CM

## 2021-09-29 DIAGNOSIS — N40.1 BENIGN PROSTATIC HYPERPLASIA WITH URINARY OBSTRUCTION: ICD-10-CM

## 2021-09-29 DIAGNOSIS — I10 ESSENTIAL HYPERTENSION, BENIGN: ICD-10-CM

## 2021-09-29 DIAGNOSIS — E78.2 MIXED HYPERLIPIDEMIA: ICD-10-CM

## 2021-09-29 DIAGNOSIS — I35.1 NONRHEUMATIC AORTIC VALVE INSUFFICIENCY: ICD-10-CM

## 2021-09-29 DIAGNOSIS — Z00.00 WELLNESS EXAMINATION: Primary | ICD-10-CM

## 2021-09-29 DIAGNOSIS — I35.0 MILD AORTIC STENOSIS: ICD-10-CM

## 2021-09-29 DIAGNOSIS — Z79.01 LONG TERM CURRENT USE OF ANTICOAGULANT THERAPY: ICD-10-CM

## 2021-09-29 DIAGNOSIS — I48.0 PAROXYSMAL ATRIAL FIBRILLATION: ICD-10-CM

## 2021-09-29 DIAGNOSIS — N13.8 BENIGN PROSTATIC HYPERPLASIA WITH URINARY OBSTRUCTION: ICD-10-CM

## 2021-09-29 PROCEDURE — 1126F PR PAIN SEVERITY QUANTIFIED, NO PAIN PRESENT: ICD-10-PCS | Mod: CPTII,S$GLB,, | Performed by: INTERNAL MEDICINE

## 2021-09-29 PROCEDURE — 3008F BODY MASS INDEX DOCD: CPT | Mod: CPTII,S$GLB,, | Performed by: INTERNAL MEDICINE

## 2021-09-29 PROCEDURE — 3288F PR FALLS RISK ASSESSMENT DOCUMENTED: ICD-10-PCS | Mod: CPTII,S$GLB,, | Performed by: INTERNAL MEDICINE

## 2021-09-29 PROCEDURE — 1101F PR PT FALLS ASSESS DOC 0-1 FALLS W/OUT INJ PAST YR: ICD-10-PCS | Mod: CPTII,S$GLB,, | Performed by: INTERNAL MEDICINE

## 2021-09-29 PROCEDURE — 3078F DIAST BP <80 MM HG: CPT | Mod: CPTII,S$GLB,, | Performed by: INTERNAL MEDICINE

## 2021-09-29 PROCEDURE — 99999 PR PBB SHADOW E&M-EST. PATIENT-LVL IV: ICD-10-PCS | Mod: PBBFAC,,, | Performed by: INTERNAL MEDICINE

## 2021-09-29 PROCEDURE — 3078F PR MOST RECENT DIASTOLIC BLOOD PRESSURE < 80 MM HG: ICD-10-PCS | Mod: CPTII,S$GLB,, | Performed by: INTERNAL MEDICINE

## 2021-09-29 PROCEDURE — 1160F RVW MEDS BY RX/DR IN RCRD: CPT | Mod: CPTII,S$GLB,, | Performed by: INTERNAL MEDICINE

## 2021-09-29 PROCEDURE — 1159F MED LIST DOCD IN RCRD: CPT | Mod: CPTII,S$GLB,, | Performed by: INTERNAL MEDICINE

## 2021-09-29 PROCEDURE — 1126F AMNT PAIN NOTED NONE PRSNT: CPT | Mod: CPTII,S$GLB,, | Performed by: INTERNAL MEDICINE

## 2021-09-29 PROCEDURE — 3008F PR BODY MASS INDEX (BMI) DOCUMENTED: ICD-10-PCS | Mod: CPTII,S$GLB,, | Performed by: INTERNAL MEDICINE

## 2021-09-29 PROCEDURE — 3288F FALL RISK ASSESSMENT DOCD: CPT | Mod: CPTII,S$GLB,, | Performed by: INTERNAL MEDICINE

## 2021-09-29 PROCEDURE — 3074F SYST BP LT 130 MM HG: CPT | Mod: CPTII,S$GLB,, | Performed by: INTERNAL MEDICINE

## 2021-09-29 PROCEDURE — 99397 PER PM REEVAL EST PAT 65+ YR: CPT | Mod: S$GLB,,, | Performed by: INTERNAL MEDICINE

## 2021-09-29 PROCEDURE — 1160F PR REVIEW ALL MEDS BY PRESCRIBER/CLIN PHARMACIST DOCUMENTED: ICD-10-PCS | Mod: CPTII,S$GLB,, | Performed by: INTERNAL MEDICINE

## 2021-09-29 PROCEDURE — 99999 PR PBB SHADOW E&M-EST. PATIENT-LVL IV: CPT | Mod: PBBFAC,,, | Performed by: INTERNAL MEDICINE

## 2021-09-29 PROCEDURE — 1159F PR MEDICATION LIST DOCUMENTED IN MEDICAL RECORD: ICD-10-PCS | Mod: CPTII,S$GLB,, | Performed by: INTERNAL MEDICINE

## 2021-09-29 PROCEDURE — 1101F PT FALLS ASSESS-DOCD LE1/YR: CPT | Mod: CPTII,S$GLB,, | Performed by: INTERNAL MEDICINE

## 2021-09-29 PROCEDURE — 99397 PR PREVENTIVE VISIT,EST,65 & OVER: ICD-10-PCS | Mod: S$GLB,,, | Performed by: INTERNAL MEDICINE

## 2021-09-29 PROCEDURE — 3074F PR MOST RECENT SYSTOLIC BLOOD PRESSURE < 130 MM HG: ICD-10-PCS | Mod: CPTII,S$GLB,, | Performed by: INTERNAL MEDICINE

## 2021-09-30 ENCOUNTER — TELEPHONE (OUTPATIENT)
Dept: INTERNAL MEDICINE | Facility: CLINIC | Age: 71
End: 2021-09-30

## 2021-10-08 ENCOUNTER — PATIENT MESSAGE (OUTPATIENT)
Dept: INTERNAL MEDICINE | Facility: CLINIC | Age: 71
End: 2021-10-08

## 2021-10-08 ENCOUNTER — PATIENT MESSAGE (OUTPATIENT)
Dept: UROLOGY | Facility: CLINIC | Age: 71
End: 2021-10-08

## 2021-10-08 RX ORDER — BUSPIRONE HYDROCHLORIDE 5 MG/1
5 TABLET ORAL 2 TIMES DAILY
Qty: 60 TABLET | Refills: 3 | Status: SHIPPED | OUTPATIENT
Start: 2021-10-08 | End: 2022-02-07

## 2021-10-13 ENCOUNTER — LAB VISIT (OUTPATIENT)
Dept: LAB | Facility: OTHER | Age: 71
End: 2021-10-13
Attending: INTERNAL MEDICINE
Payer: COMMERCIAL

## 2021-10-13 DIAGNOSIS — I10 ESSENTIAL HYPERTENSION, BENIGN: ICD-10-CM

## 2021-10-13 DIAGNOSIS — E55.9 VITAMIN D DEFICIENCY: ICD-10-CM

## 2021-10-13 DIAGNOSIS — Z00.00 WELLNESS EXAMINATION: ICD-10-CM

## 2021-10-13 LAB
25(OH)D3+25(OH)D2 SERPL-MCNC: 35 NG/ML (ref 30–96)
ALBUMIN SERPL BCP-MCNC: 3.8 G/DL (ref 3.5–5.2)
ALP SERPL-CCNC: 46 U/L (ref 55–135)
ALT SERPL W/O P-5'-P-CCNC: 23 U/L (ref 10–44)
ANION GAP SERPL CALC-SCNC: 8 MMOL/L (ref 8–16)
AST SERPL-CCNC: 20 U/L (ref 10–40)
BASOPHILS # BLD AUTO: 0.1 K/UL (ref 0–0.2)
BASOPHILS NFR BLD: 1.5 % (ref 0–1.9)
BILIRUB SERPL-MCNC: 0.9 MG/DL (ref 0.1–1)
BUN SERPL-MCNC: 13 MG/DL (ref 8–23)
CALCIUM SERPL-MCNC: 10 MG/DL (ref 8.7–10.5)
CHLORIDE SERPL-SCNC: 108 MMOL/L (ref 95–110)
CHOLEST SERPL-MCNC: 121 MG/DL (ref 120–199)
CHOLEST/HDLC SERPL: 2.7 {RATIO} (ref 2–5)
CO2 SERPL-SCNC: 26 MMOL/L (ref 23–29)
CREAT SERPL-MCNC: 0.8 MG/DL (ref 0.5–1.4)
DIFFERENTIAL METHOD: NORMAL
EOSINOPHIL # BLD AUTO: 0.2 K/UL (ref 0–0.5)
EOSINOPHIL NFR BLD: 2.7 % (ref 0–8)
ERYTHROCYTE [DISTWIDTH] IN BLOOD BY AUTOMATED COUNT: 13.4 % (ref 11.5–14.5)
EST. GFR  (AFRICAN AMERICAN): >60 ML/MIN/1.73 M^2
EST. GFR  (NON AFRICAN AMERICAN): >60 ML/MIN/1.73 M^2
GLUCOSE SERPL-MCNC: 94 MG/DL (ref 70–110)
HCT VFR BLD AUTO: 44.2 % (ref 40–54)
HDLC SERPL-MCNC: 45 MG/DL (ref 40–75)
HDLC SERPL: 37.2 % (ref 20–50)
HGB BLD-MCNC: 14.4 G/DL (ref 14–18)
IMM GRANULOCYTES # BLD AUTO: 0.03 K/UL (ref 0–0.04)
IMM GRANULOCYTES NFR BLD AUTO: 0.5 % (ref 0–0.5)
LDLC SERPL CALC-MCNC: 58.2 MG/DL (ref 63–159)
LYMPHOCYTES # BLD AUTO: 1.6 K/UL (ref 1–4.8)
LYMPHOCYTES NFR BLD: 23.8 % (ref 18–48)
MCH RBC QN AUTO: 27.2 PG (ref 27–31)
MCHC RBC AUTO-ENTMCNC: 32.6 G/DL (ref 32–36)
MCV RBC AUTO: 83 FL (ref 82–98)
MONOCYTES # BLD AUTO: 0.5 K/UL (ref 0.3–1)
MONOCYTES NFR BLD: 8 % (ref 4–15)
NEUTROPHILS # BLD AUTO: 4.2 K/UL (ref 1.8–7.7)
NEUTROPHILS NFR BLD: 63.5 % (ref 38–73)
NONHDLC SERPL-MCNC: 76 MG/DL
NRBC BLD-RTO: 0 /100 WBC
PLATELET # BLD AUTO: 179 K/UL (ref 150–450)
PMV BLD AUTO: 11.5 FL (ref 9.2–12.9)
POTASSIUM SERPL-SCNC: 3.9 MMOL/L (ref 3.5–5.1)
PROT SERPL-MCNC: 6.7 G/DL (ref 6–8.4)
RBC # BLD AUTO: 5.3 M/UL (ref 4.6–6.2)
SODIUM SERPL-SCNC: 142 MMOL/L (ref 136–145)
T4 FREE SERPL-MCNC: 0.97 NG/DL (ref 0.71–1.51)
TRIGL SERPL-MCNC: 89 MG/DL (ref 30–150)
TSH SERPL DL<=0.005 MIU/L-ACNC: 0.38 UIU/ML (ref 0.4–4)
WBC # BLD AUTO: 6.65 K/UL (ref 3.9–12.7)

## 2021-10-13 PROCEDURE — 85025 COMPLETE CBC W/AUTO DIFF WBC: CPT | Performed by: INTERNAL MEDICINE

## 2021-10-13 PROCEDURE — 84439 ASSAY OF FREE THYROXINE: CPT | Performed by: INTERNAL MEDICINE

## 2021-10-13 PROCEDURE — 80061 LIPID PANEL: CPT | Performed by: INTERNAL MEDICINE

## 2021-10-13 PROCEDURE — 80053 COMPREHEN METABOLIC PANEL: CPT | Performed by: INTERNAL MEDICINE

## 2021-10-13 PROCEDURE — 84443 ASSAY THYROID STIM HORMONE: CPT | Performed by: INTERNAL MEDICINE

## 2021-10-13 PROCEDURE — 82306 VITAMIN D 25 HYDROXY: CPT | Performed by: INTERNAL MEDICINE

## 2021-10-13 PROCEDURE — 36415 COLL VENOUS BLD VENIPUNCTURE: CPT | Performed by: INTERNAL MEDICINE

## 2021-10-28 ENCOUNTER — HOSPITAL ENCOUNTER (OUTPATIENT)
Dept: CARDIOLOGY | Facility: OTHER | Age: 71
Discharge: HOME | End: 2021-10-28
Attending: INTERNAL MEDICINE
Payer: COMMERCIAL

## 2021-10-28 VITALS
DIASTOLIC BLOOD PRESSURE: 64 MMHG | WEIGHT: 176 LBS | BODY MASS INDEX: 23.84 KG/M2 | HEIGHT: 72 IN | SYSTOLIC BLOOD PRESSURE: 113 MMHG

## 2021-10-28 DIAGNOSIS — I48.0 PAROXYSMAL ATRIAL FIBRILLATION: ICD-10-CM

## 2021-10-28 LAB
ASCENDING AORTA: 3.68 CM
AV INDEX (PROSTH): 0.48
AV MEAN GRADIENT: 12 MMHG
AV PEAK GRADIENT: 24 MMHG
AV VALVE AREA: 2.2 CM2
AV VELOCITY RATIO: 0.41
BSA FOR ECHO PROCEDURE: 2.01 M2
CV ECHO LV RWT: 0.39 CM
DOP CALC AO PEAK VEL: 2.44 M/S
DOP CALC AO VTI: 50.52 CM
DOP CALC LVOT AREA: 4.6 CM2
DOP CALC LVOT DIAMETER: 2.42 CM
DOP CALC LVOT PEAK VEL: 1 M/S
DOP CALC LVOT STROKE VOLUME: 111.02 CM3
DOP CALCLVOT PEAK VEL VTI: 24.15 CM
E WAVE DECELERATION TIME: 284.13 MSEC
E/A RATIO: 0.53
E/E' RATIO: 7.29 M/S
ECHO LV POSTERIOR WALL: 0.96 CM (ref 0.6–1.1)
EJECTION FRACTION: 60 %
FRACTIONAL SHORTENING: 33 % (ref 28–44)
INTERVENTRICULAR SEPTUM: 0.82 CM (ref 0.6–1.1)
IVRT: 159.17 MSEC
LA MAJOR: 5.57 CM
LA MINOR: 5.62 CM
LA WIDTH: 4.05 CM
LEFT ATRIUM SIZE: 3.6 CM
LEFT ATRIUM VOLUME INDEX MOD: 32.7 ML/M2
LEFT ATRIUM VOLUME INDEX: 34.3 ML/M2
LEFT ATRIUM VOLUME MOD: 66 CM3
LEFT ATRIUM VOLUME: 69.34 CM3
LEFT INTERNAL DIMENSION IN SYSTOLE: 3.25 CM (ref 2.1–4)
LEFT VENTRICLE DIASTOLIC VOLUME INDEX: 55.43 ML/M2
LEFT VENTRICLE DIASTOLIC VOLUME: 111.96 ML
LEFT VENTRICLE MASS INDEX: 74 G/M2
LEFT VENTRICLE SYSTOLIC VOLUME INDEX: 21.1 ML/M2
LEFT VENTRICLE SYSTOLIC VOLUME: 42.66 ML
LEFT VENTRICULAR INTERNAL DIMENSION IN DIASTOLE: 4.88 CM (ref 3.5–6)
LEFT VENTRICULAR MASS: 149.69 G
LV LATERAL E/E' RATIO: 6.2 M/S
LV SEPTAL E/E' RATIO: 8.86 M/S
MV PEAK A VEL: 1.17 M/S
MV PEAK E VEL: 0.62 M/S
MV STENOSIS PRESSURE HALF TIME: 82.4 MS
MV VALVE AREA P 1/2 METHOD: 2.67 CM2
PISA TR MAX VEL: 2.6 M/S
PV PEAK VELOCITY: 1.07 CM/S
RA MAJOR: 4.46 CM
RIGHT VENTRICULAR END-DIASTOLIC DIMENSION: 2.99 CM
RV TISSUE DOPPLER FREE WALL SYSTOLIC VELOCITY 1 (APICAL 4 CHAMBER VIEW): 11.75 CM/S
SINUS: 3.59 CM
STJ: 2.83 CM
TDI LATERAL: 0.1 M/S
TDI SEPTAL: 0.07 M/S
TDI: 0.09 M/S
TR MAX PG: 27 MMHG
TRICUSPID ANNULAR PLANE SYSTOLIC EXCURSION: 2.52 CM

## 2021-10-28 PROCEDURE — 93306 ECHO (CUPID ONLY): ICD-10-PCS | Mod: 26,,, | Performed by: INTERNAL MEDICINE

## 2021-10-28 PROCEDURE — 93306 TTE W/DOPPLER COMPLETE: CPT

## 2021-10-28 PROCEDURE — 93306 TTE W/DOPPLER COMPLETE: CPT | Mod: 26,,, | Performed by: INTERNAL MEDICINE

## 2021-11-05 ENCOUNTER — HOSPITAL ENCOUNTER (OUTPATIENT)
Dept: CARDIOLOGY | Facility: CLINIC | Age: 71
Discharge: HOME | End: 2021-11-05
Payer: COMMERCIAL

## 2021-11-05 ENCOUNTER — OFFICE VISIT (OUTPATIENT)
Dept: ELECTROPHYSIOLOGY | Facility: CLINIC | Age: 71
End: 2021-11-05
Payer: COMMERCIAL

## 2021-11-05 VITALS
DIASTOLIC BLOOD PRESSURE: 56 MMHG | HEART RATE: 57 BPM | WEIGHT: 173.75 LBS | HEIGHT: 72 IN | BODY MASS INDEX: 23.53 KG/M2 | SYSTOLIC BLOOD PRESSURE: 122 MMHG

## 2021-11-05 DIAGNOSIS — I48.0 PAROXYSMAL ATRIAL FIBRILLATION: ICD-10-CM

## 2021-11-05 DIAGNOSIS — I48.0 PAROXYSMAL ATRIAL FIBRILLATION: Primary | ICD-10-CM

## 2021-11-05 DIAGNOSIS — Z79.01 LONG TERM CURRENT USE OF ANTICOAGULANT THERAPY: ICD-10-CM

## 2021-11-05 PROCEDURE — 1159F PR MEDICATION LIST DOCUMENTED IN MEDICAL RECORD: ICD-10-PCS | Mod: CPTII,S$GLB,, | Performed by: INTERNAL MEDICINE

## 2021-11-05 PROCEDURE — 93005 RHYTHM STRIP: ICD-10-PCS | Mod: S$GLB,,, | Performed by: INTERNAL MEDICINE

## 2021-11-05 PROCEDURE — 99999 PR PBB SHADOW E&M-EST. PATIENT-LVL III: ICD-10-PCS | Mod: PBBFAC,,, | Performed by: INTERNAL MEDICINE

## 2021-11-05 PROCEDURE — 3074F SYST BP LT 130 MM HG: CPT | Mod: CPTII,S$GLB,, | Performed by: INTERNAL MEDICINE

## 2021-11-05 PROCEDURE — 1126F PR PAIN SEVERITY QUANTIFIED, NO PAIN PRESENT: ICD-10-PCS | Mod: CPTII,S$GLB,, | Performed by: INTERNAL MEDICINE

## 2021-11-05 PROCEDURE — 93010 ELECTROCARDIOGRAM REPORT: CPT | Mod: S$GLB,,, | Performed by: INTERNAL MEDICINE

## 2021-11-05 PROCEDURE — 3074F PR MOST RECENT SYSTOLIC BLOOD PRESSURE < 130 MM HG: ICD-10-PCS | Mod: CPTII,S$GLB,, | Performed by: INTERNAL MEDICINE

## 2021-11-05 PROCEDURE — 1160F RVW MEDS BY RX/DR IN RCRD: CPT | Mod: CPTII,S$GLB,, | Performed by: INTERNAL MEDICINE

## 2021-11-05 PROCEDURE — 3288F FALL RISK ASSESSMENT DOCD: CPT | Mod: CPTII,S$GLB,, | Performed by: INTERNAL MEDICINE

## 2021-11-05 PROCEDURE — 3288F PR FALLS RISK ASSESSMENT DOCUMENTED: ICD-10-PCS | Mod: CPTII,S$GLB,, | Performed by: INTERNAL MEDICINE

## 2021-11-05 PROCEDURE — 1101F PR PT FALLS ASSESS DOC 0-1 FALLS W/OUT INJ PAST YR: ICD-10-PCS | Mod: CPTII,S$GLB,, | Performed by: INTERNAL MEDICINE

## 2021-11-05 PROCEDURE — 3008F PR BODY MASS INDEX (BMI) DOCUMENTED: ICD-10-PCS | Mod: CPTII,S$GLB,, | Performed by: INTERNAL MEDICINE

## 2021-11-05 PROCEDURE — 1101F PT FALLS ASSESS-DOCD LE1/YR: CPT | Mod: CPTII,S$GLB,, | Performed by: INTERNAL MEDICINE

## 2021-11-05 PROCEDURE — 3078F DIAST BP <80 MM HG: CPT | Mod: CPTII,S$GLB,, | Performed by: INTERNAL MEDICINE

## 2021-11-05 PROCEDURE — 93010 RHYTHM STRIP: ICD-10-PCS | Mod: S$GLB,,, | Performed by: INTERNAL MEDICINE

## 2021-11-05 PROCEDURE — 93005 ELECTROCARDIOGRAM TRACING: CPT | Mod: S$GLB,,, | Performed by: INTERNAL MEDICINE

## 2021-11-05 PROCEDURE — 99214 OFFICE O/P EST MOD 30 MIN: CPT | Mod: S$GLB,,, | Performed by: INTERNAL MEDICINE

## 2021-11-05 PROCEDURE — 3078F PR MOST RECENT DIASTOLIC BLOOD PRESSURE < 80 MM HG: ICD-10-PCS | Mod: CPTII,S$GLB,, | Performed by: INTERNAL MEDICINE

## 2021-11-05 PROCEDURE — 99999 PR PBB SHADOW E&M-EST. PATIENT-LVL III: CPT | Mod: PBBFAC,,, | Performed by: INTERNAL MEDICINE

## 2021-11-05 PROCEDURE — 1126F AMNT PAIN NOTED NONE PRSNT: CPT | Mod: CPTII,S$GLB,, | Performed by: INTERNAL MEDICINE

## 2021-11-05 PROCEDURE — 3008F BODY MASS INDEX DOCD: CPT | Mod: CPTII,S$GLB,, | Performed by: INTERNAL MEDICINE

## 2021-11-05 PROCEDURE — 1159F MED LIST DOCD IN RCRD: CPT | Mod: CPTII,S$GLB,, | Performed by: INTERNAL MEDICINE

## 2021-11-05 PROCEDURE — 99214 PR OFFICE/OUTPT VISIT, EST, LEVL IV, 30-39 MIN: ICD-10-PCS | Mod: S$GLB,,, | Performed by: INTERNAL MEDICINE

## 2021-11-05 PROCEDURE — 1160F PR REVIEW ALL MEDS BY PRESCRIBER/CLIN PHARMACIST DOCUMENTED: ICD-10-PCS | Mod: CPTII,S$GLB,, | Performed by: INTERNAL MEDICINE

## 2021-11-29 ENCOUNTER — TELEPHONE (OUTPATIENT)
Dept: OPTOMETRY | Facility: CLINIC | Age: 71
End: 2021-11-29
Payer: MEDICARE

## 2021-12-15 DIAGNOSIS — E78.2 MIXED HYPERLIPIDEMIA: ICD-10-CM

## 2021-12-20 RX ORDER — SIMVASTATIN 20 MG/1
TABLET, FILM COATED ORAL
Qty: 90 TABLET | Refills: 3 | Status: SHIPPED | OUTPATIENT
Start: 2021-12-20 | End: 2023-01-04

## 2022-01-02 ENCOUNTER — PATIENT MESSAGE (OUTPATIENT)
Dept: OTOLARYNGOLOGY | Facility: CLINIC | Age: 72
End: 2022-01-02
Payer: MEDICARE

## 2022-01-06 ENCOUNTER — PATIENT MESSAGE (OUTPATIENT)
Dept: ELECTROPHYSIOLOGY | Facility: CLINIC | Age: 72
End: 2022-01-06
Payer: MEDICARE

## 2022-01-10 ENCOUNTER — TELEPHONE (OUTPATIENT)
Dept: ELECTROPHYSIOLOGY | Facility: CLINIC | Age: 72
End: 2022-01-10
Payer: MEDICARE

## 2022-01-10 NOTE — TELEPHONE ENCOUNTER
Late Entry: 1/7/22-  Spoke with patient and advised that his message, and [employer] readings were reviewed with Dr Gomez. Patient advised that Dr Gomez recommends that he continue his current regimen, including (pill in the pocket ) flecainide 300 mg x 1 PRN, and metoprolol 200 mg daily, as he Kard[employer] readings indicate that he is remaining in sinus rhythm with occasional PVC's. Patient reassured that the occasional PVC's that he feels are benign, however is this were to become more frequent or bothersome, he should notify the office to discuss options. Patient verbalized understanding and denied any further questions at that time.

## 2022-01-11 ENCOUNTER — OFFICE VISIT (OUTPATIENT)
Dept: OTOLARYNGOLOGY | Facility: CLINIC | Age: 72
End: 2022-01-11
Payer: COMMERCIAL

## 2022-01-11 VITALS — HEIGHT: 72 IN | WEIGHT: 179.25 LBS | BODY MASS INDEX: 24.28 KG/M2

## 2022-01-11 DIAGNOSIS — R43.8 REDUCED SENSE OF SMELL: ICD-10-CM

## 2022-01-11 DIAGNOSIS — H61.23 BILATERAL IMPACTED CERUMEN: ICD-10-CM

## 2022-01-11 DIAGNOSIS — R43.1 PAROSMIA: Primary | ICD-10-CM

## 2022-01-11 DIAGNOSIS — J34.2 DEVIATED NASAL SEPTUM: ICD-10-CM

## 2022-01-11 PROCEDURE — 1159F MED LIST DOCD IN RCRD: CPT | Mod: CPTII,S$GLB,, | Performed by: OTOLARYNGOLOGY

## 2022-01-11 PROCEDURE — 3288F FALL RISK ASSESSMENT DOCD: CPT | Mod: CPTII,S$GLB,, | Performed by: OTOLARYNGOLOGY

## 2022-01-11 PROCEDURE — 3008F BODY MASS INDEX DOCD: CPT | Mod: CPTII,S$GLB,, | Performed by: OTOLARYNGOLOGY

## 2022-01-11 PROCEDURE — 69210 EAR CERUMEN REMOVAL: ICD-10-PCS | Mod: S$GLB,,, | Performed by: OTOLARYNGOLOGY

## 2022-01-11 PROCEDURE — 1126F AMNT PAIN NOTED NONE PRSNT: CPT | Mod: CPTII,S$GLB,, | Performed by: OTOLARYNGOLOGY

## 2022-01-11 PROCEDURE — 3008F PR BODY MASS INDEX (BMI) DOCUMENTED: ICD-10-PCS | Mod: CPTII,S$GLB,, | Performed by: OTOLARYNGOLOGY

## 2022-01-11 PROCEDURE — 1160F RVW MEDS BY RX/DR IN RCRD: CPT | Mod: CPTII,S$GLB,, | Performed by: OTOLARYNGOLOGY

## 2022-01-11 PROCEDURE — 1101F PR PT FALLS ASSESS DOC 0-1 FALLS W/OUT INJ PAST YR: ICD-10-PCS | Mod: CPTII,S$GLB,, | Performed by: OTOLARYNGOLOGY

## 2022-01-11 PROCEDURE — 99214 OFFICE O/P EST MOD 30 MIN: CPT | Mod: 25,S$GLB,, | Performed by: OTOLARYNGOLOGY

## 2022-01-11 PROCEDURE — 69210 REMOVE IMPACTED EAR WAX UNI: CPT | Mod: S$GLB,,, | Performed by: OTOLARYNGOLOGY

## 2022-01-11 PROCEDURE — 99214 PR OFFICE/OUTPT VISIT, EST, LEVL IV, 30-39 MIN: ICD-10-PCS | Mod: 25,S$GLB,, | Performed by: OTOLARYNGOLOGY

## 2022-01-11 PROCEDURE — 3288F PR FALLS RISK ASSESSMENT DOCUMENTED: ICD-10-PCS | Mod: CPTII,S$GLB,, | Performed by: OTOLARYNGOLOGY

## 2022-01-11 PROCEDURE — 99999 PR PBB SHADOW E&M-EST. PATIENT-LVL III: CPT | Mod: PBBFAC,,, | Performed by: OTOLARYNGOLOGY

## 2022-01-11 PROCEDURE — 1126F PR PAIN SEVERITY QUANTIFIED, NO PAIN PRESENT: ICD-10-PCS | Mod: CPTII,S$GLB,, | Performed by: OTOLARYNGOLOGY

## 2022-01-11 PROCEDURE — 1160F PR REVIEW ALL MEDS BY PRESCRIBER/CLIN PHARMACIST DOCUMENTED: ICD-10-PCS | Mod: CPTII,S$GLB,, | Performed by: OTOLARYNGOLOGY

## 2022-01-11 PROCEDURE — 1159F PR MEDICATION LIST DOCUMENTED IN MEDICAL RECORD: ICD-10-PCS | Mod: CPTII,S$GLB,, | Performed by: OTOLARYNGOLOGY

## 2022-01-11 PROCEDURE — 1101F PT FALLS ASSESS-DOCD LE1/YR: CPT | Mod: CPTII,S$GLB,, | Performed by: OTOLARYNGOLOGY

## 2022-01-11 PROCEDURE — 99999 PR PBB SHADOW E&M-EST. PATIENT-LVL III: ICD-10-PCS | Mod: PBBFAC,,, | Performed by: OTOLARYNGOLOGY

## 2022-01-11 RX ORDER — BUDESONIDE 0.5 MG/2ML
0.5 INHALANT ORAL DAILY
Qty: 60 ML | Refills: 2 | Status: SHIPPED | OUTPATIENT
Start: 2022-01-11 | End: 2023-01-13 | Stop reason: SDUPTHER

## 2022-01-11 NOTE — PATIENT INSTRUCTIONS
Get Shola Med Sinus Rinse from the pharmacy.  Use it daily with added budesonide according to the instructions.  It will help to cut down the inflammation in your nose and sinuses.

## 2022-01-11 NOTE — PROCEDURES
Ear Cerumen Removal    Date/Time: 1/11/2022 2:30 PM  Performed by: Wilfredo Hendrix MD  Authorized by: Wilfredo Hendrix MD     Consent Done?:  Yes (Verbal)    Local anesthetic:  None  Location details:  Both ears  Procedure type: curette    Cerumen  Removal Results:  Cerumen completely removed  Patient tolerance:  Patient tolerated the procedure well with no immediate complications

## 2022-01-11 NOTE — PROGRESS NOTES
Subjective:      Anthony is a 71 y.o. male who comes for follow-up of an olfactory disturbance.  His last visit with me was on 12/10/2020.  Minimal change in symptoms, still altered stimuli to odors, also baseline reduced intensity of odors.  Taste also affected with dysgeusia, some weight loss.  Used Flonase for a while without improvement.  Tried olfactory training sporadically without improvement.    SNOT-22 score: : (P) 50  NOSE score:: (P) 40%  ETDQ-7 score:: (P) 2    The patient's medications, allergies, past medical, surgical, social and family histories were reviewed and updated as appropriate.    A detailed review of systems was obtained with pertinent positives as per the above HPI, and otherwise negative.        Objective:     Ht 6' (1.829 m)   Wt 81.3 kg (179 lb 3.7 oz)   BMI 24.31 kg/m²        Constitutional:   He appears well-developed. He is cooperative. Normal speech.  No hoarse voice.      Head:  Normocephalic. Salivary glands normal.  Facial strength is normal.      Ears:    Right Ear: No drainage or tenderness. Tympanic membrane is not perforated. Tympanic membrane mobility is normal. No middle ear effusion. No decreased hearing is noted.   Left Ear: No drainage or tenderness. Tympanic membrane is not perforated. Tympanic membrane mobility is normal.  No middle ear effusion. No decreased hearing is noted.     Nose:  No mucosal edema, rhinorrhea, septal deviation or polyps. No epistaxis. Turbinates normal, no turbinate masses and no turbinate hypertrophy.  Right sinus exhibits no maxillary sinus tenderness and no frontal sinus tenderness. Left sinus exhibits no maxillary sinus tenderness and no frontal sinus tenderness.     Mouth/Throat  Oropharynx clear and moist without lesions or asymmetry and normal uvula midline. He does not have dentures. Normal dentition. No oral lesions or mucous membrane lesions. No oropharyngeal exudate or posterior oropharyngeal erythema. Mirror exam not performed due  to patient tolerance.  Mirror exam not performed due to patient tolerance.      Neck:  Neck normal without thyromegaly masses, asymmetry, normal tracheal structure, crepitus, and tenderness, thyroid normal, trachea normal and no adenopathy. Normal range of motion present.     He has no cervical adenopathy.     Cardiovascular:   Regular rhythm.      Pulmonary/Chest:   Effort normal.     Psychiatric:   He has a normal mood and affect. His speech is normal and behavior is normal.     Neurological:   No cranial nerve deficit.     Skin:   No rash noted.       Procedure    Cerumen removal performed.  See procedure note.        Data Reviewed    WBC (K/uL)   Date Value   10/13/2021 6.65     Eosinophil % (%)   Date Value   10/13/2021 2.7     Eos # (K/uL)   Date Value   10/13/2021 0.2     Platelets (K/uL)   Date Value   10/13/2021 179     Glucose (mg/dL)   Date Value   10/13/2021 94     IgE (IU/mL)   Date Value   07/26/2021 <35       No sinus imaging available.      Assessment:     1. Parosmia    2. Reduced sense of smell    3. Bilateral impacted cerumen    4. Deviated nasal septum         Plan:     Discussed concurrent budesonide sinus rinse daily with olfactory training.  Also discussed possible MRI, EEG, neurology referral, will consider for future.  Follow up if symptoms worsen or fail to improve.

## 2022-01-14 NOTE — TELEPHONE ENCOUNTER
No new care gaps identified.  Powered by [employer]. Reference number: [id number].   1/14/2022 10:15:33 AM CST

## 2022-01-18 RX ORDER — POTASSIUM CHLORIDE 20 MEQ/1
20 TABLET, EXTENDED RELEASE ORAL 2 TIMES DAILY
Qty: 180 TABLET | Refills: 2 | Status: SHIPPED | OUTPATIENT
Start: 2022-01-18 | End: 2022-08-02 | Stop reason: SDUPTHER

## 2022-01-18 NOTE — TELEPHONE ENCOUNTER
No new care gaps identified.  Powered by [employer]. Reference number: [id number].   1/18/2022 11:42:58 AM CST

## 2022-01-25 NOTE — TELEPHONE ENCOUNTER
Quick DC. Request already responded to by other means (e.g. phone or fax)   Refill Authorization Note   Anthony Reynaga  is requesting a refill authorization.  Brief Assessment and Rationale for Refill:  Quick Discontinue  Medication Therapy Plan:       Medication Reconciliation Completed:  No      Comments:   Pended Medication(s)       Requested Prescriptions     Pending Prescriptions Disp Refills    potassium chloride SA (K-DUR,KLOR-CON) 20 MEQ tablet [Pharmacy Med Name: POTASSIUM CL 20MEQ ER TABLETS] 180 tablet 3     Sig: TAKE 1 TABLET(20 MEQ) BY MOUTH TWICE DAILY        Duplicate Pended Encounter(s)/ Last Prescribed Details: (includes pharmacy & prescriber details)   [id number] [place] [id number] [facility]   [address] [facility] [address]   Phone:  [phone]  Fax:  [phone]   KATE #:  [id number]   ALMA Reason: --       Outpatient Medication Detail     Disp Refills Start End ALMA   potassium chloride SA (K-DUR,KLOR-CON) 20 MEQ tablet 180 tablet 2 1/18/2022  No   Sig - Route: Take 1 tablet (20 mEq total) by mouth 2 (two) times daily. - Oral   Sent to pharmacy as: potassium chloride SA (K-DUR,KLOR-CON) 20 MEQ tablet   Class: Normal   Order: [id number]   Date/Time Signed: 1/18/2022 11:45       E-Prescribing Status: Receipt confirmed by pharmacy (1/18/2022 11:46 AM CST)     Ordering Encounter Report    Associated Reports   View Encounter                Note composed:11:46 AM 01/25/2022

## 2022-01-26 RX ORDER — POTASSIUM CHLORIDE 20 MEQ/1
TABLET, EXTENDED RELEASE ORAL
Qty: 180 TABLET | Refills: 3 | OUTPATIENT
Start: 2022-01-26

## 2022-05-02 NOTE — PROGRESS NOTES
INR from 11/12 at goal. Medications and chart reviewed. No changes noted to necessitate adjustment of warfarin or follow-up plan. See calendar.       
Statement Selected

## 2022-05-16 ENCOUNTER — PATIENT MESSAGE (OUTPATIENT)
Dept: INTERNAL MEDICINE | Facility: CLINIC | Age: 72
End: 2022-05-16
Payer: MEDICARE

## 2022-05-17 ENCOUNTER — OFFICE VISIT (OUTPATIENT)
Dept: INTERNAL MEDICINE | Facility: CLINIC | Age: 72
End: 2022-05-17
Attending: FAMILY MEDICINE
Payer: COMMERCIAL

## 2022-05-17 DIAGNOSIS — U07.1 COVID-19: Primary | ICD-10-CM

## 2022-05-17 DIAGNOSIS — I10 ESSENTIAL HYPERTENSION, BENIGN: ICD-10-CM

## 2022-05-17 DIAGNOSIS — I48.0 PAROXYSMAL ATRIAL FIBRILLATION: ICD-10-CM

## 2022-05-17 PROCEDURE — 1160F PR REVIEW ALL MEDS BY PRESCRIBER/CLIN PHARMACIST DOCUMENTED: ICD-10-PCS | Mod: CPTII,95,, | Performed by: FAMILY MEDICINE

## 2022-05-17 PROCEDURE — 1159F PR MEDICATION LIST DOCUMENTED IN MEDICAL RECORD: ICD-10-PCS | Mod: CPTII,95,, | Performed by: FAMILY MEDICINE

## 2022-05-17 PROCEDURE — 1126F AMNT PAIN NOTED NONE PRSNT: CPT | Mod: CPTII,95,, | Performed by: FAMILY MEDICINE

## 2022-05-17 PROCEDURE — 1160F RVW MEDS BY RX/DR IN RCRD: CPT | Mod: CPTII,95,, | Performed by: FAMILY MEDICINE

## 2022-05-17 PROCEDURE — 1126F PR PAIN SEVERITY QUANTIFIED, NO PAIN PRESENT: ICD-10-PCS | Mod: CPTII,95,, | Performed by: FAMILY MEDICINE

## 2022-05-17 PROCEDURE — 99214 PR OFFICE/OUTPT VISIT, EST, LEVL IV, 30-39 MIN: ICD-10-PCS | Mod: 95,,, | Performed by: FAMILY MEDICINE

## 2022-05-17 PROCEDURE — 1159F MED LIST DOCD IN RCRD: CPT | Mod: CPTII,95,, | Performed by: FAMILY MEDICINE

## 2022-05-17 PROCEDURE — 99214 OFFICE O/P EST MOD 30 MIN: CPT | Mod: 95,,, | Performed by: FAMILY MEDICINE

## 2022-05-17 RX ORDER — PROMETHAZINE HYDROCHLORIDE AND DEXTROMETHORPHAN HYDROBROMIDE 6.25; 15 MG/5ML; MG/5ML
5 SYRUP ORAL EVERY 4 HOURS PRN
Qty: 120 ML | Refills: 1 | Status: SHIPPED | OUTPATIENT
Start: 2022-05-17 | End: 2022-05-27

## 2022-05-17 NOTE — PROGRESS NOTES
The patient location is:  home  The chief complaint leading to consultation is:  COVID-19    Visit type: audiovisual    Face to Face time with patient:  10 minutes  Fifteen minutes of total time spent on the encounter, which includes face to face time and non-face to face time preparing to see the patient (eg, review of tests), Obtaining and/or reviewing separately obtained history, Documenting clinical information in the electronic or other health record, Independently interpreting results (not separately reported) and communicating results to the patient/family/caregiver, or Care coordination (not separately reported).         Each patient to whom he or she provides medical services by telemedicine is:  (1) informed of the relationship between the physician and patient and the respective role of any other health care provider with respect to management of the patient; and (2) notified that he or she may decline to receive medical services by telemedicine and may withdraw from such care at any time.    Notes:   CHIEF COMPLAINT: Patient presents with several days of flulike symptoms    HISTORY OF PRESENT ILLNESS: The patient presents with several days of ST and cough.  Also Some fatigue.  The patient denies wheezing, nausea, vomiting, constipation, or diarrhea.  Patient has a sick contact.  The patient is fully vaccinated and boosted.  He had a positive COVID test today.    He has a history of paroxysmal AFib.    The patient has a history of stable hypertension on current medications.  Patient denies chest pain today.    REVIEW OF SYSTEMS:  GENERAL: No fatigability or weight loss.  SKIN: No rashes, itching or changes in color or texture of skin.  HEAD: No headaches or recent head trauma.  EYES: Visual acuity fine. No photophobia, ocular pain or diplopia.  EARS: Denies ear pain, discharge or vertigo.  NOSE: No loss of smell, no epistaxis or postnasal drip.  MOUTH & THROAT: No hoarseness or change in voice. No  excessive gum bleeding.  NODES: Denies swollen glands.  CHEST: Denies JANSEN, cyanosis, wheezing, and sputum production.  CARDIOVASCULAR: Denies chest pain, PND, orthopnea or reduced exercise tolerance.  ABDOMEN: Appetite fine. No weight loss. Denies diarrhea, abdominal pain, hematemesis or blood in stool.  URINARY: No flank pain, dysuria or hematuria.  PERIPHERAL VASCULAR: No claudication or cyanosis.  MUSCULOSKELETAL: No joint stiffness or swelling. Denies back pain.  The patient does have some myalgias.  NEUROLOGIC: No history of seizures, paralysis, alteration of gait or coordination.    SOCIAL HISTORY: The patient does not smoke.  The patient consumes alcohol socially.      PHYSICAL EXAMINATION:   APPEARANCE: Well nourished, well developed, in no acute distress.    HEAD: Normocephalic, atraumatic.  EYES: PERRL. EOMI.  Conjunctivae without injection and  anicteric  NEUROLOGIC:       Normal speech development.      Hearing normal.  PSYCHIATRIC: Patient is alert and oriented x3.  Thought processes are all normal.  There is no homicidality.  There is no suicidality.  There is no evidence of psychosis.    LABORATORY/RADIOLOGY: Chart reviewed.    ASSESSMENT:   COVID-19  Paroxysmal AFib  Hypertension  Long-term use of anticoagulation    PLAN:  He is hesitant to take any antiviral medication.  I tend to agree with this.  I think he can tolerate Phenergan DM quite well      Answers for HPI/ROS submitted by the patient on 5/16/2022  Chronicity: new  Onset: in the past 7 days  Progression since onset: unchanged  Frequency: hourly  Cough characteristics: non-productive  chest pain: No  chills: No  ear congestion: No  ear pain: No  fever: No  headaches: No  heartburn: No  hemoptysis: No  myalgias: No  nasal congestion: Yes  postnasal drip: Yes  rash: No  rhinorrhea: Yes  shortness of breath: No  sore throat: Yes  sweats: No  weight loss: No  wheezing: No  Aggravated by: nothing  asthma: No  bronchiectasis: No  bronchitis:  No  COPD: No  emphysema: No  environmental allergies: No  pneumonia: No  Treatments tried: body position changes  Improvement on treatment: moderate

## 2022-05-24 ENCOUNTER — PATIENT MESSAGE (OUTPATIENT)
Dept: INTERNAL MEDICINE | Facility: CLINIC | Age: 72
End: 2022-05-24
Payer: MEDICARE

## 2022-05-24 NOTE — TELEPHONE ENCOUNTER
As long as no symptoms currently, afebrile, and >10 days since symptom onset patient should be fine to have dental appointment. He may test persistently positive for up to 90 days. Please let patient know, thank you!

## 2022-06-05 DIAGNOSIS — F41.9 ANXIETY DISORDER, UNSPECIFIED TYPE: Primary | ICD-10-CM

## 2022-06-06 RX ORDER — BUSPIRONE HYDROCHLORIDE 5 MG/1
TABLET ORAL
Qty: 60 TABLET | Refills: 3 | Status: SHIPPED | OUTPATIENT
Start: 2022-06-06 | End: 2022-07-28 | Stop reason: SDUPTHER

## 2022-06-21 ENCOUNTER — OFFICE VISIT (OUTPATIENT)
Dept: UROLOGY | Facility: CLINIC | Age: 72
End: 2022-06-21
Payer: COMMERCIAL

## 2022-06-21 ENCOUNTER — LAB VISIT (OUTPATIENT)
Dept: LAB | Facility: HOSPITAL | Age: 72
End: 2022-06-21
Attending: UROLOGY
Payer: MEDICARE

## 2022-06-21 VITALS — BODY MASS INDEX: 25.01 KG/M2 | HEIGHT: 72 IN | WEIGHT: 184.63 LBS

## 2022-06-21 DIAGNOSIS — N13.8 BENIGN PROSTATIC HYPERPLASIA WITH URINARY OBSTRUCTION: ICD-10-CM

## 2022-06-21 DIAGNOSIS — R97.20 ELEVATED PSA: ICD-10-CM

## 2022-06-21 DIAGNOSIS — N40.1 BENIGN PROSTATIC HYPERPLASIA WITH URINARY OBSTRUCTION: Primary | ICD-10-CM

## 2022-06-21 DIAGNOSIS — N40.1 BENIGN PROSTATIC HYPERPLASIA WITH URINARY OBSTRUCTION: ICD-10-CM

## 2022-06-21 DIAGNOSIS — N13.8 BENIGN PROSTATIC HYPERPLASIA WITH URINARY OBSTRUCTION: Primary | ICD-10-CM

## 2022-06-21 PROCEDURE — 3008F PR BODY MASS INDEX (BMI) DOCUMENTED: ICD-10-PCS | Mod: CPTII,S$GLB,, | Performed by: UROLOGY

## 2022-06-21 PROCEDURE — 3288F PR FALLS RISK ASSESSMENT DOCUMENTED: ICD-10-PCS | Mod: CPTII,S$GLB,, | Performed by: UROLOGY

## 2022-06-21 PROCEDURE — 1126F AMNT PAIN NOTED NONE PRSNT: CPT | Mod: CPTII,S$GLB,, | Performed by: UROLOGY

## 2022-06-21 PROCEDURE — 1101F PR PT FALLS ASSESS DOC 0-1 FALLS W/OUT INJ PAST YR: ICD-10-PCS | Mod: CPTII,S$GLB,, | Performed by: UROLOGY

## 2022-06-21 PROCEDURE — 36415 COLL VENOUS BLD VENIPUNCTURE: CPT | Performed by: UROLOGY

## 2022-06-21 PROCEDURE — 84153 ASSAY OF PSA TOTAL: CPT | Performed by: UROLOGY

## 2022-06-21 PROCEDURE — 99213 PR OFFICE/OUTPT VISIT, EST, LEVL III, 20-29 MIN: ICD-10-PCS | Mod: S$GLB,,, | Performed by: UROLOGY

## 2022-06-21 PROCEDURE — 3008F BODY MASS INDEX DOCD: CPT | Mod: CPTII,S$GLB,, | Performed by: UROLOGY

## 2022-06-21 PROCEDURE — 1160F RVW MEDS BY RX/DR IN RCRD: CPT | Mod: CPTII,S$GLB,, | Performed by: UROLOGY

## 2022-06-21 PROCEDURE — 84154 ASSAY OF PSA FREE: CPT | Performed by: UROLOGY

## 2022-06-21 PROCEDURE — 99999 PR PBB SHADOW E&M-EST. PATIENT-LVL III: CPT | Mod: PBBFAC,,, | Performed by: UROLOGY

## 2022-06-21 PROCEDURE — 1101F PT FALLS ASSESS-DOCD LE1/YR: CPT | Mod: CPTII,S$GLB,, | Performed by: UROLOGY

## 2022-06-21 PROCEDURE — 99213 OFFICE O/P EST LOW 20 MIN: CPT | Mod: S$GLB,,, | Performed by: UROLOGY

## 2022-06-21 PROCEDURE — 3288F FALL RISK ASSESSMENT DOCD: CPT | Mod: CPTII,S$GLB,, | Performed by: UROLOGY

## 2022-06-21 PROCEDURE — 1159F PR MEDICATION LIST DOCUMENTED IN MEDICAL RECORD: ICD-10-PCS | Mod: CPTII,S$GLB,, | Performed by: UROLOGY

## 2022-06-21 PROCEDURE — 1126F PR PAIN SEVERITY QUANTIFIED, NO PAIN PRESENT: ICD-10-PCS | Mod: CPTII,S$GLB,, | Performed by: UROLOGY

## 2022-06-21 PROCEDURE — 1160F PR REVIEW ALL MEDS BY PRESCRIBER/CLIN PHARMACIST DOCUMENTED: ICD-10-PCS | Mod: CPTII,S$GLB,, | Performed by: UROLOGY

## 2022-06-21 PROCEDURE — 99999 PR PBB SHADOW E&M-EST. PATIENT-LVL III: ICD-10-PCS | Mod: PBBFAC,,, | Performed by: UROLOGY

## 2022-06-21 PROCEDURE — 1159F MED LIST DOCD IN RCRD: CPT | Mod: CPTII,S$GLB,, | Performed by: UROLOGY

## 2022-06-21 NOTE — PROGRESS NOTES
Clinic Note  6/21/2022      Subjective:         Chief Complaint:   HPI  Anthony Reynaga is a 72 y.o. male history of BPH and elevated PSA. S/p biopsy (B9) in 2 /11. Moderate symptoms, minimal bother.  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 History of kidney stones and prostatitis. Is on  Uroxatrol. Talked to Bashir about Rezum.  Hernia repair in July 2016.  LUTS: bothered by nocturia 4-6x /noc. Also bothered by decreased ejaculate and decreased orgasm intensity.  Stopped finasteride 4 year ago. Is thinking about nursing home.       3/6/2020 PHI- PSA 5, 24% free, PHI score- 25  5/11/2021 PHI- PSA 5.3, 25% free, PHI score 24.    6/21/22: He returns to clinic today for f/u. PHI from today pending. He plans on retiring on the 30th of this month. States his urinary frequency has improved on Uroxatrol, but hesitancy and intermittency has worsened. He interested in discussing surgical therapy for his BPH with obstructive LUTS. He is currently not interested in obtaining a MRI at this time.   post void residual 95 ccs      Lab Results   Component Value Date    PSA 3.80 10/04/2012    PSA 4.10 (H) 03/01/2012    PSA 3.2 10/13/2011    PSADIAG 5.7 (H) 10/30/2019    PSADIAG 3.6 10/19/2018    PSADIAG 2.1 09/20/2017    PSADIAG 3.0 09/14/2016    PSADIAG 4.9 (H) 03/11/2016    PSADIAG 4.6 (H) 10/20/2015    PSADIAG 3.2 10/14/2014    PSADIAG 3.5 10/08/2013      Past Medical History:   Diagnosis Date    Anticoagulant long-term use     Atrial fibrillation     BPH (benign prostatic hyperplasia)     Cataract     Elevated PSA     Floaters     Hernia     bilateral inquinal    Hypertension     Inguinal hernia     Kidney stone     Knee injury      Family History   Problem Relation Age of Onset    Cancer Mother         Terminal Renal Cancer    Hypertension Mother     Dementia Father     Hypertension Father     Benign prostatic hyperplasia Neg Hx      Social History     Socioeconomic History    Marital status: Single   Tobacco  Use    Smoking status: Never Smoker    Smokeless tobacco: Never Used   Substance and Sexual Activity    Alcohol use: Yes     Alcohol/week: 1.0 - 2.0 standard drink     Types: 1 - 2 Cans of beer per week     Comment: rarely    Drug use: No    Sexual activity: Yes     Partners: Female     Birth control/protection: OCP     Past Surgical History:   Procedure Laterality Date    APPENDECTOMY      BLADDER SURGERY      polyp removed benign    COLONOSCOPY N/A 6/22/2016    Procedure: COLONOSCOPY;  Surgeon: Joaquin Francis MD;  Location: [facility] ([address] [place] [address]);  Service: Endoscopy;  Laterality: N/A;    Thanks for letting us know.  I would approve him to hold coumadin x 3 days prior, without lovenox.  We will see him for an INR on 6/8 and will provide him with procedure instructions at that time., per [facility]    CYSTOSCOPY      benign bladder papilloma    FINGER SURGERY      HERNIA REPAIR Left 2016    inguinal    LITHOTRIPSY      TONSILLECTOMY, ADENOIDECTOMY       Patient Active Problem List   Diagnosis    Elevated PSA    Paroxysmal atrial fibrillation    PVD (posterior vitreous detachment)    High myopia    NS (nuclear sclerosis), bilateral    Benign prostatic hyperplasia with urinary obstruction    Long term current use of anticoagulant therapy    Aortic regurgitation    Essential hypertension, benign    Right ureteral stone    Bilateral kidney stones    Kidney stone    Voice disturbance    Vocal fold atrophy    Glottic insufficiency    Hyperfunctional dysphonia    Mixed hyperlipidemia    PVC (premature ventricular contraction)    Mild aortic stenosis    Posterior vitreous detachment, bilateral     Review of Systems   Constitutional: Negative for appetite change, chills, fatigue, fever and unexpected weight change.   HENT: Negative for nosebleeds.    Respiratory: Negative for shortness of breath and wheezing.    Cardiovascular: Negative for chest pain, palpitations and leg swelling.    Gastrointestinal: Negative for abdominal distention, abdominal pain, constipation, diarrhea, nausea and vomiting.   Genitourinary: Positive for frequency and nocturia. Negative for difficulty urinating, dysuria and hematuria.        4-6x/noc, 4 nights/week  Hesitancy, incomplete emptying   Musculoskeletal: Negative for arthralgias and back pain.   Skin: Negative for pallor.   Neurological: Negative for dizziness, seizures and syncope.   Hematological: Negative for adenopathy.   Psychiatric/Behavioral: Negative for dysphoric mood.         Objective:      There were no vitals taken for this visit.  Estimated body mass index is 24.31 kg/m² as calculated from the following:    Height as of 1/11/22: 6' (1.829 m).    Weight as of 1/11/22: 81.3 kg (179 lb 3.7 oz).  Physical Exam  Vitals and nursing note reviewed.   Constitutional:       General: He is not in acute distress.     Appearance: Normal appearance. He is well-developed. He is not diaphoretic.   HENT:      Head: Atraumatic.      Nose: Nose normal.   Eyes:      Extraocular Movements: Extraocular movements intact.      Pupils: Pupils are equal, round, and reactive to light.   Neck:      Trachea: No tracheal deviation.   Cardiovascular:      Rate and Rhythm: Normal rate.   Pulmonary:      Effort: Pulmonary effort is normal. No respiratory distress.      Breath sounds: No wheezing.   Abdominal:      General: Abdomen is flat. Bowel sounds are normal. There is no distension.      Palpations: Abdomen is soft. There is no mass.      Tenderness: There is no abdominal tenderness. There is no guarding or rebound.   Genitourinary:     Prostate: Normal.      Rectum: Normal. No mass, tenderness, external hemorrhoid or internal hemorrhoid.      Comments: Prostate 55cc, non-nodular  Musculoskeletal:         General: Normal range of motion.      Cervical back: Normal range of motion.   Skin:     General: Skin is warm and dry.   Neurological:      General: No focal deficit  present.      Mental Status: He is alert and oriented to person, place, and time. Mental status is at baseline.   Psychiatric:         Mood and Affect: Mood normal.         Behavior: Behavior normal.         Thought Content: Thought content normal.         Judgment: Judgment normal.           Assessment and Plan:           Problem List Items Addressed This Visit        Renal/    Elevated PSA    Benign prostatic hyperplasia with urinary obstruction - Primary          Follow up:   1 year with EAMON Norman

## 2022-06-21 NOTE — LETTER
June 21, 2022        Gonzalo Harris MD  [address]  [facility] [address]             [facility] - Urology 2nd Fl  [address]  [facility] [address]  Phone: [phone]   Patient: Anthony Reynaga   MR Number: [id number]   YOB: 1950   Date of Visit: 6/21/2022       Dear Dr. Harris:    Thank you for referring Anthony Reynaga to me for evaluation. Attached you will find relevant portions of my assessment and plan of care.    If you have questions, please do not hesitate to call me. I look forward to following Anthony Reynaga along with you.    Sincerely,      Livan Lowry MD            CC    No Recipients    Enclosure

## 2022-06-22 LAB
-2 PROPSA (PHI): 18.5 PG/ML
FREE PSA (PHI): 1.6 NG/ML
PROSTATE HEALTH INDEX VALUE (PHI): 30.2
PSA FREE MFR SERPL: 24 %
PSA SERPL-MCNC: 6.8 NG/ML

## 2022-07-28 DIAGNOSIS — F41.9 ANXIETY DISORDER, UNSPECIFIED TYPE: ICD-10-CM

## 2022-07-28 RX ORDER — BUSPIRONE HYDROCHLORIDE 5 MG/1
5 TABLET ORAL 2 TIMES DAILY
Qty: 60 TABLET | Refills: 3 | Status: SHIPPED | OUTPATIENT
Start: 2022-07-28 | End: 2022-08-02 | Stop reason: SDUPTHER

## 2022-07-28 NOTE — TELEPHONE ENCOUNTER
Refill Routing Note   Medication(s) are not appropriate for processing by [facility] for the following reason(s):      - Outside of protocol    [facility] action(s):  Route       Medication Therapy Plan: Non-delegated  Medication reconciliation completed: No     Appointments  past 12m or future 3m with PCP    Date Provider   Last Visit   9/29/2021 Gonzalo Harris MD   Next Visit   Visit date not found Gonzalo Harris MD   ED visits in past 90 days: 0        Note composed:11:50 AM 07/28/2022

## 2022-08-01 ENCOUNTER — PATIENT MESSAGE (OUTPATIENT)
Dept: INTERNAL MEDICINE | Facility: CLINIC | Age: 72
End: 2022-08-01
Payer: MEDICARE

## 2022-08-01 DIAGNOSIS — I49.3 PVC (PREMATURE VENTRICULAR CONTRACTION): Primary | ICD-10-CM

## 2022-08-01 DIAGNOSIS — F41.9 ANXIETY DISORDER, UNSPECIFIED TYPE: ICD-10-CM

## 2022-08-02 RX ORDER — POTASSIUM CHLORIDE 20 MEQ/1
20 TABLET, EXTENDED RELEASE ORAL 2 TIMES DAILY
Qty: 180 TABLET | Refills: 0 | Status: SHIPPED | OUTPATIENT
Start: 2022-08-02 | End: 2022-10-20

## 2022-08-02 RX ORDER — BUSPIRONE HYDROCHLORIDE 5 MG/1
5 TABLET ORAL 2 TIMES DAILY
Qty: 180 TABLET | Refills: 0 | Status: SHIPPED | OUTPATIENT
Start: 2022-08-02 | End: 2022-11-07

## 2022-08-02 NOTE — TELEPHONE ENCOUNTER
Care Due:                  Date            Visit Type   Department     Provider  --------------------------------------------------------------------------------                                ESTABLISHED                              PATIENT -    [facility] INTERNAL  Chip Ayers  Last Visit: 05-      [facility]  Next Visit: None Scheduled  None         None Found                                                            Last  Test          Frequency    Reason                     Performed    Due Date  --------------------------------------------------------------------------------    CMP.........  12 months..  potassium, simvastatin...  10-   10-    Lipid Panel.  12 months..  simvastatin..............  10-   10-    [employer] Embedded Care Gaps. Reference number: [id number]. 8/02/2022   11:29:59 AM CDT

## 2022-08-31 ENCOUNTER — OFFICE VISIT (OUTPATIENT)
Dept: CARDIOLOGY | Facility: CLINIC | Age: 72
End: 2022-08-31
Payer: MEDICARE

## 2022-08-31 VITALS
SYSTOLIC BLOOD PRESSURE: 120 MMHG | HEART RATE: 66 BPM | BODY MASS INDEX: 25.12 KG/M2 | OXYGEN SATURATION: 96 % | WEIGHT: 185.44 LBS | HEIGHT: 72 IN | DIASTOLIC BLOOD PRESSURE: 62 MMHG

## 2022-08-31 DIAGNOSIS — I35.0 MILD AORTIC STENOSIS: ICD-10-CM

## 2022-08-31 DIAGNOSIS — I48.0 PAROXYSMAL ATRIAL FIBRILLATION: Primary | ICD-10-CM

## 2022-08-31 DIAGNOSIS — I49.3 PVC (PREMATURE VENTRICULAR CONTRACTION): ICD-10-CM

## 2022-08-31 DIAGNOSIS — E78.2 MIXED HYPERLIPIDEMIA: ICD-10-CM

## 2022-08-31 DIAGNOSIS — I10 ESSENTIAL HYPERTENSION, BENIGN: Chronic | ICD-10-CM

## 2022-08-31 PROCEDURE — 99214 OFFICE O/P EST MOD 30 MIN: CPT | Mod: S$PBB,,, | Performed by: INTERNAL MEDICINE

## 2022-08-31 PROCEDURE — 99999 PR PBB SHADOW E&M-EST. PATIENT-LVL IV: CPT | Mod: PBBFAC,,, | Performed by: INTERNAL MEDICINE

## 2022-08-31 PROCEDURE — 99214 PR OFFICE/OUTPT VISIT, EST, LEVL IV, 30-39 MIN: ICD-10-PCS | Mod: S$PBB,,, | Performed by: INTERNAL MEDICINE

## 2022-08-31 PROCEDURE — 99999 PR PBB SHADOW E&M-EST. PATIENT-LVL IV: ICD-10-PCS | Mod: PBBFAC,,, | Performed by: INTERNAL MEDICINE

## 2022-08-31 PROCEDURE — 99214 OFFICE O/P EST MOD 30 MIN: CPT | Mod: PBBFAC | Performed by: INTERNAL MEDICINE

## 2022-08-31 NOTE — PROGRESS NOTES
Subjective:   Chief Complaint: Establish Care, Follow-up, Annual Exam, and Dizziness  Last Clinic Visit: 5/26/2021     History of Present Illness: Anthony Reynaga is a 72 y.o. gentleman with functionally bicuspid aortic valve, mild AS/AR, paroxysmal atrial fibrillation, hypertension, possible hyperlipidemia, who presents to follow-up with cardiology.    Interval History:  Over the past 1 year he retired in June, and has been spending some time in [place] as well as here.  Continuing to walk on a regular basis and occasional mild weight training before the pandemic, but has not exercise on a regular basis since the pandemic.  Reports eating a reasonably healthy diet, does endorse occasional lightheadedness, and dizziness, and acknowledges not drinking water throughout the day.  Occasional lightheadedness or working on the computer, reports blood pressure at home occasionally running on the low side he has had some bouts of arrhythmia normal ECG in November of 2021, he had an echocardiogram ordered in October of 2021 by outside provider and reported mild AI.  He reviewed recent echocardiograms bothered by the fact that his left atrial size has gone from severe to moderate to mild on serial echocardiograms.  Blood pressures at home in the 110s to 120s, heart rates in the 50s, and does have white coat hypertension home blood pressure log scanned in to clinic.  Also on several different medications in interested in reducing what medications he can.  Occasional PVCs found on EKGs.  Also does not feel like his alpha-blocker is helping significantly with BPH.  We also switched to Eliquis from warfarin since we saw him last.  Noted be on chlorthalidone, with potassium supplementation.    5/26/2021  He was most recently followed by Dr. Mcclellan, and also follows with Dr. Gomez in the [facility].  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  He was first diagnosed with heart murmur in childhood, and then reports no  significant issues for several years, in the 1990s he was diagnosed again with possible bicuspid aortic valve, as well as possible mitral valve prolapse, followed with several different cardiologist at [facility], and most recently here.  Denies any significant dyspnea on exertion, no orthopnea, no history of heart failure no history of rheumatic fever, and multiple serial echocardiograms showing relatively stable aortic valve disease.  Normal EF, preserved LV dimensions.  He also has a history of hypertension, and currently on chlorthalidone, brings in a log of his blood pressures blood pressures running in the 110-120 range at home, a history of white coat hypertension.  Blood pressure well controlled with chlorthalidone 25 mg, does have hypokalemia and on potassium for this.  He had a significant ACE-inhibitor induced cough, and thus is intolerant to this medication.  Also does report an enlarged prostate, and has some nocturia.  Does not snore does have a family history of sleep apnea.  In regards to atrial fibrillation he does have 2 recent episodes where flecainide was not effective, had some paroxysmal atrial fibrillation this past summer or a 30 day event monitor, but did not have any AFib during this time period currently on metoprolol 200 mg, flecainide pill in pocket strategy.  Pill in pocket has worked in the past.  Denies any clear triggers.  Has been on Coumadin for many years, in the past hesitant to transition to DOAC given concern for no reversal agent.  Also on simvastatin for many years, most recent LDL is very well controlled in the 60s, unclear whether he had any significant hyperlipidemia prior to starting this medication.  Most recent CT non con does show very small calcifications in the LAD, as well as some mild aortic calcifications.  Has significant issues with sleep in regards to anxiety, and also nocturia.  Asked today about statins and aortic valve disease whether taking a statin prematurely  could have precipitated his aortic valve atherosclerosis.    Dx:  Functionally bicuspid aortic valve  Mildly AS/AR  PAF, on flecainide pill in pocket, and metoprolol 200  Hypertension  ACE-inhibitor cough  Hyperlipidemia  History of iatrogenic hypokalemia    Medications:  Outpatient Encounter Medications as of 8/31/2022   Medication Sig Dispense Refill    alfuzosin (UROXATRAL) 10 mg Tb24 TAKE 1 TABLET BY MOUTH ONCE DAILY 90 tablet 3    busPIRone (BUSPAR) 5 MG Tab Take 1 tablet (5 mg total) by mouth 2 (two) times a day. 180 tablet 0    chlorthalidone (HYGROTEN) 25 MG Tab TAKE 1 TABLET BY MOUTH ONCE DAILY 90 tablet 3    ELIQUIS 5 mg Tab TAKE 1 TABLET BY MOUTH  TWICE DAILY 180 tablet 3    flecainide (TAMBOCOR) 150 MG Tab Take 2 tabs PO prn for AF. Up to one dose per day. 10 tablet 3    metoprolol succinate (TOPROL-XL) 200 MG 24 hr tablet TAKE 1 TABLET BY MOUTH ONCE DAILY 90 tablet 3    potassium chloride SA (K-DUR,KLOR-CON) 20 MEQ tablet Take 1 tablet (20 mEq total) by mouth 2 (two) times daily. 180 tablet 0    simvastatin (ZOCOR) 20 MG tablet TAKE 1 TABLET BY MOUTH IN  THE EVENING 90 tablet 3    vitamin D 1000 units Tab Take 2,000 Units by mouth once daily.       budesonide (PULMICORT) 0.5 mg/2 mL nebulizer solution Take 2 mLs (0.5 mg total) by nebulization once daily. For use in saline irrigation as directed. 60 mL 2    LORazepam (ATIVAN) 1 MG tablet Take 1 tablet (1 mg total) by mouth every evening. for 20 days (Patient taking differently: Take 1 mg by mouth daily as needed.) 20 tablet 0    [DISCONTINUED] fluticasone propionate (FLONASE) 50 mcg/actuation nasal spray 2 sprays (100 mcg total) by Each Nostril route once daily. (Patient not taking: No sig reported) 16 g 5     No facility-administered encounter medications on file as of 8/31/2022.     Social History:  Anthony reports that he has never smoked. He has never used smokeless tobacco. He reports current alcohol use of about 1.0 - 2.0 standard drink per week. He  reports that he does not use drugs.  A professor of neuroscience  At [employer]    Objective:   /62 (BP Location: Left arm, Patient Position: Sitting, BP Method: Large (Automatic))   Pulse 66   Ht 6' (1.829 m)   Wt 84.1 kg (185 lb 6.5 oz)   SpO2 96%   BMI 25.15 kg/m²     Physical Exam   Constitutional: He does not appear ill. No distress.   HENT:   Head: Normocephalic and atraumatic.   Mouth/Throat: Mucous membranes are moist.   Cardiovascular: Normal rate, regular rhythm and normal pulses. Exam reveals no gallop and no friction rub.   Murmur heard.  2/6 crescendo decrescendo systolic murmur heard best at the left upper sternal border.   Pulmonary/Chest: Effort normal and breath sounds normal. No stridor. No respiratory distress. He has no wheezes. He has no rhonchi. He has no rales. He exhibits no tenderness.   Abdominal: Normal appearance.   Musculoskeletal:      Right lower leg: No edema.      Left lower leg: No edema.   Neurological: He is alert.   Skin: Skin is warm.    EKG:  My independent visualization of most recent EKG is normal sinus rhythm    TTE:  10/28/2021   The left ventricle is normal in size with normal systolic function.  Grade I left ventricular diastolic dysfunction.  Normal right ventricular size with normal right ventricular systolic function.  Mild aortic regurgitation.     08/13/2020  IMPRESSION:   The tricuspid valve appears normal in structure with mild tricuspid insufficiency at velocity suggesting right ventricular pressure 20 mmHg plus right atrial pressure.   Right ventricle appears normal in size with qualitatively good function area   The left atrial volume index is mildly enlarged, measuring 35.90 cc/m2.   Mitral valve appears normal in structure with mild insufficiency.   Normal left ventricular size and structure.  Qualitatively good left ventricular systolic function with SF= 36% and EF estimated 60 -65% from apical four chamber views.  Normal indices of left ventricular  diastolic function.    Difficult to distinguish structure of aortic valve secondary to 1-1.5 cm relatively immobile calcification most closely associated with the non-coronary cusp - native structure is probably trileaflet with functionally bicuspid valve secondary to scarring and sclerosis.  Peak velocity across the aortic valve <2.1 m/s with mean gradient <11 mmHg and mild estimated regurgitant volume arising from small central jet.  (ascending aorta peak velocity 2.1 m/sec.).   Aortic dimensions:  Sinuses of Valsalva = 37 mm.  ST junction             = 30 mm.  Ascending aorta     = 38 mm.  Lipids:  Recent Labs   Lab 10/13/21  0915   LDL Cholesterol 58.2 L   HDL 45   Cholesterol 121        Renal:  Recent Labs   Lab 10/13/21  0915   Creatinine 0.8   Potassium 3.9   CO2 26   BUN 13       Liver:  Recent Labs   Lab 10/13/21  0915   AST 20   ALT 23       Assessment:     1. Paroxysmal atrial fibrillation    2. PVC (premature ventricular contraction)    3. Essential hypertension, benign    4. Mixed hyperlipidemia    5. Mild aortic stenosis      Plan:   1. Paroxysmal atrial fibrillation  Appears to be reasonably well controlled with pill in pocket, only episode he had where this did not work was after 1 of the vaccines.  Compliant with anticoagulation, continue current management at this time.    2. PVC (premature ventricular contraction)  Discussed pathophysiology, alarm signs, and indication for management currently on large dose of metoprolol, symptoms appear to be relatively  manageable will follow-up magnesium.  - Magnesium; Future    3. Essential hypertension, benign  With lightheadedness, normal blood pressures at home, on chlorthalidone, and potential electrolyte abnormalities on potassium supplementation discussed pathophysiology of this in relationship with both atrial fibrillation as well as PVCs.  Offered him down titration to hydrochlorothiazide, he would elect to continue chlorthalidone at this time, will  follow-up magnesium as well to determine if additional magnesium supplementation indicated during next set of labs.    4. Mixed hyperlipidemia  Primary prevention, continue statin well controlled.    5. Mild aortic stenosis  Very stable on most recent echocardiogram, will follow-up with echo in 3 years from last which will be in 2024.  Sooner if symptoms.      Follow up in one year      Jean Nair MD [facility]

## 2022-10-06 ENCOUNTER — LAB VISIT (OUTPATIENT)
Dept: LAB | Facility: OTHER | Age: 72
End: 2022-10-06
Attending: INTERNAL MEDICINE
Payer: MEDICARE

## 2022-10-06 DIAGNOSIS — I49.3 PVC (PREMATURE VENTRICULAR CONTRACTION): ICD-10-CM

## 2022-10-06 DIAGNOSIS — Z00.00 WELLNESS EXAMINATION: ICD-10-CM

## 2022-10-06 LAB
ALBUMIN SERPL BCP-MCNC: 4 G/DL (ref 3.5–5.2)
ALP SERPL-CCNC: 47 U/L (ref 55–135)
ALT SERPL W/O P-5'-P-CCNC: 21 U/L (ref 10–44)
ANION GAP SERPL CALC-SCNC: 7 MMOL/L (ref 8–16)
AST SERPL-CCNC: 22 U/L (ref 10–40)
BASOPHILS # BLD AUTO: 0.08 K/UL (ref 0–0.2)
BASOPHILS NFR BLD: 1.1 % (ref 0–1.9)
BILIRUB SERPL-MCNC: 1 MG/DL (ref 0.1–1)
BUN SERPL-MCNC: 17 MG/DL (ref 8–23)
CALCIUM SERPL-MCNC: 9.9 MG/DL (ref 8.7–10.5)
CHLORIDE SERPL-SCNC: 108 MMOL/L (ref 95–110)
CHOLEST SERPL-MCNC: 131 MG/DL (ref 120–199)
CHOLEST/HDLC SERPL: 2.9 {RATIO} (ref 2–5)
CO2 SERPL-SCNC: 25 MMOL/L (ref 23–29)
CREAT SERPL-MCNC: 0.8 MG/DL (ref 0.5–1.4)
DIFFERENTIAL METHOD: NORMAL
EOSINOPHIL # BLD AUTO: 0.4 K/UL (ref 0–0.5)
EOSINOPHIL NFR BLD: 5.3 % (ref 0–8)
ERYTHROCYTE [DISTWIDTH] IN BLOOD BY AUTOMATED COUNT: 13.6 % (ref 11.5–14.5)
EST. GFR  (NO RACE VARIABLE): >60 ML/MIN/1.73 M^2
GLUCOSE SERPL-MCNC: 91 MG/DL (ref 70–110)
HCT VFR BLD AUTO: 45 % (ref 40–54)
HDLC SERPL-MCNC: 45 MG/DL (ref 40–75)
HDLC SERPL: 34.4 % (ref 20–50)
HGB BLD-MCNC: 14.7 G/DL (ref 14–18)
IMM GRANULOCYTES # BLD AUTO: 0.02 K/UL (ref 0–0.04)
IMM GRANULOCYTES NFR BLD AUTO: 0.3 % (ref 0–0.5)
LDLC SERPL CALC-MCNC: 63.8 MG/DL (ref 63–159)
LYMPHOCYTES # BLD AUTO: 1.5 K/UL (ref 1–4.8)
LYMPHOCYTES NFR BLD: 22.1 % (ref 18–48)
MAGNESIUM SERPL-MCNC: 2 MG/DL (ref 1.6–2.6)
MCH RBC QN AUTO: 27.4 PG (ref 27–31)
MCHC RBC AUTO-ENTMCNC: 32.7 G/DL (ref 32–36)
MCV RBC AUTO: 84 FL (ref 82–98)
MONOCYTES # BLD AUTO: 0.5 K/UL (ref 0.3–1)
MONOCYTES NFR BLD: 7.2 % (ref 4–15)
NEUTROPHILS # BLD AUTO: 4.5 K/UL (ref 1.8–7.7)
NEUTROPHILS NFR BLD: 64 % (ref 38–73)
NONHDLC SERPL-MCNC: 86 MG/DL
NRBC BLD-RTO: 0 /100 WBC
PLATELET # BLD AUTO: 173 K/UL (ref 150–450)
PMV BLD AUTO: 11.7 FL (ref 9.2–12.9)
POTASSIUM SERPL-SCNC: 3.8 MMOL/L (ref 3.5–5.1)
PROT SERPL-MCNC: 7 G/DL (ref 6–8.4)
RBC # BLD AUTO: 5.36 M/UL (ref 4.6–6.2)
SODIUM SERPL-SCNC: 140 MMOL/L (ref 136–145)
TRIGL SERPL-MCNC: 111 MG/DL (ref 30–150)
WBC # BLD AUTO: 6.98 K/UL (ref 3.9–12.7)

## 2022-10-06 PROCEDURE — 80053 COMPREHEN METABOLIC PANEL: CPT | Performed by: INTERNAL MEDICINE

## 2022-10-06 PROCEDURE — 85025 COMPLETE CBC W/AUTO DIFF WBC: CPT | Performed by: INTERNAL MEDICINE

## 2022-10-06 PROCEDURE — 83735 ASSAY OF MAGNESIUM: CPT | Performed by: INTERNAL MEDICINE

## 2022-10-06 PROCEDURE — 80061 LIPID PANEL: CPT | Performed by: INTERNAL MEDICINE

## 2022-10-06 PROCEDURE — 36415 COLL VENOUS BLD VENIPUNCTURE: CPT | Performed by: INTERNAL MEDICINE

## 2022-10-12 NOTE — PROGRESS NOTES
"Subjective:       Patient ID: Anthony Reynaga is a 72 y.o. male.    Chief Complaint: Annual Exam    Pt here for f/u. Due for c-scope which he will schedule.    Anxiety is well controlled on current meds. No SI/HI. He previously tried SSRI but caused sexual dysfunction.    Pt's BP is well controlled. Tolerating meds well. Pt denies cp/sob/ha/vision or neuro changes. Checking at home and is well controlled.     HLD is tx with simvastatin which he tolerates well.     He has pAfib for which he takes metoprolol and is anticoag with Eliquis. He sees cardiology and EP regularly. This is currently stable and he denies cp/sob/palpitations. He uses flecainide for "pill in pocket" approach to a-fib episodes. Most recent ECHO showed mild aortic stenosis which is being monitored by cardiology.     He has BPH with LUTS (nocturia) for which he takes uroxatral with limited success. He has elevated PSA and had biopsy that was negative; most recent PSA was higher. He sees urology regularly and will be discussing with them again.     He has been working with ENT over the last 2 years re: parosmia. Not a foul smell but difficult to describe. He used flonase with some improvement initially but now not as effective. He has chronic nasal congestion with post nasal drip but no other symptoms of sinus infection. Saw allergy who did allergy testing but no clear answers re: parosmia. He was referred to neuro but decided to not pursue for now. He is considering going to an external center of excellence for olfaction.       Review of Systems   Constitutional:  Negative for appetite change, fatigue, fever and unexpected weight change.   HENT:  Negative for congestion, rhinorrhea and sore throat.    Eyes:  Negative for visual disturbance.   Respiratory:  Negative for cough and shortness of breath.    Cardiovascular:  Negative for chest pain, palpitations and leg swelling.   Gastrointestinal:  Negative for abdominal pain, blood in stool, " constipation and diarrhea.   Genitourinary:  Negative for difficulty urinating and dysuria.   Skin:  Negative for color change and rash.   Neurological:  Negative for dizziness, syncope, light-headedness and headaches.   Hematological:  Negative for adenopathy.   Psychiatric/Behavioral:  Negative for dysphoric mood.      Objective:      Physical Exam  Constitutional:       Appearance: He is well-developed.   HENT:      Head: Normocephalic and atraumatic.      Right Ear: External ear normal.      Left Ear: External ear normal.      Mouth/Throat:      Pharynx: No oropharyngeal exudate.   Eyes:      Conjunctiva/sclera: Conjunctivae normal.      Pupils: Pupils are equal, round, and reactive to light.   Neck:      Thyroid: No thyromegaly.      Vascular: No carotid bruit.   Cardiovascular:      Rate and Rhythm: Normal rate and regular rhythm.      Heart sounds: Normal heart sounds, S1 normal and S2 normal.   Pulmonary:      Effort: Pulmonary effort is normal.      Breath sounds: Normal breath sounds.   Abdominal:      General: Bowel sounds are normal.      Palpations: Abdomen is soft. There is no mass.      Tenderness: There is no abdominal tenderness.   Musculoskeletal:      Cervical back: Neck supple.   Lymphadenopathy:      Cervical: No cervical adenopathy.   Neurological:      Mental Status: He is alert and oriented to person, place, and time.      Cranial Nerves: No cranial nerve deficit.   Psychiatric:         Behavior: Behavior normal.       Assessment:       1. Paroxysmal atrial fibrillation    2. Essential hypertension, benign    3. Nonrheumatic aortic valve insufficiency    4. Mixed hyperlipidemia    5. Mild aortic stenosis    6. Generalized anxiety disorder    7. Benign prostatic hyperplasia with urinary obstruction    8. Long term current use of anticoagulant therapy    9. Encounter for colorectal cancer screening        Plan:       1. recent labs reviewed  2. Home BP monitoring  3. Keep f/u with  specialists

## 2022-10-13 ENCOUNTER — OFFICE VISIT (OUTPATIENT)
Dept: INTERNAL MEDICINE | Facility: CLINIC | Age: 72
End: 2022-10-13
Payer: MEDICARE

## 2022-10-13 ENCOUNTER — PATIENT MESSAGE (OUTPATIENT)
Dept: INTERNAL MEDICINE | Facility: CLINIC | Age: 72
End: 2022-10-13

## 2022-10-13 ENCOUNTER — TELEPHONE (OUTPATIENT)
Dept: INTERNAL MEDICINE | Facility: CLINIC | Age: 72
End: 2022-10-13
Payer: MEDICARE

## 2022-10-13 VITALS
BODY MASS INDEX: 24.54 KG/M2 | SYSTOLIC BLOOD PRESSURE: 124 MMHG | WEIGHT: 181.19 LBS | OXYGEN SATURATION: 98 % | DIASTOLIC BLOOD PRESSURE: 59 MMHG | HEART RATE: 63 BPM | HEIGHT: 72 IN

## 2022-10-13 DIAGNOSIS — I10 ESSENTIAL HYPERTENSION, BENIGN: Chronic | ICD-10-CM

## 2022-10-13 DIAGNOSIS — N13.8 BENIGN PROSTATIC HYPERPLASIA WITH URINARY OBSTRUCTION: ICD-10-CM

## 2022-10-13 DIAGNOSIS — Z79.01 LONG TERM CURRENT USE OF ANTICOAGULANT THERAPY: ICD-10-CM

## 2022-10-13 DIAGNOSIS — I48.0 PAROXYSMAL ATRIAL FIBRILLATION: Primary | ICD-10-CM

## 2022-10-13 DIAGNOSIS — F41.1 GENERALIZED ANXIETY DISORDER: ICD-10-CM

## 2022-10-13 DIAGNOSIS — I35.0 MILD AORTIC STENOSIS: ICD-10-CM

## 2022-10-13 DIAGNOSIS — N40.1 BENIGN PROSTATIC HYPERPLASIA WITH URINARY OBSTRUCTION: ICD-10-CM

## 2022-10-13 DIAGNOSIS — I35.1 NONRHEUMATIC AORTIC VALVE INSUFFICIENCY: ICD-10-CM

## 2022-10-13 DIAGNOSIS — E78.2 MIXED HYPERLIPIDEMIA: ICD-10-CM

## 2022-10-13 DIAGNOSIS — Z12.11 ENCOUNTER FOR COLORECTAL CANCER SCREENING: ICD-10-CM

## 2022-10-13 DIAGNOSIS — Z12.12 ENCOUNTER FOR COLORECTAL CANCER SCREENING: ICD-10-CM

## 2022-10-13 PROCEDURE — 99999 PR PBB SHADOW E&M-EST. PATIENT-LVL IV: ICD-10-PCS | Mod: PBBFAC,,, | Performed by: INTERNAL MEDICINE

## 2022-10-13 PROCEDURE — 99214 PR OFFICE/OUTPT VISIT, EST, LEVL IV, 30-39 MIN: ICD-10-PCS | Mod: S$PBB,,, | Performed by: INTERNAL MEDICINE

## 2022-10-13 PROCEDURE — 99999 PR PBB SHADOW E&M-EST. PATIENT-LVL IV: CPT | Mod: PBBFAC,,, | Performed by: INTERNAL MEDICINE

## 2022-10-13 PROCEDURE — 99214 OFFICE O/P EST MOD 30 MIN: CPT | Mod: S$PBB,,, | Performed by: INTERNAL MEDICINE

## 2022-10-13 PROCEDURE — 99214 OFFICE O/P EST MOD 30 MIN: CPT | Mod: PBBFAC | Performed by: INTERNAL MEDICINE

## 2022-10-15 ENCOUNTER — PATIENT MESSAGE (OUTPATIENT)
Dept: CARDIOLOGY | Facility: CLINIC | Age: 72
End: 2022-10-15
Payer: MEDICARE

## 2022-10-17 ENCOUNTER — PATIENT MESSAGE (OUTPATIENT)
Dept: INTERNAL MEDICINE | Facility: CLINIC | Age: 72
End: 2022-10-17
Payer: MEDICARE

## 2022-10-17 ENCOUNTER — PATIENT MESSAGE (OUTPATIENT)
Dept: UROLOGY | Facility: CLINIC | Age: 72
End: 2022-10-17
Payer: MEDICARE

## 2022-10-20 ENCOUNTER — PATIENT MESSAGE (OUTPATIENT)
Dept: INTERNAL MEDICINE | Facility: CLINIC | Age: 72
End: 2022-10-20
Payer: MEDICARE

## 2022-10-20 DIAGNOSIS — I49.3 PVC (PREMATURE VENTRICULAR CONTRACTION): ICD-10-CM

## 2022-10-20 RX ORDER — POTASSIUM CHLORIDE 20 MEQ/1
TABLET, EXTENDED RELEASE ORAL
Qty: 180 TABLET | Refills: 3 | Status: SHIPPED | OUTPATIENT
Start: 2022-10-20 | End: 2023-09-05

## 2022-10-20 NOTE — TELEPHONE ENCOUNTER
No new care gaps identified.  [facility] Embedded Care Gaps. Reference number: [id number]. 10/20/2022   4:31:41 AM FERDINANDT

## 2022-10-20 NOTE — TELEPHONE ENCOUNTER
Refill Decision Note   Anthony Ronnell  is requesting a refill authorization.  Brief Assessment and Rationale for Refill:  Approve     Medication Therapy Plan:       Medication Reconciliation Completed: No   Comments:     No Care Gaps recommended.     Note composed:12:13 PM 10/20/2022

## 2022-10-22 ENCOUNTER — OFFICE VISIT (OUTPATIENT)
Dept: URGENT CARE | Facility: CLINIC | Age: 72
End: 2022-10-22
Payer: MEDICARE

## 2022-10-22 VITALS
DIASTOLIC BLOOD PRESSURE: 75 MMHG | RESPIRATION RATE: 16 BRPM | OXYGEN SATURATION: 95 % | SYSTOLIC BLOOD PRESSURE: 166 MMHG | TEMPERATURE: 98 F | BODY MASS INDEX: 24.52 KG/M2 | HEART RATE: 60 BPM | WEIGHT: 181 LBS | HEIGHT: 72 IN

## 2022-10-22 DIAGNOSIS — R35.0 URINARY FREQUENCY: Primary | ICD-10-CM

## 2022-10-22 LAB
BILIRUB UR QL STRIP: NEGATIVE
GLUCOSE UR QL STRIP: NEGATIVE
KETONES UR QL STRIP: NEGATIVE
LEUKOCYTE ESTERASE UR QL STRIP: NEGATIVE
PH, POC UA: 5.5 (ref 5–8)
POC BLOOD, URINE: NEGATIVE
POC NITRATES, URINE: NEGATIVE
PROT UR QL STRIP: NEGATIVE
SP GR UR STRIP: 1.02 (ref 1–1.03)
UROBILINOGEN UR STRIP-ACNC: NORMAL (ref 0.3–2.2)

## 2022-10-22 PROCEDURE — 81003 POCT URINALYSIS, DIPSTICK, AUTOMATED, W/O SCOPE: ICD-10-PCS | Mod: QW,S$GLB,, | Performed by: NURSE PRACTITIONER

## 2022-10-22 PROCEDURE — 87086 URINE CULTURE/COLONY COUNT: CPT | Performed by: NURSE PRACTITIONER

## 2022-10-22 PROCEDURE — 99213 PR OFFICE/OUTPT VISIT, EST, LEVL III, 20-29 MIN: ICD-10-PCS | Mod: S$GLB,,, | Performed by: NURSE PRACTITIONER

## 2022-10-22 PROCEDURE — 81003 URINALYSIS AUTO W/O SCOPE: CPT | Mod: QW,S$GLB,, | Performed by: NURSE PRACTITIONER

## 2022-10-22 PROCEDURE — 99213 OFFICE O/P EST LOW 20 MIN: CPT | Mod: S$GLB,,, | Performed by: NURSE PRACTITIONER

## 2022-10-22 NOTE — PROGRESS NOTES
Subjective:       Patient ID: Anthony Reynaga is a 72 y.o. male.    Vitals:  height is 6' (1.829 m) and weight is 82.1 kg (181 lb). His temperature is 97.7 °F (36.5 °C). His blood pressure is 166/75 (abnormal) and his pulse is 60. His respiration is 16 and oxygen saturation is 95%.     Chief Complaint: Urinary Tract Infection    Patient having some uti symptoms for the last three days with burning after urination .  Provider note begins below    Reports a history urinary frequency and urgency.  Patient has a large prostate.  He does not want to take antibiotics if there is no bacteria in his urine today.  Patient states his P as a is 6.  He has a urologist.  Denies discharge or hematuria.  Reports nocturia.  Denies discharge.  Symptoms have been worsening over the last month.    Urinary Tract Infection   This is a new problem. The current episode started more than 1 year ago. The problem has been unchanged. The quality of the pain is described as burning. The pain is at a severity of 1/10. There has been no fever. He is Sexually active. There is No history of pyelonephritis. Associated symptoms include frequency, urgency and withholding. Pertinent negatives include no behavior changes, chills, discharge, flank pain, hematuria, hesitancy, nausea, possible pregnancy, sweats, vomiting, weight loss, bubble bath use, constipation or rash. He has tried nothing for the symptoms.     Constitution: Negative for chills, fatigue and fever.   Cardiovascular:  Negative for chest pain and sob on exertion.   Respiratory:  Negative for cough and shortness of breath.    Gastrointestinal:  Negative for nausea, vomiting and constipation.   Genitourinary:  Positive for dysuria, frequency and urgency. Negative for urine decreased, flank pain, hematuria and penile discharge.   Skin:  Negative for rash.     Objective:      Physical Exam   Constitutional: He is oriented to person, place, and time.   HENT:   Head: Normocephalic and  atraumatic.   Cardiovascular: Normal rate.   Pulmonary/Chest: Effort normal. No respiratory distress.   Abdominal: Normal appearance. He exhibits no distension and no mass. Soft. flat abdomen There is no abdominal tenderness. There is no rebound, no guarding, no left CVA tenderness and no right CVA tenderness. No hernia.   Neurological: He is alert and oriented to person, place, and time.   Skin: Skin is dry.   Psychiatric: His behavior is normal. Mood normal.       Results for orders placed or performed in visit on 10/22/22   POCT Urinalysis, Dipstick, Automated, W/O Scope   Result Value Ref Range    POC Blood, Urine Negative Negative    POC Bilirubin, Urine Negative Negative    POC Urobilinogen, Urine norm 0.3 - 2.2    POC Ketones, Urine Negative Negative    POC Protein, Urine Negative Negative    POC Nitrates, Urine Negative Negative    POC Glucose, Urine Negative Negative    pH, UA 5.5 5 - 8    POC Specific Gravity, Urine 1.020 1.003 - 1.029    POC Leukocytes, Urine Negative Negative        Assessment:       1. Urinary frequency          Plan:       UA without leukocytes or nitrates.  No blood.  Will send for urine culture.  Patient will follow-up with urologist and PCP.  Suspect prostate inflammation for shows enlargement.    Urinary frequency  -     Urine culture  -     POCT Urinalysis, Dipstick, Automated, W/O Scope

## 2022-10-23 LAB — BACTERIA UR CULT: NO GROWTH

## 2022-10-24 ENCOUNTER — PATIENT MESSAGE (OUTPATIENT)
Dept: INTERNAL MEDICINE | Facility: CLINIC | Age: 72
End: 2022-10-24
Payer: MEDICARE

## 2022-10-24 ENCOUNTER — TELEPHONE (OUTPATIENT)
Dept: URGENT CARE | Facility: CLINIC | Age: 72
End: 2022-10-24
Payer: MEDICARE

## 2022-10-24 RX ORDER — FINASTERIDE 5 MG/1
5 TABLET, FILM COATED ORAL DAILY
Qty: 90 TABLET | Refills: 3 | Status: SHIPPED | OUTPATIENT
Start: 2022-10-24 | End: 2023-08-16

## 2022-10-24 NOTE — TELEPHONE ENCOUNTER
No answer and unable to leave a message on answering machine for pt to call back for test results(mailbox is full) . Urine culture shows no growth but pt given antibiotic. He should follow with urology

## 2022-11-09 ENCOUNTER — PATIENT MESSAGE (OUTPATIENT)
Dept: UROLOGY | Facility: CLINIC | Age: 72
End: 2022-11-09
Payer: MEDICARE

## 2022-11-11 ENCOUNTER — TELEPHONE (OUTPATIENT)
Dept: ENDOSCOPY | Facility: HOSPITAL | Age: 72
End: 2022-11-11

## 2022-11-11 ENCOUNTER — CLINICAL SUPPORT (OUTPATIENT)
Dept: ENDOSCOPY | Facility: HOSPITAL | Age: 72
End: 2022-11-11
Attending: INTERNAL MEDICINE
Payer: MEDICARE

## 2022-11-11 ENCOUNTER — OFFICE VISIT (OUTPATIENT)
Dept: URGENT CARE | Facility: CLINIC | Age: 72
End: 2022-11-11
Payer: MEDICARE

## 2022-11-11 ENCOUNTER — TELEPHONE (OUTPATIENT)
Dept: UROLOGY | Facility: CLINIC | Age: 72
End: 2022-11-11
Payer: MEDICARE

## 2022-11-11 ENCOUNTER — PATIENT MESSAGE (OUTPATIENT)
Dept: UROLOGY | Facility: CLINIC | Age: 72
End: 2022-11-11
Payer: MEDICARE

## 2022-11-11 VITALS
HEIGHT: 72 IN | BODY MASS INDEX: 24.38 KG/M2 | SYSTOLIC BLOOD PRESSURE: 170 MMHG | TEMPERATURE: 97 F | OXYGEN SATURATION: 97 % | RESPIRATION RATE: 18 BRPM | WEIGHT: 180 LBS | DIASTOLIC BLOOD PRESSURE: 69 MMHG | HEART RATE: 65 BPM

## 2022-11-11 DIAGNOSIS — R35.0 FREQUENCY OF URINATION: ICD-10-CM

## 2022-11-11 DIAGNOSIS — Z12.11 ENCOUNTER FOR COLORECTAL CANCER SCREENING: ICD-10-CM

## 2022-11-11 DIAGNOSIS — N13.8 BENIGN PROSTATIC HYPERPLASIA WITH URINARY OBSTRUCTION: ICD-10-CM

## 2022-11-11 DIAGNOSIS — Z12.12 ENCOUNTER FOR COLORECTAL CANCER SCREENING: ICD-10-CM

## 2022-11-11 DIAGNOSIS — N40.1 BENIGN PROSTATIC HYPERPLASIA WITH URINARY OBSTRUCTION: ICD-10-CM

## 2022-11-11 DIAGNOSIS — R30.0 DYSURIA: Primary | ICD-10-CM

## 2022-11-11 DIAGNOSIS — N20.1 RIGHT URETERAL STONE: ICD-10-CM

## 2022-11-11 LAB
BILIRUB UR QL STRIP: NEGATIVE
GLUCOSE UR QL STRIP: NEGATIVE
KETONES UR QL STRIP: NEGATIVE
LEUKOCYTE ESTERASE UR QL STRIP: NEGATIVE
PH, POC UA: 6 (ref 5–8)
POC BLOOD, URINE: POSITIVE
POC NITRATES, URINE: NEGATIVE
PROT UR QL STRIP: NEGATIVE
SP GR UR STRIP: 1.02 (ref 1–1.03)
UROBILINOGEN UR STRIP-ACNC: NORMAL (ref 0.3–2.2)

## 2022-11-11 PROCEDURE — 99213 OFFICE O/P EST LOW 20 MIN: CPT | Mod: S$GLB,,, | Performed by: FAMILY MEDICINE

## 2022-11-11 PROCEDURE — 81003 URINALYSIS AUTO W/O SCOPE: CPT | Mod: QW,S$GLB,, | Performed by: FAMILY MEDICINE

## 2022-11-11 PROCEDURE — 87086 URINE CULTURE/COLONY COUNT: CPT | Performed by: FAMILY MEDICINE

## 2022-11-11 PROCEDURE — 99213 PR OFFICE/OUTPT VISIT, EST, LEVL III, 20-29 MIN: ICD-10-PCS | Mod: S$GLB,,, | Performed by: FAMILY MEDICINE

## 2022-11-11 PROCEDURE — 81003 POCT URINALYSIS, DIPSTICK, AUTOMATED, W/O SCOPE: ICD-10-PCS | Mod: QW,S$GLB,, | Performed by: FAMILY MEDICINE

## 2022-11-11 RX ORDER — SULFAMETHOXAZOLE AND TRIMETHOPRIM 800; 160 MG/1; MG/1
1 TABLET ORAL 2 TIMES DAILY
Qty: 14 TABLET | Refills: 0 | Status: SHIPPED | OUTPATIENT
Start: 2022-11-11 | End: 2022-11-18

## 2022-11-11 NOTE — PROGRESS NOTES
Subjective:       Patient ID: Anthony Reynaga is a 72 y.o. male.    Vitals:  height is 6' (1.829 m) and weight is 81.6 kg (180 lb). His oral temperature is 97.1 °F (36.2 °C). His blood pressure is 170/69 (abnormal) and his pulse is 65. His respiration is 18 and oxygen saturation is 97%.     Chief Complaint: Urinary Frequency    Pt c/o burning during urination and urinary frequency and retention. Pt states he has an enlarged prostate that has worsened. Pt was here 3 weeks ago and there was no infection but symptoms have worsened the last few days. States burning continues for roughly 1 min post urination and dissipates. No bleeding or discharge. Pt states history of urethritis.    Urinary Frequency   This is a recurrent problem. The current episode started 1 to 4 weeks ago. The problem occurs every urination. The problem has been gradually worsening. The quality of the pain is described as burning. The pain is at a severity of 5/10. The pain is moderate. There has been no fever. He is Sexually active. There is No history of pyelonephritis. Associated symptoms include frequency, urgency and withholding. Pertinent negatives include no discharge. Treatments tried: tylenol. The treatment provided no relief.     Genitourinary:  Positive for frequency and urgency.     Objective:      Physical Exam   Constitutional: He is oriented to person, place, and time. He appears well-developed. He is cooperative. No distress.   HENT:   Head: Normocephalic and atraumatic.   Nose: Nose normal.   Mouth/Throat: Oropharynx is clear and moist and mucous membranes are normal.   Eyes: Conjunctivae and lids are normal.   Neck: Trachea normal and phonation normal. Neck supple.   Cardiovascular: Normal rate, regular rhythm, normal heart sounds and normal pulses.   Pulmonary/Chest: Effort normal and breath sounds normal.   Abdominal: Normal appearance. He exhibits no distension and no mass. Soft. flat abdomen There is no abdominal tenderness.  There is no rebound, no guarding, no left CVA tenderness and no right CVA tenderness.   Musculoskeletal:         General: No deformity.   Neurological: He is alert and oriented to person, place, and time. He has normal strength and normal reflexes. No sensory deficit.   Skin: Skin is warm, dry, intact and not diaphoretic.   Psychiatric: His speech is normal and behavior is normal. Judgment and thought content normal.   Nursing note and vitals reviewed.      Assessment:       1. Dysuria    2. Frequency of urination    3. Benign prostatic hyperplasia with urinary obstruction    4. Right ureteral stone          Plan:         Dysuria  -     sulfamethoxazole-trimethoprim 800-160mg (BACTRIM DS) 800-160 mg Tab; Take 1 tablet by mouth 2 (two) times daily. for 7 days  Dispense: 14 tablet; Refill: 0    Frequency of urination  -     POCT Urinalysis, Dipstick, Automated, W/O Scope  -     Urine culture  -     sulfamethoxazole-trimethoprim 800-160mg (BACTRIM DS) 800-160 mg Tab; Take 1 tablet by mouth 2 (two) times daily. for 7 days  Dispense: 14 tablet; Refill: 0    Benign prostatic hyperplasia with urinary obstruction  -     Urine culture  -     sulfamethoxazole-trimethoprim 800-160mg (BACTRIM DS) 800-160 mg Tab; Take 1 tablet by mouth 2 (two) times daily. for 7 days  Dispense: 14 tablet; Refill: 0    Right ureteral stone       May use OTC pyridium twice a day for now  Pt or guardian provided educational materials and instructions regarding their visit diagnosis.

## 2022-11-11 NOTE — TELEPHONE ENCOUNTER
Patient has an order for a colonoscopy.  Is it ok to hold Eliquis 2 days prior to procedure?  Please advise.  Thank you.    Kaci

## 2022-11-13 ENCOUNTER — TELEPHONE (OUTPATIENT)
Dept: URGENT CARE | Facility: CLINIC | Age: 72
End: 2022-11-13
Payer: MEDICARE

## 2022-11-13 LAB — BACTERIA UR CULT: NORMAL

## 2022-11-14 ENCOUNTER — OFFICE VISIT (OUTPATIENT)
Dept: UROLOGY | Facility: CLINIC | Age: 72
End: 2022-11-14
Payer: MEDICARE

## 2022-11-14 VITALS
HEART RATE: 60 BPM | DIASTOLIC BLOOD PRESSURE: 60 MMHG | HEIGHT: 72 IN | BODY MASS INDEX: 23.62 KG/M2 | SYSTOLIC BLOOD PRESSURE: 117 MMHG | WEIGHT: 174.38 LBS

## 2022-11-14 DIAGNOSIS — N13.8 BPH WITH URINARY OBSTRUCTION: Primary | ICD-10-CM

## 2022-11-14 DIAGNOSIS — N40.1 BPH WITH URINARY OBSTRUCTION: Primary | ICD-10-CM

## 2022-11-14 DIAGNOSIS — R10.2 PELVIC PAIN IN MALE: ICD-10-CM

## 2022-11-14 PROCEDURE — 99999 PR PBB SHADOW E&M-EST. PATIENT-LVL III: ICD-10-PCS | Mod: PBBFAC,,, | Performed by: UROLOGY

## 2022-11-14 PROCEDURE — 99214 PR OFFICE/OUTPT VISIT, EST, LEVL IV, 30-39 MIN: ICD-10-PCS | Mod: S$PBB,,, | Performed by: UROLOGY

## 2022-11-14 PROCEDURE — 99999 PR PBB SHADOW E&M-EST. PATIENT-LVL III: CPT | Mod: PBBFAC,,, | Performed by: UROLOGY

## 2022-11-14 PROCEDURE — 99214 OFFICE O/P EST MOD 30 MIN: CPT | Mod: S$PBB,,, | Performed by: UROLOGY

## 2022-11-14 PROCEDURE — 99213 OFFICE O/P EST LOW 20 MIN: CPT | Mod: PBBFAC | Performed by: UROLOGY

## 2022-11-14 RX ORDER — OXAPROZIN 600 MG/1
1200 TABLET, FILM COATED ORAL DAILY
Qty: 60 TABLET | Refills: 0 | Status: SHIPPED | OUTPATIENT
Start: 2022-11-14 | End: 2022-12-14

## 2022-11-14 NOTE — PROGRESS NOTES
CHIEF COMPLAINT:    Mr. Reynaga is a 72 y.o. male presenting for a consultation at the request of Dr. Lowry. Patient presents with LUTS.    PRESENTING ILLNESS:    Anthony Reynaga is a 72 y.o. male who c/o LUTS.  He has nocturia x 2-6, + decreased FOS, + intermittency.  He's on uroxatral.  He stopped finasteride in 10/2017 as he didn't think it was giving him a benefit.    His main complaint today is pelvic pain.  Has dysuria and pain after ejaculation.    He has a history of an elevated PSA and is s/p a negative TRUS bx.  Is followed by Dr. Lowry for this.    He denies ED.    REVIEW OF SYSTEMS:    Anthony Reynaga denies headache, blurred vision, fever, nausea, vomiting, chills, abdominal pain, bleeding per rectum, cough, SOB, recent loss of consciousness, recent mental status changes, seizures, dizziness, or upper or lower extremity weakness.    MELIDA  1. 5  2. 5  3. 5  4. 5  5. 5      PATIENT HISTORY:    Past Medical History:   Diagnosis Date    Anticoagulant long-term use     Atrial fibrillation     BPH (benign prostatic hyperplasia)     Cataract     Elevated PSA     Floaters     Hernia     bilateral inquinal    Hypertension     Inguinal hernia     Kidney stone     Knee injury        Past Surgical History:   Procedure Laterality Date    APPENDECTOMY      BLADDER SURGERY      polyp removed benign    COLONOSCOPY N/A 6/22/2016    Procedure: COLONOSCOPY;  Surgeon: Joaquin Francis MD;  Location: [facility] ([address] [place] [address]);  Service: Endoscopy;  Laterality: N/A;    Thanks for letting us know.  I would approve him to hold coumadin x 3 days prior, without lovenox.  We will see him for an INR on 6/8 and will provide him with procedure instructions at that time., per [facility]    CYSTOSCOPY      benign bladder papilloma    FINGER SURGERY      HERNIA REPAIR Left 2016    inguinal    LITHOTRIPSY      TONSILLECTOMY, ADENOIDECTOMY         Family History   Problem Relation Age of Onset    Cancer Mother         Terminal Renal  Cancer    Hypertension Mother     Dementia Father     Hypertension Father     Benign prostatic hyperplasia Neg Hx        Social History     Socioeconomic History    Marital status: Single   Tobacco Use    Smoking status: Never    Smokeless tobacco: Never   Substance and Sexual Activity    Alcohol use: Yes     Alcohol/week: 1.0 - 2.0 standard drink     Types: 1 - 2 Cans of beer per week     Comment: 1-2 cans of beer 2-3 times per month    Drug use: No    Sexual activity: Yes     Partners: Female     Birth control/protection: OCP       Allergies:  Patient has no known allergies.    Medications:    Current Outpatient Medications:     alfuzosin (UROXATRAL) 10 mg Tb24, TAKE 1 TABLET BY MOUTH ONCE DAILY (Patient not taking: Reported on 10/22/2022), Disp: 90 tablet, Rfl: 3    budesonide (PULMICORT) 0.5 mg/2 mL nebulizer solution, Take 2 mLs (0.5 mg total) by nebulization once daily. For use in saline irrigation as directed. (Patient not taking: Reported on 10/13/2022), Disp: 60 mL, Rfl: 2    busPIRone (BUSPAR) 5 MG Tab, TAKE 1 TABLET BY MOUTH  TWICE DAILY, Disp: 180 tablet, Rfl: 3    chlorthalidone (HYGROTEN) 25 MG Tab, TAKE 1 TABLET BY MOUTH ONCE DAILY, Disp: 90 tablet, Rfl: 3    ELIQUIS 5 mg Tab, TAKE 1 TABLET BY MOUTH  TWICE DAILY, Disp: 180 tablet, Rfl: 3    finasteride (PROSCAR) 5 mg tablet, Take 1 tablet (5 mg total) by mouth once daily., Disp: 90 tablet, Rfl: 3    flecainide (TAMBOCOR) 150 MG Tab, Take 2 tabs PO prn for AF. Up to one dose per day., Disp: 10 tablet, Rfl: 3    LORazepam (ATIVAN) 1 MG tablet, Take 1 tablet (1 mg total) by mouth every evening. for 20 days (Patient taking differently: Take 1 mg by mouth daily as needed.), Disp: 20 tablet, Rfl: 0    metoprolol succinate (TOPROL-XL) 200 MG 24 hr tablet, TAKE 1 TABLET BY MOUTH ONCE DAILY, Disp: 90 tablet, Rfl: 3    potassium chloride SA (K-DUR,KLOR-CON) 20 MEQ tablet, TAKE 1 TABLET BY MOUTH  TWICE DAILY, Disp: 180 tablet, Rfl: 3    simvastatin (ZOCOR) 20 MG  tablet, TAKE 1 TABLET BY MOUTH IN  THE EVENING, Disp: 90 tablet, Rfl: 3    sulfamethoxazole-trimethoprim 800-160mg (BACTRIM DS) 800-160 mg Tab, Take 1 tablet by mouth 2 (two) times daily. for 7 days, Disp: 14 tablet, Rfl: 0    vitamin D 1000 units Tab, Take 2,000 Units by mouth once daily. , Disp: , Rfl:     PHYSICAL EXAMINATION:    The patient generally appears in good health, is appropriately interactive, and is in no apparent distress.     Eyes: anicteric sclerae, moist conjunctivae; no lid-lag; PERRLA     HENT: Atraumatic; oropharynx clear with moist mucous membranes and no mucosal ulcerations;normal hard and soft palate.  No evidence of lymphadenopathy.    Neck: Trachea midline.  No thyromegaly.    Musculoskeletal: No abnormal gait.    Skin: No lesions.    Mental: Cooperative with normal affect.  Is oriented to time, place, and person.    Neuro: Grossly intact.    Chest: Normal inspiratory effort.   No accessory muscles.  No audible wheezes.  Respirations symmetric on inspiration and expiration.    Heart: Regular rhythm.      Abdomen:  Soft, non-tender. No masses or organomegaly. Bladder is not palpable. No evidence of flank discomfort. No evidence of inguinal hernia.    Genitourinary: The penis is circumcised with no evidence of plaques or induration. The urethral meatus is normal. The testes, epididymides, and cord structures are normal in size and contour bilaterally. The scrotum is normal in size and contour.    Normal anal sphincter tone. No rectal mass.    The prostate is 50 g. Normal landmarks. Lateral sulci. Median furrow intact.  No nodularity or induration. Seminal vesicles are normal.    Extremities: No clubbing, cyanosis, or edema      LABS:      Lab Results   Component Value Date    PSA 3.80 10/04/2012    PSA 4.10 (H) 03/01/2012    PSA 3.2 10/13/2011    PSADIAG 5.7 (H) 10/30/2019    PSADIAG 3.6 10/19/2018    PSADIAG 2.1 09/20/2017       IMPRESSION:    Encounter Diagnoses   Name Primary?    BPH with  urinary obstruction Yes         PLAN:    1. Discussed the pathophysiology of pelvic floor muscle dysfunction.  Will try daypro for the pain. Side effects discussed.  A new Rx was given.  If this does not alleviate his pain, he may benefit from physical therapy.   2. Continue flomax.  3. RTC 6 weeks.  If pain has resolved and LUTS are still a bother, he'll consider rezum.    Copy to: Alen

## 2022-11-15 ENCOUNTER — PATIENT MESSAGE (OUTPATIENT)
Dept: CARDIOLOGY | Facility: CLINIC | Age: 72
End: 2022-11-15
Payer: MEDICARE

## 2022-11-16 ENCOUNTER — PATIENT MESSAGE (OUTPATIENT)
Dept: UROLOGY | Facility: CLINIC | Age: 72
End: 2022-11-16
Payer: MEDICARE

## 2022-11-19 ENCOUNTER — PATIENT MESSAGE (OUTPATIENT)
Dept: ELECTROPHYSIOLOGY | Facility: CLINIC | Age: 72
End: 2022-11-19
Payer: MEDICARE

## 2022-12-19 ENCOUNTER — CLINICAL SUPPORT (OUTPATIENT)
Dept: ENDOSCOPY | Facility: HOSPITAL | Age: 72
End: 2022-12-19
Attending: INTERNAL MEDICINE
Payer: MEDICARE

## 2022-12-19 VITALS — BODY MASS INDEX: 24.38 KG/M2 | HEIGHT: 72 IN | WEIGHT: 180 LBS

## 2022-12-19 DIAGNOSIS — Z12.12 ENCOUNTER FOR COLORECTAL CANCER SCREENING: ICD-10-CM

## 2022-12-19 DIAGNOSIS — Z12.11 ENCOUNTER FOR COLORECTAL CANCER SCREENING: ICD-10-CM

## 2022-12-19 RX ORDER — SODIUM, POTASSIUM,MAG SULFATES 17.5-3.13G
1 SOLUTION, RECONSTITUTED, ORAL ORAL DAILY
Qty: 1 KIT | Refills: 0 | Status: SHIPPED | OUTPATIENT
Start: 2022-12-19 | End: 2022-12-21

## 2023-01-09 ENCOUNTER — PATIENT MESSAGE (OUTPATIENT)
Dept: UROLOGY | Facility: CLINIC | Age: 73
End: 2023-01-09
Payer: MEDICARE

## 2023-01-11 ENCOUNTER — OFFICE VISIT (OUTPATIENT)
Dept: UROLOGY | Facility: CLINIC | Age: 73
End: 2023-01-11
Payer: MEDICARE

## 2023-01-11 VITALS
DIASTOLIC BLOOD PRESSURE: 55 MMHG | BODY MASS INDEX: 23.95 KG/M2 | HEIGHT: 72 IN | HEART RATE: 55 BPM | SYSTOLIC BLOOD PRESSURE: 120 MMHG | WEIGHT: 176.81 LBS

## 2023-01-11 DIAGNOSIS — N13.8 BPH WITH URINARY OBSTRUCTION: ICD-10-CM

## 2023-01-11 DIAGNOSIS — N40.1 BPH WITH URINARY OBSTRUCTION: ICD-10-CM

## 2023-01-11 DIAGNOSIS — R97.20 ELEVATED PSA: Primary | ICD-10-CM

## 2023-01-11 PROCEDURE — 99214 OFFICE O/P EST MOD 30 MIN: CPT | Mod: S$PBB,,, | Performed by: UROLOGY

## 2023-01-11 PROCEDURE — 99999 PR PBB SHADOW E&M-EST. PATIENT-LVL IV: ICD-10-PCS | Mod: PBBFAC,,, | Performed by: UROLOGY

## 2023-01-11 PROCEDURE — 99999 PR PBB SHADOW E&M-EST. PATIENT-LVL IV: CPT | Mod: PBBFAC,,, | Performed by: UROLOGY

## 2023-01-11 PROCEDURE — 99214 OFFICE O/P EST MOD 30 MIN: CPT | Mod: PBBFAC | Performed by: UROLOGY

## 2023-01-11 PROCEDURE — 99214 PR OFFICE/OUTPT VISIT, EST, LEVL IV, 30-39 MIN: ICD-10-PCS | Mod: S$PBB,,, | Performed by: UROLOGY

## 2023-01-11 NOTE — PROGRESS NOTES
CHIEF COMPLAINT:    Mr. Reynaga is a 72 y.o. male presenting  with LUTS.    PRESENTING ILLNESS:    Anthony Reynaga is a 72 y.o. male who c/o LUTS.  He was having pelvic pain when I saw him last, but this resolved.  His LUTS also improved.  He has nocturia x 1.  Good FOS.  He's no longer on uroxatral.  But he did restart his finasteride.  Started in 10/2022.     He has a history of an elevated PSA and is s/p a negative TRUS bx.  Is followed by Dr. Lowry for this.    He denies ED.    REVIEW OF SYSTEMS:    Anthony Reynaga denies headache, blurred vision, fever, nausea, vomiting, chills, abdominal pain, bleeding per rectum, cough, SOB, recent loss of consciousness, recent mental status changes, seizures, dizziness, or upper or lower extremity weakness.    MELIDA  1. 5  2. 5  3. 5  4. 5  5. 5      PATIENT HISTORY:    Past Medical History:   Diagnosis Date    Anticoagulant long-term use     Atrial fibrillation     BPH (benign prostatic hyperplasia)     Cataract     Elevated PSA     Floaters     Hernia     bilateral inquinal    Hypertension     Inguinal hernia     Kidney stone     Knee injury        Past Surgical History:   Procedure Laterality Date    APPENDECTOMY      BLADDER SURGERY      polyp removed benign    COLONOSCOPY N/A 6/22/2016    Procedure: COLONOSCOPY;  Surgeon: Joaquin Francis MD;  Location: [facility] ([address] [place] [address]);  Service: Endoscopy;  Laterality: N/A;    Thanks for letting us know.  I would approve him to hold coumadin x 3 days prior, without lovenox.  We will see him for an INR on 6/8 and will provide him with procedure instructions at that time., per [facility]    CYSTOSCOPY      benign bladder papilloma    FINGER SURGERY      HERNIA REPAIR Left 2016    inguinal    LITHOTRIPSY      TONSILLECTOMY, ADENOIDECTOMY         Family History   Problem Relation Age of Onset    Cancer Mother         Terminal Renal Cancer    Hypertension Mother     Dementia Father     Hypertension Father     Benign prostatic  hyperplasia Neg Hx        Social History     Socioeconomic History    Marital status: Single   Tobacco Use    Smoking status: Never    Smokeless tobacco: Never   Substance and Sexual Activity    Alcohol use: Yes     Alcohol/week: 1.0 - 2.0 standard drink     Types: 1 - 2 Cans of beer per week     Comment: 1-2 cans of beer 2-3 times per month    Drug use: No    Sexual activity: Yes     Partners: Female     Birth control/protection: OCP       Allergies:  Patient has no known allergies.    Medications:    Current Outpatient Medications:     busPIRone (BUSPAR) 5 MG Tab, TAKE 1 TABLET BY MOUTH  TWICE DAILY, Disp: 180 tablet, Rfl: 3    chlorthalidone (HYGROTEN) 25 MG Tab, TAKE 1 TABLET BY MOUTH ONCE DAILY, Disp: 90 tablet, Rfl: 3    ELIQUIS 5 mg Tab, TAKE 1 TABLET BY MOUTH  TWICE DAILY, Disp: 180 tablet, Rfl: 3    finasteride (PROSCAR) 5 mg tablet, Take 1 tablet (5 mg total) by mouth once daily., Disp: 90 tablet, Rfl: 3    flecainide (TAMBOCOR) 150 MG Tab, TAKE 2 TABLETS (300 mg total ) BY MOUTH AS NEEDED FOR AF. LIMIT ONE DOSE PER 24 HOURS., Disp: 90 tablet, Rfl: 3    metoprolol succinate (TOPROL-XL) 200 MG 24 hr tablet, TAKE 1 TABLET BY MOUTH ONCE DAILY, Disp: 90 tablet, Rfl: 3    potassium chloride SA (K-DUR,KLOR-CON) 20 MEQ tablet, TAKE 1 TABLET BY MOUTH  TWICE DAILY, Disp: 180 tablet, Rfl: 3    simvastatin (ZOCOR) 20 MG tablet, TAKE 1 TABLET BY MOUTH IN  THE EVENING, Disp: 90 tablet, Rfl: 3    vitamin D 1000 units Tab, Take 2,000 Units by mouth once daily. , Disp: , Rfl:     budesonide (PULMICORT) 0.5 mg/2 mL nebulizer solution, Take 2 mLs (0.5 mg total) by nebulization once daily. For use in saline irrigation as directed., Disp: 60 mL, Rfl: 2    LORazepam (ATIVAN) 1 MG tablet, Take 1 tablet (1 mg total) by mouth every evening. for 20 days (Patient taking differently: Take 1 mg by mouth daily as needed.), Disp: 20 tablet, Rfl: 0    PHYSICAL EXAMINATION:    The patient generally appears in good health, is  appropriately interactive, and is in no apparent distress.     Eyes: anicteric sclerae, moist conjunctivae; no lid-lag; PERRLA     HENT: Atraumatic; oropharynx clear with moist mucous membranes and no mucosal ulcerations;normal hard and soft palate.  No evidence of lymphadenopathy.    Neck: Trachea midline.  No thyromegaly.    Musculoskeletal: No abnormal gait.    Skin: No lesions.    Mental: Cooperative with normal affect.  Is oriented to time, place, and person.    Neuro: Grossly intact.    Chest: Normal inspiratory effort.   No accessory muscles.  No audible wheezes.  Respirations symmetric on inspiration and expiration.    Heart: Regular rhythm.      Abdomen:  Soft, non-tender. No masses or organomegaly. Bladder is not palpable. No evidence of flank discomfort. No evidence of inguinal hernia.    Genitourinary: The penis is circumcised with no evidence of plaques or induration. The urethral meatus is normal. The testes, epididymides, and cord structures are normal in size and contour bilaterally. The scrotum is normal in size and contour.    Normal anal sphincter tone. No rectal mass.    The prostate is 50 g. Normal landmarks. Lateral sulci. Median furrow intact.  No nodularity or induration. Seminal vesicles are normal.    Extremities: No clubbing, cyanosis, or edema      LABS:    On finasteride 10/2022.  Lab Results   Component Value Date    PSA 3.80 10/04/2012    PSA 4.10 (H) 03/01/2012    PSA 3.2 10/13/2011    PSADIAG 5.7 (H) 10/30/2019    PSADIAG 3.6 10/19/2018    PSADIAG 2.1 09/20/2017       IMPRESSION:    Encounter Diagnoses   Name Primary?    Elevated PSA Yes    BPH with urinary obstruction          PLAN:    1. Continue finasteride for his LUTS.  2. Continue to see Dr. Lowry for his elevated PSA.  3. RTC prn.    Copy to: Alen

## 2023-01-25 ENCOUNTER — OFFICE VISIT (OUTPATIENT)
Dept: ELECTROPHYSIOLOGY | Facility: CLINIC | Age: 73
End: 2023-01-25
Payer: MEDICARE

## 2023-01-25 ENCOUNTER — HOSPITAL ENCOUNTER (OUTPATIENT)
Dept: CARDIOLOGY | Facility: CLINIC | Age: 73
Discharge: HOME | End: 2023-01-25
Payer: MEDICARE

## 2023-01-25 VITALS
SYSTOLIC BLOOD PRESSURE: 126 MMHG | DIASTOLIC BLOOD PRESSURE: 67 MMHG | WEIGHT: 179.25 LBS | BODY MASS INDEX: 24.28 KG/M2 | HEIGHT: 72 IN | HEART RATE: 61 BPM

## 2023-01-25 DIAGNOSIS — I49.3 PVC (PREMATURE VENTRICULAR CONTRACTION): ICD-10-CM

## 2023-01-25 DIAGNOSIS — I48.0 PAROXYSMAL ATRIAL FIBRILLATION: ICD-10-CM

## 2023-01-25 DIAGNOSIS — E78.2 MIXED HYPERLIPIDEMIA: ICD-10-CM

## 2023-01-25 DIAGNOSIS — I35.0 MILD AORTIC STENOSIS: ICD-10-CM

## 2023-01-25 DIAGNOSIS — I35.1 NONRHEUMATIC AORTIC VALVE INSUFFICIENCY: Primary | ICD-10-CM

## 2023-01-25 DIAGNOSIS — Z79.01 LONG TERM CURRENT USE OF ANTICOAGULANT THERAPY: ICD-10-CM

## 2023-01-25 DIAGNOSIS — I10 ESSENTIAL HYPERTENSION, BENIGN: Chronic | ICD-10-CM

## 2023-01-25 PROCEDURE — 93010 RHYTHM STRIP: ICD-10-PCS | Mod: S$PBB,,, | Performed by: INTERNAL MEDICINE

## 2023-01-25 PROCEDURE — 99999 PR PBB SHADOW E&M-EST. PATIENT-LVL III: CPT | Mod: PBBFAC,,, | Performed by: INTERNAL MEDICINE

## 2023-01-25 PROCEDURE — 99214 OFFICE O/P EST MOD 30 MIN: CPT | Mod: S$PBB,,, | Performed by: INTERNAL MEDICINE

## 2023-01-25 PROCEDURE — 99214 PR OFFICE/OUTPT VISIT, EST, LEVL IV, 30-39 MIN: ICD-10-PCS | Mod: S$PBB,,, | Performed by: INTERNAL MEDICINE

## 2023-01-25 PROCEDURE — 93005 ELECTROCARDIOGRAM TRACING: CPT | Mod: PBBFAC | Performed by: INTERNAL MEDICINE

## 2023-01-25 PROCEDURE — 93010 ELECTROCARDIOGRAM REPORT: CPT | Mod: S$PBB,,, | Performed by: INTERNAL MEDICINE

## 2023-01-25 PROCEDURE — 99213 OFFICE O/P EST LOW 20 MIN: CPT | Mod: PBBFAC | Performed by: INTERNAL MEDICINE

## 2023-01-25 PROCEDURE — 99999 PR PBB SHADOW E&M-EST. PATIENT-LVL III: ICD-10-PCS | Mod: PBBFAC,,, | Performed by: INTERNAL MEDICINE

## 2023-01-25 NOTE — PROGRESS NOTES
"Subjective:    Patient ID:  Anthony Reynaga is a 72 y.o. male who presents for evaluation of PAF    Atrial Fibrillation  Symptoms include palpitations. Symptoms are negative for chest pain, dizziness, shortness of breath, syncope and weakness.     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  HTN on meds  question of bicuspid AoV in past; reviewed by Dr Mcclellan; tristen.    "PAF" onset was in 1996. Spontaneously back to SR, after meds given.   1997: Required DCCV for another episode.  Since, seems that he gets an episode about q 6 months. Always seems to be triggered by episodes of fast HR: exertion, nightmare...  Was having AF q year; 2 episodes: 3/15 and 8/15. In 8/15, lasted 62 hours (longer than usual); ECG in [facility] confirms AF.    Hadn't had AF x 4 years. Occ palpitations; nothing longterm.  Since last seen, had one episode of AF. Went to OSH ER, where pill in pocket flecainide was successful.  Had AF last in 12/2019. PIP flecainide successful. In summer, had different, shorter palpitations. Resolved when monitor placed, and hasn't had any since.    During 2021, has had 2 episodes of AF: one following COVID vaccine #2 (7h) and another lasting 20h. PIP flecainide unsuccessful.  Also has periods of frequent short palpitations (none for past month).    Echo 60%; 1= AI    Update 2023:  Has been having AF about 1 q 6 mos. PIP flecainide works about 50% of the time. Onset usually during stressors.  Also has occasional PVCs captured on [employer] On Me (NOOM) device.  HRs at home often in 50s.    My interpretation of today's ECG is SR at 60 bpm. No ectopy.    Review of Systems   Constitutional: Negative. Negative for malaise/fatigue.   HENT: Negative.  Negative for ear pain and tinnitus.    Eyes:  Negative for blurred vision.   Cardiovascular:  Positive for palpitations. Negative for chest pain, dyspnea on exertion, near-syncope and " syncope.   Respiratory: Negative.  Negative for shortness of breath.    Endocrine: Negative.  Negative for polyuria.   Hematologic/Lymphatic: Does not bruise/bleed easily.   Skin: Negative.  Negative for rash.   Musculoskeletal: Negative.  Negative for joint pain and muscle weakness.   Gastrointestinal: Negative.  Negative for abdominal pain and change in bowel habit.   Genitourinary:  Negative for frequency.   Neurological: Negative.  Negative for dizziness and weakness.   Psychiatric/Behavioral: Negative.  Negative for depression. The patient is not nervous/anxious.    Allergic/Immunologic: Negative for environmental allergies.      Objective:    Physical Exam  Vitals and nursing note reviewed.   Constitutional:       Appearance: He is well-developed.   HENT:      Head: Normocephalic and atraumatic.   Eyes:      General: Lids are normal. No scleral icterus.     Conjunctiva/sclera: Conjunctivae normal.   Neck:      Thyroid: No thyromegaly.      Vascular: No JVD.      Trachea: No tracheal deviation.   Cardiovascular:      Rate and Rhythm: Normal rate and regular rhythm.      Pulses:           Radial pulses are 2+ on the right side and 2+ on the left side.      Heart sounds:   Harsh midsystolic murmur is present at the upper right sternal border radiating to the neck.   Pulmonary:      Effort: Pulmonary effort is normal. No tachypnea, accessory muscle usage or respiratory distress.      Breath sounds: Normal breath sounds. No wheezing.   Abdominal:      General: There is no distension.   Musculoskeletal:         General: Normal range of motion.      Cervical back: Normal range of motion.   Skin:     General: Skin is warm and dry.      Findings: No rash.   Neurological:      Mental Status: He is alert and oriented to person, place, and time.      Motor: No abnormal muscle tone.      Deep Tendon Reflexes: Reflexes are normal and symmetric.   Psychiatric:         Behavior: Behavior normal.         Assessment:       1.  Nonrheumatic aortic valve insufficiency    2. Essential hypertension, benign    3. Mild aortic stenosis    4. Mixed hyperlipidemia    5. Paroxysmal atrial fibrillation    6. PVC (premature ventricular contraction)    7. Long term current use of anticoagulant therapy         Plan:       AF:  Minimal sx and very infrequent.   Continue pill-in-the-pocket approach to AF. Provided reference to [facility] article (Tabatha et al. N Engl J Med 2004;351:2384-91). Patient now able to self-direct pill-in-the-pocket treatment: 300 mg PO x1 prn.  If episodes become more frequent, would consider changing to standing-dose flecainide (100 bid).    PVCs:  Benign, but symptomatic at times.  Continue BB.  Considering adding verapamil (would likely decrease BB). He'll monitor for now.    Ao valve abnormality:  f/u with Dr. ZACKERY Nair (primary cardiologist). Echo q 2-3 years.    Return in 1 year, or earlier prn.

## 2023-01-26 ENCOUNTER — PATIENT MESSAGE (OUTPATIENT)
Dept: OTOLARYNGOLOGY | Facility: CLINIC | Age: 73
End: 2023-01-26
Payer: MEDICARE

## 2023-01-26 ENCOUNTER — PATIENT MESSAGE (OUTPATIENT)
Dept: INTERNAL MEDICINE | Facility: CLINIC | Age: 73
End: 2023-01-26
Payer: MEDICARE

## 2023-01-27 DIAGNOSIS — Z12.11 ENCOUNTER FOR COLORECTAL CANCER SCREENING: Primary | ICD-10-CM

## 2023-01-27 DIAGNOSIS — Z12.12 ENCOUNTER FOR COLORECTAL CANCER SCREENING: Primary | ICD-10-CM

## 2023-02-01 ENCOUNTER — PATIENT MESSAGE (OUTPATIENT)
Dept: INTERNAL MEDICINE | Facility: CLINIC | Age: 73
End: 2023-02-01
Payer: MEDICARE

## 2023-02-01 DIAGNOSIS — M62.89 PELVIC FLOOR DYSFUNCTION: ICD-10-CM

## 2023-02-01 DIAGNOSIS — M54.50 LOW BACK PAIN, UNSPECIFIED BACK PAIN LATERALITY, UNSPECIFIED CHRONICITY, UNSPECIFIED WHETHER SCIATICA PRESENT: Primary | ICD-10-CM

## 2023-02-20 ENCOUNTER — PATIENT MESSAGE (OUTPATIENT)
Dept: OTOLARYNGOLOGY | Facility: CLINIC | Age: 73
End: 2023-02-20
Payer: MEDICARE

## 2023-02-27 ENCOUNTER — OFFICE VISIT (OUTPATIENT)
Dept: URGENT CARE | Facility: CLINIC | Age: 73
End: 2023-02-27
Payer: MEDICARE

## 2023-02-27 VITALS
BODY MASS INDEX: 23.7 KG/M2 | WEIGHT: 175 LBS | TEMPERATURE: 99 F | HEIGHT: 72 IN | SYSTOLIC BLOOD PRESSURE: 157 MMHG | HEART RATE: 74 BPM | OXYGEN SATURATION: 96 % | DIASTOLIC BLOOD PRESSURE: 73 MMHG | RESPIRATION RATE: 18 BRPM

## 2023-02-27 DIAGNOSIS — J98.01 BRONCHOSPASM: ICD-10-CM

## 2023-02-27 DIAGNOSIS — J40 BRONCHITIS: Primary | ICD-10-CM

## 2023-02-27 PROCEDURE — 99214 PR OFFICE/OUTPT VISIT, EST, LEVL IV, 30-39 MIN: ICD-10-PCS | Mod: 25,S$GLB,, | Performed by: FAMILY MEDICINE

## 2023-02-27 PROCEDURE — 94640 PR INHAL RX, AIRWAY OBST/DX SPUTUM INDUCT: ICD-10-PCS | Mod: S$GLB,,, | Performed by: FAMILY MEDICINE

## 2023-02-27 PROCEDURE — 94640 AIRWAY INHALATION TREATMENT: CPT | Mod: S$GLB,,, | Performed by: FAMILY MEDICINE

## 2023-02-27 PROCEDURE — 99214 OFFICE O/P EST MOD 30 MIN: CPT | Mod: 25,S$GLB,, | Performed by: FAMILY MEDICINE

## 2023-02-27 RX ORDER — IPRATROPIUM BROMIDE 0.5 MG/2.5ML
0.5 SOLUTION RESPIRATORY (INHALATION)
Status: COMPLETED | OUTPATIENT
Start: 2023-02-27 | End: 2023-02-27

## 2023-02-27 RX ORDER — ALBUTEROL SULFATE 0.83 MG/ML
2.5 SOLUTION RESPIRATORY (INHALATION)
Status: COMPLETED | OUTPATIENT
Start: 2023-02-27 | End: 2023-02-27

## 2023-02-27 RX ORDER — BENZONATATE 100 MG/1
100 CAPSULE ORAL EVERY 6 HOURS PRN
Qty: 30 CAPSULE | Refills: 1 | Status: SHIPPED | OUTPATIENT
Start: 2023-02-27 | End: 2023-03-24

## 2023-02-27 RX ORDER — PREDNISONE 20 MG/1
40 TABLET ORAL DAILY
Qty: 8 TABLET | Refills: 0 | Status: SHIPPED | OUTPATIENT
Start: 2023-02-27 | End: 2023-03-03

## 2023-02-27 RX ADMIN — IPRATROPIUM BROMIDE 0.5 MG: 0.5 SOLUTION RESPIRATORY (INHALATION) at 08:02

## 2023-02-27 RX ADMIN — ALBUTEROL SULFATE 2.5 MG: 0.83 SOLUTION RESPIRATORY (INHALATION) at 08:02

## 2023-02-27 NOTE — PROGRESS NOTES
Subjective:       Patient ID: Anthony Reynaga is a 72 y.o. male.    Vitals:  height is 6' (1.829 m) and weight is 79.4 kg (175 lb). His temperature is 98.8 °F (37.1 °C). His blood pressure is 157/73 (abnormal) and his pulse is 74. His respiration is 18 and oxygen saturation is 96%.     Chief Complaint: Cough    72-year-old male who presents with cough and chest congestion over past several days.  He reports having had a cold about 3-4 weeks ago that completely resolved.  He reports being out about after [employer] [place]as, and the day after MG developed fever up to 102.4 along with aches, and that generally all improved.  However he is now left with residual cough and chest congestion.  At home COVID tests have been negative.  No colored sputum is noted with the cough.  He denies history of asthma, although does report a history consistent with reactive airways following URI.  Never has used an inhaler.  Budesonide is on his medication list, but this was intended to be used as part of a nasal rinse to address changes in taste and smell, not nebulized.       Cough  This is a new problem. The current episode started in the past 7 days. The problem has been gradually worsening. The problem occurs constantly. The cough is Productive of sputum. Associated symptoms include a fever. Nothing aggravates the symptoms. Treatments tried: niquil and tylenol. The treatment provided moderate relief.   Constitution: Positive for fever.   Respiratory:  Positive for cough.      Objective:      Physical Exam   Constitutional: He is oriented to person, place, and time. He appears well-developed.  Non-toxic appearance. He does not appear ill. No distress.   HENT:   Head: Normocephalic and atraumatic.   Ears:   Right Ear: Tympanic membrane normal.   Left Ear: Tympanic membrane normal.      Comments: Bilateral ear canals with soft cerumen, not problematic.    Mouth/Throat: Oropharynx is clear and moist. No oropharyngeal exudate.   Eyes:  Conjunctivae and EOM are normal. Pupils are equal, round, and reactive to light.   Cardiovascular: Normal rate, regular rhythm and normal heart sounds.   Pulmonary/Chest: Effort normal. No stridor. No respiratory distress. He has no rhonchi.         Comments: Coarse cough noted.  Lungs were largely cleared to auscultation bilaterally, although soft bibasilar end expiratory wheeze noted, which improved after nebulizer treatment.  He was reluctant to complete nebulizer treatment due to his history of Afib.  Excellent air movement.    Abdominal: Normal appearance.   Lymphadenopathy:     He has no cervical adenopathy.   Neurological: He is alert and oriented to person, place, and time.   Skin: Skin is warm, dry and not diaphoretic.   Psychiatric: His behavior is normal. Judgment and thought content normal.   Vitals reviewed.      Assessment:       1. Bronchitis    2. Bronchospasm          Plan:     I have reviewed the patient's previous visits, labs, and images to look for any trends or previous treatments as part of this assessment.     Bronchitis  -     predniSONE (DELTASONE) 20 MG tablet; Take 2 tablets (40 mg total) by mouth once daily. for 4 days  Dispense: 8 tablet; Refill: 0  -     benzonatate (TESSALON PERLES) 100 MG capsule; Take 1 capsule (100 mg total) by mouth every 6 (six) hours as needed for Cough.  Dispense: 30 capsule; Refill: 1    Bronchospasm  -     albuterol nebulizer solution 2.5 mg  -     ipratropium 0.02 % nebulizer solution 0.5 mg    Make sure that you follow up with your primary care doctor in the next 2-5 days if needed .  Return to the Urgent Care if signs or symptoms change and certainly if you have worsening symptoms go to the nearest emergency department for further evaluation.

## 2023-03-09 ENCOUNTER — PATIENT MESSAGE (OUTPATIENT)
Dept: OTOLARYNGOLOGY | Facility: CLINIC | Age: 73
End: 2023-03-09
Payer: MEDICARE

## 2023-03-09 DIAGNOSIS — R43.8 REDUCED SENSE OF SMELL: ICD-10-CM

## 2023-03-10 RX ORDER — BUDESONIDE 0.5 MG/2ML
0.5 INHALANT ORAL DAILY
Qty: 60 ML | Refills: 2 | Status: SHIPPED | OUTPATIENT
Start: 2023-03-10 | End: 2024-03-28

## 2023-03-13 ENCOUNTER — PATIENT MESSAGE (OUTPATIENT)
Dept: OTOLARYNGOLOGY | Facility: CLINIC | Age: 73
End: 2023-03-13
Payer: MEDICARE

## 2023-03-13 NOTE — TELEPHONE ENCOUNTER
Called pt and scheduled appt next week at [place] location since next available at [facility] is in a month

## 2023-03-23 ENCOUNTER — OFFICE VISIT (OUTPATIENT)
Dept: OTOLARYNGOLOGY | Facility: CLINIC | Age: 73
End: 2023-03-23
Payer: MEDICARE

## 2023-03-23 VITALS
DIASTOLIC BLOOD PRESSURE: 65 MMHG | BODY MASS INDEX: 24.13 KG/M2 | SYSTOLIC BLOOD PRESSURE: 125 MMHG | WEIGHT: 177.94 LBS

## 2023-03-23 DIAGNOSIS — R43.1 PAROSMIA: ICD-10-CM

## 2023-03-23 DIAGNOSIS — H61.23 BILATERAL IMPACTED CERUMEN: ICD-10-CM

## 2023-03-23 DIAGNOSIS — R43.8 HYPOSMIA: Primary | ICD-10-CM

## 2023-03-23 PROCEDURE — 99214 PR OFFICE/OUTPT VISIT, EST, LEVL IV, 30-39 MIN: ICD-10-PCS | Mod: S$PBB,25,, | Performed by: OTOLARYNGOLOGY

## 2023-03-23 PROCEDURE — 99999 PR PBB SHADOW E&M-EST. PATIENT-LVL III: ICD-10-PCS | Mod: PBBFAC,,, | Performed by: OTOLARYNGOLOGY

## 2023-03-23 PROCEDURE — 99214 OFFICE O/P EST MOD 30 MIN: CPT | Mod: S$PBB,25,, | Performed by: OTOLARYNGOLOGY

## 2023-03-23 PROCEDURE — 69210 EAR CERUMEN REMOVAL: ICD-10-PCS | Mod: S$PBB,,, | Performed by: OTOLARYNGOLOGY

## 2023-03-23 PROCEDURE — 69210 REMOVE IMPACTED EAR WAX UNI: CPT | Mod: PBBFAC | Performed by: OTOLARYNGOLOGY

## 2023-03-23 PROCEDURE — 99213 OFFICE O/P EST LOW 20 MIN: CPT | Mod: PBBFAC,25 | Performed by: OTOLARYNGOLOGY

## 2023-03-23 PROCEDURE — 99999 PR PBB SHADOW E&M-EST. PATIENT-LVL III: CPT | Mod: PBBFAC,,, | Performed by: OTOLARYNGOLOGY

## 2023-03-23 NOTE — PATIENT INSTRUCTIONS
Medication for the sinus rinse will be sent from the compounding pharmacy, [facility], in [place], [place].  Use it according to directions.    Olfactory training as per discussion.

## 2023-03-24 NOTE — PROCEDURES
Ear Cerumen Removal    Date/Time: 3/23/2023 8:30 AM  Performed by: Wilfredo Hendrix MD  Authorized by: Wilfredo Hendrix MD     Consent Done?:  Yes (Verbal)    Local anesthetic:  None  Location details:  Both ears  Procedure type: curette    Cerumen  Removal Results:  Cerumen completely removed  Patient tolerance:  Patient tolerated the procedure well with no immediate complications

## 2023-03-24 NOTE — PROGRESS NOTES
Subjective:      Anthony is a 72 y.o. male who comes for follow-up of  olfactory disturbance . His last visit with me was on 1/11/2022. No change in hyposmia, still unable to perceive most odors.  Associated parosmia, distorted perception of stimuli, rarely occurs between meals.  Affects QOL.  Previously discussed olfactory training, was unable to diligently pursue at the time.  Steroid spray did not help.  Also recent URI, pain over right eye, congestion and discharge, now improving.  Given steroid Rx in urgent care but did not fill.  Also feels bilateral aural fullness, suspects cerumen.    SNOT-22 score: : (P) 61  NOSE score:: (P) 25%  ETDQ-7 score:: (P) 1.9    The patient's medications, allergies, past medical, surgical, social and family histories were reviewed and updated as appropriate.    A detailed review of systems was obtained with pertinent positives as per the above HPI, and otherwise negative.        Objective:     /65 Comment: PT gave BP reading from home this morning  Wt 80.7 kg (177 lb 14.6 oz)   BMI 24.13 kg/m²        Constitutional:   He appears well-developed. He is cooperative. Normal speech.  No hoarse voice.      Head:  Normocephalic. Salivary glands normal.  Facial strength is normal.      Ears:    Right Ear: No drainage or tenderness. Tympanic membrane is not perforated. Tympanic membrane mobility is normal. No middle ear effusion. No decreased hearing is noted.   Left Ear: No drainage or tenderness. Tympanic membrane is not perforated. Tympanic membrane mobility is normal.  No middle ear effusion. No decreased hearing is noted.     Nose:  No mucosal edema, rhinorrhea, septal deviation or polyps. No epistaxis. Turbinates normal, no turbinate masses and no turbinate hypertrophy.  Right sinus exhibits no maxillary sinus tenderness and no frontal sinus tenderness. Left sinus exhibits no maxillary sinus tenderness and no frontal sinus tenderness.     Mouth/Throat  Oropharynx clear and  moist without lesions or asymmetry and normal uvula midline. He does not have dentures. Normal dentition. No oral lesions or mucous membrane lesions. No oropharyngeal exudate or posterior oropharyngeal erythema. Mirror exam not performed due to patient tolerance.  Mirror exam not performed due to patient tolerance.      Neck:  Neck normal without thyromegaly masses, asymmetry, normal tracheal structure, crepitus, and tenderness, thyroid normal, trachea normal and no adenopathy. Normal range of motion present.     He has no cervical adenopathy.     Cardiovascular:    Regular rhythm.              Pulmonary/Chest:   Effort normal.     Psychiatric:   He has a normal mood and affect. His speech is normal and behavior is normal.     Neurological:   No cranial nerve deficit.     Skin:   No rash noted.     Procedure    Cerumen removal performed.  See procedure note.        Data Reviewed    WBC (K/uL)   Date Value   10/06/2022 6.98     Eosinophil % (%)   Date Value   10/06/2022 5.3     Eos # (K/uL)   Date Value   10/06/2022 0.4     Platelets (K/uL)   Date Value   10/06/2022 173     Glucose (mg/dL)   Date Value   10/06/2022 91     IgE (IU/mL)   Date Value   07/26/2021 <35       No sinus imaging available.      Assessment:     1. Hyposmia    2. Parosmia    3. Bilateral impacted cerumen         Plan:     Again discussed olfactory training, given possible kit order information.  Rx budesonide sinus rinse for concurrent use.  Follow up if symptoms worsen or fail to improve.

## 2023-03-30 ENCOUNTER — CLINICAL SUPPORT (OUTPATIENT)
Dept: ENDOSCOPY | Facility: HOSPITAL | Age: 73
End: 2023-03-30
Attending: INTERNAL MEDICINE
Payer: MEDICARE

## 2023-03-30 ENCOUNTER — TELEPHONE (OUTPATIENT)
Dept: ENDOSCOPY | Facility: HOSPITAL | Age: 73
End: 2023-03-30
Payer: MEDICARE

## 2023-03-30 ENCOUNTER — PATIENT MESSAGE (OUTPATIENT)
Dept: ELECTROPHYSIOLOGY | Facility: CLINIC | Age: 73
End: 2023-03-30
Payer: MEDICARE

## 2023-03-30 VITALS — WEIGHT: 177 LBS | BODY MASS INDEX: 23.98 KG/M2 | HEIGHT: 72 IN

## 2023-03-30 DIAGNOSIS — Z12.11 ENCOUNTER FOR COLORECTAL CANCER SCREENING: ICD-10-CM

## 2023-03-30 DIAGNOSIS — Z12.12 ENCOUNTER FOR COLORECTAL CANCER SCREENING: ICD-10-CM

## 2023-03-30 NOTE — TELEPHONE ENCOUNTER
Caller: Unspecified (Today, 10:34 AM)        CLEARANCE TO HOLD ORAL ANTICOAGULATION THERAPY     A request for clearance has been received to hold ELIQUIS prior to a scheduled procedure: COLONOSCOPY .  It has been determined, per the current guidelines, that the above patient has been cleared to hold ELIQUIS for 2 days prior to the scheduled procedure, and resumed post procedure as soon as possible per the surgeon's discretion.  If you have any further questions please contact our office at [phone].     Sincerely,       Bryan Gomez MD, MPH, [facility]     [facility]   Cardiac Electrophysiolgy               Previous Messages    Patient Calls  (Newest Message First)   Lu Roblero RN  You 1 hour ago (10:49 AM)     BB        CLEARANCE TO HOLD ORAL ANTICOAGULATION THERAPY     A request for clearance has been received to hold ELIQUIS prior to a scheduled procedure: COLONOSCOPY .  It has been determined, per the current guidelines, that the above patient has been cleared to hold ELIQUIS for 2 days prior to the scheduled procedure, and resumed post procedure as soon as possible per the surgeon's discretion.  If you have any further questions please contact our office at [phone].     Sincerely,       Bryan Gomez MD, MPH, [facility]     Cardiac Rhythm Device Clinic   Cardiac Electrophysiolgy           Brenda Noland MA routed conversation to Lu Roblero RN 1 hour ago (10:42 AM)      You routed conversation to Bryan Gomez MD; Patricia SEN Staff 1 hour ago (10:36 AM)     You 1 hour ago (10:36 AM)     [facility] CHRISTIANE Gomez,     Pt has order for a colonoscopy. Can he hold Eliquis x 2 days per Endoscopy protocol? Please advise.  Thanks.   Barb VASQUEZ.         Note

## 2023-03-30 NOTE — TELEPHONE ENCOUNTER
Hi Dr CHRISTIANE Gomez,    Pt has order for a colonoscopy. Can he hold Eliquis x 2 days per Endoscopy protocol? Please advise.  Thanks.   Barb VASQUEZ.

## 2023-04-25 ENCOUNTER — TELEPHONE (OUTPATIENT)
Dept: INTERNAL MEDICINE | Facility: CLINIC | Age: 73
End: 2023-04-25
Payer: MEDICARE

## 2023-04-25 NOTE — TELEPHONE ENCOUNTER
----- Message from Ivonne Bourgeois sent at 4/25/2023 11:42 AM CDT -----  Regarding: Sooner Appt Request  Who Is Calling : TRAN GILLILAND [[id number]]        Reason For The Call: Patient is requesting a sooner appointment.  Patient declined first available and does not want to be added to the waitlist.  Please contact the patient to schedule.        Preferred Contact Method: [phone]        Additional Information: Persistent cough and heartburn

## 2023-04-25 NOTE — TELEPHONE ENCOUNTER
Called pt to sched appt  Scheduled next in person appt in our clinic  Offered sooner in person at other locations and sooner virtual at our location and he declined  Put him on wait list and gave him ER prompts for URI  Pt verbalized understanding and had no further concerns or questions.

## 2023-04-28 ENCOUNTER — TELEPHONE (OUTPATIENT)
Dept: INTERNAL MEDICINE | Facility: CLINIC | Age: 73
End: 2023-04-28
Payer: MEDICARE

## 2023-05-02 ENCOUNTER — OFFICE VISIT (OUTPATIENT)
Dept: INTERNAL MEDICINE | Facility: CLINIC | Age: 73
End: 2023-05-02
Payer: MEDICARE

## 2023-05-02 VITALS
OXYGEN SATURATION: 96 % | BODY MASS INDEX: 23.95 KG/M2 | DIASTOLIC BLOOD PRESSURE: 57 MMHG | SYSTOLIC BLOOD PRESSURE: 119 MMHG | HEART RATE: 51 BPM | WEIGHT: 176.81 LBS | HEIGHT: 72 IN

## 2023-05-02 DIAGNOSIS — Z87.898 HISTORY OF PERSISTENT COUGH: Primary | ICD-10-CM

## 2023-05-02 PROCEDURE — 99212 PR OFFICE/OUTPT VISIT, EST, LEVL II, 10-19 MIN: ICD-10-PCS | Mod: S$PBB,,, | Performed by: PHYSICIAN ASSISTANT

## 2023-05-02 PROCEDURE — 99999 PR PBB SHADOW E&M-EST. PATIENT-LVL III: CPT | Mod: PBBFAC,,, | Performed by: PHYSICIAN ASSISTANT

## 2023-05-02 PROCEDURE — 99999 PR PBB SHADOW E&M-EST. PATIENT-LVL III: ICD-10-PCS | Mod: PBBFAC,,, | Performed by: PHYSICIAN ASSISTANT

## 2023-05-02 PROCEDURE — 99212 OFFICE O/P EST SF 10 MIN: CPT | Mod: S$PBB,,, | Performed by: PHYSICIAN ASSISTANT

## 2023-05-02 PROCEDURE — 99213 OFFICE O/P EST LOW 20 MIN: CPT | Mod: PBBFAC | Performed by: PHYSICIAN ASSISTANT

## 2023-05-02 NOTE — PROGRESS NOTES
INTERNAL MEDICINE URGENT VISIT NOTE    CHIEF COMPLAINT     Chief Complaint   Patient presents with    Cough     Had cold/flu in February, coughing since then. Cough has improved in the last week, some phlegm in the morning  Hx of ENT issues in the last couple of years         HPI     Anthony Reynaga is a 72 y.o. male who presents for an urgent visit today.    PCP is Gonzalo Harris MD, patient is new to me.     Patient presents with complaints of cough.     He is established with Dr. Hendrix ENT and was last seen on 3/23/2023 - he was recommended to try olfactory training. Prescribed budesonide sinus rinse. RTC PRN.     Today in office he presents better.  He admits that he had a persistent cough up until about 2 weeks ago.  Cough is now resolved.  He is suspicious that some of the cough could have been related to silent reflux but patient has not needed to take any medications and reports that symptoms are very few and far between.  Denies chest pain, shortness of breath, persistent nasal mucus or postnasal drip.  Not currently interested in medication or further evaluation.  Unaccompanied in the office today..       Past Medical History:  Past Medical History:   Diagnosis Date    Anticoagulant long-term use     Atrial fibrillation     BPH (benign prostatic hyperplasia)     Cataract     Elevated PSA     Floaters     Hernia     bilateral inquinal    Hypertension     Inguinal hernia     Kidney stone     Knee injury        Home Medications:  Prior to Admission medications    Medication Sig Start Date End Date Taking? Authorizing Provider   busPIRone (BUSPAR) 5 MG Tab TAKE 1 TABLET BY MOUTH  TWICE DAILY 11/7/22  Yes Gonzalo Harris MD   chlorthalidone (HYGROTEN) 25 MG Tab TAKE 1 TABLET BY MOUTH ONCE DAILY 3/15/23  Yes Jean Nair MD   ELIQUIS 5 mg Tab TAKE 1 TABLET BY MOUTH  TWICE DAILY 3/15/23  Yes Jean Nair MD   finasteride (PROSCAR) 5 mg tablet Take 1 tablet (5 mg total) by mouth once daily.  10/24/22 10/24/23 Yes Gonzalo Harris MD   flecainide (TAMBOCOR) 150 MG Tab TAKE 2 TABLETS (300 mg total ) BY MOUTH AS NEEDED FOR AF. LIMIT ONE DOSE PER 24 HOURS. 11/14/22  Yes Bryan Gomez MD   metoprolol succinate (TOPROL-XL) 200 MG 24 hr tablet TAKE 1 TABLET BY MOUTH ONCE DAILY 11/7/22  Yes Gonzalo Harris MD   potassium chloride SA (K-DUR,KLOR-CON) 20 MEQ tablet TAKE 1 TABLET BY MOUTH  TWICE DAILY 10/20/22  Yes Gonzalo Harris MD   simvastatin (ZOCOR) 20 MG tablet TAKE 1 TABLET BY MOUTH IN  THE EVENING 1/4/23  Yes Gonzalo Harris MD   vitamin D 1000 units Tab Take 2,000 Units by mouth once daily.    Yes Historical Provider   budesonide (PULMICORT) 0.5 mg/2 mL nebulizer solution Take 2 mLs (0.5 mg total) by nebulization once daily. For use in saline irrigation as directed. 3/10/23 4/9/23  Wilfredo Hendrix MD   LORazepam (ATIVAN) 1 MG tablet Take 1 tablet (1 mg total) by mouth every evening. for 20 days  Patient taking differently: Take 1 mg by mouth daily as needed. 11/23/20 2/27/23  Chip Rincon MD       Review of Systems:  Review of Systems   Constitutional:  Negative for chills and fever.   HENT:  Negative for sore throat and trouble swallowing.    Eyes:  Negative for visual disturbance.   Respiratory:  Positive for cough. Negative for shortness of breath.    Cardiovascular:  Negative for chest pain.   Gastrointestinal:  Negative for abdominal pain, constipation, diarrhea, nausea and vomiting.   Genitourinary:  Negative for dysuria and flank pain.   Musculoskeletal:  Negative for back pain, neck pain and neck stiffness.   Skin:  Negative for rash.   Neurological:  Negative for dizziness, syncope, weakness and headaches.   Psychiatric/Behavioral:  Negative for confusion.      Health Maintainence:   Immunizations:  Health Maintenance         Date Due Completion Date    PROSTATE-SPECIFIC ANTIGEN 10/30/2020 10/30/2019    Colorectal Cancer Screening 06/22/2021 6/22/2016    TETANUS  VACCINE 05/26/2025 5/26/2015    Lipid Panel 10/06/2027 10/6/2022             PHYSICAL EXAM     BP (!) 119/57   Pulse (!) 51   Ht 6' (1.829 m)   Wt 80.2 kg (176 lb 12.9 oz)   SpO2 96%   BMI 23.98 kg/m²     Physical Exam  Vitals and nursing note reviewed.   Constitutional:       Appearance: Normal appearance.      Comments: Elderly male in NAD or apparent pain. He makes good eye contact, speaks in clear full sentences and ambulates with ease.    HENT:      Head: Normocephalic and atraumatic.      Nose: Nose normal.      Mouth/Throat:      Pharynx: Oropharynx is clear.   Eyes:      Conjunctiva/sclera: Conjunctivae normal.   Cardiovascular:      Rate and Rhythm: Normal rate and regular rhythm.      Pulses: Normal pulses.   Pulmonary:      Effort: No respiratory distress.   Abdominal:      Tenderness: There is no abdominal tenderness.   Musculoskeletal:         General: Normal range of motion.      Cervical back: No rigidity.   Skin:     General: Skin is warm and dry.      Capillary Refill: Capillary refill takes less than 2 seconds.      Findings: No rash.   Neurological:      General: No focal deficit present.      Mental Status: He is alert.      Gait: Gait normal.   Psychiatric:         Mood and Affect: Mood normal.       LABS     No results found for: LABA1C, HGBA1C  CMP  Sodium   Date Value Ref Range Status   10/06/2022 140 136 - 145 mmol/L Final     Potassium   Date Value Ref Range Status   10/06/2022 3.8 3.5 - 5.1 mmol/L Final     Chloride   Date Value Ref Range Status   10/06/2022 108 95 - 110 mmol/L Final     CO2   Date Value Ref Range Status   10/06/2022 25 23 - 29 mmol/L Final     Glucose   Date Value Ref Range Status   10/06/2022 91 70 - 110 mg/dL Final     BUN   Date Value Ref Range Status   10/06/2022 17 8 - 23 mg/dL Final     Creatinine   Date Value Ref Range Status   10/06/2022 0.8 0.5 - 1.4 mg/dL Final     Calcium   Date Value Ref Range Status   10/06/2022 9.9 8.7 - 10.5 mg/dL Final     Total  Protein   Date Value Ref Range Status   10/06/2022 7.0 6.0 - 8.4 g/dL Final     Albumin   Date Value Ref Range Status   10/06/2022 4.0 3.5 - 5.2 g/dL Final     Total Bilirubin   Date Value Ref Range Status   10/06/2022 1.0 0.1 - 1.0 mg/dL Final     Comment:     For infants and newborns, interpretation of results should be based  on gestational age, weight and in agreement with clinical  observations.    Premature Infant recommended reference ranges:  Up to 24 hours.............<8.0 mg/dL  Up to 48 hours............<12.0 mg/dL  3-5 days..................<15.0 mg/dL  6-29 days.................<15.0 mg/dL       Alkaline Phosphatase   Date Value Ref Range Status   10/06/2022 47 (L) 55 - 135 U/L Final     AST   Date Value Ref Range Status   10/06/2022 22 10 - 40 U/L Final     ALT   Date Value Ref Range Status   10/06/2022 21 10 - 44 U/L Final     Anion Gap   Date Value Ref Range Status   10/06/2022 7 (L) 8 - 16 mmol/L Final     eGFR if    Date Value Ref Range Status   10/13/2021 >60 >60 mL/min/1.73 m^2 Final     eGFR if non    Date Value Ref Range Status   10/13/2021 >60 >60 mL/min/1.73 m^2 Final     Comment:     Calculation used to obtain the estimated glomerular filtration  rate (eGFR) is the CKD-EPI equation.        Lab Results   Component Value Date    WBC 6.98 10/06/2022    HGB 14.7 10/06/2022    HCT 45.0 10/06/2022    MCV 84 10/06/2022     10/06/2022     Lab Results   Component Value Date    CHOL 131 10/06/2022    CHOL 121 10/13/2021    CHOL 137 11/12/2020     Lab Results   Component Value Date    HDL 45 10/06/2022    HDL 45 10/13/2021    HDL 41 11/12/2020     Lab Results   Component Value Date    LDLCALC 63.8 10/06/2022    LDLCALC 58.2 (L) 10/13/2021    LDLCALC 63.8 11/12/2020     Lab Results   Component Value Date    TRIG 111 10/06/2022    TRIG 89 10/13/2021    TRIG 161 (H) 11/12/2020     Lab Results   Component Value Date    CHOLHDL 34.4 10/06/2022    CHOLHDL 37.2  10/13/2021    CHOLHDL 29.9 11/12/2020     Lab Results   Component Value Date    TSH 0.378 (L) 10/13/2021       ASSESSMENT/PLAN     Anthony Reynaga is a 72 y.o. male     Anthony was seen today for cough. Cough now resolved. Follow-up if symptoms return.     Diagnoses and all orders for this visit:    History of persistent cough            Patient was counseled on when and how to seek emergent care.       Grisel Perez PA-C   Department of Internal Medicine - [facility]   1:13 PM

## 2023-05-12 ENCOUNTER — PATIENT MESSAGE (OUTPATIENT)
Dept: ENDOSCOPY | Facility: HOSPITAL | Age: 73
End: 2023-05-12
Payer: MEDICARE

## 2023-05-12 DIAGNOSIS — Z12.11 COLON CANCER SCREENING: Primary | ICD-10-CM

## 2023-05-12 RX ORDER — POLYETHYLENE GLYCOL 3350, SODIUM SULFATE ANHYDROUS, SODIUM BICARBONATE, SODIUM CHLORIDE, POTASSIUM CHLORIDE 236; 22.74; 6.74; 5.86; 2.97 G/4L; G/4L; G/4L; G/4L; G/4L
4 POWDER, FOR SOLUTION ORAL ONCE
Qty: 4000 ML | Refills: 0 | Status: SHIPPED | OUTPATIENT
Start: 2023-05-12 | End: 2023-05-12

## 2023-05-14 ENCOUNTER — NURSE TRIAGE (OUTPATIENT)
Dept: ADMINISTRATIVE | Facility: CLINIC | Age: 73
End: 2023-05-14
Payer: MEDICARE

## 2023-05-14 NOTE — TELEPHONE ENCOUNTER
Pt reports he is already beginning to have clear stools prior to beginning the evening prep. Has concerns regarding the absorption of his medication. I have advised that if he feels his medication is not working to call nurse on call for further triaging. He verbalizes understanding. Will call back with any questions/concerns.     Reason for Disposition   Abdominal bloating, cramping, nausea, or vomiting while drinking bowel prep, questions about    Additional Information   Negative: Shock suspected (e.g., cold/pale/clammy skin, too weak to stand, low BP, rapid pulse)   Negative: Difficult to awaken or acting confused (e.g., disoriented, slurred speech)   Negative: Passed out (i.e., lost consciousness, collapsed and was not responding)   Negative: Sounds like a life-threatening emergency to the triager   Negative: SEVERE abdomen pain (e.g., excruciating)   Negative: SEVERE rectal bleeding (large blood clots; on and off, or constant bleeding)   Negative: SEVERE dizziness (e.g., unable to stand, requires support to walk, feels like passing out now)   Negative: SEVERE vomiting (e.g., 6 or more times/day)   Negative: [1] MODERATE rectal bleeding (small blood clots, passing blood without stool, or toilet water turns red) AND [2] more than once   Negative: [1] MILD-MODERATE abdomen pain AND [2] constant AND [3] present > 2 hours   Negative: [1] Drinking very little AND [2] dehydration suspected (e.g., no urine > 12 hours, very dry mouth, very lightheaded)   Negative: Patient sounds very sick or weak to the triager   Negative: Fever > 100.4 F (38.0 C)   Negative: [1] Abdominal bloating, cramping, nausea, or vomiting while drinking bowel prep AND [2] CANNOT finish bowel prep AND [3] has tried recommended Care Advice   Negative: [1] Caller has URGENT question or concern AND [2] triager unable to answer question   Negative: [1] MILD-MODERATE abdomen pain (e.g., does not interfere with normal activities) AND [2] pain comes and  goes (cramps) [3] lasting > 48 hours   Negative: [1] MILD to MODERATE vomiting (e.g., 1 to 5 times a day) AND [2] lasting > 24 hours   Negative: Any rectal bleeding occurring > 24 hours after colonoscopy   Negative: [1] Caller has NON-URGENT question AND [2] triager unable to answer question    Protocols used: Colonoscopy Symptoms and [id number]

## 2023-05-15 ENCOUNTER — ANESTHESIA (OUTPATIENT)
Dept: ENDOSCOPY | Facility: HOSPITAL | Age: 73
End: 2023-05-15
Payer: MEDICARE

## 2023-05-15 ENCOUNTER — ANESTHESIA EVENT (OUTPATIENT)
Dept: ENDOSCOPY | Facility: HOSPITAL | Age: 73
End: 2023-05-15
Payer: MEDICARE

## 2023-05-15 ENCOUNTER — HOSPITAL ENCOUNTER (OUTPATIENT)
Facility: HOSPITAL | Age: 73
Discharge: HOME | End: 2023-05-15
Attending: COLON & RECTAL SURGERY | Admitting: COLON & RECTAL SURGERY
Payer: MEDICARE

## 2023-05-15 VITALS
DIASTOLIC BLOOD PRESSURE: 53 MMHG | BODY MASS INDEX: 23.7 KG/M2 | SYSTOLIC BLOOD PRESSURE: 107 MMHG | HEIGHT: 72 IN | WEIGHT: 175 LBS | HEART RATE: 52 BPM | TEMPERATURE: 98 F | OXYGEN SATURATION: 97 % | RESPIRATION RATE: 15 BRPM

## 2023-05-15 DIAGNOSIS — Z86.010 HISTORY OF COLON POLYPS: Primary | ICD-10-CM

## 2023-05-15 PROCEDURE — 45385 COLONOSCOPY W/LESION REMOVAL: CPT | Mod: PT,,, | Performed by: COLON & RECTAL SURGERY

## 2023-05-15 PROCEDURE — 25000003 PHARM REV CODE 250: Performed by: COLON & RECTAL SURGERY

## 2023-05-15 PROCEDURE — 88305 TISSUE EXAM BY PATHOLOGIST: CPT | Mod: 26,,, | Performed by: PATHOLOGY

## 2023-05-15 PROCEDURE — 45385 COLONOSCOPY W/LESION REMOVAL: CPT | Mod: PT | Performed by: COLON & RECTAL SURGERY

## 2023-05-15 PROCEDURE — 25000003 PHARM REV CODE 250: Performed by: NURSE ANESTHETIST, CERTIFIED REGISTERED

## 2023-05-15 PROCEDURE — E9220 PRA ENDO ANESTHESIA: ICD-10-PCS | Mod: PT,,, | Performed by: NURSE ANESTHETIST, CERTIFIED REGISTERED

## 2023-05-15 PROCEDURE — 27201089 HC SNARE, DISP (ANY): Performed by: COLON & RECTAL SURGERY

## 2023-05-15 PROCEDURE — 37000009 HC ANESTHESIA EA ADD 15 MINS: Performed by: COLON & RECTAL SURGERY

## 2023-05-15 PROCEDURE — 37000008 HC ANESTHESIA 1ST 15 MINUTES: Performed by: COLON & RECTAL SURGERY

## 2023-05-15 PROCEDURE — 88305 TISSUE EXAM BY PATHOLOGIST: CPT | Performed by: PATHOLOGY

## 2023-05-15 PROCEDURE — 88305 TISSUE EXAM BY PATHOLOGIST: ICD-10-PCS | Mod: 26,,, | Performed by: PATHOLOGY

## 2023-05-15 PROCEDURE — 63600175 PHARM REV CODE 636 W HCPCS: Performed by: NURSE ANESTHETIST, CERTIFIED REGISTERED

## 2023-05-15 PROCEDURE — E9220 PRA ENDO ANESTHESIA: HCPCS | Mod: PT,,, | Performed by: NURSE ANESTHETIST, CERTIFIED REGISTERED

## 2023-05-15 PROCEDURE — 45385 PR COLONOSCOPY,REMV LESN,SNARE: ICD-10-PCS | Mod: PT,,, | Performed by: COLON & RECTAL SURGERY

## 2023-05-15 RX ORDER — LIDOCAINE HYDROCHLORIDE 20 MG/ML
INJECTION INTRAVENOUS
Status: DISCONTINUED | OUTPATIENT
Start: 2023-05-15 | End: 2023-05-15

## 2023-05-15 RX ORDER — SODIUM CHLORIDE 9 MG/ML
INJECTION, SOLUTION INTRAVENOUS CONTINUOUS
Status: DISCONTINUED | OUTPATIENT
Start: 2023-05-15 | End: 2023-05-15 | Stop reason: HOSPADM

## 2023-05-15 RX ORDER — PROPOFOL 10 MG/ML
VIAL (ML) INTRAVENOUS
Status: DISCONTINUED | OUTPATIENT
Start: 2023-05-15 | End: 2023-05-15

## 2023-05-15 RX ORDER — PROPOFOL 10 MG/ML
VIAL (ML) INTRAVENOUS CONTINUOUS PRN
Status: DISCONTINUED | OUTPATIENT
Start: 2023-05-15 | End: 2023-05-15

## 2023-05-15 RX ADMIN — LIDOCAINE HYDROCHLORIDE 50 MG: 20 INJECTION INTRAVENOUS at 09:05

## 2023-05-15 RX ADMIN — SODIUM CHLORIDE: 0.9 INJECTION, SOLUTION INTRAVENOUS at 09:05

## 2023-05-15 RX ADMIN — Medication 150 MCG/KG/MIN: at 09:05

## 2023-05-15 RX ADMIN — PROPOFOL 80 MG: 10 INJECTION, EMULSION INTRAVENOUS at 09:05

## 2023-05-15 NOTE — PROVATION PATIENT INSTRUCTIONS
Discharge Summary/Instructions after an Endoscopic Procedure  Patient Name: Anthony Reynaga  Patient MRN: [id number]  Patient YOB: 1950  Monday, May 15, 2023  Noam Hollingsworth MD  Dear patient,  As a result of recent federal legislation (The Federal Cures Act), you may   receive lab or pathology results from your procedure in your [facility]   account before your physician is able to contact you. Your physician or   their representative will relay the results to you with their   recommendations at their soonest availability.  Thank you,  RESTRICTIONS:  During your procedure today, you received medications for sedation.  These   medications may affect your judgment, balance and coordination.  Therefore,   for 24 hours, you have the following restrictions:   - DO NOT drive a car, operate machinery, make legal/financial decisions,   sign important papers or drink alcohol.    ACTIVITY:  Today: no heavy lifting, straining or running due to procedural   sedation/anesthesia.  The following day: return to full activity including work.  DIET:  Eat and drink normally unless instructed otherwise.     TREATMENT FOR COMMON SIDE EFFECTS:  - Mild abdominal pain, nausea, belching, bloating or excessive gas:  rest,   eat lightly and use a heating pad.  - Sore Throat: treat with throat lozenges and/or gargle with warm salt   water.  - Because air was used during the procedure, expelling large amounts of air   from your rectum or belching is normal.  - If a bowel prep was taken, you may not have a bowel movement for 1-3 days.    This is normal.  SYMPTOMS TO WATCH FOR AND REPORT TO YOUR PHYSICIAN:  1. Abdominal pain or bloating, other than gas cramps.  2. Chest pain.  3. Back pain.  4. Signs of infection such as: chills or fever occurring within 24 hours   after the procedure.  5. Rectal bleeding, which would show as bright red, maroon, or black stools.   (A tablespoon of blood from the rectum is not serious, especially if    hemorrhoids are present.)  6. Vomiting.  7. Weakness or dizziness.  GO DIRECTLY TO THE NEAREST EMERGENCY ROOM IF YOU HAVE ANY OF THE FOLLOWING:      Difficulty breathing              Chills and/or fever over 101 F   Persistent vomiting and/or vomiting blood   Severe abdominal pain   Severe chest pain   Black, tarry stools   Bleeding- more than one tablespoon   Any other symptom or condition that you feel may need urgent attention  Your doctor recommends these additional instructions:  If any biopsies were taken, your doctors clinic will contact you in 1 to 2   weeks with any results.  - Discharge patient to home (ambulatory).   - Patient has a contact number available for emergencies.  The signs and   symptoms of potential delayed complications were discussed with the   patient.  Return to normal activities tomorrow.  Written discharge   instructions were provided to the patient.   - Resume previous diet.   - Continue present medications.   - Return to primary care physician as previously scheduled.   - Repeat colonoscopy date to be determined after pending pathology results   are reviewed for surveillance based on pathology results.   - Resume Eliquis (apixaban) at prior dose tomorrow.  For questions, problems or results please call your physician - Noam Hollingsworth MD at Work:  [phone].  [facility] EMERGENCY ROOM PHONE NUMBER: [phone]  IF A COMPLICATION OR EMERGENCY SITUATION ARISES AND YOU ARE UNABLE TO REACH   YOUR PHYSICIAN - GO DIRECTLY TO THE EMERGENCY ROOM.  Noam Hollingsworth MD  5/15/2023 9:31:59 AM  This report has been verified and signed electronically.  Dear patient,  As a result of recent federal legislation (The Federal Cures Act), you may   receive lab or pathology results from your procedure in your [facility]   account before your physician is able to contact you. Your physician or   their representative will relay the results to you with their   recommendations at their  soonest availability.  Thank you,  PROVATION

## 2023-05-15 NOTE — ANESTHESIA PREPROCEDURE EVALUATION
05/15/2023  Pre-operative evaluation for Procedure(s) (LRB):  COLONOSCOPY (N/A)    Anthony Reynaga is a 73 y.o. male     Patient Active Problem List   Diagnosis    Elevated PSA    Paroxysmal atrial fibrillation    PVD (posterior vitreous detachment)    High myopia    NS (nuclear sclerosis), bilateral    BPH with urinary obstruction    Long term current use of anticoagulant therapy    Aortic regurgitation    Essential hypertension, benign    Voice disturbance    Vocal fold atrophy    Glottic insufficiency    Hyperfunctional dysphonia    Mixed hyperlipidemia    PVC (premature ventricular contraction)    Mild aortic stenosis    Posterior vitreous detachment, bilateral       Review of patient's allergies indicates:  No Known Allergies    No current facility-administered medications on file prior to encounter.     Current Outpatient Medications on File Prior to Encounter   Medication Sig Dispense Refill    busPIRone (BUSPAR) 5 MG Tab TAKE 1 TABLET BY MOUTH  TWICE DAILY 180 tablet 3    finasteride (PROSCAR) 5 mg tablet Take 1 tablet (5 mg total) by mouth once daily. 90 tablet 3    flecainide (TAMBOCOR) 150 MG Tab TAKE 2 TABLETS (300 mg total ) BY MOUTH AS NEEDED FOR AF. LIMIT ONE DOSE PER 24 HOURS. 90 tablet 3    LORazepam (ATIVAN) 1 MG tablet Take 1 tablet (1 mg total) by mouth every evening. for 20 days (Patient taking differently: Take 1 mg by mouth daily as needed.) 20 tablet 0    metoprolol succinate (TOPROL-XL) 200 MG 24 hr tablet TAKE 1 TABLET BY MOUTH ONCE DAILY 90 tablet 3    potassium chloride SA (K-DUR,KLOR-CON) 20 MEQ tablet TAKE 1 TABLET BY MOUTH  TWICE DAILY 180 tablet 3    vitamin D 1000 units Tab Take 2,000 Units by mouth once daily.          Past Surgical History:   Procedure Laterality Date    APPENDECTOMY      BLADDER SURGERY      polyp removed benign    COLONOSCOPY N/A  6/22/2016    Procedure: COLONOSCOPY;  Surgeon: Joaquin Francis MD;  Location: [facility] ([address] [place] [address]);  Service: Endoscopy;  Laterality: N/A;    Thanks for letting us know.  I would approve him to hold coumadin x 3 days prior, without lovenox.  We will see him for an INR on 6/8 and will provide him with procedure instructions at that time., per [facility]    CYSTOSCOPY      benign bladder papilloma    FINGER SURGERY      HERNIA REPAIR Left 2016    inguinal    LITHOTRIPSY      TONSILLECTOMY, ADENOIDECTOMY         Social History     Socioeconomic History    Marital status: Single   Tobacco Use    Smoking status: Never    Smokeless tobacco: Never   Substance and Sexual Activity    Alcohol use: Yes     Alcohol/week: 1.0 - 2.0 standard drink     Types: 1 - 2 Cans of beer per week     Comment: 1-2 cans of beer 2-3 times per month    Drug use: No    Sexual activity: Yes     Partners: Female     Birth control/protection: OCP         2D Echo:  Results for orders placed or performed during the hospital encounter of 08/20/18   2D echo with color flow doppler   Result Value Ref Range    EF + QEF 55 55 - 65    Mitral Valve Regurgitation TRIVIAL TO MILD     Diastolic Dysfunction Yes (A)     Aortic Valve Regurgitation MILD     Est. PA Systolic Pressure 26.63     Tricuspid Valve Regurgitation MILD            Pre-op Assessment    I have reviewed the Patient Summary Reports.     I have reviewed the Nursing Notes. I have reviewed the NPO Status.      Review of Systems  Anesthesia Hx:  No problems with previous Anesthesia    Cardiovascular:   Hypertension Dysrhythmias atrial fibrillation    Renal/:   renal calculi        Physical Exam    Airway:  Mouth Opening: Normal  Tongue: Normal    Chest/Lungs:  Normal Respiratory Rate    Heart:  Rhythm: Regular Rhythm        Anesthesia Plan  Type of Anesthesia, risks & benefits discussed:    Anesthesia Type: Gen Natural Airway  Intra-op Monitoring Plan: Standard ASA  Monitors  Induction:  IV  Informed Consent: Informed consent signed with the Patient and all parties understand the risks and agree with anesthesia plan.  All questions answered.   ASA Score: 3    Ready For Surgery From Anesthesia Perspective.     .

## 2023-05-15 NOTE — H&P
COLONOSCOPY HISTORY AND PHYSICAL EXAM    Procedure : Colonoscopy      INDICATIONS: personal history of colon polyps    Family Hx of CRC: none    Last Colonoscopy:  2016  Findings: single adenoma, cecum       Past Medical History:   Diagnosis Date    Anticoagulant long-term use     Atrial fibrillation     BPH (benign prostatic hyperplasia)     Cataract     Elevated PSA     Floaters     Hernia     bilateral inquinal    Hypertension     Inguinal hernia     Kidney stone     Knee injury      Sedation Problems: NO  Family History   Problem Relation Age of Onset    Cancer Mother         Terminal Renal Cancer    Hypertension Mother     Dementia Father     Hypertension Father     Benign prostatic hyperplasia Neg Hx      Fam Hx of Sedation Problems: NO  Social History     Socioeconomic History    Marital status: Single   Tobacco Use    Smoking status: Never    Smokeless tobacco: Never   Substance and Sexual Activity    Alcohol use: Yes     Alcohol/week: 1.0 - 2.0 standard drink     Types: 1 - 2 Cans of beer per week     Comment: 1-2 cans of beer 2-3 times per month    Drug use: No    Sexual activity: Yes     Partners: Female     Birth control/protection: OCP       Review of Systems - Negative except   Respiratory ROS: no dyspnea  Cardiovascular ROS: no exertional chest pain  Gastrointestinal ROS: NO abdominal discomfort,  NO rectal bleeding  Musculoskeletal ROS: no muscular pain  Neurological ROS: no recent stroke    Physical Exam:  BP (!) 164/71   Pulse 64   Temp 97.9 °F (36.6 °C)   Resp 15   Ht 6' (1.829 m)   Wt 79.4 kg (175 lb)   SpO2 97%   BMI 23.73 kg/m²   General: no distress  Head: normocephalic  Mallampati Score   Neck: supple, symmetrical, trachea midline  Lungs:  clear to auscultation bilaterally  Heart: regular rate and rhythm  Abdomen: soft, non-tender non-distented; bowel sounds normal; no masses,  no organomegaly  Extremities: no cyanosis or edema, or clubbing    ASA:  III    PLAN  COLONOSCOPY.     SedationPlan :MAC    The details of the procedure, the possible need for biopsy or polypectomy and the potential risks including bleeding, perforation, missed polyps were discussed in detail.

## 2023-05-15 NOTE — TRANSFER OF CARE
Anesthesia Transfer of Care Note    Patient: Anthony Reynaga    Procedure(s) Performed: Procedure(s) (LRB):  COLONOSCOPY (N/A)    Patient location: GI    Anesthesia Type: general    Transport from OR: Transported from OR on room air with adequate spontaneous ventilation    Post pain: adequate analgesia    Post assessment: no apparent anesthetic complications and tolerated procedure well    Post vital signs: stable    Level of consciousness: responds to stimulation and lethargic    Nausea/Vomiting: no nausea/vomiting    Complications: none    Transfer of care protocol was followed      Last vitals:   Visit Vitals  BP (!) 164/71   Pulse 64   Temp 36.6 °C (97.9 °F)   Resp 15   Ht 6' (1.829 m)   Wt 79.4 kg (175 lb)   SpO2 97%   BMI 23.73 kg/m²

## 2023-05-15 NOTE — ANESTHESIA POSTPROCEDURE EVALUATION
Anesthesia Post Evaluation    Patient: Anthony Reynaga    Procedure(s) Performed: Procedure(s) (LRB):  COLONOSCOPY (N/A)    Final Anesthesia Type: general      Patient location during evaluation: PACU  Patient participation: Yes- Able to Participate  Level of consciousness: awake and alert  Post-procedure vital signs: reviewed and stable  Pain management: adequate  Airway patency: patent    PONV status at discharge: No PONV  Anesthetic complications: no      Cardiovascular status: blood pressure returned to baseline  Respiratory status: unassisted  Follow-up not needed.          Vitals Value Taken Time   /53 05/15/23 1010   Temp 36.6 °C (97.9 °F) 05/15/23 0940   Pulse 52 05/15/23 1010   Resp 15 05/15/23 1010   SpO2 97 % 05/15/23 1010         Event Time   Out of Recovery 10:27:15         Pain/Isra Score: Isra Score: 9 (5/15/2023  9:55 AM)

## 2023-05-18 ENCOUNTER — TELEPHONE (OUTPATIENT)
Dept: ENDOSCOPY | Facility: HOSPITAL | Age: 73
End: 2023-05-18
Payer: MEDICARE

## 2023-05-19 LAB
FINAL PATHOLOGIC DIAGNOSIS: NORMAL
GROSS: NORMAL
Lab: NORMAL

## 2023-06-14 ENCOUNTER — LAB VISIT (OUTPATIENT)
Dept: LAB | Facility: HOSPITAL | Age: 73
End: 2023-06-14
Attending: UROLOGY
Payer: MEDICARE

## 2023-06-14 DIAGNOSIS — N40.1 BENIGN PROSTATIC HYPERPLASIA WITH URINARY OBSTRUCTION: ICD-10-CM

## 2023-06-14 DIAGNOSIS — N13.8 BENIGN PROSTATIC HYPERPLASIA WITH URINARY OBSTRUCTION: ICD-10-CM

## 2023-06-14 DIAGNOSIS — R97.20 ELEVATED PSA: ICD-10-CM

## 2023-06-14 PROCEDURE — 36415 COLL VENOUS BLD VENIPUNCTURE: CPT | Performed by: UROLOGY

## 2023-06-15 LAB
-2 PROPSA (PHI): 7.1 PG/ML
FREE PSA (PHI): 0.6 NG/ML
PROSTATE HEALTH INDEX VALUE (PHI): NORMAL
PSA FREE MFR SERPL: NORMAL %
PSA SERPL-MCNC: 3 NG/ML

## 2023-06-22 ENCOUNTER — OFFICE VISIT (OUTPATIENT)
Dept: UROLOGY | Facility: CLINIC | Age: 73
End: 2023-06-22
Payer: MEDICARE

## 2023-06-22 DIAGNOSIS — R97.20 ELEVATED PSA: Primary | ICD-10-CM

## 2023-06-22 DIAGNOSIS — N40.1 BPH WITH URINARY OBSTRUCTION: ICD-10-CM

## 2023-06-22 DIAGNOSIS — N13.8 BPH WITH URINARY OBSTRUCTION: ICD-10-CM

## 2023-06-22 PROCEDURE — 99999 PR PBB SHADOW E&M-EST. PATIENT-LVL II: ICD-10-PCS | Mod: PBBFAC,,, | Performed by: UROLOGY

## 2023-06-22 PROCEDURE — 99214 PR OFFICE/OUTPT VISIT, EST, LEVL IV, 30-39 MIN: ICD-10-PCS | Mod: S$PBB,,, | Performed by: UROLOGY

## 2023-06-22 PROCEDURE — 99214 OFFICE O/P EST MOD 30 MIN: CPT | Mod: S$PBB,,, | Performed by: UROLOGY

## 2023-06-22 PROCEDURE — 99212 OFFICE O/P EST SF 10 MIN: CPT | Mod: PBBFAC | Performed by: UROLOGY

## 2023-06-22 PROCEDURE — 99999 PR PBB SHADOW E&M-EST. PATIENT-LVL II: CPT | Mod: PBBFAC,,, | Performed by: UROLOGY

## 2023-06-22 NOTE — PROGRESS NOTES
Clinic Note  6/22/2023      Subjective:         Chief Complaint:   HPI  Anthony Reynaga is a 73 y.o. male history of BPH and elevated PSA. S/p biopsy (B9) in 2 /11. Moderate symptoms, minimal bother.  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 History of kidney stones and prostatitis. Is on  Uroxatrol. Talked to Bashir about Rezum.  Hernia repair in July 2016.  LUTS: bothered by nocturia 4-6x /noc. Also bothered by decreased ejaculate and decreased orgasm intensity.  Stopped finasteride 4 year ago. Is thinking about [facility].        3/6/2020 PHI- PSA 5, 24% free, PHI score- 25  5/11/2021 PHI- PSA 5.3, 25% free, PHI score 24.     6/21/22: He returns to clinic today for f/u. PHI from today pending. He plans on retiring on the 30th of this month. States his urinary frequency has improved on Uroxatrol, but hesitancy and intermittency has worsened. He interested in discussing surgical therapy for his BPH with obstructive LUTS. He is currently not interested in obtaining a MRI at this time.   post void residual 95 ccs     6/22/2023- PSA 6/14/2023- 3.0 (corrected 6.0). Met with Dr. Camejo in January to discuss BPH therapies.  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 Proscar helping (nocturia improved from 6 to 2 times/noc).  Reviewed [place] age specific ranges.      Lab Results   Component Value Date    PSA 3.80 10/04/2012    PSA 4.10 (H) 03/01/2012    PSA 3.2 10/13/2011    PSADIAG 5.7 (H) 10/30/2019    PSADIAG 3.6 10/19/2018    PSADIAG 2.1 09/20/2017    PSADIAG 3.0 09/14/2016    PSADIAG 4.9 (H) 03/11/2016    PSADIAG 4.6 (H) 10/20/2015    PSADIAG 3.2 10/14/2014    PSADIAG 3.5 10/08/2013      Past Medical History:   Diagnosis Date    Anticoagulant long-term use     Atrial fibrillation     BPH (benign prostatic hyperplasia)     Cataract     Elevated PSA     Floaters     Hernia     bilateral inquinal    Hypertension     Inguinal hernia     Kidney stone     Knee injury      Family History   Problem Relation  Age of Onset    Cancer Mother         Terminal Renal Cancer    Hypertension Mother     Dementia Father     Hypertension Father     Benign prostatic hyperplasia Neg Hx      Social History     Socioeconomic History    Marital status: Single   Tobacco Use    Smoking status: Never    Smokeless tobacco: Never   Substance and Sexual Activity    Alcohol use: Yes     Alcohol/week: 1.0 - 2.0 standard drink     Types: 1 - 2 Cans of beer per week     Comment: 1-2 cans of beer 2-3 times per month    Drug use: No    Sexual activity: Yes     Partners: Female     Birth control/protection: OCP     Past Surgical History:   Procedure Laterality Date    APPENDECTOMY      BLADDER SURGERY      polyp removed benign    COLONOSCOPY N/A 6/22/2016    Procedure: COLONOSCOPY;  Surgeon: Joaquin Francis MD;  Location: [facility] (4TH FLR);  Service: Endoscopy;  Laterality: N/A;    Thanks for letting us know.  I would approve him to hold coumadin x 3 days prior, without lovenox.  We will see him for an INR on 6/8 and will provide him with procedure instructions at that time., per [facility]    COLONOSCOPY N/A 5/15/2023    Procedure: COLONOSCOPY;  Surgeon: Noam Hollingsworth MD;  Location: [facility] ([address] [place] [address]);  Service: Endoscopy;  Laterality: N/A;  instr portal-tb-eliquis hold ok see te11/11/22  Ok to hold Eliquis- See t/e 3/30/23, instr via portal - PC  golytely  5/9 - precall done - [facility]    CYSTOSCOPY      benign bladder papilloma    FINGER SURGERY      HERNIA REPAIR Left 2016    inguinal    LITHOTRIPSY      TONSILLECTOMY, ADENOIDECTOMY       Patient Active Problem List   Diagnosis    Elevated PSA    Paroxysmal atrial fibrillation    PVD (posterior vitreous detachment)    High myopia    NS (nuclear sclerosis), bilateral    BPH with urinary obstruction    Long term current use of anticoagulant therapy    Aortic regurgitation    Essential hypertension, benign    Voice disturbance    Vocal fold atrophy    Glottic insufficiency     Hyperfunctional dysphonia    Mixed hyperlipidemia    PVC (premature ventricular contraction)    Mild aortic stenosis    Posterior vitreous detachment, bilateral     Review of Systems   Constitutional:  Negative for appetite change, chills, fatigue, fever and unexpected weight change.   HENT:  Negative for nosebleeds.    Respiratory:  Negative for shortness of breath and wheezing.    Cardiovascular:  Negative for chest pain, palpitations and leg swelling.   Gastrointestinal:  Negative for abdominal distention, abdominal pain, constipation, diarrhea, nausea and vomiting.   Genitourinary:  Positive for frequency and nocturia. Negative for dysuria and hematuria.   Musculoskeletal:  Negative for arthralgias and back pain.   Skin:  Negative for pallor.   Neurological:  Negative for dizziness, seizures and syncope.   Hematological:  Negative for adenopathy.   Psychiatric/Behavioral:  Negative for dysphoric mood.        Objective:      There were no vitals taken for this visit.  Estimated body mass index is 23.73 kg/m² as calculated from the following:    Height as of 5/15/23: 6' (1.829 m).    Weight as of 5/15/23: 79.4 kg (175 lb).  Physical Exam      Assessment and Plan:           Problem List Items Addressed This Visit       Elevated PSA - Primary    BPH with urinary obstruction       Follow up:   1 year with EAMON.    Livan Lowry

## 2023-07-01 ENCOUNTER — TELEPHONE (OUTPATIENT)
Dept: INTERNAL MEDICINE | Facility: CLINIC | Age: 73
End: 2023-07-01
Payer: MEDICARE

## 2023-07-01 NOTE — TELEPHONE ENCOUNTER
Gladly accept him.    Please call patient and schedule patient for an appointment with me. Thank you.

## 2023-07-01 NOTE — TELEPHONE ENCOUNTER
----- Message from Chaya Stewart sent at 6/30/2023  1:27 PM CDT -----  Contact: [phone]  Pt would like to schedule a new pt appt. Dr. Ferrera was recommended by Dr. Livan Lowry. Next annual is not due until October 11. Pt was advised that  is not accepting any new pts at this time. Pt would like to be added to doctors waitlist if possible.

## 2023-07-02 ENCOUNTER — PATIENT MESSAGE (OUTPATIENT)
Dept: OTOLARYNGOLOGY | Facility: CLINIC | Age: 73
End: 2023-07-02
Payer: MEDICARE

## 2023-07-12 ENCOUNTER — PATIENT MESSAGE (OUTPATIENT)
Dept: INTERNAL MEDICINE | Facility: CLINIC | Age: 73
End: 2023-07-12
Payer: MEDICARE

## 2023-07-12 ENCOUNTER — TELEPHONE (OUTPATIENT)
Dept: INTERNAL MEDICINE | Facility: CLINIC | Age: 73
End: 2023-07-12
Payer: MEDICARE

## 2023-07-14 ENCOUNTER — OFFICE VISIT (OUTPATIENT)
Dept: INTERNAL MEDICINE | Facility: CLINIC | Age: 73
End: 2023-07-14
Payer: MEDICARE

## 2023-07-14 VITALS
OXYGEN SATURATION: 97 % | BODY MASS INDEX: 24.42 KG/M2 | WEIGHT: 180.31 LBS | DIASTOLIC BLOOD PRESSURE: 65 MMHG | SYSTOLIC BLOOD PRESSURE: 125 MMHG | HEIGHT: 72 IN | HEART RATE: 59 BPM

## 2023-07-14 DIAGNOSIS — L98.9 SKIN LESIONS: Primary | ICD-10-CM

## 2023-07-14 PROCEDURE — 99999 PR PBB SHADOW E&M-EST. PATIENT-LVL IV: CPT | Mod: PBBFAC,,, | Performed by: PHYSICIAN ASSISTANT

## 2023-07-14 PROCEDURE — 99212 PR OFFICE/OUTPT VISIT, EST, LEVL II, 10-19 MIN: ICD-10-PCS | Mod: S$PBB,,, | Performed by: PHYSICIAN ASSISTANT

## 2023-07-14 PROCEDURE — 99999 PR PBB SHADOW E&M-EST. PATIENT-LVL IV: ICD-10-PCS | Mod: PBBFAC,,, | Performed by: PHYSICIAN ASSISTANT

## 2023-07-14 PROCEDURE — 99212 OFFICE O/P EST SF 10 MIN: CPT | Mod: S$PBB,,, | Performed by: PHYSICIAN ASSISTANT

## 2023-07-14 PROCEDURE — 99214 OFFICE O/P EST MOD 30 MIN: CPT | Mod: PBBFAC | Performed by: PHYSICIAN ASSISTANT

## 2023-07-14 NOTE — PROGRESS NOTES
INTERNAL MEDICINE URGENT VISIT NOTE    CHIEF COMPLAINT     Chief Complaint   Patient presents with    Cyst     On right forearm       HPI     Anthony Reynaga is a 73 y.o. male who presents for an urgent visit today.    PCP is Hayes Miranda MD, patient is known to me.     Three skin lesions.  Two lesions on the right forearm and right upper arm.   The right lower arm lesion is erythematous and     Past Medical History:  Past Medical History:   Diagnosis Date    Anticoagulant long-term use     Atrial fibrillation     BPH (benign prostatic hyperplasia)     Cataract     Elevated PSA     Floaters     Hernia     bilateral inquinal    Hypertension     Inguinal hernia     Kidney stone     Knee injury        Home Medications:  Prior to Admission medications    Medication Sig Start Date End Date Taking? Authorizing Provider   busPIRone (BUSPAR) 5 MG Tab TAKE 1 TABLET BY MOUTH  TWICE DAILY 11/7/22  Yes Gonzalo Harris MD   chlorthalidone (HYGROTEN) 25 MG Tab TAKE 1 TABLET BY MOUTH ONCE DAILY 3/15/23  Yes Jean Nair MD   ELIQUIS 5 mg Tab TAKE 1 TABLET BY MOUTH  TWICE DAILY 3/15/23  Yes Jean Nair MD   finasteride (PROSCAR) 5 mg tablet Take 1 tablet (5 mg total) by mouth once daily. 10/24/22 10/24/23 Yes Gonzalo Harris MD   flecainide (TAMBOCOR) 150 MG Tab TAKE 2 TABLETS (300 mg total ) BY MOUTH AS NEEDED FOR AF. LIMIT ONE DOSE PER 24 HOURS. 11/14/22  Yes Bryan Gomez MD   metoprolol succinate (TOPROL-XL) 200 MG 24 hr tablet TAKE 1 TABLET BY MOUTH ONCE DAILY 11/7/22  Yes Gonzalo Harris MD   potassium chloride SA (K-DUR,KLOR-CON) 20 MEQ tablet TAKE 1 TABLET BY MOUTH  TWICE DAILY 10/20/22  Yes Gonzalo Harris MD   simvastatin (ZOCOR) 20 MG tablet TAKE 1 TABLET BY MOUTH IN  THE EVENING 1/4/23  Yes Gonzalo Harris MD   vitamin D 1000 units Tab Take 2,000 Units by mouth once daily.    Yes Historical Provider   budesonide (PULMICORT) 0.5 mg/2 mL nebulizer solution Take 2 mLs (0.5 mg  total) by nebulization once daily. For use in saline irrigation as directed. 3/10/23 4/9/23  Wilfredo Hendrix MD   LORazepam (ATIVAN) 1 MG tablet Take 1 tablet (1 mg total) by mouth every evening. for 20 days  Patient taking differently: Take 1 mg by mouth daily as needed. 11/23/20 2/27/23  Chip Rincon MD       Review of Systems:  Review of Systems   Constitutional:  Negative for chills and fever.   HENT:  Negative for sore throat and trouble swallowing.    Eyes:  Negative for visual disturbance.   Respiratory:  Negative for cough and shortness of breath.    Cardiovascular:  Negative for chest pain.   Gastrointestinal:  Negative for abdominal pain, constipation, diarrhea, nausea and vomiting.   Genitourinary:  Negative for dysuria and flank pain.   Musculoskeletal:  Negative for back pain, neck pain and neck stiffness.   Skin:  Negative for rash.        Lesion    Neurological:  Negative for dizziness, syncope, weakness and headaches.   Psychiatric/Behavioral:  Negative for confusion.        Health Maintainence:   Immunizations:  Health Maintenance         Date Due Completion Date    PROSTATE-SPECIFIC ANTIGEN 10/30/2020 10/30/2019    Influenza Vaccine (1) 09/01/2023 10/6/2022 (Done)    Override on 10/6/2022: Done (pt doesn't have lot number/ got vaccine completed at pharmacy)    TETANUS VACCINE 05/26/2025 5/26/2015    Lipid Panel 10/06/2027 10/6/2022    Colorectal Cancer Screening 05/15/2030 5/15/2023             PHYSICAL EXAM     /65   Pulse (!) 59   Ht 6' (1.829 m)   Wt 81.8 kg (180 lb 5.4 oz)   SpO2 97%   BMI 24.46 kg/m²     Physical Exam  Vitals and nursing note reviewed.   Constitutional:       Appearance: Normal appearance.      Comments: Elderly male in NAD or apparent pain    HENT:      Head: Normocephalic and atraumatic.      Nose: Nose normal.      Mouth/Throat:      Pharynx: Oropharynx is clear.   Eyes:      Conjunctiva/sclera: Conjunctivae normal.   Cardiovascular:      Rate and  Rhythm: Normal rate and regular rhythm.      Pulses: Normal pulses.   Pulmonary:      Effort: No respiratory distress.   Abdominal:      Tenderness: There is no abdominal tenderness.   Musculoskeletal:         General: Normal range of motion.      Cervical back: No rigidity.   Skin:     General: Skin is warm and dry.      Capillary Refill: Capillary refill takes less than 2 seconds.      Findings: No rash.   Neurological:      General: No focal deficit present.      Mental Status: He is alert.      Gait: Gait normal.   Psychiatric:         Mood and Affect: Mood normal.         LABS     No results found for: LABA1C, HGBA1C  CMP  Sodium   Date Value Ref Range Status   10/06/2022 140 136 - 145 mmol/L Final     Potassium   Date Value Ref Range Status   10/06/2022 3.8 3.5 - 5.1 mmol/L Final     Chloride   Date Value Ref Range Status   10/06/2022 108 95 - 110 mmol/L Final     CO2   Date Value Ref Range Status   10/06/2022 25 23 - 29 mmol/L Final     Glucose   Date Value Ref Range Status   10/06/2022 91 70 - 110 mg/dL Final     BUN   Date Value Ref Range Status   10/06/2022 17 8 - 23 mg/dL Final     Creatinine   Date Value Ref Range Status   10/06/2022 0.8 0.5 - 1.4 mg/dL Final     Calcium   Date Value Ref Range Status   10/06/2022 9.9 8.7 - 10.5 mg/dL Final     Total Protein   Date Value Ref Range Status   10/06/2022 7.0 6.0 - 8.4 g/dL Final     Albumin   Date Value Ref Range Status   10/06/2022 4.0 3.5 - 5.2 g/dL Final     Total Bilirubin   Date Value Ref Range Status   10/06/2022 1.0 0.1 - 1.0 mg/dL Final     Comment:     For infants and newborns, interpretation of results should be based  on gestational age, weight and in agreement with clinical  observations.    Premature Infant recommended reference ranges:  Up to 24 hours.............<8.0 mg/dL  Up to 48 hours............<12.0 mg/dL  3-5 days..................<15.0 mg/dL  6-29 days.................<15.0 mg/dL       Alkaline Phosphatase   Date Value Ref Range Status    10/06/2022 47 (L) 55 - 135 U/L Final     AST   Date Value Ref Range Status   10/06/2022 22 10 - 40 U/L Final     ALT   Date Value Ref Range Status   10/06/2022 21 10 - 44 U/L Final     Anion Gap   Date Value Ref Range Status   10/06/2022 7 (L) 8 - 16 mmol/L Final     eGFR if    Date Value Ref Range Status   10/13/2021 >60 >60 mL/min/1.73 m^2 Final     eGFR if non    Date Value Ref Range Status   10/13/2021 >60 >60 mL/min/1.73 m^2 Final     Comment:     Calculation used to obtain the estimated glomerular filtration  rate (eGFR) is the CKD-EPI equation.        Lab Results   Component Value Date    WBC 6.98 10/06/2022    HGB 14.7 10/06/2022    HCT 45.0 10/06/2022    MCV 84 10/06/2022     10/06/2022     Lab Results   Component Value Date    CHOL 131 10/06/2022    CHOL 121 10/13/2021    CHOL 137 11/12/2020     Lab Results   Component Value Date    HDL 45 10/06/2022    HDL 45 10/13/2021    HDL 41 11/12/2020     Lab Results   Component Value Date    LDLCALC 63.8 10/06/2022    LDLCALC 58.2 (L) 10/13/2021    LDLCALC 63.8 11/12/2020     Lab Results   Component Value Date    TRIG 111 10/06/2022    TRIG 89 10/13/2021    TRIG 161 (H) 11/12/2020     Lab Results   Component Value Date    CHOLHDL 34.4 10/06/2022    CHOLHDL 37.2 10/13/2021    CHOLHDL 29.9 11/12/2020     Lab Results   Component Value Date    TSH 0.378 (L) 10/13/2021       ASSESSMENT/PLAN     Anthony Reynaga is a 73 y.o. male       Anthony was seen today for cyst.    Diagnoses and all orders for this visit:    Skin lesions  -     Ambulatory referral/consult to Dermatology; Future       Patient was counseled on when and how to seek emergent care.       Grisel Perez PA-C   Department of Internal Medicine - [facility]   4:07 PM

## 2023-08-16 RX ORDER — FINASTERIDE 5 MG/1
5 TABLET, FILM COATED ORAL
Qty: 90 TABLET | Refills: 3 | Status: SHIPPED | OUTPATIENT
Start: 2023-08-16

## 2023-08-23 NOTE — PROGRESS NOTES
Anthony Reynaga is a 73 y.o. male here for follow-up for atrial fibrillation, functionally bicsupid aortic valve, HLD. Patient of Dr Nair last seen in 08/31/2022.    Dx:  Functionally bicuspid aortic valve  Mildly AS/AR  PAF, on flecainide pill in pocket, and metoprolol 200  Hypertension  ACE-inhibitor cough  Hyperlipidemia  History of iatrogenic hypokalemia    Patient feels well. Short of breath with talking. Only occurs at rest. Able to walk briskly and sweat without any shortness of breath. Denies chest discomfort, palpitations, claudication symptoms.     The patient is not known to have coexisting coronary artery disease.     Cardiac Risk Factors  Age > 45-male, > 55-female:  YES  +1   Smoking:   NO   Sig. family hx of CHD*:  NO   Hypertension:   YES  +1   Diabetes:   NO   HDL < 35:   NO   HDL > 59:   NO   Total: 2   *- Significant family history of Coronary Heart Disease per National Cholesterol Education Program = Myocardial Infarction or sudden death at less than 55 years old in   father or other 1st-degree male relative, or less than 65 years old in mother or   other 1st-degree female relative.    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    Mixed hyperlipidemia 08/24/2017    PVC (premature ventricular contraction) 01/18/2018    Mild aortic stenosis 10/02/2019    Posterior vitreous detachment, bilateral 05/04/2021     Resolved Ambulatory Problems     Diagnosis Date Noted    Right ureteral stone 06/25/2015     Bilateral kidney stones 06/25/2015    Kidney stone 07/01/2015     Past Medical History:   Diagnosis Date    Anticoagulant long-term use     Atrial fibrillation     BPH (benign prostatic hyperplasia)     Cataract     Elevated PSA     Floaters     Hernia     Hypertension     Inguinal hernia     Knee injury           Review of patient's allergies indicates:  No Known Allergies       Current Outpatient Medications   Medication Instructions    budesonide (PULMICORT) 0.5 mg, Nebulization, Daily, For use in saline irrigation as directed.    busPIRone (BUSPAR) 5 MG Tab TAKE 1 TABLET BY MOUTH  TWICE DAILY    chlorthalidone (HYGROTEN) 25 MG Tab TAKE 1 TABLET BY MOUTH ONCE DAILY    ELIQUIS 5 mg Tab TAKE 1 TABLET BY MOUTH  TWICE DAILY    finasteride (PROSCAR) 5 mg, Oral    flecainide (TAMBOCOR) 150 MG Tab TAKE 2 TABLETS (300 mg total ) BY MOUTH AS NEEDED FOR AF. LIMIT ONE DOSE PER 24 HOURS.    LORazepam (ATIVAN) 1 mg, Oral, Nightly    metoprolol succinate (TOPROL-XL) 200 MG 24 hr tablet TAKE 1 TABLET BY MOUTH ONCE DAILY    potassium chloride SA (K-DUR,KLOR-CON) 20 MEQ tablet TAKE 1 TABLET BY MOUTH  TWICE DAILY    simvastatin (ZOCOR) 20 MG tablet TAKE 1 TABLET BY MOUTH IN  THE EVENING    vitamin D (VITAMIN D3) 2,000 Units, Oral, Daily         Past Surgical History:   Procedure Laterality Date    APPENDECTOMY      BLADDER SURGERY      polyp removed benign    COLONOSCOPY N/A 6/22/2016    Procedure: COLONOSCOPY;  Surgeon: Joaquin Francis MD;  Location: [facility] ([address] [place] [address]);  Service: Endoscopy;  Laterality: N/A;    Thanks for letting us know.  I would approve him to hold coumadin x 3 days prior, without lovenox.  We will see him for an INR on 6/8 and will provide him with procedure instructions at that time., per [facility]    COLONOSCOPY N/A 5/15/2023    Procedure: COLONOSCOPY;  Surgeon: Noam Hollingsworth MD;  [facility]: [facility] ([facility]  Service: Endoscopy;  Laterality: N/A;  instr portal-tb-eliquis hold ok see  "te11/11/22  Ok to hold Eliquis- See t/e 3/30/23, instr via portal - PC  golytely  5/9 - precall done - [facility]    CYSTOSCOPY      benign bladder papilloma    FINGER SURGERY      HERNIA REPAIR Left 2016    inguinal    LITHOTRIPSY      TONSILLECTOMY, ADENOIDECTOMY            Family History   Problem Relation Age of Onset    Cancer Mother         Terminal Renal Cancer    Hypertension Mother     Dementia Father     Hypertension Father     Benign prostatic hyperplasia Neg Hx           Social History     Socioeconomic History    Marital status: Single   Tobacco Use    Smoking status: Never    Smokeless tobacco: Never   Substance and Sexual Activity    Alcohol use: Yes     Alcohol/week: 1.0 - 2.0 standard drink of alcohol     Types: 1 - 2 Cans of beer per week     Comment: 1-2 cans of beer 2-3 times per month    Drug use: No    Sexual activity: Yes     Partners: Female     Birth control/protection: OCP         The following portions of the patient's history were reviewed and updated as appropriate: allergies, current medications, past family history, past medical history, past social history, past surgical history, and problem list.     Review of Systems  ROS      Objective:     Vitals:    08/24/23 1534   BP: (!) 119/59   Pulse: (!) 57   SpO2: 96%   Weight: 81.8 kg (180 lb 5.4 oz)   Height: 6' 8" (2.032 m)   PainSc: 0-No pain        Physical Exam:  Physical Exam  Vitals reviewed.   Constitutional:       General: He is not in acute distress.     Appearance: Normal appearance. He is not ill-appearing or toxic-appearing.   HENT:      Head: Normocephalic and atraumatic.      Mouth/Throat:      Mouth: Mucous membranes are moist.   Cardiovascular:      Rate and Rhythm: Normal rate and regular rhythm.      Heart sounds: Murmur (Grade 2/6 systolic crescendo - decrescendo murmur at RUSB 2nd intercostal) heard.      No friction rub. No gallop.   Pulmonary:      Effort: Pulmonary effort is normal. No respiratory distress.      Breath " sounds: Normal breath sounds.   Abdominal:      Palpations: Abdomen is soft.   Musculoskeletal:      Right lower leg: No edema.      Left lower leg: No edema.   Skin:     General: Skin is warm.   Neurological:      Mental Status: He is alert and oriented to person, place, and time. Mental status is at baseline.         Lab Review   Lab Results   Component Value Date     10/06/2022    K 3.8 10/06/2022     10/06/2022    CO2 25 10/06/2022    BUN 17 10/06/2022    CREATININE 0.8 10/06/2022    CALCIUM 9.9 10/06/2022     Lab Results   Component Value Date    WBC 6.98 10/06/2022    HGB 14.7 10/06/2022    HCT 45.0 10/06/2022    MCV 84 10/06/2022     10/06/2022     Lab Results   Component Value Date    CHOL 131 10/06/2022    TRIG 111 10/06/2022    HDL 45 10/06/2022        LDL 64 mg/dL      Assessment:        1. Nonrheumatic aortic valve insufficiency    2. Essential hypertension, benign    3. Mild aortic stenosis    4. Mixed hyperlipidemia    5. Paroxysmal atrial fibrillation    6. PVC (premature ventricular contraction)    7. Long term current use of anticoagulant therapy          Plan:     Nonrheumatic aortic insufficiency  Currently asymptomatic from AI  Last TTE 10/2021 , mild AS, mild AI, preserved LVEF  Consider repeat TTE 5242-4427 or if presence of new angina, syncope, dyspnea (advised patient to monitor for these symptoms)  Evaluate alternative, noncardiac causes of cough and shortness of breath at rest    Paroxysmal atrial fibrillation  Continue metoprolol succinate 200 mg PO daily  Continue flecainide 150 mg PO BID  Continue Eliquis 5 mg PO BID  Continue routine follow up with EP Dr. Patricia AC  LDL 64 mg/dL  Well controlled  Continue simvastatin 20 mg PO daily  Monitor for drug-drug interactions with simvastatin    HTN  Not eligible for Dig HTN  Home BP log shows -120s/60-70s mm Hg for past 2 weeks  Continue chlorthalidone 25 mg PO daily   Dietary sodium restriction.  Regular aerobic  exercise.  Check blood pressures daily and record.    Follow up: 12 months and as needed.     Staff: Dr Nair    Thank you for your consultation. Please call with questions or concerns.     Andre Dhillon MD  Cardiology PGY6  [facility]

## 2023-08-24 ENCOUNTER — OFFICE VISIT (OUTPATIENT)
Dept: CARDIOLOGY | Facility: CLINIC | Age: 73
End: 2023-08-24
Payer: MEDICARE

## 2023-08-24 VITALS
HEART RATE: 57 BPM | WEIGHT: 180.31 LBS | OXYGEN SATURATION: 96 % | DIASTOLIC BLOOD PRESSURE: 59 MMHG | HEIGHT: 78 IN | SYSTOLIC BLOOD PRESSURE: 119 MMHG | BODY MASS INDEX: 20.86 KG/M2

## 2023-08-24 DIAGNOSIS — I10 ESSENTIAL HYPERTENSION, BENIGN: Chronic | ICD-10-CM

## 2023-08-24 DIAGNOSIS — I35.0 MILD AORTIC STENOSIS: ICD-10-CM

## 2023-08-24 DIAGNOSIS — I35.1 NONRHEUMATIC AORTIC VALVE INSUFFICIENCY: Primary | ICD-10-CM

## 2023-08-24 DIAGNOSIS — I49.3 PVC (PREMATURE VENTRICULAR CONTRACTION): ICD-10-CM

## 2023-08-24 DIAGNOSIS — I48.0 PAROXYSMAL ATRIAL FIBRILLATION: ICD-10-CM

## 2023-08-24 DIAGNOSIS — Z79.01 LONG TERM CURRENT USE OF ANTICOAGULANT THERAPY: ICD-10-CM

## 2023-08-24 DIAGNOSIS — E78.2 MIXED HYPERLIPIDEMIA: ICD-10-CM

## 2023-08-24 PROCEDURE — 99999 PR PBB SHADOW E&M-EST. PATIENT-LVL III: ICD-10-PCS | Mod: PBBFAC,GC,, | Performed by: INTERNAL MEDICINE

## 2023-08-24 PROCEDURE — 99214 PR OFFICE/OUTPT VISIT, EST, LEVL IV, 30-39 MIN: ICD-10-PCS | Mod: S$PBB,GC,, | Performed by: INTERNAL MEDICINE

## 2023-08-24 PROCEDURE — 99214 OFFICE O/P EST MOD 30 MIN: CPT | Mod: S$PBB,GC,, | Performed by: INTERNAL MEDICINE

## 2023-08-24 PROCEDURE — 99213 OFFICE O/P EST LOW 20 MIN: CPT | Mod: PBBFAC | Performed by: INTERNAL MEDICINE

## 2023-08-24 PROCEDURE — 99999 PR PBB SHADOW E&M-EST. PATIENT-LVL III: CPT | Mod: PBBFAC,GC,, | Performed by: INTERNAL MEDICINE

## 2023-08-30 DIAGNOSIS — I49.3 PVC (PREMATURE VENTRICULAR CONTRACTION): ICD-10-CM

## 2023-09-05 RX ORDER — POTASSIUM CHLORIDE 20 MEQ/1
TABLET, EXTENDED RELEASE ORAL
Qty: 180 TABLET | Refills: 0 | Status: SHIPPED | OUTPATIENT
Start: 2023-09-05 | End: 2023-11-24

## 2023-10-19 ENCOUNTER — OFFICE VISIT (OUTPATIENT)
Dept: INTERNAL MEDICINE | Facility: CLINIC | Age: 73
End: 2023-10-19
Attending: FAMILY MEDICINE
Payer: MEDICARE

## 2023-10-19 VITALS
OXYGEN SATURATION: 98 % | WEIGHT: 183 LBS | HEART RATE: 62 BPM | BODY MASS INDEX: 24.79 KG/M2 | HEIGHT: 72 IN | SYSTOLIC BLOOD PRESSURE: 180 MMHG | DIASTOLIC BLOOD PRESSURE: 74 MMHG

## 2023-10-19 DIAGNOSIS — N40.1 BPH WITH URINARY OBSTRUCTION: ICD-10-CM

## 2023-10-19 DIAGNOSIS — I48.0 PAROXYSMAL ATRIAL FIBRILLATION: ICD-10-CM

## 2023-10-19 DIAGNOSIS — Z79.01 LONG TERM CURRENT USE OF ANTICOAGULANT THERAPY: ICD-10-CM

## 2023-10-19 DIAGNOSIS — M79.605 PAIN OF LEFT LOWER EXTREMITY: ICD-10-CM

## 2023-10-19 DIAGNOSIS — I10 HYPERTENSION, ESSENTIAL: Primary | ICD-10-CM

## 2023-10-19 DIAGNOSIS — N13.8 BPH WITH URINARY OBSTRUCTION: ICD-10-CM

## 2023-10-19 DIAGNOSIS — I35.1 NONRHEUMATIC AORTIC VALVE INSUFFICIENCY: ICD-10-CM

## 2023-10-19 PROCEDURE — 99214 OFFICE O/P EST MOD 30 MIN: CPT | Mod: PBBFAC | Performed by: FAMILY MEDICINE

## 2023-10-19 PROCEDURE — 99999 PR PBB SHADOW E&M-EST. PATIENT-LVL IV: ICD-10-PCS | Mod: PBBFAC,,, | Performed by: FAMILY MEDICINE

## 2023-10-19 PROCEDURE — 99999 PR PBB SHADOW E&M-EST. PATIENT-LVL IV: CPT | Mod: PBBFAC,,, | Performed by: FAMILY MEDICINE

## 2023-10-19 PROCEDURE — 99214 PR OFFICE/OUTPT VISIT, EST, LEVL IV, 30-39 MIN: ICD-10-PCS | Mod: S$PBB,,, | Performed by: FAMILY MEDICINE

## 2023-10-19 PROCEDURE — 99214 OFFICE O/P EST MOD 30 MIN: CPT | Mod: S$PBB,,, | Performed by: FAMILY MEDICINE

## 2023-10-19 NOTE — PROGRESS NOTES
Subjective:       Patient ID: Anthony Reynaga is a 73 y.o. male.    Chief Complaint: Annual Exam    New patient, formerly saw Dr. Harris. Concerns for BP going up past 2 weeks. On chlorthalidone. ACEI allergy. Home readings in nml range, but office readings high and has white coat anxiety. Walks for exercise. See drug allergies for BP med probs.    Hit leg on couch? Months ago. Brief transient L calf pains with touch, lasts 30 sec or so. No other motor sx. 'burning'.     Examination of the lower extremity revealed no evidence of DVT.  Negative Tinel's at the perineal.  No calf asymmetry.  Vascular examination was within normal limits.        Review of Systems   Constitutional:  Negative for appetite change, chills, diaphoresis, fatigue and fever.   HENT:  Negative for congestion, postnasal drip, rhinorrhea, sore throat and trouble swallowing.    Eyes:  Negative for visual disturbance.   Respiratory:  Negative for cough, choking, chest tightness, shortness of breath and wheezing.    Cardiovascular:  Negative for chest pain and leg swelling.   Gastrointestinal:  Negative for abdominal distention, abdominal pain, diarrhea, nausea and vomiting.   Genitourinary:  Negative for difficulty urinating and hematuria.   Musculoskeletal:  Negative for arthralgias and myalgias.        Leg pain, neuropathic quality   Skin:  Negative for rash.   Neurological:  Positive for numbness. Negative for weakness, light-headedness and headaches.   Psychiatric/Behavioral:  Negative for confusion and dysphoric mood. The patient is nervous/anxious.        Objective:      Physical Exam  Vitals and nursing note reviewed.   Constitutional:       Appearance: He is well-developed. He is not diaphoretic.   HENT:      Head: Normocephalic and atraumatic.   Eyes:      General: No scleral icterus.     Conjunctiva/sclera: Conjunctivae normal.   Neck:      Vascular: No carotid bruit.   Cardiovascular:      Rate and Rhythm: Normal rate and regular rhythm.       Pulses:           Dorsalis pedis pulses are 2+ on the left side.      Heart sounds: Murmur heard.      No friction rub. No gallop.   Pulmonary:      Effort: Pulmonary effort is normal. No respiratory distress.      Breath sounds: Normal breath sounds. No wheezing or rales.   Abdominal:      General: There is no distension.      Tenderness: There is no abdominal tenderness.   Musculoskeletal:         General: No deformity.      Cervical back: Normal range of motion and neck supple.   Skin:     General: Skin is warm and dry.      Findings: No erythema or rash.   Neurological:      Mental Status: He is alert and oriented to person, place, and time.      Cranial Nerves: No cranial nerve deficit.      Motor: No tremor.      Coordination: Coordination normal.      Gait: Gait normal.   Psychiatric:         Behavior: Behavior normal.         Thought Content: Thought content normal.         Judgment: Judgment normal.         Assessment:       1. Hypertension, essential    2. Paroxysmal atrial fibrillation    3. Long term current use of anticoagulant therapy    4. Nonrheumatic aortic valve insufficiency    5. BPH with urinary obstruction    6. Pain of left lower extremity        Plan:     Medication List with Changes/Refills   Current Medications    BUDESONIDE (PULMICORT) 0.5 MG/2 ML NEBULIZER SOLUTION    Take 2 mLs (0.5 mg total) by nebulization once daily. For use in saline irrigation as directed.    BUSPIRONE (BUSPAR) 5 MG TAB    TAKE 1 TABLET BY MOUTH  TWICE DAILY    CHLORTHALIDONE (HYGROTEN) 25 MG TAB    TAKE 1 TABLET BY MOUTH ONCE DAILY    ELIQUIS 5 MG TAB    TAKE 1 TABLET BY MOUTH  TWICE DAILY    FINASTERIDE (PROSCAR) 5 MG TABLET    TAKE 1 TABLET BY MOUTH ONCE DAILY    FLECAINIDE (TAMBOCOR) 150 MG TAB    TAKE 2 TABLETS (300 mg total ) BY MOUTH AS NEEDED FOR AF. LIMIT ONE DOSE PER 24 HOURS.    LORAZEPAM (ATIVAN) 1 MG TABLET    Take 1 tablet (1 mg total) by mouth every evening. for 20 days    METOPROLOL SUCCINATE  (TOPROL-XL) 200 MG 24 HR TABLET    TAKE 1 TABLET BY MOUTH ONCE DAILY    POTASSIUM CHLORIDE SA (K-DUR,KLOR-CON) 20 MEQ TABLET    TAKE 1 TABLET BY MOUTH  TWICE DAILY    SIMVASTATIN (ZOCOR) 20 MG TABLET    TAKE 1 TABLET BY MOUTH IN  THE EVENING    VITAMIN D 1000 UNITS TAB    Take 2,000 Units by mouth once daily.      1. Hypertension, essential  Assessment & Plan:  Follow-up in 1 month with his cuff for calibration and recheck blood pressure.  His home readings are normal they are elevated at the office.  Consider initiation of valsartan.  Discussed various blood pressure options with him.  Even though he had an Ace cough, would suggest a trial of ARB      2. Paroxysmal atrial fibrillation  Overview:  -managed by [facility]      3. Long term current use of anticoagulant therapy  Overview:  -PAF, managed by [facility]      4. Nonrheumatic aortic valve insufficiency  Overview:  -followed by cardiology      5. BPH with urinary obstruction  Overview:  -followed in       6. Pain of left lower extremity  Comments:  ? ant tib neuropathic  Orders:  -     Ambulatory referral/consult to Orthopedics; Future; Expected date: 10/26/2023      See meds, orders, follow up, routing and instructions sections of encounter and AVS. Discussed with patient and provided on AVS.    Discussed diet and exercise as therapeutic modalities for metabolic and other conditions. Provided patient information, which are included as links on the AVS for detailed information.    Lab Results   Component Value Date     10/06/2022    K 3.8 10/06/2022     10/06/2022    BUN 17 10/06/2022    CREATININE 0.8 10/06/2022    GLU 91 10/06/2022    MG 2.0 10/06/2022    AST 22 10/06/2022    ALT 21 10/06/2022    ALBUMIN 4.0 10/06/2022    PROT 7.0 10/06/2022    BILITOT 1.0 10/06/2022    CHOL 131 10/06/2022    HDL 45 10/06/2022    LDLCALC 63.8 10/06/2022    TRIG 111 10/06/2022    WBC 6.98 10/06/2022    HGB 14.7 10/06/2022    HCT 45.0 10/06/2022    PLT  173 10/06/2022    PSA 3.80 10/04/2012    PSADIAG 5.7 (H) 10/30/2019    TSH 0.378 (L) 10/13/2021

## 2023-10-31 ENCOUNTER — HOSPITAL ENCOUNTER (OUTPATIENT)
Dept: RADIOLOGY | Facility: HOSPITAL | Age: 73
Discharge: HOME | End: 2023-10-31
Attending: NURSE PRACTITIONER
Payer: MEDICARE

## 2023-10-31 ENCOUNTER — OFFICE VISIT (OUTPATIENT)
Dept: ORTHOPEDICS | Facility: CLINIC | Age: 73
End: 2023-10-31
Payer: MEDICARE

## 2023-10-31 ENCOUNTER — OFFICE VISIT (OUTPATIENT)
Dept: OPHTHALMOLOGY | Facility: CLINIC | Age: 73
End: 2023-10-31
Payer: MEDICARE

## 2023-10-31 VITALS — WEIGHT: 183 LBS | HEIGHT: 72 IN | BODY MASS INDEX: 24.79 KG/M2

## 2023-10-31 DIAGNOSIS — M79.605 LEFT LEG PAIN: ICD-10-CM

## 2023-10-31 DIAGNOSIS — M25.552 LEFT HIP PAIN: ICD-10-CM

## 2023-10-31 DIAGNOSIS — M25.562 PAIN OF LEFT KNEE AND LOWER LEG: Primary | ICD-10-CM

## 2023-10-31 DIAGNOSIS — H43.813 POSTERIOR VITREOUS DETACHMENT, BILATERAL: Primary | ICD-10-CM

## 2023-10-31 DIAGNOSIS — M79.662 PAIN OF LEFT KNEE AND LOWER LEG: Primary | ICD-10-CM

## 2023-10-31 DIAGNOSIS — M79.605 LEFT LEG PAIN: Primary | ICD-10-CM

## 2023-10-31 DIAGNOSIS — H25.13 NS (NUCLEAR SCLEROSIS), BILATERAL: ICD-10-CM

## 2023-10-31 PROCEDURE — 92014 PR EYE EXAM, EST PATIENT,COMPREHESV: ICD-10-PCS | Mod: S$PBB,,, | Performed by: OPHTHALMOLOGY

## 2023-10-31 PROCEDURE — 92201 OPSCPY EXTND RTA DRAW UNI/BI: CPT | Mod: S$PBB,,, | Performed by: OPHTHALMOLOGY

## 2023-10-31 PROCEDURE — 99213 OFFICE O/P EST LOW 20 MIN: CPT | Mod: PBBFAC,27 | Performed by: NURSE PRACTITIONER

## 2023-10-31 PROCEDURE — 73562 X-RAY EXAM OF KNEE 3: CPT | Mod: 26,RT,, | Performed by: RADIOLOGY

## 2023-10-31 PROCEDURE — 73590 XR TIBIA FIBULA 2 VIEW LEFT: ICD-10-PCS | Mod: 26,LT,, | Performed by: RADIOLOGY

## 2023-10-31 PROCEDURE — 99999 PR PBB SHADOW E&M-EST. PATIENT-LVL III: ICD-10-PCS | Mod: PBBFAC,,, | Performed by: NURSE PRACTITIONER

## 2023-10-31 PROCEDURE — 92201 PR OPHTHALMOSCOPY, EXT, W/RET DRAW/SCLERAL DEPR, I&R, UNI/BI: ICD-10-PCS | Mod: S$PBB,,, | Performed by: OPHTHALMOLOGY

## 2023-10-31 PROCEDURE — 92134 CPTRZ OPH DX IMG PST SGM RTA: CPT | Mod: PBBFAC | Performed by: OPHTHALMOLOGY

## 2023-10-31 PROCEDURE — 73590 X-RAY EXAM OF LOWER LEG: CPT | Mod: TC,LT

## 2023-10-31 PROCEDURE — 99999 PR PBB SHADOW E&M-EST. PATIENT-LVL III: ICD-10-PCS | Mod: PBBFAC,,, | Performed by: OPHTHALMOLOGY

## 2023-10-31 PROCEDURE — 99213 OFFICE O/P EST LOW 20 MIN: CPT | Mod: S$PBB,,, | Performed by: NURSE PRACTITIONER

## 2023-10-31 PROCEDURE — 92134 POSTERIOR SEGMENT OCT RETINA (OCULAR COHERENCE TOMOGRAPHY)-BOTH EYES: ICD-10-PCS | Mod: 26,S$PBB,, | Performed by: OPHTHALMOLOGY

## 2023-10-31 PROCEDURE — 73502 X-RAY EXAM HIP UNI 2-3 VIEWS: CPT | Mod: TC,LT

## 2023-10-31 PROCEDURE — 73590 X-RAY EXAM OF LOWER LEG: CPT | Mod: 26,LT,, | Performed by: RADIOLOGY

## 2023-10-31 PROCEDURE — 99213 OFFICE O/P EST LOW 20 MIN: CPT | Mod: PBBFAC | Performed by: OPHTHALMOLOGY

## 2023-10-31 PROCEDURE — 73564 XR KNEE ORTHO LEFT WITH FLEXION: ICD-10-PCS | Mod: 26,LT,, | Performed by: RADIOLOGY

## 2023-10-31 PROCEDURE — 99999 PR PBB SHADOW E&M-EST. PATIENT-LVL III: CPT | Mod: PBBFAC,,, | Performed by: OPHTHALMOLOGY

## 2023-10-31 PROCEDURE — 92201 OPSCPY EXTND RTA DRAW UNI/BI: CPT | Mod: PBBFAC | Performed by: OPHTHALMOLOGY

## 2023-10-31 PROCEDURE — 73564 X-RAY EXAM KNEE 4 OR MORE: CPT | Mod: 26,LT,, | Performed by: RADIOLOGY

## 2023-10-31 PROCEDURE — 92014 COMPRE OPH EXAM EST PT 1/>: CPT | Mod: S$PBB,,, | Performed by: OPHTHALMOLOGY

## 2023-10-31 PROCEDURE — 99999 PR PBB SHADOW E&M-EST. PATIENT-LVL III: CPT | Mod: PBBFAC,,, | Performed by: NURSE PRACTITIONER

## 2023-10-31 PROCEDURE — 73562 XR KNEE ORTHO LEFT WITH FLEXION: ICD-10-PCS | Mod: 26,RT,, | Performed by: RADIOLOGY

## 2023-10-31 PROCEDURE — 73562 X-RAY EXAM OF KNEE 3: CPT | Mod: TC,RT

## 2023-10-31 PROCEDURE — 99213 PR OFFICE/OUTPT VISIT, EST, LEVL III, 20-29 MIN: ICD-10-PCS | Mod: S$PBB,,, | Performed by: NURSE PRACTITIONER

## 2023-10-31 PROCEDURE — 73502 X-RAY EXAM HIP UNI 2-3 VIEWS: CPT | Mod: 26,LT,, | Performed by: RADIOLOGY

## 2023-10-31 PROCEDURE — 73502 XR HIP WITH PELVIS WHEN PERFORMED, 2 OR 3 VIEWS LEFT: ICD-10-PCS | Mod: 26,LT,, | Performed by: RADIOLOGY

## 2023-10-31 NOTE — PROGRESS NOTES
HPI    2 year check  DLS: 05/4/21 Dr. Fam    Patient states his vision has been stable since his last visit. Denies   flashes, floaters, diplopia, photophobia, glare, HA's, or pain.    No gtts      OCT - PVD OU      A/P      1. PVD OU    2. High Myopia    3. Early NS OU      RD precautions      2 yr

## 2023-11-02 ENCOUNTER — TELEPHONE (OUTPATIENT)
Dept: ORTHOPEDICS | Facility: CLINIC | Age: 73
End: 2023-11-02
Payer: MEDICARE

## 2023-11-02 ENCOUNTER — OFFICE VISIT (OUTPATIENT)
Dept: ORTHOPEDICS | Facility: CLINIC | Age: 73
End: 2023-11-02
Payer: MEDICARE

## 2023-11-02 ENCOUNTER — HOSPITAL ENCOUNTER (OUTPATIENT)
Dept: RADIOLOGY | Facility: HOSPITAL | Age: 73
Discharge: HOME | End: 2023-11-02
Attending: REGISTERED NURSE
Payer: MEDICARE

## 2023-11-02 VITALS — WEIGHT: 183.44 LBS | HEIGHT: 72 IN | BODY MASS INDEX: 24.85 KG/M2

## 2023-11-02 DIAGNOSIS — M51.36 DDD (DEGENERATIVE DISC DISEASE), LUMBAR: ICD-10-CM

## 2023-11-02 DIAGNOSIS — M54.16 LUMBAR RADICULOPATHY: ICD-10-CM

## 2023-11-02 DIAGNOSIS — M51.36 DDD (DEGENERATIVE DISC DISEASE), LUMBAR: Primary | ICD-10-CM

## 2023-11-02 DIAGNOSIS — M43.00 SPONDYLOLYSIS: Primary | ICD-10-CM

## 2023-11-02 PROCEDURE — 99214 PR OFFICE/OUTPT VISIT, EST, LEVL IV, 30-39 MIN: ICD-10-PCS | Mod: S$PBB,,, | Performed by: REGISTERED NURSE

## 2023-11-02 PROCEDURE — 72110 XR LUMBAR SPINE AP AND LAT WITH FLEX/EXT: ICD-10-PCS | Mod: 26,,, | Performed by: RADIOLOGY

## 2023-11-02 PROCEDURE — 99213 OFFICE O/P EST LOW 20 MIN: CPT | Mod: PBBFAC | Performed by: REGISTERED NURSE

## 2023-11-02 PROCEDURE — 99999 PR PBB SHADOW E&M-EST. PATIENT-LVL III: CPT | Mod: PBBFAC,,, | Performed by: REGISTERED NURSE

## 2023-11-02 PROCEDURE — 99214 OFFICE O/P EST MOD 30 MIN: CPT | Mod: S$PBB,,, | Performed by: REGISTERED NURSE

## 2023-11-02 PROCEDURE — 99999 PR PBB SHADOW E&M-EST. PATIENT-LVL III: ICD-10-PCS | Mod: PBBFAC,,, | Performed by: REGISTERED NURSE

## 2023-11-02 PROCEDURE — 72110 X-RAY EXAM L-2 SPINE 4/>VWS: CPT | Mod: 26,,, | Performed by: RADIOLOGY

## 2023-11-02 PROCEDURE — 72110 X-RAY EXAM L-2 SPINE 4/>VWS: CPT | Mod: TC

## 2023-11-02 NOTE — PROGRESS NOTES
DATE: 11/2/2023  PATIENT: Anthony Reynaga    Supervising Physician: Pramod Belle M.D.    CHIEF COMPLAINT: back pain    HISTORY:  Anthony Reynaga is a 73 y.o. male with pmhx of pars fracture and afib on Eliquis here for initial evaluation of low back and left calf burning (Back - 2, Leg - 0). Denies leg pain today, btu states he has had leg pain years prior that was debilitating.  The pain has been present for years. The patient describes the pain as aching in his low back that has come and gone his entire life.  The pain is worse with pressure to the left calf and heavy lifting and improved by rest and stretch. There is intermittent associated numbness and tingling. There is no subjective weakness. Prior treatments have included nothing, and no PT, TOMASZ or surgery.  The patient denies myelopathic symptoms such as handwriting changes or difficulty with buttons/coins/keys. Denies perineal paresthesias, bowel/bladder dysfunction.    PAST MEDICAL/SURGICAL HISTORY:  Past Medical History:   Diagnosis Date    Anticoagulant long-term use     Atrial fibrillation     BPH (benign prostatic hyperplasia)     Cataract     Elevated PSA     Floaters     Hernia     bilateral inquinal    Hypertension     Inguinal hernia     Kidney stone     Knee injury      Past Surgical History:   Procedure Laterality Date    APPENDECTOMY      BLADDER SURGERY      polyp removed benign    COLONOSCOPY N/A 6/22/2016    Procedure: COLONOSCOPY;  Surgeon: Joaquin Francis MD;  Location: [facility] ([address] [place] [address]);  Service: Endoscopy;  Laterality: N/A;    Thanks for letting us know.  I would approve him to hold coumadin x 3 days prior, without lovenox.  We will see him for an INR on 6/8 and will provide him with procedure instructions at that time., per [facility]    COLONOSCOPY N/A 5/15/2023    Procedure: COLONOSCOPY;  Surgeon: Noam Hollingsworth MD;  Location: [facility] ([address] [place] [address]);  Service: Endoscopy;  Laterality: N/A;  instr portal-tb-eliquis hold  ok see te11/11/22  Ok to hold Eliquis- See t/e 3/30/23, instr via portal - PC  golytely  5/9 - precall done - [facility]    CYSTOSCOPY      benign bladder papilloma    FINGER SURGERY      HERNIA REPAIR Left 2016    inguinal    LITHOTRIPSY      TONSILLECTOMY, ADENOIDECTOMY         Medications:   Current Outpatient Medications on File Prior to Visit   Medication Sig Dispense Refill    busPIRone (BUSPAR) 5 MG Tab TAKE 1 TABLET BY MOUTH  TWICE DAILY 180 tablet 3    chlorthalidone (HYGROTEN) 25 MG Tab TAKE 1 TABLET BY MOUTH ONCE DAILY 90 tablet 3    ELIQUIS 5 mg Tab TAKE 1 TABLET BY MOUTH  TWICE DAILY 180 tablet 3    finasteride (PROSCAR) 5 mg tablet TAKE 1 TABLET BY MOUTH ONCE DAILY 90 tablet 3    flecainide (TAMBOCOR) 150 MG Tab TAKE 2 TABLETS (300 mg total ) BY MOUTH AS NEEDED FOR AF. LIMIT ONE DOSE PER 24 HOURS. 90 tablet 3    metoprolol succinate (TOPROL-XL) 200 MG 24 hr tablet TAKE 1 TABLET BY MOUTH ONCE DAILY 90 tablet 3    potassium chloride SA (K-DUR,KLOR-CON) 20 MEQ tablet TAKE 1 TABLET BY MOUTH  TWICE DAILY 180 tablet 0    simvastatin (ZOCOR) 20 MG tablet TAKE 1 TABLET BY MOUTH IN  THE EVENING 90 tablet 3    vitamin D 1000 units Tab Take 2,000 Units by mouth once daily.       budesonide (PULMICORT) 0.5 mg/2 mL nebulizer solution Take 2 mLs (0.5 mg total) by nebulization once daily. For use in saline irrigation as directed. 60 mL 2    LORazepam (ATIVAN) 1 MG tablet Take 1 tablet (1 mg total) by mouth every evening. for 20 days (Patient taking differently: Take 1 mg by mouth daily as needed.) 20 tablet 0     No current facility-administered medications on file prior to visit.       Social History:   Social History     Socioeconomic History    Marital status: Single   Tobacco Use    Smoking status: Never    Smokeless tobacco: Never   Substance and Sexual Activity    Alcohol use: Yes     Alcohol/week: 1.0 - 2.0 standard drink of alcohol     Types: 1 - 2 Cans of beer per week     Comment: 1-2 cans of beer 2-3 times per  month    Drug use: No    Sexual activity: Yes     Partners: Female     Birth control/protection: OCP       REVIEW OF SYSTEMS:  Constitution: Negative. Negative for chills, fever and night sweats.   Cardiovascular: Negative for chest pain and syncope.   Respiratory: Negative for cough and shortness of breath.   Gastrointestinal: See HPI. Negative for nausea/vomiting. Negative for abdominal pain.  Genitourinary: See HPI. Negative for discoloration or dysuria.  Skin: Negative for dry skin, itching and rash.   Hematologic/Lymphatic: Negative for bleeding problem. Does not bruise/bleed easily.   Musculoskeletal: Negative for falls and muscle weakness.   Neurological: See HPI. No seizures.   Endocrine: Negative for polydipsia, polyphagia and polyuria.   Allergic/Immunologic: Negative for hives and persistent infections.     EXAM:  Ht 6' (1.829 m)   Wt 83.2 kg (183 lb 6.8 oz)   BMI 24.88 kg/m²     General: The patient is a 73 y.o. male in no apparent distress, the patient is oriented to person, place and time.  Psych: Normal mood and affect  HEENT: Vision grossly intact, hearing intact to the spoken word.  Lungs: Respirations unlabored.  Gait: Normal station and gait, no difficulty with toe or heel walk.   Skin: Dorsal lumbar skin negative for rashes, lesions, hairy patches and surgical scars. There is mild lumbar tenderness to palpation.  Range of motion: Lumbar range of motion is acceptable.  Spinal Balance: Global saggital and coronal spinal balance acceptable, not significant for scoliosis and kyphosis.  Musculoskeletal: No pain with the range of motion of the bilateral hips. No trochanteric tenderness to palpation.  Vascular: Bilateral lower extremities warm and well perfused, dorsalis pedis pulses 2+ bilaterally.  Neurological: Normal strength and tone in all major motor groups in the bilateral lower extremities. Normal sensation to light touch in the L2-S1 dermatomes bilaterally.  Deep tendon reflexes symmetric 2+  "in the bilateral lower extremities.  Negative Babinski bilaterally. Straight leg raise negative bilaterally.    IMAGING:      Today I personally reviewed AP, Lat and Flex/Ex  upright L-spine films that demonstrate anterolisthesis of L5 on S1 with pars fracture.       Body mass index is 24.88 kg/m².    No results found for: "HGBA1C"        ASSESSMENT/PLAN:    Anthony was seen today for low-back pain and leg pain.    Diagnoses and all orders for this visit:    Spondylolysis    Lumbar radiculopathy      Today we discussed at length all of the different treatment options including anti-inflammatories, acetaminophen, rest, ice, heat, physical therapy including strengthening and stretching exercises, home exercises, ROM, aerobic conditioning, aqua therapy, other modalities including ultrasound, massage, and dry needling, epidural steroid injections and finally surgical intervention.  the pt would like to hold off on medications and PT for now. He may follow up as needed.       "

## 2023-11-02 NOTE — TELEPHONE ENCOUNTER
Spoke to patient regarding x-ray. Patient is aware of x-ray scheduled for 2:00 pm at the [facility]. Patient stated thank you. Thanks.

## 2023-11-03 ENCOUNTER — PATIENT MESSAGE (OUTPATIENT)
Dept: ORTHOPEDICS | Facility: CLINIC | Age: 73
End: 2023-11-03
Payer: MEDICARE

## 2023-11-03 DIAGNOSIS — M51.36 DDD (DEGENERATIVE DISC DISEASE), LUMBAR: Primary | ICD-10-CM

## 2023-11-09 ENCOUNTER — OFFICE VISIT (OUTPATIENT)
Dept: DERMATOLOGY | Facility: CLINIC | Age: 73
End: 2023-11-09
Payer: MEDICARE

## 2023-11-09 DIAGNOSIS — L98.9 SKIN LESIONS: ICD-10-CM

## 2023-11-09 DIAGNOSIS — L91.8 SKIN TAG: ICD-10-CM

## 2023-11-09 DIAGNOSIS — Z12.83 SCREENING EXAM FOR SKIN CANCER: Primary | ICD-10-CM

## 2023-11-09 DIAGNOSIS — D18.01 CHERRY ANGIOMA: ICD-10-CM

## 2023-11-09 DIAGNOSIS — L81.4 LENTIGO: ICD-10-CM

## 2023-11-09 DIAGNOSIS — L82.1 SEBORRHEIC KERATOSES: ICD-10-CM

## 2023-11-09 DIAGNOSIS — D22.9 MULTIPLE BENIGN NEVI: ICD-10-CM

## 2023-11-09 DIAGNOSIS — D23.9 DERMATOFIBROMA: ICD-10-CM

## 2023-11-09 PROCEDURE — 99203 OFFICE O/P NEW LOW 30 MIN: CPT | Mod: S$PBB,,, | Performed by: STUDENT IN AN ORGANIZED HEALTH CARE EDUCATION/TRAINING PROGRAM

## 2023-11-09 PROCEDURE — 99999 PR PBB SHADOW E&M-EST. PATIENT-LVL III: ICD-10-PCS | Mod: PBBFAC,,, | Performed by: STUDENT IN AN ORGANIZED HEALTH CARE EDUCATION/TRAINING PROGRAM

## 2023-11-09 PROCEDURE — 99999 PR PBB SHADOW E&M-EST. PATIENT-LVL III: CPT | Mod: PBBFAC,,, | Performed by: STUDENT IN AN ORGANIZED HEALTH CARE EDUCATION/TRAINING PROGRAM

## 2023-11-09 PROCEDURE — 99203 PR OFFICE/OUTPT VISIT, NEW, LEVL III, 30-44 MIN: ICD-10-PCS | Mod: S$PBB,,, | Performed by: STUDENT IN AN ORGANIZED HEALTH CARE EDUCATION/TRAINING PROGRAM

## 2023-11-09 PROCEDURE — 99213 OFFICE O/P EST LOW 20 MIN: CPT | Mod: PBBFAC | Performed by: STUDENT IN AN ORGANIZED HEALTH CARE EDUCATION/TRAINING PROGRAM

## 2023-11-09 NOTE — PROGRESS NOTES
Subjective:      Patient ID:  Anthony Reynaga is a 73 y.o. male who presents for   Chief Complaint   Patient presents with    Skin Check     TBSE     Patient here for Total Body Skin Exam    Last seen by dermatologist: never    no - personal history of atypical moles removed  no - personal history of MM   no - family history of MM  no - childhood blistering sunburns  no - tanning bed use  no - personal history of NMSC    No new concerning moles or lesions but would like a full check.       Review of Systems   Skin:  Positive for activity-related sunscreen use and wears hat. Negative for daily sunscreen use and recent sunburn.   Hematologic/Lymphatic: Bruises/bleeds easily (on eliquis).       Objective:   Physical Exam   Constitutional: He appears well-developed and well-nourished. No distress.   Neurological: He is alert and oriented to person, place, and time. He is not disoriented.   Psychiatric: He has a normal mood and affect.   Skin:   Areas Examined (abnormalities noted in diagram):   Scalp / Hair Palpated and Inspected  Head / Face Inspection Performed  Neck Inspection Performed  Chest / Axilla Inspection Performed  Abdomen Inspection Performed  Genitals / Buttocks / Groin Inspection Performed  Back Inspection Performed  RUE Inspected  LUE Inspection Performed  RLE Inspected  LLE Inspection Performed  Nails and Digits Inspection Performed                 Diagram Legend     Erythematous scaling macule/papule c/w actinic keratosis       Vascular papule c/w angioma      Pigmented verrucoid papule/plaque c/w seborrheic keratosis      Yellow umbilicated papule c/w sebaceous hyperplasia      Irregularly shaped tan macule c/w lentigo     1-2 mm smooth white papules consistent with Milia      Movable subcutaneous cyst with punctum c/w epidermal inclusion cyst      Subcutaneous movable cyst c/w pilar cyst      Firm pink to brown papule c/w dermatofibroma      Pedunculated fleshy papule(s) c/w skin tag(s)      Evenly  pigmented macule c/w junctional nevus     Mildly variegated pigmented, slightly irregular-bordered macule c/w mildly atypical nevus      Flesh colored to evenly pigmented papule c/w intradermal nevus       Pink pearly papule/plaque c/w basal cell carcinoma      Erythematous hyperkeratotic cursted plaque c/w SCC      Surgical scar with no sign of skin cancer recurrence      Open and closed comedones      Inflammatory papules and pustules      Verrucoid papule consistent consistent with wart     Erythematous eczematous patches and plaques     Dystrophic onycholytic nail with subungual debris c/w onychomycosis     Umbilicated papule    Erythematous-base heme-crusted tan verrucoid plaque consistent with inflamed seborrheic keratosis     Erythematous Silvery Scaling Plaque c/w Psoriasis     See annotation      Assessment / Plan:        Screening exam for skin cancer  Total body skin examination performed today including at least 12 points as noted in physical examination. No lesions suspicious for malignancy noted.    Recommend daily sun protection/avoidance, use of at least SPF 30, broad spectrum sunscreen (OTC drug), skin self examinations, and routine physician surveillance to optimize early detection    Multiple benign nevi  Seborrheic keratoses  Lentigo  Cherry angioma  Skin tag  Dermatofibroma  Reassurance given to patient. No treatment is necessary.   Treatment of benign, asymptomatic lesions may be considered cosmetic.    Skin lesions--2 day history of erythematous macule with overlying thin crust. Ddx broad--could include abrasion. Will do HSV PCR to r/o  -     HSV by Rapid PCR, Non-Blood [facility]; Skin; Future; Expected date: 11/09/2023         Follow up in about 2 years (around 11/9/2025).

## 2023-11-11 LAB
HSV1 DNA SPEC QL NAA+PROBE: NEGATIVE
HSV2 DNA SPEC QL NAA+PROBE: NEGATIVE
SPECIMEN SOURCE: NORMAL

## 2023-11-14 ENCOUNTER — PATIENT MESSAGE (OUTPATIENT)
Dept: DERMATOLOGY | Facility: CLINIC | Age: 73
End: 2023-11-14
Payer: MEDICARE

## 2023-11-14 RX ORDER — KETOCONAZOLE 20 MG/G
CREAM TOPICAL 2 TIMES DAILY
Qty: 30 G | Refills: 2 | Status: SHIPPED | OUTPATIENT
Start: 2023-11-14 | End: 2024-03-28

## 2023-11-14 RX ORDER — HYDROCORTISONE 25 MG/G
CREAM TOPICAL 2 TIMES DAILY
Qty: 28 G | Refills: 1 | Status: SHIPPED | OUTPATIENT
Start: 2023-11-14 | End: 2024-03-28

## 2023-11-17 ENCOUNTER — LAB VISIT (OUTPATIENT)
Dept: LAB | Facility: HOSPITAL | Age: 73
End: 2023-11-17
Attending: FAMILY MEDICINE
Payer: MEDICARE

## 2023-11-17 ENCOUNTER — OFFICE VISIT (OUTPATIENT)
Dept: INTERNAL MEDICINE | Facility: CLINIC | Age: 73
End: 2023-11-17
Attending: FAMILY MEDICINE
Payer: MEDICARE

## 2023-11-17 ENCOUNTER — TELEPHONE (OUTPATIENT)
Dept: INTERNAL MEDICINE | Facility: CLINIC | Age: 73
End: 2023-11-17

## 2023-11-17 VITALS
WEIGHT: 182.31 LBS | HEART RATE: 58 BPM | DIASTOLIC BLOOD PRESSURE: 72 MMHG | OXYGEN SATURATION: 97 % | BODY MASS INDEX: 24.69 KG/M2 | HEIGHT: 72 IN | SYSTOLIC BLOOD PRESSURE: 172 MMHG

## 2023-11-17 DIAGNOSIS — I10 HYPERTENSION, ESSENTIAL: Primary | ICD-10-CM

## 2023-11-17 DIAGNOSIS — M79.605 PAIN OF LEFT LOWER EXTREMITY: ICD-10-CM

## 2023-11-17 DIAGNOSIS — I10 HYPERTENSION, ESSENTIAL: ICD-10-CM

## 2023-11-17 DIAGNOSIS — E78.5 HYPERLIPIDEMIA, UNSPECIFIED HYPERLIPIDEMIA TYPE: ICD-10-CM

## 2023-11-17 DIAGNOSIS — R79.9 ABNORMAL BLOOD CHEMISTRY: Primary | ICD-10-CM

## 2023-11-17 DIAGNOSIS — E78.2 MIXED HYPERLIPIDEMIA: ICD-10-CM

## 2023-11-17 LAB
ALBUMIN SERPL BCP-MCNC: 4.1 G/DL (ref 3.5–5.2)
ALP SERPL-CCNC: 51 U/L (ref 55–135)
ALT SERPL W/O P-5'-P-CCNC: 26 U/L (ref 10–44)
ANION GAP SERPL CALC-SCNC: 7 MMOL/L (ref 8–16)
AST SERPL-CCNC: 24 U/L (ref 10–40)
BILIRUB SERPL-MCNC: 0.6 MG/DL (ref 0.1–1)
BUN SERPL-MCNC: 21 MG/DL (ref 8–23)
CALCIUM SERPL-MCNC: 11.1 MG/DL (ref 8.7–10.5)
CHLORIDE SERPL-SCNC: 103 MMOL/L (ref 95–110)
CHOLEST SERPL-MCNC: 155 MG/DL (ref 120–199)
CHOLEST/HDLC SERPL: 3.3 {RATIO} (ref 2–5)
CO2 SERPL-SCNC: 30 MMOL/L (ref 23–29)
CREAT SERPL-MCNC: 1.1 MG/DL (ref 0.5–1.4)
EST. GFR  (NO RACE VARIABLE): >60 ML/MIN/1.73 M^2
GLUCOSE SERPL-MCNC: 85 MG/DL (ref 70–110)
HDLC SERPL-MCNC: 47 MG/DL (ref 40–75)
HDLC SERPL: 30.3 % (ref 20–50)
LDLC SERPL CALC-MCNC: 74.4 MG/DL (ref 63–159)
NONHDLC SERPL-MCNC: 108 MG/DL
POTASSIUM SERPL-SCNC: 4.6 MMOL/L (ref 3.5–5.1)
PROT SERPL-MCNC: 7.4 G/DL (ref 6–8.4)
SODIUM SERPL-SCNC: 140 MMOL/L (ref 136–145)
TRIGL SERPL-MCNC: 168 MG/DL (ref 30–150)

## 2023-11-17 PROCEDURE — 99999 PR PBB SHADOW E&M-EST. PATIENT-LVL V: ICD-10-PCS | Mod: PBBFAC,,, | Performed by: FAMILY MEDICINE

## 2023-11-17 PROCEDURE — 80061 LIPID PANEL: CPT | Performed by: FAMILY MEDICINE

## 2023-11-17 PROCEDURE — 99214 OFFICE O/P EST MOD 30 MIN: CPT | Mod: S$PBB,,, | Performed by: FAMILY MEDICINE

## 2023-11-17 PROCEDURE — 99215 OFFICE O/P EST HI 40 MIN: CPT | Mod: PBBFAC | Performed by: FAMILY MEDICINE

## 2023-11-17 PROCEDURE — 99999 PR PBB SHADOW E&M-EST. PATIENT-LVL V: CPT | Mod: PBBFAC,,, | Performed by: FAMILY MEDICINE

## 2023-11-17 PROCEDURE — 36415 COLL VENOUS BLD VENIPUNCTURE: CPT | Performed by: FAMILY MEDICINE

## 2023-11-17 PROCEDURE — 80053 COMPREHEN METABOLIC PANEL: CPT | Performed by: FAMILY MEDICINE

## 2023-11-17 PROCEDURE — 99214 PR OFFICE/OUTPT VISIT, EST, LEVL IV, 30-39 MIN: ICD-10-PCS | Mod: S$PBB,,, | Performed by: FAMILY MEDICINE

## 2023-11-17 RX ORDER — AMLODIPINE BESYLATE 5 MG/1
2.5 TABLET ORAL DAILY
Qty: 90 TABLET | Refills: 1 | Status: SHIPPED | OUTPATIENT
Start: 2023-11-17 | End: 2024-03-28 | Stop reason: SDUPTHER

## 2023-11-17 RX ORDER — METOPROLOL SUCCINATE 200 MG/1
200 TABLET, EXTENDED RELEASE ORAL DAILY
Qty: 90 TABLET | Refills: 0 | Status: SHIPPED | OUTPATIENT
Start: 2023-11-17 | End: 2024-01-12

## 2023-11-17 RX ORDER — METOPROLOL SUCCINATE 200 MG/1
200 TABLET, EXTENDED RELEASE ORAL DAILY
Qty: 90 TABLET | Refills: 0 | Status: SHIPPED | OUTPATIENT
Start: 2023-11-17 | End: 2023-11-17 | Stop reason: SDUPTHER

## 2023-11-17 NOTE — PATIENT INSTRUCTIONS
Schedule lab orders for today.      Information about cholesterol, high blood pressure and healthy diet and activity recommendations can be found at the following links on the Internet:    Cholesterol  https://www.nhlbi.nih.gov/resources/your-guide-lowering-cholesterol-therapeutic-lifestyle-changes-tlc    Weight loss  http://www.nhlbi.nih.gov/health/educational/lose_wt/index.htm    High Blood Pressure  https://www.nhlbi.nih.gov/files/docs/public/heart/new_dash.pdf    Cardiovascular General  http://www.heart.org/HEARTORG/    Diabetes General  http://diabetes.org/    CDC  https://www.cdc.gov/    Healthfinder  Https://healthfinder.gov/    Dietary Guide - Comprehensive  https://health.gov/dietaryguidelines/2015/guidelines/    Physical Activity and Exercise  https://health.gov/paguidelines/second-edition/pdf/Physical_Activity_Guidelines_2nd_edition.pdf    Choosing Wisely  https://www.choosingwisely.org/patient-resources/

## 2023-11-17 NOTE — PROGRESS NOTES
Subjective:       Patient ID: Anthony Reynaga is a 73 y.o. male.    Chief Complaint: Follow-up    Established patient follows up for management of chronic medical illnesses with complaints today. Please see dictation and ROS for interval problems, specific complaints and disease management discussion.    Past, Surgical, Family, Social, Histories; Medications, allergies reviewed and reconciled.  Health maintenance file reviewed and addressed items due. Recent applicable lab, imaging and cardiovascular results reviewed.  Problem list items reviewed and modified or added entries (in the overview section) may not be transcribed into this encounter note due to note writer format.    BP check.    Calf pain. Knee and thigh. Radiculopathic in nature and intermittent. Followed in ortho and spine clinic and patient tells me thought to be d/t spondylolisthesis.    Discussed hyperlipidemia and patient is requesting laboratory today.  Orders have been placed by his previous PCP but we will reorder and get done today.                Review of Systems   Constitutional:  Negative for appetite change, chills, diaphoresis, fatigue and fever.   HENT:  Negative for congestion, postnasal drip, rhinorrhea, sore throat and trouble swallowing.    Eyes:  Negative for visual disturbance.   Respiratory:  Negative for cough, choking, chest tightness, shortness of breath and wheezing.    Cardiovascular:  Negative for chest pain and leg swelling.   Gastrointestinal:  Negative for abdominal distention, abdominal pain, diarrhea, nausea and vomiting.   Genitourinary:  Negative for difficulty urinating and hematuria.   Musculoskeletal:  Positive for arthralgias and gait problem. Negative for myalgias.   Skin:  Negative for rash.   Neurological:  Negative for weakness, light-headedness and headaches.   Psychiatric/Behavioral:  Negative for confusion and dysphoric mood.        Objective:      Physical Exam  Vitals and nursing note reviewed.    Constitutional:       General: He is not in acute distress.     Appearance: He is well-developed.   Pulmonary:      Effort: Pulmonary effort is normal.   Musculoskeletal:      Cervical back: Neck supple.      Right lower leg: No edema.      Left lower leg: No edema.   Skin:     General: Skin is warm and dry.      Findings: No rash.   Neurological:      Mental Status: He is alert and oriented to person, place, and time.   Psychiatric:         Behavior: Behavior normal.         Thought Content: Thought content normal.         Judgment: Judgment normal.         Assessment:       1. Hypertension, essential    2. Pain of left lower extremity    3. Hyperlipidemia, unspecified hyperlipidemia type    4. Mixed hyperlipidemia        Plan:     Medication List with Changes/Refills   New Medications    AMLODIPINE (NORVASC) 5 MG TABLET    Take 0.5 tablets (2.5 mg total) by mouth once daily.   Current Medications    BUDESONIDE (PULMICORT) 0.5 MG/2 ML NEBULIZER SOLUTION    Take 2 mLs (0.5 mg total) by nebulization once daily. For use in saline irrigation as directed.    BUSPIRONE (BUSPAR) 5 MG TAB    TAKE 1 TABLET BY MOUTH  TWICE DAILY    CHLORTHALIDONE (HYGROTEN) 25 MG TAB    TAKE 1 TABLET BY MOUTH ONCE DAILY    ELIQUIS 5 MG TAB    TAKE 1 TABLET BY MOUTH  TWICE DAILY    FINASTERIDE (PROSCAR) 5 MG TABLET    TAKE 1 TABLET BY MOUTH ONCE DAILY    FLECAINIDE (TAMBOCOR) 150 MG TAB    TAKE 2 TABLETS (300 mg total ) BY MOUTH AS NEEDED FOR AF. LIMIT ONE DOSE PER 24 HOURS.    HYDROCORTISONE 2.5 % CREAM    Apply topically 2 (two) times daily. Use to affected areas for up to 2 weeks then take a 1 week break or decrease to 3 times weekly. Apply to lesion on penis    KETOCONAZOLE (NIZORAL) 2 % CREAM    Apply topically 2 (two) times daily. Apply to lesion on penis until resolved    LORAZEPAM (ATIVAN) 1 MG TABLET    Take 1 tablet (1 mg total) by mouth every evening. for 20 days    POTASSIUM CHLORIDE SA (K-DUR,KLOR-CON) 20 MEQ TABLET    TAKE 1  TABLET BY MOUTH  TWICE DAILY    SIMVASTATIN (ZOCOR) 20 MG TABLET    TAKE 1 TABLET BY MOUTH IN  THE EVENING    VITAMIN D 1000 UNITS TAB    Take 2,000 Units by mouth once daily.    Changed and/or Refilled Medications    Modified Medication Previous Medication    METOPROLOL SUCCINATE (TOPROL-XL) 200 MG 24 HR TABLET metoprolol succinate (TOPROL-XL) 200 MG 24 hr tablet       Take 1 tablet (200 mg total) by mouth once daily.    TAKE 1 TABLET BY MOUTH ONCE DAILY     1. 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 Had ACEI cough, and alfuzosin orthostasis.  -home cuff BP's concordant today, home readings largely OK, occ low reading  -add amlodipine and titrate   -send home readings through portal  -RTC APRN in a couple months    Orders:  -     amLODIPine (NORVASC) 5 MG tablet; Take 0.5 tablets (2.5 mg total) by mouth once daily.  Dispense: 90 tablet; Refill: 1  -     Comprehensive Metabolic Panel; Future; Expected date: 11/17/2023  -     Discontinue: metoprolol succinate (TOPROL-XL) 200 MG 24 hr tablet; Take 1 tablet (200 mg total) by mouth once daily.  Dispense: 90 tablet; Refill: 0  -     metoprolol succinate (TOPROL-XL) 200 MG 24 hr tablet; Take 1 tablet (200 mg total) by mouth once daily.  Dispense: 90 tablet; Refill: 0    2. Pain of left lower extremity  Comments:  ? ant tib neuropathic  Orders:  -     Ambulatory referral/consult to Orthopedics    3. Hyperlipidemia, unspecified hyperlipidemia type  -     Lipid Panel; Future; Expected date: 11/17/2023    4. Mixed hyperlipidemia  Assessment & Plan:  -was placed on simvastatin prior to est w/ me for ? Indication, discuss continuation w/ general cardiology next visit, cont for now (current ASCVD risk 32%)        See meds, orders, follow up, routing and instructions sections of encounter and AVS. Discussed with patient and provided on AVS.    Today's appointment was >25 minutes including chart review (such as  review of consult notes, op notes, ER notes, labs, imaging, path, cultures, etc.), medication reconciliation and adjustment, and in some cases >50% time spent in counseling.

## 2023-11-17 NOTE — ASSESSMENT & PLAN NOTE
-was placed on simvastatin prior to est w/ me for ? Indication, discuss continuation w/ general cardiology next visit, cont for now (current ASCVD risk 32%)

## 2023-11-17 NOTE — TELEPHONE ENCOUNTER
Please call patient and explain that tests show elevated calcium level.    I recommend follow up testing. Please see orders for repeat and please schedule soon.     Thank you.

## 2023-11-17 NOTE — ASSESSMENT & PLAN NOTE
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 Had ACEI cough, and alfuzosin orthostasis.  -home cuff BP's concordant today, home readings largely OK, occ low reading  -add amlodipine and titrate   -send home readings through portal  -RTC APRN in a couple months

## 2023-11-20 ENCOUNTER — PATIENT MESSAGE (OUTPATIENT)
Dept: INTERNAL MEDICINE | Facility: CLINIC | Age: 73
End: 2023-11-20
Payer: MEDICARE

## 2023-11-22 ENCOUNTER — CLINICAL SUPPORT (OUTPATIENT)
Dept: REHABILITATION | Facility: OTHER | Age: 73
End: 2023-11-22
Payer: MEDICARE

## 2023-11-22 ENCOUNTER — LAB VISIT (OUTPATIENT)
Dept: LAB | Facility: HOSPITAL | Age: 73
End: 2023-11-22
Attending: FAMILY MEDICINE
Payer: MEDICARE

## 2023-11-22 DIAGNOSIS — R79.9 ABNORMAL BLOOD CHEMISTRY: ICD-10-CM

## 2023-11-22 DIAGNOSIS — M51.36 DDD (DEGENERATIVE DISC DISEASE), LUMBAR: ICD-10-CM

## 2023-11-22 DIAGNOSIS — M54.50 CHRONIC BILATERAL LOW BACK PAIN WITHOUT SCIATICA: Primary | ICD-10-CM

## 2023-11-22 DIAGNOSIS — G89.29 CHRONIC BILATERAL LOW BACK PAIN WITHOUT SCIATICA: Primary | ICD-10-CM

## 2023-11-22 DIAGNOSIS — R29.898 WEAKNESS OF BOTH LOWER EXTREMITIES: ICD-10-CM

## 2023-11-22 LAB — CA-I BLDV-SCNC: 1.4 MMOL/L (ref 1.06–1.42)

## 2023-11-22 PROCEDURE — 36415 COLL VENOUS BLD VENIPUNCTURE: CPT | Performed by: FAMILY MEDICINE

## 2023-11-22 PROCEDURE — 97161 PT EVAL LOW COMPLEX 20 MIN: CPT | Mod: PN

## 2023-11-22 PROCEDURE — 82330 ASSAY OF CALCIUM: CPT | Performed by: FAMILY MEDICINE

## 2023-11-22 PROCEDURE — 97112 NEUROMUSCULAR REEDUCATION: CPT | Mod: PN

## 2023-11-22 NOTE — PLAN OF CARE
[facility]  Physical Therapy Initial Evaluation    Name: Anthony Reynaga  Clinic Number: [id number]    Therapy Diagnosis:   Encounter Diagnoses   Name Primary?    DDD (degenerative disc disease), lumbar     Chronic bilateral low back pain without sciatica Yes    Weakness of both lower extremities      Physician: DALJIT Hook NP    Physician Orders: Physical Therapy Evaluate and Treat  Medical Diagnosis from Referral: DDD (degenerative disc disease), lumbar [M51.36]   Evaluation Date: 11/22/2023  Authorization Period Expiration: 11/2/24  Plan of Care Expiration: 11/22/2023 to 2/22/23  Visit # / Visits authorized: 1/1 (pending additional authorization following initial evaluation)     Time In: 1000am  Time Out: 1100am  Total Billable Time: 60 minutes    Precautions: Standard    Subjective     Date of onset: chronic- pt started having pain at 20 years old, but has been worse in the past 6 months.     History of current condition - Anthony reports that he has experienced low back pain and left lower leg pain (at the level of the calf). Pt states that his imaging revealed anterolisthesis L5/S1. Pt has radicular symptoms into his left leg that come and go. Pt has central low back pain first thing in the morning he states this used to be 10/10 and is now 5/10. Pt reports that as soon as he starts walking around his pain drastically drops to 0/10. Pt states that his condition is chronic but seems to be flared up at this time. He has back and leg pain after walking around on hard surfaces or standing statically creates his low back pain. Pt attended pelvic floor therapy for enlarged prostate last month. Pt states that he went to see a  and this worsened his pelvic floor condition, after lifting heavy weights. Pt would like to get back to yoga and weight lifting. Pt retired in 2022.      Medical History:   Past Medical History:   Diagnosis Date    Anticoagulant long-term use     Atrial  fibrillation     BPH (benign prostatic hyperplasia)     Cataract     Elevated PSA     Floaters     Hernia     bilateral inquinal    Hypertension     Inguinal hernia     Kidney stone     Knee injury        Surgical History:   Anthony Reynaga  has a past surgical history that includes TONSILLECTOMY, ADENOIDECTOMY; Appendectomy; Cystoscopy; Bladder surgery; Finger surgery; Lithotripsy; Colonoscopy (N/A, 6/22/2016); Hernia repair (Left, 2016); and Colonoscopy (N/A, 5/15/2023).    Medications:   Anthony has a current medication list which includes the following prescription(s): amlodipine, budesonide, buspirone, chlorthalidone, eliquis, finasteride, flecainide, hydrocortisone, ketoconazole, lorazepam, metoprolol succinate, potassium chloride sa, simvastatin, and vitamin d.    Allergies:   Review of patient's allergies indicates:   Allergen Reactions    Lisinopril Other (See Comments)     Cough    Uroxatral [alfuzosin] Other (See Comments)     orthostatic        Imaging: Impression:     1. No acute fractures.  2. Reconfirmed lumbar levocurvature and spondylolysis defects and grade 1 anterolisthesis L5 on S1 with flexion and extension views demonstrating no instability.  3. Interval progression in degenerative changes with respect to the lumbar spine as noted when compared to earlier exam.  4. Bilateral renal calculi.    Prior Therapy: None  Social History: Retired  Occupation: retired from faculty at [employer]  Prior Level of Function: Weight lifting and yoga  Current Level of Function: Walking 4 days per week at [facility] [employer]     Pain:  Current 3/10, worst 5/10, best 2/10   Location: central low back  Description: Sharp  Aggravating Factors: initially rising to stand  Easing Factors:  changing positions frequently    Pts goals: Pt would like to return to yoga and weight lifting with no increase in central low back or left leg pain.    Objective     WNL=within normal limits  WFL=within functional limits  NT=not  "tested  *=pain    Posture: Forward head, rounded shoulders   Palpation: pain/tenderness to central low back  Sensation: N&T L LE  Deep tendon reflexes: WNL    Lumbar Active range of motion  Pain/dysfunction with movement:   Flexion 80    Extension 10*    Right side bending 8*    Left side bending 5*    Right rotation 5*    Left rotation 5*          Lower extremity manual muscle tests  Right Left   Hip flexion 5/5 5/5   Hip extension 4/5 3+/5   Hip abduction 3/5 3/5   Hip adduction 4/5 4+/5   Hip internal rotation 4+/5 4/5   Hip external rotation 5/5 4+/5   Knee flexion 4+/5 4+/5   Knee extension 4/5 4+/5   Ankle dorsiflexion 5/5 5/5   Ankle plantarflexion 4+/5 5/5   Ankle inversion 5/5 5/5   Ankle eversion 5/5 5/5         Slump R: -  Slump L: +         CMS Impairment/Limitation/Restriction for FOTO Survey    Therapist reviewed FOTO scores for Anthony Reynaga on 11/22/2023.   FOTO documents entered into [employer] - see Media section.    Limitation Score: 54%  Predicted Goal: 78%    Category: Mobility     TREATMENT     Treatment Time In: 1000am  Treatment Time Out: 1100am  Total Treatment time separate from Evaluation: 30 minutes    Neuromuscular re education for 30 minutes for improved balance and proprioception including:   Supine LTR 10x 10" hold  Supine TrA contraction with 5 second hold 30x   Supine TrA contraction with marches 30x  SKTC 10x 10" hold  Supine TrA contraction with SLR 3x8 repetitions each     Home Exercises and Patient Education Provided:    Education provided:   - Findings; prognosis and plan of care (POC)  - Home exercise program (HEP)  - Modality options  - Therapist contact information    Written Home Exercises Provided: Yes  Exercises were reviewed and Anthony was able to demonstrate them prior to the end of the session.  Anthony demonstrated good understanding of the education provided.     See EMR under Patient Instructions for exercises provided today.    Assessment     Anthony is a 73 y.o. male referred " to outpatient Physical Therapy with a medical diagnosis of DDD (degenerative disc disease), lumbar [M51.36] . Pt presents to PT with pain, decreased lumbar spine ROM, decreased strength and flexibility, poor posture, and functional deficits with prolonged static standing. These deficits are negatively impacting this patient's ability to complete their work duties and activities of daily living.     Pt prognosis is Good.   Pt will benefit from skilled outpatient Physical Therapy to address the deficits stated above and in the chart below, provide pt/family education, and to maximize pt's level of independence.     Plan of care discussed with patient: Yes  Pt's spiritual, cultural and educational needs considered and pt agreeable to plan of care and goals as stated below:     Anticipated Barriers for therapy: None    Medical Necessity is demonstrated by the following  History  Co-morbidities and personal factors that may impact the plan of care Co-morbidities:   advanced age    Personal Factors:   no deficits     low   Examination  Body Structures and Functions, activity limitations and participation restrictions that may impact the plan of care Body Regions:   back  lower extremities    Body Systems:    gross symmetry    Participation Restrictions:   Walking    Activity limitations:   Learning and applying knowledge  no deficits    General Tasks and Commands  No Deficits    Communication  No Deficits    Mobility  no deficits    Self care  no deficits    Domestic Life  No Deficits    Interactions/Relationships  No Deficits    Life Areas  No Deficits    Community and Social Life  No Deficits         low   Clinical Presentation stable and uncomplicated low   Decision Making/ Complexity Score: low     GOALS:  Short Term Goals (4 Weeks):  1. Patient will be compliant with home exercise program to supplement therapy in promoting functional mobility. (progressing, not met)    2. Patient will perform  with good control to  demonstrate improved core strength. (progressing, not met)    3. Patient will report no pain during thoracolumbar active range of motion to promote functional mobility.  (progressing, not met)    4. Patient will improve impaired lower extremity left hip abduction manual muscle tests  to >/=4/5 to improve strength for functional tasks. (progressing, not met)        Long Term Goals (6 Weeks):   1. Patient will improve FOTO score to </= 58% limited to decrease perceived limitation with maintaining/changing body position. (progressing, not met)    2. Patient will perform dying bug exercise with good control to demonstrate improved core strength.  (progressing, not met)    3. Patient will improve impaired lower extremity right hip abduction manual muscle tests to >/=4+/5 to improve strength for functional tasks.  (progressing, not met)    4. Patient will tolerate standing for 60 minutes with no increase in low back pain to return to PLOF.  (progressing, not met)         Plan     Plan of care Certification: 11/22/2023 to 2/22/24.    Outpatient Physical Therapy 2 times weekly for 8 weeks to include the following interventions: Therapeutic Exercises, Manual Therapeutic Technique, Neuromuscular Re Education, Therapeutic Activities. Modalities, Kinesiotape prn, and Functional Dry Needling as needed.    Karyn Howard, PT,  DPT, OCS

## 2023-11-23 DIAGNOSIS — I49.3 PVC (PREMATURE VENTRICULAR CONTRACTION): ICD-10-CM

## 2023-11-23 NOTE — TELEPHONE ENCOUNTER
No care due was identified.  [facility] Embedded Care Due Messages. Reference number: [id number].   11/23/2023 5:29:28 AM CST

## 2023-11-24 RX ORDER — POTASSIUM CHLORIDE 20 MEQ/1
TABLET, EXTENDED RELEASE ORAL
Qty: 180 TABLET | Refills: 0 | Status: SHIPPED | OUTPATIENT
Start: 2023-11-24 | End: 2024-01-29

## 2023-11-24 NOTE — TELEPHONE ENCOUNTER
Refill Routing Note   Medication(s) are not appropriate for processing by [facility] for the following reason(s):        No active prescription written by provider    [facility] action(s):  Defer               Appointments  past 12m or future 3m with PCP    Date Provider   Last Visit   11/17/2023 Hayes Ferrera MD   Next Visit   Visit date not found Hayes Ferrera MD   ED visits in past 90 days: 0        Note composed:9:13 AM 11/24/2023

## 2023-11-28 ENCOUNTER — CLINICAL SUPPORT (OUTPATIENT)
Dept: REHABILITATION | Facility: OTHER | Age: 73
End: 2023-11-28
Payer: MEDICARE

## 2023-11-28 DIAGNOSIS — R29.898 WEAKNESS OF BOTH LOWER EXTREMITIES: Primary | ICD-10-CM

## 2023-11-28 PROCEDURE — 97112 NEUROMUSCULAR REEDUCATION: CPT | Mod: PN

## 2023-11-28 PROCEDURE — 97530 THERAPEUTIC ACTIVITIES: CPT | Mod: PN

## 2023-11-28 NOTE — PROGRESS NOTES
"[facility]   Physical Therapy Treatment Note     Name: Anthony Reynaga  Clinic Number: [id number]    Therapy Diagnosis:   Encounter Diagnosis   Name Primary?    Weakness of both lower extremities Yes     Physician: DALJIT Hook NP    Visit Date: 11/28/2023    Physician: DALJIT Hook NP     Physician Orders: Physical Therapy Evaluate and Treat  Medical Diagnosis from Referral: DDD (degenerative disc disease), lumbar [M51.36]   Evaluation Date: 11/22/2023  Authorization Period Expiration: 11/2/24  Plan of Care Expiration: 11/22/2023 to 2/22/23  Visit # / Visits authorized: 1/1 (pending additional authorization following initial evaluation)      Time In: 0300pm  Time Out: 0400pm  Total Billable Time: 60 minutes     Precautions: Standard      SUBJECTIVE     Pt reports: he is doing great today. Pt states he had some hip flexor pain after his home exercises.  He was compliant with home exercise program.  Response to previous treatment: decreased low back pain  Functional change: improved tolerance to standing    Pain: 3/10  Location: right hip      OBJECTIVE     Objective Measures updated at progress report unless specified.       TREATMENT     Total Treatment time (time-based codes) separate from Evaluation: 60 minutes     Anthony received the treatments listed below:        Neuromuscular re education for 30 minutes for improved balance and proprioception including:   Supine LTR 10x 10" hold  Supine TrA contraction with 5 second hold 30x   Supine TrA contraction with marches 30x  SKTC 10x 10" hold  Supine TrA contraction with SLR 3x8 repetitions each   Sciatic nn glide on bolster 30x R/L    Patient received therapeutic activities for 30 minutes for improved tolerance to functional activities including:  KB janki vs 15# x2 laps on turf  Rows vs black tube 30x5" hold  Standing TrA pulldown vs black tube 30x5" hold  Paloff press vs black tube 3x10 repetitions        PATIENT EDUCATION AND " HOME EXERCISES     Home Exercises Provided and Patient Education Provided     Education provided:   PT educated pt on importance of compliance with their HEP this visit.     Written Home Exercises Provided: yes. Exercises were reviewed and Anthony was able to demonstrate them prior to the end of the session.  Anthony demonstrated good  understanding of the education provided. See EMR under Patient Instructions for exercises provided during therapy sessions    ASSESSMENT   Pt tolerated tx session well today and completed all therapeutic exercises with minimal/no increase in right hip pain. Progressed abdominal strengthening exercises this visit.     Anthony is progressing well towards his goals.   Pt prognosis is Good.     Pt will continue to benefit from skilled outpatient physical therapy to address the deficits listed in the problem list box on initial evaluation, provide pt/family education and to maximize pt's level of independence in the home and community environment.     Pt's spiritual, cultural and educational needs considered and pt agreeable to plan of care and goals.     Anticipated barriers to physical therapy: None    Goals:   Short Term Goals (4 Weeks):  1. Patient will be compliant with home exercise program to supplement therapy in promoting functional mobility. (progressing, not met)    2. Patient will perform  with good control to demonstrate improved core strength. (progressing, not met)    3. Patient will report no pain during thoracolumbar active range of motion to promote functional mobility.  (progressing, not met)    4. Patient will improve impaired lower extremity left hip abduction manual muscle tests  to >/=4/5 to improve strength for functional tasks. (progressing, not met)          Long Term Goals (6 Weeks):   1. Patient will improve FOTO score to </= 58% limited to decrease perceived limitation with maintaining/changing body position. (progressing, not met)    2. Patient will perform dying bug  exercise with good control to demonstrate improved core strength.  (progressing, not met)    3. Patient will improve impaired lower extremity right hip abduction manual muscle tests to >/=4+/5 to improve strength for functional tasks.  (progressing, not met)    4. Patient will tolerate standing for 60 minutes with no increase in low back pain to return to PLOF.  (progressing, not met)          PLAN   Plan of care Certification: 11/22/2023 to 2/22/24.     Outpatient Physical Therapy 2 times weekly for 8 weeks to include the following interventions: Therapeutic Exercises, Manual Therapeutic Technique, Neuromuscular Re Education, Therapeutic Activities. Modalities, Kinesiotape prn, and Functional Dry Needling as needed.      Karyn Howard, PT

## 2023-12-01 ENCOUNTER — CLINICAL SUPPORT (OUTPATIENT)
Dept: REHABILITATION | Facility: OTHER | Age: 73
End: 2023-12-01
Payer: MEDICARE

## 2023-12-01 DIAGNOSIS — R29.898 WEAKNESS OF BOTH LOWER EXTREMITIES: Primary | ICD-10-CM

## 2023-12-01 PROCEDURE — 97530 THERAPEUTIC ACTIVITIES: CPT | Mod: PN

## 2023-12-01 PROCEDURE — 97112 NEUROMUSCULAR REEDUCATION: CPT | Mod: PN

## 2023-12-01 NOTE — PROGRESS NOTES
"  [facility]   Physical Therapy Treatment Note     Name: Anthony Reynaga  Clinic Number: [id number]    Therapy Diagnosis:   Encounter Diagnosis   Name Primary?    Weakness of both lower extremities Yes     Physician: DALJIT Hook NP    Visit Date: 12/1/2023    Physician: DALJIT Hook NP     Physician Orders: Physical Therapy Evaluate and Treat  Medical Diagnosis from Referral: DDD (degenerative disc disease), lumbar [M51.36]   Evaluation Date: 11/22/2023  Authorization Period Expiration: 11/2/24  Plan of Care Expiration: 11/22/2023 to 2/22/23  Visit # / Visits authorized: 2/20     Time In: 930am  Time Out: 1030am  Total Billable Time: 60 minutes     Precautions: Standard      SUBJECTIVE     Pt reports: he continues to have some muscle soreness after physical therapy. He doesn't notice any change to symptoms yet.    He was compliant with home exercise program.  Response to previous treatment: decreased low back pain  Functional change: improved tolerance to standing    Pain: 3/10  Location: right hip      OBJECTIVE     Objective Measures updated at progress report unless specified.       TREATMENT     Total Treatment time (time-based codes) separate from Evaluation: 60 minutes     Anthony received the treatments listed below:        Neuromuscular re education for 30 minutes for improved balance and proprioception including:   Supine LTR with green tball 10x 10" hold  Supine TrA contraction with 5 second hold 30x   Supine TrA contraction with marches 30x  SKTC 10x 10" hold  DKTC with green tball x20  Supine TrA contraction with SLR 3x8 repetitions each   Sciatic nn glide on bolster 30x R/left  Supine hamstring stretch with strap 3x30"  Prone quadriceps stretch with strap 3x30"    Patient received therapeutic activities for 30 minutes for improved tolerance to functional activities including:  KB marches vs 15# x2 laps on turf  KB RDL to 12" step 2x10  Rows vs black tube 30x5" hold  TRX " "squat/row 3x10  Standing TrA pulldown vs black tube 30x5" hold  Paloff press vs black tube 3x10 repetitions        PATIENT EDUCATION AND HOME EXERCISES     Home Exercises Provided and Patient Education Provided     Education provided:   PT educated pt on importance of compliance with their HEP this visit.     Written Home Exercises Provided: yes. Exercises were reviewed and Anthony was able to demonstrate them prior to the end of the session.  Anthony demonstrated good  understanding of the education provided. See EMR under Patient Instructions for exercises provided during therapy sessions    ASSESSMENT   Pt tolerated tx session well today and completed all therapeutic exercises with minimal/no increase in right hip pain. Continued progression of abdominal strengthening exercises this visit and we started to incorporate some lower extremity/hip mobility exercise with good tolerance.    Anthony is progressing well towards his goals.   Pt prognosis is Good.     Pt will continue to benefit from skilled outpatient physical therapy to address the deficits listed in the problem list box on initial evaluation, provide pt/family education and to maximize pt's level of independence in the home and community environment.     Pt's spiritual, cultural and educational needs considered and pt agreeable to plan of care and goals.     Anticipated barriers to physical therapy: None    Goals:   Short Term Goals (4 Weeks):  1. Patient will be compliant with home exercise program to supplement therapy in promoting functional mobility. (progressing, not met)    2. Patient will perform  with good control to demonstrate improved core strength. (progressing, not met)    3. Patient will report no pain during thoracolumbar active range of motion to promote functional mobility.  (progressing, not met)    4. Patient will improve impaired lower extremity left hip abduction manual muscle tests  to >/=4/5 to improve strength for functional tasks. " (progressing, not met)          Long Term Goals (6 Weeks):   1. Patient will improve FOTO score to </= 58% limited to decrease perceived limitation with maintaining/changing body position. (progressing, not met)    2. Patient will perform dying bug exercise with good control to demonstrate improved core strength.  (progressing, not met)    3. Patient will improve impaired lower extremity right hip abduction manual muscle tests to >/=4+/5 to improve strength for functional tasks.  (progressing, not met)    4. Patient will tolerate standing for 60 minutes with no increase in low back pain to return to PLOF.  (progressing, not met)          PLAN   Plan of care Certification: 11/22/2023 to 2/22/24.     Outpatient Physical Therapy 2 times weekly for 8 weeks to include the following interventions: Therapeutic Exercises, Manual Therapeutic Technique, Neuromuscular Re Education, Therapeutic Activities. Modalities, Kinesiotape prn, and Functional Dry Needling as needed.      Pramod Fulton, PT

## 2023-12-05 ENCOUNTER — CLINICAL SUPPORT (OUTPATIENT)
Dept: REHABILITATION | Facility: OTHER | Age: 73
End: 2023-12-05
Payer: MEDICARE

## 2023-12-05 DIAGNOSIS — R29.898 WEAKNESS OF BOTH LOWER EXTREMITIES: Primary | ICD-10-CM

## 2023-12-05 PROCEDURE — 97112 NEUROMUSCULAR REEDUCATION: CPT | Mod: PN

## 2023-12-05 PROCEDURE — 97530 THERAPEUTIC ACTIVITIES: CPT | Mod: PN

## 2023-12-05 NOTE — PROGRESS NOTES
"    [facility]   Physical Therapy Treatment Note     Name: Anthony Reynaga  Clinic Number: [id number]    Therapy Diagnosis:   Encounter Diagnosis   Name Primary?    Weakness of both lower extremities Yes     Physician: DALJIT Hook NP    Visit Date: 12/5/2023    Physician: DALJIT Hook NP     Physician Orders: Physical Therapy Evaluate and Treat  Medical Diagnosis from Referral: DDD (degenerative disc disease), lumbar [M51.36]   Evaluation Date: 11/22/2023  Authorization Period Expiration: 11/2/24  Plan of Care Expiration: 11/22/2023 to 2/22/23  Visit # / Visits authorized: 3/20     Time In: 1000am  Time Out: 1100am  Total Billable Time: 60 minutes     Precautions: Standard      SUBJECTIVE     Pt reports: he had increased pain at left hip and lower extremity after walking a long distance.    He was compliant with home exercise program.  Response to previous treatment: decreased low back pain  Functional change: improved tolerance to standing    Pain: 3/10  Location: right hip      OBJECTIVE     Objective Measures updated at progress report unless specified.       TREATMENT     Total Treatment time (time-based codes) separate from Evaluation: 60 minutes     Anthony received the treatments listed below:        Neuromuscular re education for 30 minutes for improved balance and proprioception including:   Supine LTR with green tball 10x 10" hold  Supine TrA contraction with 5 second hold 30x   Supine TrA contraction with marches 30x  SKTC 10x 10" hold  DKTC with green tball x20  Supine TrA contraction with SLR 3x10 repetitions each   SL hip abduction 2x10  Prone hip extension 2x10  Sciatic nn glide on bolster 30x R/left  Supine hamstring stretch with strap (3 way) 3x30"  Prone quadriceps stretch with strap 3x30"    Patient received therapeutic activities for 30 minutes for improved tolerance to functional activities including:  KB marches vs 15# x2 laps on turf  KB RDL to 12" step vs 15# " "2x10  Rows vs black tube 30x5" hold  TRX squat/row 3x10  TRX Bridge Row 2x10  Standing TrA pulldown vs black tube 30x5" hold  Paloff press vs black tube 3x10 repetitions        PATIENT EDUCATION AND HOME EXERCISES     Home Exercises Provided and Patient Education Provided     Education provided:   PT educated pt on importance of compliance with their HEP this visit.     Written Home Exercises Provided: yes. Exercises were reviewed and Anthony was able to demonstrate them prior to the end of the session.  Anthony demonstrated good  understanding of the education provided. See EMR under Patient Instructions for exercises provided during therapy sessions    ASSESSMENT     Pt tolerated tx session well today and completed all therapeutic exercises. He had some increased pain with dying bugs movement. He was unable to stabilize at the lumbar spine during movement and felt increased left hip pain. Continued progression of abdominal strengthening exercises this visit and we started to incorporate some lower extremity/hip mobility exercise with good tolerance.    Anthony is progressing well towards his goals.   Pt prognosis is Good.     Pt will continue to benefit from skilled outpatient physical therapy to address the deficits listed in the problem list box on initial evaluation, provide pt/family education and to maximize pt's level of independence in the home and community environment.     Pt's spiritual, cultural and educational needs considered and pt agreeable to plan of care and goals.     Anticipated barriers to physical therapy: None    Goals:   Short Term Goals (4 Weeks):  1. Patient will be compliant with home exercise program to supplement therapy in promoting functional mobility. (progressing, not met)    2. Patient will perform  with good control to demonstrate improved core strength. (progressing, not met)    3. Patient will report no pain during thoracolumbar active range of motion to promote functional mobility.  " (progressing, not met)    4. Patient will improve impaired lower extremity left hip abduction manual muscle tests  to >/=4/5 to improve strength for functional tasks. (progressing, not met)          Long Term Goals (6 Weeks):   1. Patient will improve FOTO score to </= 58% limited to decrease perceived limitation with maintaining/changing body position. (progressing, not met)    2. Patient will perform dying bug exercise with good control to demonstrate improved core strength.  (progressing, not met)    3. Patient will improve impaired lower extremity right hip abduction manual muscle tests to >/=4+/5 to improve strength for functional tasks.  (progressing, not met)    4. Patient will tolerate standing for 60 minutes with no increase in low back pain to return to PLOF.  (progressing, not met)          PLAN   Plan of care Certification: 11/22/2023 to 2/22/24.     Outpatient Physical Therapy 2 times weekly for 8 weeks to include the following interventions: Therapeutic Exercises, Manual Therapeutic Technique, Neuromuscular Re Education, Therapeutic Activities. Modalities, Kinesiotape prn, and Functional Dry Needling as needed.      Pramod Fulton, PT

## 2023-12-07 NOTE — PROGRESS NOTES
"  [facility]   Physical Therapy Treatment Note     Name: Anthony Reynaga  Clinic Number: [id number]    Therapy Diagnosis:   No diagnosis found.    Physician: DALJIT Hook NP    Visit Date: 12/8/2023    Physician: DALJIT Hook NP     Physician Orders: Physical Therapy Evaluate and Treat  Medical Diagnosis from Referral: DDD (degenerative disc disease), lumbar [M51.36]   Evaluation Date: 11/22/2023  Authorization Period Expiration: 11/2/24  Plan of Care Expiration: 11/22/2023 to 2/22/23  Visit # / Visits authorized: 3/20     Time In: 1000am  Time Out: 1100am  Total Billable Time: 60 minutes     Precautions: Standard      SUBJECTIVE     Pt reports: he had increased pain at left hip and lower extremity after walking a long distance.    He was compliant with home exercise program.  Response to previous treatment: decreased low back pain  Functional change: improved tolerance to standing    Pain: 3/10  Location: right hip      OBJECTIVE     Objective Measures updated at progress report unless specified.       TREATMENT     Total Treatment time (time-based codes) separate from Evaluation: 60 minutes     Anthony received the treatments listed below:        Neuromuscular re education for 30 minutes for improved balance and proprioception including:   Supine LTR with green tball 10x 10" hold  Supine TrA contraction with 5 second hold 30x   Supine TrA contraction with marches 30x  SKTC 10x 10" hold  DKTC with green tball x20  Supine TrA contraction with SLR 3x10 repetitions each   SL hip abduction 2x10  Prone hip extension 2x10  Sciatic nn glide on bolster 30x R/left  Supine hamstring stretch with strap (3 way) 3x30"  Prone quadriceps stretch with strap 3x30"    Patient received therapeutic activities for 30 minutes for improved tolerance to functional activities including:  KB marchglynn vs 15# x2 laps on turf  KB RDL to 12" step vs 15# 2x10  Rows vs black tube 30x5" hold  TRX squat/row 3x10  TRX " "Bridge Row 2x10  Standing TrA pulldown vs black tube 30x5" hold  Paloff press vs black tube 3x10 repetitions    PATIENT EDUCATION AND HOME EXERCISES     Home Exercises Provided and Patient Education Provided     Education provided:   PT educated pt on importance of compliance with their HEP this visit.     Written Home Exercises Provided: yes. Exercises were reviewed and Anthony was able to demonstrate them prior to the end of the session.  Anthony demonstrated good  understanding of the education provided. See EMR under Patient Instructions for exercises provided during therapy sessions    ASSESSMENT     Pt tolerated tx session well today and completed all therapeutic exercises. He had some increased pain with dying bugs movement. He was unable to stabilize at the lumbar spine during movement and felt increased left hip pain. Continued progression of abdominal strengthening exercises this visit and we started to incorporate some lower extremity/hip mobility exercise with good tolerance.    Anthony is progressing well towards his goals.   Pt prognosis is Good.     Pt will continue to benefit from skilled outpatient physical therapy to address the deficits listed in the problem list box on initial evaluation, provide pt/family education and to maximize pt's level of independence in the home and community environment.     Pt's spiritual, cultural and educational needs considered and pt agreeable to plan of care and goals.     Anticipated barriers to physical therapy: None    Goals:   Short Term Goals (4 Weeks):  1. Patient will be compliant with home exercise program to supplement therapy in promoting functional mobility. (progressing, not met)    2. Patient will perform  with good control to demonstrate improved core strength. (progressing, not met)    3. Patient will report no pain during thoracolumbar active range of motion to promote functional mobility.  (progressing, not met)    4. Patient will improve impaired lower " extremity left hip abduction manual muscle tests  to >/=4/5 to improve strength for functional tasks. (progressing, not met)          Long Term Goals (6 Weeks):   1. Patient will improve FOTO score to </= 58% limited to decrease perceived limitation with maintaining/changing body position. (progressing, not met)    2. Patient will perform dying bug exercise with good control to demonstrate improved core strength.  (progressing, not met)    3. Patient will improve impaired lower extremity right hip abduction manual muscle tests to >/=4+/5 to improve strength for functional tasks.  (progressing, not met)    4. Patient will tolerate standing for 60 minutes with no increase in low back pain to return to PLOF.  (progressing, not met)          PLAN   Plan of care Certification: 11/22/2023 to 2/22/24.     Outpatient Physical Therapy 2 times weekly for 8 weeks to include the following interventions: Therapeutic Exercises, Manual Therapeutic Technique, Neuromuscular Re Education, Therapeutic Activities. Modalities, Kinesiotape prn, and Functional Dry Needling as needed.      Rafita Mata, PT

## 2023-12-08 ENCOUNTER — CLINICAL SUPPORT (OUTPATIENT)
Dept: REHABILITATION | Facility: OTHER | Age: 73
End: 2023-12-08
Payer: MEDICARE

## 2023-12-08 DIAGNOSIS — R29.898 WEAKNESS OF BOTH LOWER EXTREMITIES: Primary | ICD-10-CM

## 2023-12-08 PROCEDURE — 97112 NEUROMUSCULAR REEDUCATION: CPT | Mod: PN

## 2023-12-08 PROCEDURE — 97530 THERAPEUTIC ACTIVITIES: CPT | Mod: PN

## 2023-12-08 NOTE — PROGRESS NOTES
[facility]   Physical Therapy Treatment Note     Name: Anthony Reynaga  Clinic Number: [id number]    Therapy Diagnosis:   Encounter Diagnosis   Name Primary?    Weakness of both lower extremities Yes     Physician: DALJIT Hook NP    Visit Date: 12/8/2023    Physician: DALJIT Hook NP     Physician Orders: Physical Therapy Evaluate and Treat  Medical Diagnosis from Referral: DDD (degenerative disc disease), lumbar [M51.36]   Evaluation Date: 11/22/2023  Authorization Period Expiration: 11/2/24  Plan of Care Expiration: 11/22/2023 to 2/22/23  Visit # / Visits authorized: 4/20     Time In: 1000am  Time Out: 1100am  Total Billable Time: 60 minutes     Precautions: Standard      SUBJECTIVE     Pt reports: he is doing great today. Pt states that he is having much less low back pain and experienced zero pain for two days straight. This was very encouraging to him. Pt states that he did experience numbness in his leg with no new activity/suspected cause. Pt requests to avoid exercises lying on his stomach today because these exercises exacerbate his LBP.    He was compliant with home exercise program.  Response to previous treatment: decreased low back pain  Functional change: improved tolerance to standing    Pain: 3/10  Location: right hip      OBJECTIVE     Objective Measures updated at progress report unless specified.       TREATMENT     Total Treatment time (time-based codes) separate from Evaluation: 60 minutes     Anthony received the treatments listed below:        Neuromuscular re education for 30 minutes for improved balance and proprioception including:   Supine LTR with green tball x2 minutes  Supine TrA contraction with 5 second hold 30x   Supine TrA contraction with marches 30x  DKTC with green tball x20  Supine TrA contraction with SLR 3x10 repetitions each   SL hip abduction 2x10  Sciatic nn glide on green ball 30x R/left  Supine hamstring stretch with strap (3 way)  "3x30"      Patient received therapeutic activities for 30 minutes for improved tolerance to functional activities including:  KB marches vs 15# x2 laps on turf  KB RDL to 12" step vs 15# 2x10  Rows vs black tube 30x5" hold on foam  TRX squat/row 3x10  TRX Bridge Row 2x10  Standing TrA pulldown vs black tube 30x5" hold on foam   Paloff press vs black tube 3x10 repetitions on foam        PATIENT EDUCATION AND HOME EXERCISES     Home Exercises Provided and Patient Education Provided     Education provided:   PT educated pt on importance of compliance with their HEP this visit.     Written Home Exercises Provided: yes. Exercises were reviewed and Anthony was able to demonstrate them prior to the end of the session.  Anthony demonstrated good  understanding of the education provided. See EMR under Patient Instructions for exercises provided during therapy sessions    ASSESSMENT     Pt tolerated tx session well today. Progressed pt's standing transverse abdominus exercises to foam surface today to further challenge pt's core strength for continued decrease in low back pain. Continue PT POC with emphasis on decreasing pt's low back pain and radicular symptoms.    Anthony is progressing well towards his goals.   Pt prognosis is Good.     Pt will continue to benefit from skilled outpatient physical therapy to address the deficits listed in the problem list box on initial evaluation, provide pt/family education and to maximize pt's level of independence in the home and community environment.     Pt's spiritual, cultural and educational needs considered and pt agreeable to plan of care and goals.     Anticipated barriers to physical therapy: None    Goals:   Short Term Goals (4 Weeks):  1. Patient will be compliant with home exercise program to supplement therapy in promoting functional mobility. (progressing, not met)    2. Patient will perform  with good control to demonstrate improved core strength. (progressing, not met)    3. " Patient will report no pain during thoracolumbar active range of motion to promote functional mobility.  (progressing, not met)    4. Patient will improve impaired lower extremity left hip abduction manual muscle tests  to >/=4/5 to improve strength for functional tasks. (progressing, not met)          Long Term Goals (6 Weeks):   1. Patient will improve FOTO score to </= 58% limited to decrease perceived limitation with maintaining/changing body position. (progressing, not met)    2. Patient will perform dying bug exercise with good control to demonstrate improved core strength.  (progressing, not met)    3. Patient will improve impaired lower extremity right hip abduction manual muscle tests to >/=4+/5 to improve strength for functional tasks.  (progressing, not met)    4. Patient will tolerate standing for 60 minutes with no increase in low back pain to return to PLOF.  (progressing, not met)          PLAN   Plan of care Certification: 11/22/2023 to 2/22/24.     Outpatient Physical Therapy 2 times weekly for 8 weeks to include the following interventions: Therapeutic Exercises, Manual Therapeutic Technique, Neuromuscular Re Education, Therapeutic Activities. Modalities, Kinesiotape prn, and Functional Dry Needling as needed.      Karyn Howard, PT

## 2023-12-10 ENCOUNTER — PATIENT MESSAGE (OUTPATIENT)
Dept: INTERNAL MEDICINE | Facility: CLINIC | Age: 73
End: 2023-12-10
Payer: MEDICARE

## 2023-12-10 DIAGNOSIS — F41.9 ANXIETY DISORDER, UNSPECIFIED TYPE: ICD-10-CM

## 2023-12-11 NOTE — TELEPHONE ENCOUNTER
Pt requesting med refill, med pended     LOV with Hayes Fererra MD , 11/17/2023  Last prescribed by pt previous PCP

## 2023-12-11 NOTE — TELEPHONE ENCOUNTER
No care due was identified.  [facility] Embedded Care Due Messages. Reference number: [id number].   12/10/2023 8:45:49 PM CST

## 2023-12-12 ENCOUNTER — CLINICAL SUPPORT (OUTPATIENT)
Dept: REHABILITATION | Facility: OTHER | Age: 73
End: 2023-12-12
Payer: MEDICARE

## 2023-12-12 DIAGNOSIS — R29.898 WEAKNESS OF BOTH LOWER EXTREMITIES: Primary | ICD-10-CM

## 2023-12-12 PROCEDURE — 97112 NEUROMUSCULAR REEDUCATION: CPT | Mod: PN

## 2023-12-12 PROCEDURE — 97530 THERAPEUTIC ACTIVITIES: CPT | Mod: PN

## 2023-12-12 RX ORDER — BUSPIRONE HYDROCHLORIDE 5 MG/1
5 TABLET ORAL 2 TIMES DAILY
Qty: 180 TABLET | Refills: 3 | Status: SHIPPED | OUTPATIENT
Start: 2023-12-12

## 2023-12-12 NOTE — PROGRESS NOTES
"    [facility]   Physical Therapy Treatment Note     Name: Anthony Reynaga  Clinic Number: [id number]    Therapy Diagnosis:   Encounter Diagnosis   Name Primary?    Weakness of both lower extremities Yes     Physician: DALJIT Hook NP    Visit Date: 12/12/2023    Physician: DALJIT Hook NP     Physician Orders: Physical Therapy Evaluate and Treat  Medical Diagnosis from Referral: DDD (degenerative disc disease), lumbar [M51.36]   Evaluation Date: 11/22/2023  Authorization Period Expiration: 11/2/24  Plan of Care Expiration: 11/22/2023 to 2/22/23  Visit # / Visits authorized: 6/20     Time In: 1000am  Time Out: 1100am  Total Billable Time: 60 minutes     Precautions: Standard      SUBJECTIVE     Pt reports: he is doing great today. Pt does report soreness after his last session and states that his opposite leg bothered him somewhat after his last session. Pt feels better overall, but he is not at his baseline and occasionally has radicular symptoms.   He was compliant with home exercise program.  Response to previous treatment: decreased low back pain  Functional change: improved tolerance to standing    Pain: 3/10  Location: right hip      OBJECTIVE     Objective Measures updated at progress report unless specified.       TREATMENT     Total Treatment time (time-based codes) separate from Evaluation: 60 minutes     Anthony received the treatments listed below:        Neuromuscular re education for 30 minutes for improved balance and proprioception including:   Supine LTR with green tball x2 minutes  Supine TrA contraction with 5 second hold 30x   Supine TrA contraction with marches 30x  DKTC with green tball x20  Supine TrA contraction with SLR 3x10 repetitions each   SL hip abduction 2x10  Sciatic nn glide on green ball 30x R/left  Supine hamstring stretch with strap (3 way) 3x30"      Patient received therapeutic activities for 30 minutes for improved tolerance to functional " "activities including:  KB marches vs 15# x2 laps on turf  KB RDL to 12" step vs 15# 2x10- held today 2/2 increased symptoms  Rows vs black tube 30x5" hold on foam  TRX squat/row 3x10  TRX Bridge Row 2x10  Standing TrA pulldown vs black tube 30x5" hold on foam   Paloff press vs black tube 3x10 repetitions on foam- held today 2/2 increased symptoms        PATIENT EDUCATION AND HOME EXERCISES     Home Exercises Provided and Patient Education Provided     Education provided:   PT educated pt on importance of compliance with their HEP this visit.     Written Home Exercises Provided: yes. Exercises were reviewed and Anthony was able to demonstrate them prior to the end of the session.  Anthony demonstrated good  understanding of the education provided. See EMR under Patient Instructions for exercises provided during therapy sessions    ASSESSMENT     Pt tolerated tx session well today. Held deadlift, paloff press, and RDL exercises as pt is experiencing a flare up of symptoms this visit. Continue PT POC.    Anthony is progressing well towards his goals.   Pt prognosis is Good.     Pt will continue to benefit from skilled outpatient physical therapy to address the deficits listed in the problem list box on initial evaluation, provide pt/family education and to maximize pt's level of independence in the home and community environment.     Pt's spiritual, cultural and educational needs considered and pt agreeable to plan of care and goals.     Anticipated barriers to physical therapy: None    Goals:   Short Term Goals (4 Weeks):  1. Patient will be compliant with home exercise program to supplement therapy in promoting functional mobility. (progressing, not met)    2. Patient will perform  with good control to demonstrate improved core strength. (progressing, not met)    3. Patient will report no pain during thoracolumbar active range of motion to promote functional mobility.  (progressing, not met)    4. Patient will improve " impaired lower extremity left hip abduction manual muscle tests  to >/=4/5 to improve strength for functional tasks. (progressing, not met)          Long Term Goals (6 Weeks):   1. Patient will improve FOTO score to </= 58% limited to decrease perceived limitation with maintaining/changing body position. (progressing, not met)    2. Patient will perform dying bug exercise with good control to demonstrate improved core strength.  (progressing, not met)    3. Patient will improve impaired lower extremity right hip abduction manual muscle tests to >/=4+/5 to improve strength for functional tasks.  (progressing, not met)    4. Patient will tolerate standing for 60 minutes with no increase in low back pain to return to PLOF.  (progressing, not met)          PLAN   Plan of care Certification: 11/22/2023 to 2/22/24.     Outpatient Physical Therapy 2 times weekly for 8 weeks to include the following interventions: Therapeutic Exercises, Manual Therapeutic Technique, Neuromuscular Re Education, Therapeutic Activities. Modalities, Kinesiotape prn, and Functional Dry Needling as needed.      Karyn Howard, PT

## 2023-12-12 NOTE — TELEPHONE ENCOUNTER
Second request, pt will run out this weekend and concerned with withdrawal effects    Pt requesting med refill, med pended      LOV with Hayes Ferrera MD , 11/17/2023  Last prescribed by pt previous PCP

## 2023-12-15 ENCOUNTER — CLINICAL SUPPORT (OUTPATIENT)
Dept: REHABILITATION | Facility: OTHER | Age: 73
End: 2023-12-15
Payer: MEDICARE

## 2023-12-15 DIAGNOSIS — R29.898 WEAKNESS OF BOTH LOWER EXTREMITIES: Primary | ICD-10-CM

## 2023-12-15 PROCEDURE — 97112 NEUROMUSCULAR REEDUCATION: CPT | Mod: PN

## 2023-12-15 PROCEDURE — 97530 THERAPEUTIC ACTIVITIES: CPT | Mod: PN

## 2023-12-15 NOTE — PROGRESS NOTES
"    [facility]   Physical Therapy Treatment Note     Name: Anthony Reynaga  Clinic Number: [id number]    Therapy Diagnosis:   Encounter Diagnosis   Name Primary?    Weakness of both lower extremities Yes     Physician: DALJIT Hook NP    Visit Date: 12/15/2023    Physician: DALJIT Hook NP     Physician Orders: Physical Therapy Evaluate and Treat  Medical Diagnosis from Referral: DDD (degenerative disc disease), lumbar [M51.36]   Evaluation Date: 11/22/2023  Authorization Period Expiration: 11/2/24  Plan of Care Expiration: 11/22/2023 to 2/22/23  Visit # / Visits authorized: 6/20     Time In: 1000am  Time Out: 1100am  Total Billable Time: 60 minutes     Precautions: Standard      SUBJECTIVE     Pt reports: he is doing great today. Pt states that his back is pain free. However, he is experiencing radicular symptoms into B LE's.   He was compliant with home exercise program.  Response to previous treatment: decreased low back pain  Functional change: improved tolerance to standing    Pain: 3/10  Location: right hip      OBJECTIVE     Objective Measures updated at progress report unless specified.       TREATMENT     Total Treatment time (time-based codes) separate from Evaluation: 60 minutes     Anthony received the treatments listed below:        Neuromuscular re education for 30 minutes for improved balance and proprioception including:   Supine LTR with green tball x2 minutes  Supine TrA contraction with 5 second hold 30x   Supine TrA contraction with marches 30x  DKTC with green tball x20  Supine TrA contraction with SLR 3x10 repetitions each   SL hip abduction 2x10  Sciatic nn glide on green ball 30x R/left  Supine hamstring stretch with strap (3 way) 3x30"      Patient received therapeutic activities for 30 minutes for improved tolerance to functional activities including:  KB marchglynn vs 15# x2 laps on turf  KB RDL to 12" step vs 15# 2x10- held today 2/2 increased symptoms  Rows " "vs black tube 30x5" hold on foam  TRX squat/row 3x10  TRX Bridge Row 2x10  Standing TrA pulldown vs black tube 30x5" hold on foam   Paloff press vs black tube 3x10 repetitions on foam- held today 2/2 increased symptoms        PATIENT EDUCATION AND HOME EXERCISES     Home Exercises Provided and Patient Education Provided     Education provided:   PT educated pt on importance of compliance with their HEP this visit.     Written Home Exercises Provided: yes. Exercises were reviewed and Anthony was able to demonstrate them prior to the end of the session.  Anthony demonstrated good  understanding of the education provided. See EMR under Patient Instructions for exercises provided during therapy sessions    ASSESSMENT     Pt tolerated tx session well today. Held deadlift, paloff press, and RDL exercises as pt is experiencing a flare up of symptoms this visit. Continue PT POC.    Anthony is progressing well towards his goals.   Pt prognosis is Good.     Pt will continue to benefit from skilled outpatient physical therapy to address the deficits listed in the problem list box on initial evaluation, provide pt/family education and to maximize pt's level of independence in the home and community environment.     Pt's spiritual, cultural and educational needs considered and pt agreeable to plan of care and goals.     Anticipated barriers to physical therapy: None    Goals:   Short Term Goals (4 Weeks):  1. Patient will be compliant with home exercise program to supplement therapy in promoting functional mobility. (progressing, not met)    2. Patient will perform  with good control to demonstrate improved core strength. (progressing, not met)    3. Patient will report no pain during thoracolumbar active range of motion to promote functional mobility.  (progressing, not met)    4. Patient will improve impaired lower extremity left hip abduction manual muscle tests  to >/=4/5 to improve strength for functional tasks. (progressing, not " met)          Long Term Goals (6 Weeks):   1. Patient will improve FOTO score to </= 58% limited to decrease perceived limitation with maintaining/changing body position. (progressing, not met)    2. Patient will perform dying bug exercise with good control to demonstrate improved core strength.  (progressing, not met)    3. Patient will improve impaired lower extremity right hip abduction manual muscle tests to >/=4+/5 to improve strength for functional tasks.  (progressing, not met)    4. Patient will tolerate standing for 60 minutes with no increase in low back pain to return to PLOF.  (progressing, not met)          PLAN   Plan of care Certification: 11/22/2023 to 2/22/24.     Outpatient Physical Therapy 2 times weekly for 8 weeks to include the following interventions: Therapeutic Exercises, Manual Therapeutic Technique, Neuromuscular Re Education, Therapeutic Activities. Modalities, Kinesiotape prn, and Functional Dry Needling as needed.      Karyn Howard, PT

## 2023-12-26 ENCOUNTER — CLINICAL SUPPORT (OUTPATIENT)
Dept: REHABILITATION | Facility: OTHER | Age: 73
End: 2023-12-26
Payer: MEDICARE

## 2023-12-26 DIAGNOSIS — R29.898 WEAKNESS OF BOTH LOWER EXTREMITIES: Primary | ICD-10-CM

## 2023-12-26 PROCEDURE — 97112 NEUROMUSCULAR REEDUCATION: CPT | Mod: PN

## 2023-12-26 PROCEDURE — 97530 THERAPEUTIC ACTIVITIES: CPT | Mod: PN

## 2023-12-26 NOTE — PROGRESS NOTES
"      [facility]   Physical Therapy Treatment Note     Name: Anthony Reynaga  Clinic Number: [id number]    Therapy Diagnosis:   Encounter Diagnosis   Name Primary?    Weakness of both lower extremities Yes     Physician: DALJIT Hook NP    Visit Date: 12/26/2023    Physician: DALJIT Hook NP     Physician Orders: Physical Therapy Evaluate and Treat  Medical Diagnosis from Referral: DDD (degenerative disc disease), lumbar [M51.36]   Evaluation Date: 11/22/2023  Authorization Period Expiration: 11/2/24  Plan of Care Expiration: 11/22/2023 to 2/22/23  Visit # / Visits authorized: 6/20     Time In: 1035am  Time Out: 1140am  Total Billable Time: 60 minutes     Precautions: Standard      SUBJECTIVE     Pt reports: he is doing well. His low back pain has increased, but his lower extremity radicular pain has reduced.  He was compliant with home exercise program.  Response to previous treatment: decreased low back pain  Functional change: improved tolerance to standing    Pain: 3/10  Location: right hip      OBJECTIVE     Objective Measures updated at progress report unless specified.       TREATMENT     Total Treatment time (time-based codes) separate from Evaluation: 60 minutes     Anthony received the treatments listed below:        Neuromuscular re education for 30 minutes for improved balance and proprioception including:   Supine LTR with green tball x2 minutes  Supine TrA contraction with 5 second hold 30x   Supine TrA contraction with marches 30x  DKTC with green tball x20  Supine TrA contraction with SLR 3x10 repetitions each   SL hip abduction 2x10  Sciatic nn glide on green ball 30x R/left  Seated sciatic nerve glide x20 right/left   Supine hamstring stretch with strap (3 way) 3x30"      Patient received therapeutic activities for 30 minutes for improved tolerance to functional activities including:  KB marches vs 15# x2 laps on turf  KB side stepping vs 15# x2 laps on turf  KB RDL " "to 12" step vs 15# 2x10- held today 2/2 increased symptoms  Rows vs black tube 30x5" hold on foam  TRX squat/row 3x10  TRX Bridge Row 2x10  Standing TrA pulldown vs black tube 30x5" hold on foam   Paloff press vs black tube 3x10 repetitions on foam- held today 2/2 increased symptoms        PATIENT EDUCATION AND HOME EXERCISES     Home Exercises Provided and Patient Education Provided     Education provided:   PT educated pt on importance of compliance with their HEP this visit.     Written Home Exercises Provided: yes. Exercises were reviewed and Anthony was able to demonstrate them prior to the end of the session.  Anthony demonstrated good  understanding of the education provided. See EMR under Patient Instructions for exercises provided during therapy sessions    ASSESSMENT     Pt tolerated tx session well today. Added seated sciatic nerve glide with good response. Patient reported relief of symptoms after supine and seated sciatic nerve glides. Continue PT POC.    Anthony is progressing well towards his goals.   Pt prognosis is Good.     Pt will continue to benefit from skilled outpatient physical therapy to address the deficits listed in the problem list box on initial evaluation, provide pt/family education and to maximize pt's level of independence in the home and community environment.     Pt's spiritual, cultural and educational needs considered and pt agreeable to plan of care and goals.     Anticipated barriers to physical therapy: None    Goals:   Short Term Goals (4 Weeks):  1. Patient will be compliant with home exercise program to supplement therapy in promoting functional mobility. (progressing, not met)    2. Patient will perform  with good control to demonstrate improved core strength. (progressing, not met)    3. Patient will report no pain during thoracolumbar active range of motion to promote functional mobility.  (progressing, not met)    4. Patient will improve impaired lower extremity left hip " abduction manual muscle tests  to >/=4/5 to improve strength for functional tasks. (progressing, not met)          Long Term Goals (6 Weeks):   1. Patient will improve FOTO score to </= 58% limited to decrease perceived limitation with maintaining/changing body position. (progressing, not met)    2. Patient will perform dying bug exercise with good control to demonstrate improved core strength.  (progressing, not met)    3. Patient will improve impaired lower extremity right hip abduction manual muscle tests to >/=4+/5 to improve strength for functional tasks.  (progressing, not met)    4. Patient will tolerate standing for 60 minutes with no increase in low back pain to return to PLOF.  (progressing, not met)          PLAN   Plan of care Certification: 11/22/2023 to 2/22/24.     Outpatient Physical Therapy 2 times weekly for 8 weeks to include the following interventions: Therapeutic Exercises, Manual Therapeutic Technique, Neuromuscular Re Education, Therapeutic Activities. Modalities, Kinesiotape prn, and Functional Dry Needling as needed.      Pramod Fulton, PT

## 2023-12-29 ENCOUNTER — DOCUMENTATION ONLY (OUTPATIENT)
Dept: REHABILITATION | Facility: OTHER | Age: 73
End: 2023-12-29
Payer: MEDICARE

## 2023-12-29 ENCOUNTER — CLINICAL SUPPORT (OUTPATIENT)
Dept: REHABILITATION | Facility: OTHER | Age: 73
End: 2023-12-29
Payer: MEDICARE

## 2023-12-29 DIAGNOSIS — R29.898 WEAKNESS OF BOTH LOWER EXTREMITIES: Primary | ICD-10-CM

## 2023-12-29 PROCEDURE — 97112 NEUROMUSCULAR REEDUCATION: CPT | Mod: PN,CQ

## 2023-12-29 PROCEDURE — 97530 THERAPEUTIC ACTIVITIES: CPT | Mod: PN,CQ

## 2023-12-29 NOTE — PROGRESS NOTES
Physical Therapist and Physical Therapist Assistant met face to face to discuss patient's treatment plan and progress towards established goals. Pt will be seen by a physical therapist minimally every 6th visit or every 30 days.    Bryan Weber, PTA  12/29/2023

## 2023-12-29 NOTE — PROGRESS NOTES
"      [facility]   Physical Therapy Treatment Note     Name: Anthony Reynaga  Clinic Number: [id number]    Therapy Diagnosis:   Encounter Diagnosis   Name Primary?    Weakness of both lower extremities Yes     Physician: DALJIT Hook NP    Visit Date: 12/29/2023    Physician: DALJIT Hook NP     Physician Orders: Physical Therapy Evaluate and Treat  Medical Diagnosis from Referral: DDD (degenerative disc disease), lumbar [M51.36]   Evaluation Date: 11/22/2023  Authorization Period Expiration: 11/2/24  Plan of Care Expiration: 11/22/2023 to 2/22/23  Visit # / Visits authorized: 9/20     Time In: 0957  Time Out: 1057  Total Billable Time: 30 minutes     Precautions: Standard      SUBJECTIVE     Pt reports: he is doing well. Feels the L leg pain at times when standing from a seated position however it goes away after walking.   He was compliant with home exercise program.  Response to previous treatment: felt better well, no issues  Functional change: improved tolerance to standing    Pain: 3/10  Location: right hip      OBJECTIVE     Objective Measures updated at progress report unless specified.       TREATMENT   Charges based on 1:1 tx:     Anthony received the treatments listed below:        Neuromuscular re education for 20 minutes for improved balance and proprioception including:   +Seated silver SB rollouts, 10x10"  Supine LTR with green tball x3 minutes  +PPT 3" hold x 20  +Seated PPT on silver SB, 20x  Supine TrA contraction with 5 second hold 30x   Supine TrA contraction with marches 30x  DKTC with green tball x20  Supine TrA contraction with SLR 3x10 repetitions each   SL hip abduction 2x10  Sciatic nn glide on green ball 30x R/left  Seated sciatic nerve glide x20 right/left   Supine hamstring stretch with strap (3 way) 3x30"      Patient received therapeutic activities for 10 minutes for improved tolerance to functional activities including:  KB marches vs 15# x2 laps on " "turf  KB side stepping vs 15# x2 laps on turf  KB RDL to 12" step vs 15# 2x10- held today 2/2 increased symptoms  Rows vs black tube 30x5" hold on foam  TRX squat/row 3x10  TRX Bridge Row 2x10  +Standing hip abd + abdominal bracing with red ball on mat, 5" hold x20 R/L  Standing TrA pulldown vs black tube 30x5" hold on foam   Paloff press vs black tube 3x10 repetitions on foam- held today 2/2 increased symptoms        PATIENT EDUCATION AND HOME EXERCISES     Home Exercises Provided and Patient Education Provided     Education provided:   PT educated pt on importance of compliance with their HEP this visit.     Written Home Exercises Provided: yes. Exercises were reviewed and Anthony was able to demonstrate them prior to the end of the session.  Anthony demonstrated good  understanding of the education provided. See EMR under Patient Instructions for exercises provided during therapy sessions    ASSESSMENT     Anthony tolerated  exercise well with minimal complaints of increased pain. Pt was able to complete trunk/back rotational exercises with decreased muscle guarding. Muscle weakness in paraspinals/core musculature were notable during exercises. However  he was able to demonstrate improved core/transverse abdominis activation after cuing.  Neural tension continues to be notable with sciatic nerve glides. Will continue to progress core/stabilization exercises as tolerated.       Anthony is progressing well towards his goals.   Pt prognosis is Good.     Pt will continue to benefit from skilled outpatient physical therapy to address the deficits listed in the problem list box on initial evaluation, provide pt/family education and to maximize pt's level of independence in the home and community environment.     Pt's spiritual, cultural and educational needs considered and pt agreeable to plan of care and goals.     Anticipated barriers to physical therapy: None    Goals:   Short Term Goals (4 Weeks):  1. Patient will be compliant " with home exercise program to supplement therapy in promoting functional mobility. (progressing, not met)    2. Patient will perform  with good control to demonstrate improved core strength. (progressing, not met)    3. Patient will report no pain during thoracolumbar active range of motion to promote functional mobility.  (progressing, not met)    4. Patient will improve impaired lower extremity left hip abduction manual muscle tests  to >/=4/5 to improve strength for functional tasks. (progressing, not met)          Long Term Goals (6 Weeks):   1. Patient will improve FOTO score to </= 58% limited to decrease perceived limitation with maintaining/changing body position. (progressing, not met)    2. Patient will perform dying bug exercise with good control to demonstrate improved core strength.  (progressing, not met)    3. Patient will improve impaired lower extremity right hip abduction manual muscle tests to >/=4+/5 to improve strength for functional tasks.  (progressing, not met)    4. Patient will tolerate standing for 60 minutes with no increase in low back pain to return to PLOF.  (progressing, not met)          PLAN   Plan of care Certification: 11/22/2023 to 2/22/24.     Focus on core/stabilization training as well as posture and lumbar ROM, with emphasis on ADL performance.     Outpatient Physical Therapy 2 times weekly for 8 weeks to include the following interventions: Therapeutic Exercises, Manual Therapeutic Technique, Neuromuscular Re Education, Therapeutic Activities. Modalities, Kinesiotape prn, and Functional Dry Needling as needed.      Bryan Weber, PTA

## 2024-01-02 ENCOUNTER — CLINICAL SUPPORT (OUTPATIENT)
Dept: REHABILITATION | Facility: OTHER | Age: 74
End: 2024-01-02
Payer: MEDICARE

## 2024-01-02 DIAGNOSIS — R29.898 WEAKNESS OF BOTH LOWER EXTREMITIES: Primary | ICD-10-CM

## 2024-01-02 PROCEDURE — 97112 NEUROMUSCULAR REEDUCATION: CPT | Mod: PN,CQ

## 2024-01-02 PROCEDURE — 97530 THERAPEUTIC ACTIVITIES: CPT | Mod: PN,CQ

## 2024-01-02 NOTE — PROGRESS NOTES
"      [facility]   Physical Therapy Treatment Note     Name: Anthony Reynaga  Clinic Number: [id number]    Therapy Diagnosis:   Encounter Diagnosis   Name Primary?    Weakness of both lower extremities Yes     Physician: DALJIT Hook NP    Visit Date: 1/2/2024    Physician: DALJIT Hook NP     Physician Orders: Physical Therapy Evaluate and Treat  Medical Diagnosis from Referral: DDD (degenerative disc disease), lumbar [M51.36]   Evaluation Date: 11/22/2023  Authorization Period Expiration: 11/2/24  Plan of Care Expiration: 11/22/2023 to 2/22/23  Visit # / Visits authorized: 2/80     Time In: 0959  Time Out: 1057  Total Billable Time: 30 minutes     Precautions: Standard      SUBJECTIVE     Pt reports: he is doing fair. He has not had the lateral leg pain a a day or two however he was standing for about 2 hrs while attending a music event and felt some pain afterwards. He was not too sore after his last PT session.   He was compliant with home exercise program.  Response to previous treatment: felt fine well, no issues  Functional change: improved tolerance to standing    Pain: 3/10  Location: right hip      OBJECTIVE     Objective Measures updated at progress report unless specified.       TREATMENT   Charges based on 1:1 tx:     Anthony received the treatments listed below:        Neuromuscular re education for 20 minutes for improved balance and proprioception including:   Seated silver SB rollouts, 10x10"  Supine LTR with green tball x3 minutes  PPT 3" hold x 20  Seated PPT on silver SB, 20x  Supine TrA contraction with 5 second hold 30x   Supine TrA contraction with marches 30x  DKTC with green tball x20  Supine TrA contraction with SLR 3x10 repetitions each   SL hip abduction 2x10  Sciatic nn glide on green ball 30x R/left  Seated sciatic nerve glide x20 right/left   Supine hamstring stretch with strap (3 way) 3x30"      Patient received therapeutic activities for 10 minutes " "for improved tolerance to functional activities including:  KB marches vs 15# x2 laps on turf  KB side stepping vs 15# x2 laps on turf  KB RDL to 12" step vs 15# 2x10- held today 2/2 increased symptoms  Rows vs black tube 30x5" hold on foam  TRX squat/row 3x10  TRX Bridge Row 2x10  Standing hip abd + abdominal bracing with red ball on mat, 5" hold x20 R/L  Standing TrA pulldown vs black tube 30x5" hold on foam   Paloff press vs black tube 3x10 repetitions on foam- held today 2/2 increased symptoms        PATIENT EDUCATION AND HOME EXERCISES     Home Exercises Provided and Patient Education Provided     Education provided:   PT educated pt on importance of compliance with their HEP this visit.     Written Home Exercises Provided: Patient instructed to cont prior HEP. Exercises were reviewed and Anthony was able to demonstrate them prior to the end of the session.  Anthony demonstrated good  understanding of the education provided. See EMR under Patient Instructions for exercises provided during therapy sessions    ASSESSMENT     Anthony tolerated  exercise well. Continued with core/stabilization training with emphasis on posture and proper pelvic alignment. Moderate tightness in HS and core weakness continues to be an obstacle at this time.       Anthony is progressing well towards his goals.   Pt prognosis is Good.     Pt will continue to benefit from skilled outpatient physical therapy to address the deficits listed in the problem list box on initial evaluation, provide pt/family education and to maximize pt's level of independence in the home and community environment.     Pt's spiritual, cultural and educational needs considered and pt agreeable to plan of care and goals.     Anticipated barriers to physical therapy: None    Goals:   Short Term Goals (4 Weeks):  1. Patient will be compliant with home exercise program to supplement therapy in promoting functional mobility. (progressing, not met)    2. Patient will perform  " with good control to demonstrate improved core strength. (progressing, not met)    3. Patient will report no pain during thoracolumbar active range of motion to promote functional mobility.  (progressing, not met)    4. Patient will improve impaired lower extremity left hip abduction manual muscle tests  to >/=4/5 to improve strength for functional tasks. (progressing, not met)          Long Term Goals (6 Weeks):   1. Patient will improve FOTO score to </= 58% limited to decrease perceived limitation with maintaining/changing body position. (progressing, not met)    2. Patient will perform dying bug exercise with good control to demonstrate improved core strength.  (progressing, not met)    3. Patient will improve impaired lower extremity right hip abduction manual muscle tests to >/=4+/5 to improve strength for functional tasks.  (progressing, not met)    4. Patient will tolerate standing for 60 minutes with no increase in low back pain to return to PLOF.  (progressing, not met)          PLAN   Plan of care Certification: 11/22/2023 to 2/22/24.     Focus on core/stabilization training as well as posture and lumbar ROM, with emphasis on ADL performance.     Outpatient Physical Therapy 2 times weekly for 8 weeks to include the following interventions: Therapeutic Exercises, Manual Therapeutic Technique, Neuromuscular Re Education, Therapeutic Activities. Modalities, Kinesiotape prn, and Functional Dry Needling as needed.      Bryan Weber, PTA

## 2024-01-05 ENCOUNTER — CLINICAL SUPPORT (OUTPATIENT)
Dept: REHABILITATION | Facility: OTHER | Age: 74
End: 2024-01-05
Payer: MEDICARE

## 2024-01-05 DIAGNOSIS — R29.898 WEAKNESS OF BOTH LOWER EXTREMITIES: Primary | ICD-10-CM

## 2024-01-05 PROCEDURE — 97112 NEUROMUSCULAR REEDUCATION: CPT | Mod: PN

## 2024-01-05 PROCEDURE — 97530 THERAPEUTIC ACTIVITIES: CPT | Mod: PN

## 2024-01-05 NOTE — PROGRESS NOTES
"    [facility]   Physical Therapy Treatment Note     Name: Anthony Reynaga  Clinic Number: [id number]    Therapy Diagnosis:   Encounter Diagnosis   Name Primary?    Weakness of both lower extremities Yes       Physician: DALJIT Hook NP    Visit Date: 1/5/2024    Physician: DALJIT Hook NP     Physician Orders: Physical Therapy Evaluate and Treat  Medical Diagnosis from Referral: DDD (degenerative disc disease), lumbar [M51.36]   Evaluation Date: 11/22/2023  Authorization Period Expiration: 11/2/24  Plan of Care Expiration: 11/22/2023 to 2/22/23  Visit # / Visits authorized: 2/80     Time In: 10:00 am  Time Out: 11:00 am  Total Billable Time: 60 minutes     Precautions: Standard      SUBJECTIVE     Pt reports: he is doing fair. He hasn't noticed much progress but states that the pain is not as persistent   He was compliant with home exercise program.  Response to previous treatment: felt fine well, no issues  Functional change: improved tolerance to standing    Pain: 3/10  Location: right hip      OBJECTIVE     Objective Measures updated at progress report unless specified.       TREATMENT   Charges based on 1:1 tx:     Anthony received the treatments listed below:        Neuromuscular re education for 45 minutes for improved balance and proprioception including:   Seated silver SB rollouts, 10x10"  Supine LTR with green tball x3 minutes  PPT 3" hold x 20  Seated PPT on silver SB, 20x  +Seated marching w/ TrA activation on silver SB  x 20  Supine TrA contraction with 5 second hold 30x   Supine TrA contraction with marches 30x  DKTC with green tball x20  Supine TrA contraction with SLR 3x10 repetitions each   SL hip abduction 2x10  Sciatic nn glide on green ball 30x R/left  Seated sciatic nerve glide x20 right/left   Supine hamstring stretch with strap (3 way) 3x30"      Patient received therapeutic activities for 10 minutes for improved tolerance to functional activities " "including:  KB marches vs 15# x2 laps on turf  KB side stepping vs 15# x2 laps on turf  KB RDL to 12" step vs 15# 2x10- held today 2/2 increased symptoms  Rows vs black tube 30x5" hold on foam  TRX squat/row 3x10  TRX Bridge Row 2x10  Standing hip abd + abdominal bracing with red ball on mat, 5" hold x20 R/L  Standing TrA pulldown vs black tube 30x5" hold on foam   Paloff press vs GTB 3x10 repetitions on foam        PATIENT EDUCATION AND HOME EXERCISES     Home Exercises Provided and Patient Education Provided     Education provided:   PT educated pt on importance of compliance with their HEP this visit.     Written Home Exercises Provided: Patient instructed to cont prior HEP. Exercises were reviewed and Anthony was able to demonstrate them prior to the end of the session.  Anthony demonstrated good  understanding of the education provided. See EMR under Patient Instructions for exercises provided during therapy sessions    ASSESSMENT   Anthony completed all exercises with no complaints of increased pain. Significant difficulty with lumbar/pelvic dissociation on stability ball. Pt reports that one of his goals is to return to yoga even if the positions are modified. Discussed plan for pt to bring yoga pictures from his instructor in during future visits so we can modify/trial appropriate exercises in clinic. Plan to continue to progress exercises per pts tolerance       Anthony tolerated  exercise well. Continued with core/stabilization training with emphasis on posture and proper pelvic alignment. Moderate tightness in HS and core weakness continues to be an obstacle at this time.       Anthony is progressing well towards his goals.   Pt prognosis is Good.     Pt will continue to benefit from skilled outpatient physical therapy to address the deficits listed in the problem list box on initial evaluation, provide pt/family education and to maximize pt's level of independence in the home and community environment.     Pt's " spiritual, cultural and educational needs considered and pt agreeable to plan of care and goals.     Anticipated barriers to physical therapy: None    Goals:   Short Term Goals (4 Weeks):  1. Patient will be compliant with home exercise program to supplement therapy in promoting functional mobility. (progressing, not met)    2. Patient will perform  with good control to demonstrate improved core strength. (progressing, not met)    3. Patient will report no pain during thoracolumbar active range of motion to promote functional mobility.  (progressing, not met)    4. Patient will improve impaired lower extremity left hip abduction manual muscle tests  to >/=4/5 to improve strength for functional tasks. (progressing, not met)          Long Term Goals (6 Weeks):   1. Patient will improve FOTO score to </= 58% limited to decrease perceived limitation with maintaining/changing body position. (progressing, not met)    2. Patient will perform dying bug exercise with good control to demonstrate improved core strength.  (progressing, not met)    3. Patient will improve impaired lower extremity right hip abduction manual muscle tests to >/=4+/5 to improve strength for functional tasks.  (progressing, not met)    4. Patient will tolerate standing for 60 minutes with no increase in low back pain to return to PLOF.  (progressing, not met)          PLAN   Plan of care Certification: 11/22/2023 to 2/22/24.     Focus on core/stabilization training as well as posture and lumbar ROM, with emphasis on ADL performance.     Outpatient Physical Therapy 2 times weekly for 8 weeks to include the following interventions: Therapeutic Exercises, Manual Therapeutic Technique, Neuromuscular Re Education, Therapeutic Activities. Modalities, Kinesiotape prn, and Functional Dry Needling as needed.      Rafita Mata, PT

## 2024-01-09 ENCOUNTER — CLINICAL SUPPORT (OUTPATIENT)
Dept: REHABILITATION | Facility: OTHER | Age: 74
End: 2024-01-09
Payer: MEDICARE

## 2024-01-09 DIAGNOSIS — R29.898 WEAKNESS OF BOTH LOWER EXTREMITIES: Primary | ICD-10-CM

## 2024-01-09 PROCEDURE — 97112 NEUROMUSCULAR REEDUCATION: CPT | Mod: PN

## 2024-01-09 PROCEDURE — 97530 THERAPEUTIC ACTIVITIES: CPT | Mod: PN

## 2024-01-09 NOTE — PROGRESS NOTES
"    [facility]   Physical Therapy Treatment Note     Name: Anthony Reynaga  Clinic Number: [id number]    Therapy Diagnosis:   Encounter Diagnosis   Name Primary?    Weakness of both lower extremities Yes       Physician: DALJIT Hook NP    Visit Date: 1/9/2024    Physician: DALJIT Hook NP     Physician Orders: Physical Therapy Evaluate and Treat  Medical Diagnosis from Referral: DDD (degenerative disc disease), lumbar [M51.36]   Evaluation Date: 11/22/2023  Authorization Period Expiration: 11/2/24  Plan of Care Expiration: 11/22/2023 to 2/22/23  Visit # / Visits authorized: 3/80     Time In: 230pm  Time Out: 330pm  Total Billable Time: 60 minutes     Precautions: Standard      SUBJECTIVE     Pt reports: he is doing fair. He hasn't noticed much progress but states that the pain is not as persistent   He was compliant with home exercise program.  Response to previous treatment: felt fine well, no issues  Functional change: improved tolerance to standing    Pain: 3/10  Location: right hip      OBJECTIVE     Objective Measures updated at progress report unless specified.       TREATMENT   Charges based on 1:1 tx:     Anthony received the treatments listed below:        Neuromuscular re education for 30 minutes for improved balance and proprioception including:     Recumbent bike L2 x 8 minutes  Seated silver SB rollouts, 5x10" forward/bilateral sides  Supine LTR with green tball x3 minutes  PPT 3" hold x 20  Seated PPT on silver SB, 20x  +Seated marching w/ TrA activation on silver SB  x 20  Supine TrA contraction with 5 second hold 30x   Supine TrA contraction with marches 30x  DKTC with green tball x20  Supine TrA contraction with SLR 3x10 repetitions each   SL hip abduction 2x10  Sciatic nn glide on green ball 30x R/left  Seated sciatic nerve glide x20 right/left   Supine hamstring stretch with strap (3 way) 3x30"      Patient received therapeutic activities for 25 minutes for " "improved tolerance to functional activities including:  KB marches vs 15# x2 laps on turf  KB side stepping vs 15# x2 laps on turf  KB RDL to 12" step vs 15# 2x10- held today 2/2 increased symptoms  Rows vs black tube 30x5" hold on foam  TRX squat/row 3x10  TRX Row 2x10  Standing hip abd + abdominal bracing with red ball on mat, 5" hold x20 R/left  Step up 6" 2x10  Squat with 10# KB tap on 6" step 2x10  DL heel rise on wedge x20  Standing gastroc stretch 3x30"  Standing TrA pulldown vs black tube 30x5" hold on foam   Paloff press vs GTB 3x10 repetitions on foam        PATIENT EDUCATION AND HOME EXERCISES     Home Exercises Provided and Patient Education Provided     Education provided:   PT educated pt on importance of compliance with their HEP this visit.     Written Home Exercises Provided: Patient instructed to cont prior HEP. Exercises were reviewed and Anthony was able to demonstrate them prior to the end of the session.  Anthony demonstrated good  understanding of the education provided. See EMR under Patient Instructions for exercises provided during therapy sessions    ASSESSMENT     Anthony tolerated therapeutic interventions well with no complaint of increased pain. Patient continues to get left side radicular symptoms with supine hamstring stretch across the body on left. Plan to continue to progress exercises per pts tolerance       Anthony tolerated  exercise well. Continued with core/stabilization training with emphasis on posture and proper pelvic alignment. Moderate tightness in HS and core weakness continues to be an obstacle at this time.       Anthony is progressing well towards his goals.   Pt prognosis is Good.     Pt will continue to benefit from skilled outpatient physical therapy to address the deficits listed in the problem list box on initial evaluation, provide pt/family education and to maximize pt's level of independence in the home and community environment.     Pt's spiritual, cultural and " educational needs considered and pt agreeable to plan of care and goals.     Anticipated barriers to physical therapy: None    Goals:   Short Term Goals (4 Weeks):  1. Patient will be compliant with home exercise program to supplement therapy in promoting functional mobility. (progressing, not met)    2. Patient will perform  with good control to demonstrate improved core strength. (progressing, not met)    3. Patient will report no pain during thoracolumbar active range of motion to promote functional mobility.  (progressing, not met)    4. Patient will improve impaired lower extremity left hip abduction manual muscle tests  to >/=4/5 to improve strength for functional tasks. (progressing, not met)          Long Term Goals (6 Weeks):   1. Patient will improve FOTO score to </= 58% limited to decrease perceived limitation with maintaining/changing body position. (progressing, not met)    2. Patient will perform dying bug exercise with good control to demonstrate improved core strength.  (progressing, not met)    3. Patient will improve impaired lower extremity right hip abduction manual muscle tests to >/=4+/5 to improve strength for functional tasks.  (progressing, not met)    4. Patient will tolerate standing for 60 minutes with no increase in low back pain to return to PLOF.  (progressing, not met)          PLAN   Plan of care Certification: 11/22/2023 to 2/22/24.     Focus on core/stabilization training as well as posture and lumbar ROM, with emphasis on ADL performance.     Outpatient Physical Therapy 2 times weekly for 8 weeks to include the following interventions: Therapeutic Exercises, Manual Therapeutic Technique, Neuromuscular Re Education, Therapeutic Activities. Modalities, Kinesiotape prn, and Functional Dry Needling as needed.      Pramod Fulton, PT

## 2024-01-11 DIAGNOSIS — I10 HYPERTENSION, ESSENTIAL: ICD-10-CM

## 2024-01-12 ENCOUNTER — CLINICAL SUPPORT (OUTPATIENT)
Dept: REHABILITATION | Facility: OTHER | Age: 74
End: 2024-01-12
Payer: MEDICARE

## 2024-01-12 DIAGNOSIS — R29.898 WEAKNESS OF BOTH LOWER EXTREMITIES: Primary | ICD-10-CM

## 2024-01-12 PROCEDURE — 97530 THERAPEUTIC ACTIVITIES: CPT | Mod: PN

## 2024-01-12 PROCEDURE — 97112 NEUROMUSCULAR REEDUCATION: CPT | Mod: PN

## 2024-01-12 RX ORDER — METOPROLOL SUCCINATE 200 MG/1
200 TABLET, EXTENDED RELEASE ORAL
Qty: 90 TABLET | Refills: 3 | Status: SHIPPED | OUTPATIENT
Start: 2024-01-12

## 2024-01-12 NOTE — PROGRESS NOTES
"      [facility]   Physical Therapy Treatment Note     Name: Anthony Reynaga  Clinic Number: [id number]    Therapy Diagnosis:   Encounter Diagnosis   Name Primary?    Weakness of both lower extremities Yes       Physician: DALJIT Hook NP    Visit Date: 1/12/2024    Physician: DALJIT Hook NP     Physician Orders: Physical Therapy Evaluate and Treat  Medical Diagnosis from Referral: DDD (degenerative disc disease), lumbar [M51.36]   Evaluation Date: 11/22/2023  Authorization Period Expiration: 11/2/24  Plan of Care Expiration: 11/22/2023 to 2/22/23  Visit # / Visits authorized: 4/80     Time In: 100pm  Time Out: 200pm  Total Billable Time: 55 minutes     Precautions: Standard      SUBJECTIVE     Pt reports: he felt great after last treatment. He had a little muscle spasm at low back the day after last treatment, but it hasn't been too bad. Overall his back has been feeling good.    He was compliant with home exercise program.  Response to previous treatment: felt fine well, no issues  Functional change: improved tolerance to standing    Pain: 3/10  Location: right hip      OBJECTIVE     Objective Measures updated at progress report unless specified.       TREATMENT   Charges based on 1:1 tx:     Anthony received the treatments listed below:        Neuromuscular re education for 30 minutes for improved balance and proprioception including:     Recumbent bike L4 x 8 minutes  Seated silver SB rollouts, 5x10" forward/bilateral sides  Supine LTR with green tball x3 minutes  PPT 3" hold x 20  Seated PPT on silver SB, 20x  +Seated marching w/ TrA activation on silver SB  x 20  Supine TrA contraction with 5 second hold 30x   Supine TrA contraction with marches 30x  DKTC with green tball x20  Supine TrA contraction with SLR 3x10 repetitions each   SL hip abduction 2x10  Sciatic nn glide on green ball 30x R/left  Seated sciatic nerve glide x20 right/left   Supine hamstring stretch with strap " "(3 way) 3x30"      Patient received therapeutic activities for 25 minutes for improved tolerance to functional activities including:  KB marches vs 15# x2 laps on turf  KB side stepping vs 15# x2 laps on turf  KB RDL to 12" step vs 15# 2x10- held today 2/2 increased symptoms  Rows vs black tube 30x5" hold on foam  TRX squat/row 3x10  TRX Bridge Row 2x10  Standing hip abd + abdominal bracing with red ball on mat, 5" hold x20 R/left  Step up 6" to march x20  Goblet squat with 10# KB tap on 6" step 2x10  DL heel rise on wedge x20  Standing gastroc stretch 3x30"  Standing TrA pulldown vs black tube 30x5" hold on foam   Paloff press vs GTB 3x10 repetitions on foam        PATIENT EDUCATION AND HOME EXERCISES     Home Exercises Provided and Patient Education Provided     Education provided:   PT educated pt on importance of compliance with their HEP this visit.     Written Home Exercises Provided: Patient instructed to cont prior HEP. Exercises were reviewed and Anthony was able to demonstrate them prior to the end of the session.  Anthony demonstrated good  understanding of the education provided. See EMR under Patient Instructions for exercises provided during therapy sessions    ASSESSMENT     Anthony tolerated therapeutic interventions well with no complaint of increased pain. Anthony had some difficulty with squat mechanics requiring some VC for proper mechanics. We progressed gluteal/core strengthening with good tolerance. Plan to continue to progress exercises per pts tolerance       Anthony tolerated  exercise well. Continued with core/stabilization training with emphasis on posture and proper pelvic alignment. Moderate tightness in HS and core weakness continues to be an obstacle at this time.       Anthony is progressing well towards his goals.   Pt prognosis is Good.     Pt will continue to benefit from skilled outpatient physical therapy to address the deficits listed in the problem list box on initial evaluation, provide " pt/family education and to maximize pt's level of independence in the home and community environment.     Pt's spiritual, cultural and educational needs considered and pt agreeable to plan of care and goals.     Anticipated barriers to physical therapy: None    Goals:   Short Term Goals (4 Weeks):  1. Patient will be compliant with home exercise program to supplement therapy in promoting functional mobility. (progressing, not met)    2. Patient will perform  with good control to demonstrate improved core strength. (progressing, not met)    3. Patient will report no pain during thoracolumbar active range of motion to promote functional mobility.  (progressing, not met)    4. Patient will improve impaired lower extremity left hip abduction manual muscle tests  to >/=4/5 to improve strength for functional tasks. (progressing, not met)          Long Term Goals (6 Weeks):   1. Patient will improve FOTO score to </= 58% limited to decrease perceived limitation with maintaining/changing body position. (progressing, not met)    2. Patient will perform dying bug exercise with good control to demonstrate improved core strength.  (progressing, not met)    3. Patient will improve impaired lower extremity right hip abduction manual muscle tests to >/=4+/5 to improve strength for functional tasks.  (progressing, not met)    4. Patient will tolerate standing for 60 minutes with no increase in low back pain to return to PLOF.  (progressing, not met)          PLAN   Plan of care Certification: 11/22/2023 to 2/22/24.     Focus on core/stabilization training as well as posture and lumbar ROM, with emphasis on ADL performance.     Outpatient Physical Therapy 2 times weekly for 8 weeks to include the following interventions: Therapeutic Exercises, Manual Therapeutic Technique, Neuromuscular Re Education, Therapeutic Activities. Modalities, Kinesiotape prn, and Functional Dry Needling as needed.      Pramod Fulton, PT

## 2024-01-12 NOTE — TELEPHONE ENCOUNTER
No care due was identified.  [facility] Embedded Care Due Messages. Reference number: [id number].   1/11/2024 9:50:48 PM CST

## 2024-01-12 NOTE — TELEPHONE ENCOUNTER
Refill Routing Note   Medication(s) are not appropriate for processing by [facility] for the following reason(s):        Required vitals abnormal    [facility] action(s):  Defer               Appointments  past 12m or future 3m with PCP    Date Provider   Last Visit   11/17/2023 Hayes Ferrera MD   Next Visit   Visit date not found Hayes Ferrera MD   ED visits in past 90 days: 0        Note composed:11:38 AM 01/12/2024

## 2024-01-22 ENCOUNTER — PATIENT MESSAGE (OUTPATIENT)
Dept: INTERNAL MEDICINE | Facility: CLINIC | Age: 74
End: 2024-01-22
Payer: MEDICARE

## 2024-01-22 DIAGNOSIS — E78.2 MIXED HYPERLIPIDEMIA: ICD-10-CM

## 2024-01-22 RX ORDER — SIMVASTATIN 20 MG/1
20 TABLET, FILM COATED ORAL NIGHTLY
Qty: 90 TABLET | Refills: 0 | Status: SHIPPED | OUTPATIENT
Start: 2024-01-22 | End: 2024-01-23 | Stop reason: SDUPTHER

## 2024-01-22 NOTE — TELEPHONE ENCOUNTER
No care due was identified.  [facility] Embedded Care Due Messages. Reference number: [id number].   1/22/2024 11:57:29 AM CST

## 2024-01-22 NOTE — TELEPHONE ENCOUNTER
Refill Routing Note   Medication(s) are not appropriate for processing by [facility] for the following reason(s):        Responsible provider unclear  No active prescription written by provider    [facility] action(s):  Defer               Appointments  past 12m or future 3m with PCP    Date Provider   Last Visit   11/17/2023 Hayes Ferrera MD   Next Visit   Visit date not found Hayes Ferrera MD   ED visits in past 90 days: 0        Note composed:2:35 PM 01/22/2024

## 2024-01-23 ENCOUNTER — CLINICAL SUPPORT (OUTPATIENT)
Dept: REHABILITATION | Facility: OTHER | Age: 74
End: 2024-01-23
Payer: MEDICARE

## 2024-01-23 DIAGNOSIS — R29.898 WEAKNESS OF BOTH LOWER EXTREMITIES: Primary | ICD-10-CM

## 2024-01-23 PROCEDURE — 97164 PT RE-EVAL EST PLAN CARE: CPT | Mod: PN

## 2024-01-23 PROCEDURE — 97530 THERAPEUTIC ACTIVITIES: CPT | Mod: PN

## 2024-01-23 PROCEDURE — 97112 NEUROMUSCULAR REEDUCATION: CPT | Mod: PN

## 2024-01-23 RX ORDER — SIMVASTATIN 20 MG/1
20 TABLET, FILM COATED ORAL NIGHTLY
Qty: 90 TABLET | Refills: 0 | Status: SHIPPED | OUTPATIENT
Start: 2024-01-23 | End: 2024-03-19

## 2024-01-23 NOTE — PROGRESS NOTES
[facility]   Physical Therapy Discharge Note     Name: Anthony Reynaga  Clinic Number: [id number]    Therapy Diagnosis:   Encounter Diagnosis   Name Primary?    Weakness of both lower extremities Yes       Physician: DALJIT Hook NP    Visit Date: 1/23/2024    Physician: DALJIT Hook NP     Physician Orders: Physical Therapy Evaluate and Treat  Medical Diagnosis from Referral: DDD (degenerative disc disease), lumbar [M51.36]   Evaluation Date: 11/22/2023  Authorization Period Expiration: 11/2/24  Plan of Care Expiration: 11/22/2023 to 2/22/23  Visit # / Visits authorized: 5/80     Time In: 300pm  Time Out: 400pm  Total Billable Time: 55 minutes     Precautions: Standard      SUBJECTIVE     Pt reports: his low back feels good and his left lower extremity radicular pain has been minimal. He would like today to be his last day    He was compliant with home exercise program.  Response to previous treatment: felt fine well, no issues  Functional change: improved tolerance to standing    Pain: 3/10  Location: right hip      OBJECTIVE     1/23/24    WNL=within normal limits  WFL=within functional limits  NT=not tested  *=pain     Posture: Forward head, rounded shoulders   Palpation: pain/tenderness to central low back  Sensation: N&T L LE  Deep tendon reflexes: WNL     Lumbar Active range of motion  Pain/dysfunction with movement:   Flexion 80     Extension 10*     Right side bending 15     Left side bending 15     Right rotation 25     Left rotation 25              Lower extremity manual muscle tests  Right Left   Hip flexion 5/5 5/5   Hip extension 5/5 4/5   Hip abduction 5/5 4/5   Hip adduction 4+/5 4+/5   Hip internal rotation 4+/5 4/5   Hip external rotation 5/5 4+/5   Knee flexion 4+/5 4+/5   Knee extension 5/5 4+/5   Ankle dorsiflexion 5/5 5/5   Ankle plantarflexion 4+/5 5/5   Ankle inversion 5/5 5/5   Ankle eversion 5/5 5/5            Slump R: -  Slump L: -           CMS  "Impairment/Limitation/Restriction for FOTO Survey     Therapist reviewed FOTO scores for Anthony Reynaga on 11/22/2023.   FOTO documents entered into [employer] - see Media section.     Limitation Score: 54%  Predicted Goal: 78%     Category: Mobility          TREATMENT   Charges based on 1:1 tx:     Anthony received the treatments listed below:        Neuromuscular re education for 30 minutes for improved balance and proprioception including:     Recumbent bike L4 x 8 minutes  Seated silver SB rollouts, 5x10" forward/bilateral sides  Supine LTR with green tball x3 minutes  PPT 3" hold x 20  Seated PPT on silver SB, 20x  +Seated marching w/ TrA activation on silver SB  x 20  Supine TrA contraction with 5 second hold 30x   Supine TrA contraction with marches 30x  DKTC with green tball x20  Supine TrA contraction with SLR 3x10 repetitions each   Dying bugs 2x10  SL hip abduction 2x10  Sciatic nn glide on green ball 30x R/left  Seated sciatic nerve glide x20 right/left   Supine hamstring stretch with strap (3 way) 3x30"      Patient received therapeutic activities for 20 minutes for improved tolerance to functional activities including:  KB marches vs 15# x2 laps on turf  KB side stepping vs 15# x2 laps on turf  KB RDL to 12" step vs 15# 2x10- held today 2/2 increased symptoms  Rows vs black tube 30x5" hold on foam  TRX squat/row 3x10  TRX Bridge Row 2x10  Standing hip abd + abdominal bracing with red ball on mat, 5" hold x20 R/left  Step up 6" to march x20  Goblet squat with 10# KB tap on 6" step 2x10  DL heel rise on wedge x20  Standing gastroc stretch 3x30"  Standing TrA pulldown vs black tube 30x5" hold on foam   Paloff press vs GTB 3x10 repetitions on foam        PATIENT EDUCATION AND HOME EXERCISES     Home Exercises Provided and Patient Education Provided     Education provided:   PT educated pt on importance of compliance with their HEP this visit.     Written Home Exercises Provided: Patient instructed to cont prior " HEP. Exercises were reviewed and Anthony was able to demonstrate them prior to the end of the session.  Anthony demonstrated good  understanding of the education provided. See EMR under Patient Instructions for exercises provided during therapy sessions    ASSESSMENT     Anthony has made significant improvement since starting physical therapy treatment. He dmenostrates improved lower extremity strength, decreased pain, and negative neurological signs. He continues to have some left > right side weakness. He is being given an updated home exercise program.      Anthony tolerated  exercise well. Continued with core/stabilization training with emphasis on posture and proper pelvic alignment. Moderate tightness in HS and core weakness continues to be an obstacle at this time.       Anthony is progressing well towards his goals.   Pt prognosis is Good.     Pt will continue to benefit from skilled outpatient physical therapy to address the deficits listed in the problem list box on initial evaluation, provide pt/family education and to maximize pt's level of independence in the home and community environment.     Pt's spiritual, cultural and educational needs considered and pt agreeable to plan of care and goals.     Anticipated barriers to physical therapy: None    Goals:   Short Term Goals (4 Weeks):  1. Patient will be compliant with home exercise program to supplement therapy in promoting functional mobility. (met)    2. Patient will perform squat with good control to demonstrate improved core strength. (met)    3. Patient will report no pain during thoracolumbar active range of motion to promote functional mobility.  (partially met)    4. Patient will improve impaired lower extremity left hip abduction manual muscle tests  to >/=4/5 to improve strength for functional tasks. (met)          Long Term Goals (6 Weeks):   1. Patient will improve FOTO score to </= 58% limited to decrease perceived limitation with maintaining/changing  body position. (progressing, not met)    2. Patient will perform dying bug exercise with good control to demonstrate improved core strength.  (met)    3. Patient will improve impaired lower extremity right hip abduction manual muscle tests to >/=4+/5 to improve strength for functional tasks.  (progressing, not met)    4. Patient will tolerate standing for 60 minutes with no increase in low back pain to return to PLOF.  (met)          PLAN   DC with CHARANJIT Fulton, PT

## 2024-01-23 NOTE — TELEPHONE ENCOUNTER
Refill Decision Note   Anthony Reynaga  is requesting a refill authorization.  Brief Assessment and Rationale for Refill: Approve     Medication Therapy Plan:   Reissuing to corrected pharmacy      Comments:     Note composed:8:26 AM 01/23/2024

## 2024-01-28 DIAGNOSIS — I49.3 PVC (PREMATURE VENTRICULAR CONTRACTION): ICD-10-CM

## 2024-01-29 RX ORDER — POTASSIUM CHLORIDE 20 MEQ/1
TABLET, EXTENDED RELEASE ORAL
Qty: 180 TABLET | Refills: 3 | Status: SHIPPED | OUTPATIENT
Start: 2024-01-29

## 2024-01-29 NOTE — TELEPHONE ENCOUNTER
Anthony Reynaga  is requesting a refill authorization.  Brief Assessment and Rationale for Refill:  Approve     Medication Therapy Plan:         Comments:     Note composed:1:50 AM 01/29/2024

## 2024-01-29 NOTE — TELEPHONE ENCOUNTER
No care due was identified.  [facility] Embedded Care Due Messages. Reference number: [id number].   1/28/2024 10:19:47 PM CST

## 2024-02-15 DIAGNOSIS — I10 ESSENTIAL HYPERTENSION, BENIGN: ICD-10-CM

## 2024-02-17 RX ORDER — CHLORTHALIDONE 25 MG/1
TABLET ORAL
Qty: 90 TABLET | Refills: 3 | Status: SHIPPED | OUTPATIENT
Start: 2024-02-17

## 2024-02-27 DIAGNOSIS — Z00.00 ENCOUNTER FOR MEDICARE ANNUAL WELLNESS EXAM: ICD-10-CM

## 2024-03-11 DIAGNOSIS — I48.0 PAROXYSMAL ATRIAL FIBRILLATION: ICD-10-CM

## 2024-03-12 RX ORDER — APIXABAN 5 MG/1
TABLET, FILM COATED ORAL
Qty: 180 TABLET | Refills: 3 | Status: SHIPPED | OUTPATIENT
Start: 2024-03-12

## 2024-03-18 DIAGNOSIS — E78.2 MIXED HYPERLIPIDEMIA: ICD-10-CM

## 2024-03-19 RX ORDER — SIMVASTATIN 20 MG/1
20 TABLET, FILM COATED ORAL NIGHTLY
Qty: 90 TABLET | Refills: 2 | Status: SHIPPED | OUTPATIENT
Start: 2024-03-19

## 2024-03-19 NOTE — TELEPHONE ENCOUNTER
Anthony Reynaga  is requesting a refill authorization.  Brief Assessment and Rationale for Refill:  Approve     Medication Therapy Plan:         Comments:     Note composed:11:26 AM 03/19/2024

## 2024-03-19 NOTE — TELEPHONE ENCOUNTER
No care due was identified.  [facility] Embedded Care Due Messages. Reference number: [id number].   3/18/2024 9:34:52 PM CDT

## 2024-03-28 ENCOUNTER — OFFICE VISIT (OUTPATIENT)
Dept: CARDIOLOGY | Facility: CLINIC | Age: 74
End: 2024-03-28
Payer: MEDICARE

## 2024-03-28 VITALS
HEIGHT: 72 IN | HEART RATE: 55 BPM | WEIGHT: 182.44 LBS | DIASTOLIC BLOOD PRESSURE: 69 MMHG | BODY MASS INDEX: 24.71 KG/M2 | SYSTOLIC BLOOD PRESSURE: 163 MMHG

## 2024-03-28 DIAGNOSIS — I35.1 NONRHEUMATIC AORTIC VALVE INSUFFICIENCY: Primary | ICD-10-CM

## 2024-03-28 DIAGNOSIS — I48.0 PAROXYSMAL ATRIAL FIBRILLATION: Primary | ICD-10-CM

## 2024-03-28 DIAGNOSIS — I48.0 PAROXYSMAL ATRIAL FIBRILLATION: ICD-10-CM

## 2024-03-28 DIAGNOSIS — E78.2 MIXED HYPERLIPIDEMIA: ICD-10-CM

## 2024-03-28 DIAGNOSIS — I35.0 MILD AORTIC STENOSIS: ICD-10-CM

## 2024-03-28 DIAGNOSIS — I10 HYPERTENSION, ESSENTIAL: ICD-10-CM

## 2024-03-28 PROCEDURE — 99999 PR PBB SHADOW E&M-EST. PATIENT-LVL III: CPT | Mod: PBBFAC,,, | Performed by: INTERNAL MEDICINE

## 2024-03-28 PROCEDURE — 99213 OFFICE O/P EST LOW 20 MIN: CPT | Mod: PBBFAC,PN,25 | Performed by: INTERNAL MEDICINE

## 2024-03-28 PROCEDURE — 93010 ELECTROCARDIOGRAM REPORT: CPT | Mod: S$PBB,,, | Performed by: INTERNAL MEDICINE

## 2024-03-28 PROCEDURE — 99215 OFFICE O/P EST HI 40 MIN: CPT | Mod: S$PBB,,, | Performed by: INTERNAL MEDICINE

## 2024-03-28 PROCEDURE — 93005 ELECTROCARDIOGRAM TRACING: CPT | Mod: PBBFAC,PN | Performed by: INTERNAL MEDICINE

## 2024-03-28 RX ORDER — AMLODIPINE BESYLATE 5 MG/1
5 TABLET ORAL DAILY
Qty: 90 TABLET | Refills: 3 | Status: SHIPPED | OUTPATIENT
Start: 2024-03-28 | End: 2024-04-18 | Stop reason: SDUPTHER

## 2024-03-28 NOTE — PROGRESS NOTES
"Subjective:    Patient ID:  Anthony Reynaga is a 73 y.o. male who presents for evaluation of PAF    Atrial Fibrillation  Symptoms include palpitations. Symptoms are negative for chest pain, dizziness, shortness of breath, syncope and weakness.       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  HTN on meds  question of bicuspid AoV in past; reviewed by Dr Mcclellan; tristen.    "PAF" onset was in 1996. Spontaneously back to SR, after meds given.   1997: Required DCCV for another episode.  Since, seems that he gets an episode about q 6 months. Always seems to be triggered by episodes of fast HR: exertion, nightmare...  Was having AF q year; 2 episodes: 3/15 and 8/15. In 8/15, lasted 62 hours (longer than usual); ECG in [facility] confirms AF.    Hadn't had AF x 4 years. Occ palpitations; nothing longterm.  Since last seen, had one episode of AF. Went to OSH ER, where pill in pocket flecainide was successful.  Had AF last in 12/2019. PIP flecainide successful. In summer, had different, shorter palpitations. Resolved when monitor placed, and hasn't had any since.    During 2021, has had 2 episodes of AF: one following COVID vaccine #2 (7h) and another lasting 20h. PIP flecainide unsuccessful.  Also has periods of frequent short palpitations (none for past month).    Echo 60%; 1= AI    Update 2023:  Has been having AF about 1 q 6 mos. PIP flecainide works about 50% of the time. Onset usually during stressors.  Also has occasional PVCs captured on [email or web address] device.  HRs at home often in 50s.    3/2024: No AF x 15 months!  Feeling well overall. BPs at home in 120s (always has white-coat-type HTN).    My interpretation of today's ECG is SB at 55 bpm. No ectopy.    Review of Systems   Constitutional: Negative. Negative for malaise/fatigue.   HENT: Negative.  Negative for ear pain and tinnitus.    Eyes:  Negative for blurred vision. "   Cardiovascular:  Positive for palpitations. Negative for chest pain, dyspnea on exertion, near-syncope and syncope.   Respiratory: Negative.  Negative for shortness of breath.    Endocrine: Negative.  Negative for polyuria.   Hematologic/Lymphatic: Does not bruise/bleed easily.   Skin: Negative.  Negative for rash.   Musculoskeletal: Negative.  Negative for joint pain and muscle weakness.   Gastrointestinal: Negative.  Negative for abdominal pain and change in bowel habit.   Genitourinary:  Negative for frequency.   Neurological: Negative.  Negative for dizziness and weakness.   Psychiatric/Behavioral: Negative.  Negative for depression. The patient is not nervous/anxious.    Allergic/Immunologic: Negative for environmental allergies.        Objective:    Physical Exam  Vitals and nursing note reviewed.   Constitutional:       Appearance: He is well-developed.   HENT:      Head: Normocephalic and atraumatic.   Eyes:      General: Lids are normal. No scleral icterus.     Conjunctiva/sclera: Conjunctivae normal.   Neck:      Thyroid: No thyromegaly.      Vascular: No JVD.      Trachea: No tracheal deviation.   Cardiovascular:      Rate and Rhythm: Normal rate and regular rhythm.      Pulses:           Radial pulses are 2+ on the right side and 2+ on the left side.      Heart sounds:      Harsh midsystolic murmur is present at the upper right sternal border radiating to the neck.   Pulmonary:      Effort: Pulmonary effort is normal. No tachypnea, accessory muscle usage or respiratory distress.      Breath sounds: No wheezing.   Abdominal:      General: There is no distension.   Musculoskeletal:         General: Normal range of motion.      Cervical back: Normal range of motion.   Skin:     General: Skin is warm and dry.      Findings: No rash.   Neurological:      Mental Status: He is alert and oriented to person, place, and time.      Motor: No abnormal muscle tone.      Deep Tendon Reflexes: Reflexes are normal and  symmetric.   Psychiatric:         Behavior: Behavior normal.           Assessment:       1. Nonrheumatic aortic valve insufficiency    2. Paroxysmal atrial fibrillation    3. Hypertension, essential    4. Mild aortic stenosis    5. Mixed hyperlipidemia         Plan:       AF:  Minimal sx and very infrequent.   Continue pill-in-the-pocket approach to AF. Provided reference to [facility] article (Tabatha et al. N Engl J Med 2004;351:2384-91). Patient now able to self-direct pill-in-the-pocket treatment: 300 mg PO x1 prn.  If episodes become more frequent, would consider changing to standing-dose flecainide (100 bid).    PVCs:  Benign, but symptomatic at times.  Continue BB.  Considering adding verapamil (would likely decrease BB). He'll monitor for now.    HTN  Increase norvasc 2.5 ->5 (pt preference)    Ao valve abnormality:  f/u with Dr. ZACKERY Nair (primary cardiologist). Echo q 2-3 years.    Return in 1 year, or earlier prn.

## 2024-04-01 LAB
OHS QRS DURATION: 84 MS
OHS QTC CALCULATION: 403 MS

## 2024-04-18 ENCOUNTER — OFFICE VISIT (OUTPATIENT)
Dept: INTERNAL MEDICINE | Facility: CLINIC | Age: 74
End: 2024-04-18
Payer: MEDICARE

## 2024-04-18 VITALS
OXYGEN SATURATION: 96 % | HEART RATE: 59 BPM | BODY MASS INDEX: 24.14 KG/M2 | WEIGHT: 182.13 LBS | DIASTOLIC BLOOD PRESSURE: 70 MMHG | HEIGHT: 73 IN | SYSTOLIC BLOOD PRESSURE: 136 MMHG

## 2024-04-18 DIAGNOSIS — Z79.01 LONG TERM CURRENT USE OF ANTICOAGULANT THERAPY: ICD-10-CM

## 2024-04-18 DIAGNOSIS — E78.2 MIXED HYPERLIPIDEMIA: ICD-10-CM

## 2024-04-18 DIAGNOSIS — I35.0 MILD AORTIC STENOSIS: ICD-10-CM

## 2024-04-18 DIAGNOSIS — I35.1 AORTIC VALVE INSUFFICIENCY, ETIOLOGY OF CARDIAC VALVE DISEASE UNSPECIFIED: ICD-10-CM

## 2024-04-18 DIAGNOSIS — I10 HYPERTENSION, ESSENTIAL: ICD-10-CM

## 2024-04-18 DIAGNOSIS — Z00.00 ENCOUNTER FOR MEDICARE ANNUAL WELLNESS EXAM: ICD-10-CM

## 2024-04-18 DIAGNOSIS — N40.1 BPH WITH URINARY OBSTRUCTION: ICD-10-CM

## 2024-04-18 DIAGNOSIS — N13.8 BPH WITH URINARY OBSTRUCTION: ICD-10-CM

## 2024-04-18 DIAGNOSIS — I48.0 PAROXYSMAL ATRIAL FIBRILLATION: ICD-10-CM

## 2024-04-18 DIAGNOSIS — G89.29 CHRONIC BILATERAL LOW BACK PAIN WITHOUT SCIATICA: ICD-10-CM

## 2024-04-18 DIAGNOSIS — I70.0 AORTIC CALCIFICATION: ICD-10-CM

## 2024-04-18 DIAGNOSIS — Z00.00 ENCOUNTER FOR PREVENTIVE HEALTH EXAMINATION: Primary | ICD-10-CM

## 2024-04-18 DIAGNOSIS — I49.3 PVC (PREMATURE VENTRICULAR CONTRACTION): ICD-10-CM

## 2024-04-18 DIAGNOSIS — M54.50 CHRONIC BILATERAL LOW BACK PAIN WITHOUT SCIATICA: ICD-10-CM

## 2024-04-18 PROBLEM — R29.898 WEAKNESS OF BOTH LOWER EXTREMITIES: Status: RESOLVED | Noted: 2023-11-22 | Resolved: 2024-04-18

## 2024-04-18 PROCEDURE — 99999 PR PBB SHADOW E&M-EST. PATIENT-LVL V: CPT | Mod: PBBFAC,,, | Performed by: NURSE PRACTITIONER

## 2024-04-18 PROCEDURE — 99215 OFFICE O/P EST HI 40 MIN: CPT | Mod: PBBFAC | Performed by: NURSE PRACTITIONER

## 2024-04-18 PROCEDURE — G0439 PPPS, SUBSEQ VISIT: HCPCS | Mod: ,,, | Performed by: NURSE PRACTITIONER

## 2024-04-18 RX ORDER — AMLODIPINE BESYLATE 5 MG/1
2.5 TABLET ORAL DAILY
Qty: 90 TABLET | Refills: 3 | Status: SHIPPED | OUTPATIENT
Start: 2024-04-18

## 2024-04-18 NOTE — PROGRESS NOTES
"  Anthony Reynaga presented for an initial Medicare AWV today. The following components were reviewed and updated:    Medical history  Family History  Social history  Allergies and Current Medications  Health Risk Assessment  Health Maintenance  Care Team    **See Completed Assessments for Annual Wellness visit with in the encounter summary    The following assessments were completed:  Depression Screening  Cognitive function Screening    Timed Get Up Test  Whisper Test      Opioid documentation:      Patient does not have a current opioid prescription.          Vitals:    04/18/24 0855 04/18/24 0938   BP: (!) 146/78 136/70   BP Location: Right arm    Patient Position: Sitting    Pulse: (!) 59    SpO2: 96%    Weight: 82.6 kg (182 lb 1.6 oz)    Height: 6' 1" (1.854 m)      Body mass index is 24.03 kg/m².       Physical Exam  Vitals and nursing note reviewed.   Constitutional:       Appearance: He is well-developed.   HENT:      Head: Normocephalic.   Cardiovascular:      Rate and Rhythm: Normal rate and regular rhythm.      Heart sounds: Murmur heard.   Pulmonary:      Effort: Pulmonary effort is normal.      Breath sounds: Normal breath sounds.   Abdominal:      General: Bowel sounds are normal.      Palpations: Abdomen is soft.   Musculoskeletal:         General: Normal range of motion.   Skin:     General: Skin is warm and dry.   Neurological:      Mental Status: He is alert and oriented to person, place, and time.      Motor: No abnormal muscle tone.   Psychiatric:         Mood and Affect: Mood normal.            Diagnoses and health risks identified today and associated recommendations/orders:    1. Encounter for preventive health examination  Here for Health Risk Assessment/Annual Wellness Visit.  Health maintenance reviewed and updated. Follow up in one year.     2. Hypertension, essential  Chronic, at goal on current medications. Home readings at goal. Followed by PCP, Cardiology.    3. Aortic " calcification  Chronic, stable on current medications. Minimal noted on CT Urogram 6/12/15. Followed by PCP, Cardiology.    4. Mild aortic stenosis  Chronic, stable. No C/O chest pain/dyspnea Followed by PCP, Cardiology.    5. Paroxysmal atrial fibrillation  Chronic, stable on current medications. Rate controlled, rhythm regular today. Followed by PCP, Cardiology.    6. PVC (premature ventricular contraction)  Chronic, stable on current medication. Followed by PCP, Cardiology.    7. Aortic valve insufficiency, etiology of cardiac valve disease unspecified  Chronic, stable. No C/O chest pain/dyspnea Followed by PCP, Cardiology.    8. Mixed hyperlipidemia  Chronic, stable with current medication. Triglycerides mildly elevated. Followed by PCP, Cardiology.    9. BPH with urinary obstruction  Chronic, stable on current medications. Followed by PCP, Urology.     10. Long term current use of anticoagulant therapy  Chronic apixaban . Followed by PCP, Cardiology.     11. Chronic bilateral low back pain without sciatica  Chronic, stable. Report occasional episodes of pain, improved with exercise. Followed by PCP, Orthopedics.     12. Encounter for Medicare annual wellness exam  - Ambulatory Referral/Consult to Enhanced Annual Wellness Visit (eAWV)      Provided Anthony with a 5-10 year written screening schedule and personal prevention plan. Recommendations were developed using the USPSTF age appropriate recommendations. Education, counseling, and referrals were provided as needed.  After Visit Summary printed and given to patient which includes a list of additional screenings\tests needed.    Follow up in about 9 months (around 1/15/2025).with PCP      Esperanza Hager NP

## 2024-04-18 NOTE — TELEPHONE ENCOUNTER
No care due was identified.  [facility] Embedded Care Due Messages. Reference number: [id number].   4/18/2024 11:24:12 AM CDT

## 2024-04-18 NOTE — PATIENT INSTRUCTIONS
Counseling and Referral of Other Preventative  (Italic type indicates deductible and co-insurance are waived)    Patient Name: Anthony Renyaga  Today's Date: 4/18/2024    Health Maintenance       Date Due Completion Date    PROSTATE-SPECIFIC ANTIGEN 10/30/2020 10/30/2019    COVID-19 Vaccine (7 - 2023-24 season) 09/01/2023 4/24/2023    TETANUS VACCINE 05/26/2025 5/26/2015    Lipid Panel 11/17/2028 11/17/2023    Colorectal Cancer Screening 05/15/2030 5/15/2023        No orders of the defined types were placed in this encounter.      The following information is provided to all patients.  This information is to help you find resources for any of the problems found today that may be affecting your health:                  Living healthy guide: www.[facility].[place].gov      Understanding Diabetes: www.diabetes.org      Eating healthy: www.cdc.gov/healthyweight      CDC home safety checklist: www.cdc.gov/steadi/patient.html      Agency on Aging: www.goea.[place].[facility]      Alcoholics anonymous (AA): www.aa.org      Physical Activity: www.edwardo.nih.gov/gi2stim      Tobacco use: www.quitwithusla.org

## 2024-04-22 ENCOUNTER — PATIENT MESSAGE (OUTPATIENT)
Dept: DERMATOLOGY | Facility: CLINIC | Age: 74
End: 2024-04-22
Payer: MEDICARE

## 2024-05-13 ENCOUNTER — OFFICE VISIT (OUTPATIENT)
Dept: OTOLARYNGOLOGY | Facility: CLINIC | Age: 74
End: 2024-05-13
Payer: MEDICARE

## 2024-05-13 VITALS — WEIGHT: 182.75 LBS | BODY MASS INDEX: 24.11 KG/M2

## 2024-05-13 DIAGNOSIS — H61.23 BILATERAL IMPACTED CERUMEN: Primary | ICD-10-CM

## 2024-05-13 PROCEDURE — 99999 PR PBB SHADOW E&M-EST. PATIENT-LVL III: CPT | Mod: PBBFAC,,, | Performed by: NURSE PRACTITIONER

## 2024-05-13 PROCEDURE — 69210 REMOVE IMPACTED EAR WAX UNI: CPT | Mod: S$PBB,,, | Performed by: NURSE PRACTITIONER

## 2024-05-13 PROCEDURE — 69210 REMOVE IMPACTED EAR WAX UNI: CPT | Mod: PBBFAC,PN | Performed by: NURSE PRACTITIONER

## 2024-05-13 PROCEDURE — 99213 OFFICE O/P EST LOW 20 MIN: CPT | Mod: 25,S$PBB,, | Performed by: NURSE PRACTITIONER

## 2024-05-13 PROCEDURE — 99213 OFFICE O/P EST LOW 20 MIN: CPT | Mod: PBBFAC,PN | Performed by: NURSE PRACTITIONER

## 2024-05-13 NOTE — PROCEDURES
Ear Cerumen Removal    Date/Time: 5/13/2024 11:20 AM    Performed by: Nicole Campo NP  Authorized by: Nicole Campo NP    Consent Done?:  Yes (Verbal)    Local anesthetic:  None  Location details:  Both ears  Procedure type comment:  Suction  Cerumen  Removal Results:  Cerumen completely removed  Patient tolerance:  Patient tolerated the procedure well with no immediate complications

## 2024-05-13 NOTE — PROGRESS NOTES
Patient ID: Anthony Reynaga is a 74 y.o. y.o. male    Chief Complaint:   Chief Complaint   Patient presents with    Cerumen Impaction       Patient is self-referred for evaluation of a possible wax impaction in bilateral ears.  he has no complaints of hearing loss in the affected ears. This has been an issue in the past.  The patient has not been using any sort of ear drop to soften the wax.      Review of Systems   Constitutional: Negative for fever, chills, fatigue and unexpected weight change.   HENT: Positive for ear blockage. Negative for hearing loss, nosebleeds, congestion, sore throat, facial swelling, rhinorrhea, sneezing, trouble swallowing, dental problem, voice change, postnasal drip, sinus pressure, tinnitus and ear discharge.    Eyes: Negative for redness, itching and visual disturbance.   Respiratory: Negative for cough, choking and wheezing.    Cardiovascular: Negative for chest pain and palpitations.   Gastrointestinal: Negative for abdominal pain.        No reflux.   Musculoskeletal: Negative for gait problem.   Skin: Negative for rash.   Neurological: Negative for dizziness, light-headedness and headaches.     Past Medical History:   Diagnosis Date    Anticoagulant long-term use     Atrial fibrillation     BPH (benign prostatic hyperplasia)     Cataract     Elevated PSA     Floaters     Hernia     bilateral inquinal    Hypertension     Inguinal hernia     Kidney stone     Knee injury      Past Surgical History:   Procedure Laterality Date    APPENDECTOMY      BLADDER SURGERY      polyp removed benign    COLONOSCOPY N/A 6/22/2016    Procedure: COLONOSCOPY;  Surgeon: Joaquin Francis MD;  Location: [facility] ([address] [place] [address]);  Service: Endoscopy;  Laterality: N/A;    Thanks for letting us know.  I would approve him to hold coumadin x 3 days prior, without lovenox.  We will see him for an INR on 6/8 and will provide him with procedure instructions at that time., per [facility]    COLONOSCOPY N/A  5/15/2023    Procedure: COLONOSCOPY;  Surgeon: Noam Hollingsworth MD;  Location: [facility] ([address] [place] [address]);  Service: Endoscopy;  Laterality: N/A;  instr portal-tb-eliquis hold ok see te11/11/22  Ok to hold Eliquis- See t/e 3/30/23, instr via portal - PC  golytely  5/9 - precall done - [facility]    CYSTOSCOPY      benign bladder papilloma    FINGER SURGERY      HERNIA REPAIR Left 2016    inguinal    LITHOTRIPSY      TONSILLECTOMY, ADENOIDECTOMY       Social History     Socioeconomic History    Marital status: Single   Tobacco Use    Smoking status: Never    Smokeless tobacco: Never   Substance and Sexual Activity    Alcohol use: Yes     Alcohol/week: 1.0 standard drink of alcohol     Types: 1 Cans of beer per week     Comment: 1-2 cans of beer 2-3 times per month    Drug use: No    Sexual activity: Yes     Partners: Female     Birth control/protection: OCP     Social Determinants of Health     Financial Resource Strain: High Risk (4/18/2024)    Overall Financial Resource Strain (CARDIA)     Difficulty of Paying Living Expenses: Very hard   Food Insecurity: No Food Insecurity (4/18/2024)    Hunger Vital Sign     Worried About Running Out of Food in the Last Year: Never true     Ran Out of Food in the Last Year: Never true   Transportation Needs: No Transportation Needs (4/18/2024)    PRAPARE - Transportation     Lack of Transportation (Medical): No     Lack of Transportation (Non-Medical): No   Physical Activity: Sufficiently Active (4/18/2024)    Exercise Vital Sign     Days of Exercise per Week: 5 days     Minutes of Exercise per Session: 40 min   Recent Concern: Physical Activity - Insufficiently Active (3/25/2024)    Exercise Vital Sign     Days of Exercise per Week: 3 days     Minutes of Exercise per Session: 30 min   Stress: No Stress Concern Present (4/18/2024)    [employer] [place] [employer] - Occupational Stress Questionnaire     Feeling of Stress : Only a little   Recent Concern: Stress - Stress Concern  Present (3/25/2024)    [facility] of Occupational Health - Occupational Stress Questionnaire     Feeling of Stress : To some extent     Family History   Problem Relation Name Age of Onset    Cancer Mother Victoria Reynaga         Terminal Renal Cancer    Hypertension Mother Victoria Reynaga     Dementia Father Darnell Reynaga     Hypertension Father Darnell Reynaga     Benign prostatic hyperplasia Neg Hx         Objective:      There were no vitals filed for this visit.    Physical Exam   Constitutional: Well appearing / communicating without difficutly.  NAD.  Eyes: EOM I Bilaterally  Head/Face: Normocephalic. Negative paranasal sinus pressure/tenderness.  Salivary glands WNL.  House Brackmann I Bilaterally.     Right Ear: Auricle normal appearance. External Auditory Canal with cerumen impaction. EAC with no lesions or masses,TM w/o masses/lesions/perforations. TM mobility noted.   Left Ear: Auricle normal appearance. External Auditory Canal with cerumen impaction. EAC with no lesions or masses,TM w/o masses/lesions/perforations. TM mobility noted.  Nose: No gross nasal septal deviation. Inferior Turbinates 3+ bilaterally. No septal perforation. No masses/lesions. External nasal skin appears normal without masses/lesions.   Oral Cavity: Gingiva/lips within normal limits.  Dentition/gingiva healthy appearing. Mucus membranes moist. Floor of mouth soft, no masses palpated. Oral Tongue mobile. Hard Palate appears normal.    Oropharynx: Base of tongue appears normal. No masses/lesions noted. Tonsillar fossa/pharyngeal wall without lesions. Posterior oropharynx WNL.  Soft palate without masses. Midline uvula.   Neck/Lymphatic: No LAD I-VI bilaterally.  No thyromegaly.  No masses noted on exam.     Mirror laryngoscopy/nasopharyngoscopy: Active gag reflex.  Unable to perform.     Neuro/Psychiatric: AOx3.  Normal mood and affect.   Cardiovascular: Normal carotid pulses bilaterally, no increasing jugular venous  distention noted at cervical region bilaterally.    Respiratory: Normal respiratory effort, no stridor, no retractions noted.      Cerumen removal under binocular microscopy   Ear Cerumen Removal    Date/Time: 5/13/2024 11:20 AM    Performed by: Nicole Campo NP  Authorized by: Nicole Campo NP    Consent Done?:  Yes (Verbal)    Local anesthetic:  None  Location details:  Both ears  Procedure type comment:  Suction  Cerumen  Removal Results:  Cerumen completely removed  Patient tolerance:  Patient tolerated the procedure well with no immediate complications      Assessment:         ICD-10-CM ICD-9-CM    1. Bilateral impacted cerumen  H61.23 380.4 Ear Cerumen Removal           Plan:       1.   Cerumen impaction:  Removed under microscopy today without difficulty.   2. Defers audiogram today  3. Recommended yearly CI removal         Nicole Campo NP    Answers submitted by the patient for this visit:  Review of Symptoms Questionnaire  (Submitted on 5/8/2024)  None of these: Yes  hearing loss: Yes  postnasal drip: Yes  None of these : Yes  None of these: Yes  None of these : Yes  heartburn: Yes  Difficulty urinating?: Yes  Urinating too frequently?: Yes  Muscle aches / pain?: Yes  back pain: Yes  None of these : Yes  None of these: Yes  None of these : Yes  None of these: Yes  None of these: Yes  None of these: Yes

## 2024-05-14 RX ORDER — FLECAINIDE ACETATE 150 MG/1
TABLET ORAL
Qty: 30 TABLET | Refills: 3 | Status: SHIPPED | OUTPATIENT
Start: 2024-05-14

## 2024-05-23 ENCOUNTER — LAB VISIT (OUTPATIENT)
Dept: LAB | Facility: HOSPITAL | Age: 74
End: 2024-05-23
Attending: UROLOGY
Payer: MEDICARE

## 2024-05-23 DIAGNOSIS — N40.1 BPH WITH URINARY OBSTRUCTION: ICD-10-CM

## 2024-05-23 DIAGNOSIS — N13.8 BPH WITH URINARY OBSTRUCTION: ICD-10-CM

## 2024-05-23 DIAGNOSIS — R97.20 ELEVATED PSA: ICD-10-CM

## 2024-05-23 PROCEDURE — 36415 COLL VENOUS BLD VENIPUNCTURE: CPT | Performed by: UROLOGY

## 2024-05-24 LAB
-2 PROPSA (PHI): 7.9 PG/ML
FREE PSA (PHI): 0.6 NG/ML
PROSTATE HEALTH INDEX VALUE (PHI): NORMAL
PSA FREE MFR SERPL: NORMAL %
PSA SERPL-MCNC: 2.5 NG/ML

## 2024-06-10 ENCOUNTER — PATIENT MESSAGE (OUTPATIENT)
Dept: INTERNAL MEDICINE | Facility: CLINIC | Age: 74
End: 2024-06-10
Payer: MEDICARE

## 2024-06-20 ENCOUNTER — OFFICE VISIT (OUTPATIENT)
Dept: UROLOGY | Facility: CLINIC | Age: 74
End: 2024-06-20
Payer: MEDICARE

## 2024-06-20 VITALS
BODY MASS INDEX: 23.64 KG/M2 | DIASTOLIC BLOOD PRESSURE: 70 MMHG | HEART RATE: 80 BPM | HEIGHT: 73 IN | WEIGHT: 178.38 LBS | SYSTOLIC BLOOD PRESSURE: 142 MMHG

## 2024-06-20 DIAGNOSIS — N13.8 BPH WITH URINARY OBSTRUCTION: Primary | ICD-10-CM

## 2024-06-20 DIAGNOSIS — N40.1 BPH WITH URINARY OBSTRUCTION: Primary | ICD-10-CM

## 2024-06-20 DIAGNOSIS — R97.20 ELEVATED PSA: ICD-10-CM

## 2024-06-20 PROCEDURE — 99213 OFFICE O/P EST LOW 20 MIN: CPT | Mod: PBBFAC | Performed by: UROLOGY

## 2024-06-20 PROCEDURE — 99214 OFFICE O/P EST MOD 30 MIN: CPT | Mod: S$PBB,,, | Performed by: UROLOGY

## 2024-06-20 PROCEDURE — 99999 PR PBB SHADOW E&M-EST. PATIENT-LVL III: CPT | Mod: PBBFAC,,, | Performed by: UROLOGY

## 2024-06-20 RX ORDER — FINASTERIDE 5 MG/1
5 TABLET, FILM COATED ORAL DAILY
Qty: 90 TABLET | Refills: 3 | Status: SHIPPED | OUTPATIENT
Start: 2024-06-20

## 2024-06-20 NOTE — PROGRESS NOTES
Clinic Note  6/20/2024      Subjective:         Chief Complaint:   HPI  Anthony Reynaga is a 74 y.o. male history of BPH and elevated PSA. S/p biopsy (B9) in 2 /11. Moderate symptoms, minimal bother.  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 History of kidney stones and prostatitis. Is on  Uroxatrol. Talked to Bashir about Rezum.  Hernia repair in July 2016.  LUTS: bothered by nocturia 4-6x /noc. Also bothered by decreased ejaculate and decreased orgasm intensity.  Stopped finasteride 4 year ago. Is thinking about snf.        3/6/2020 PHI- PSA 5, 24% free, PHI score- 25  5/11/2021 PHI- PSA 5.3, 25% free, PHI score 24.     6/21/22: He returns to clinic today for f/u. PHI from today pending. He plans on retiring on the 30th of this month. States his urinary frequency has improved on Uroxatrol, but hesitancy and intermittency has worsened. He interested in discussing surgical therapy for his BPH with obstructive LUTS. He is currently not interested in obtaining a MRI at this time.   post void residual 95 ccs     6/22/2023- PSA 6/14/2023- 3.0 (corrected 6.0). Met with Dr. Camejo in January to discuss BPH therapies.  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 Proscar helping (nocturia improved from 6 to 2 times/noc).  Reviewed Sarah Ann age specific ranges.    6/20/2024-On finasteride for LUTS. LUTS- symptoms stable.  PSA 2.5 (corrected 5.0).  Just spent 3 weeks in [place].    Lab Results   Component Value Date    PSA 3.80 10/04/2012    PSA 4.10 (H) 03/01/2012    PSA 3.2 10/13/2011    PSADIAG 5.7 (H) 10/30/2019    PSADIAG 3.6 10/19/2018    PSADIAG 2.1 09/20/2017    PSADIAG 3.0 09/14/2016    PSADIAG 4.9 (H) 03/11/2016    PSADIAG 4.6 (H) 10/20/2015    PSADIAG 3.2 10/14/2014    PSADIAG 3.5 10/08/2013       Past Medical History:   Diagnosis Date    Anticoagulant long-term use     Atrial fibrillation     BPH (benign prostatic hyperplasia)     Cataract     Elevated PSA     Floaters     Hernia     bilateral  inquinal    Hypertension     Inguinal hernia     Kidney stone     Knee injury      Family History   Problem Relation Name Age of Onset    Cancer Mother Victoria Reynaga         Terminal Renal Cancer    Hypertension Mother Victoria Reynaga     Dementia Father Darnell Reynaga     Hypertension Father Darnell Reynaga     Benign prostatic hyperplasia Neg Hx       Social History     Socioeconomic History    Marital status: Single   Tobacco Use    Smoking status: Never    Smokeless tobacco: Never   Substance and Sexual Activity    Alcohol use: Yes     Alcohol/week: 1.0 standard drink of alcohol     Types: 1 Cans of beer per week     Comment: 1-2 cans of beer 2-3 times per month    Drug use: No    Sexual activity: Yes     Partners: Female     Birth control/protection: OCP     Social Determinants of Health     Financial Resource Strain: High Risk (4/18/2024)    Overall Financial Resource Strain (CARDIA)     Difficulty of Paying Living Expenses: Very hard   Food Insecurity: No Food Insecurity (4/18/2024)    Hunger Vital Sign     Worried About Running Out of Food in the Last Year: Never true     Ran Out of Food in the Last Year: Never true   Transportation Needs: No Transportation Needs (4/18/2024)    PRAPARE - Transportation     Lack of Transportation (Medical): No     Lack of Transportation (Non-Medical): No   Physical Activity: Sufficiently Active (4/18/2024)    Exercise Vital Sign     Days of Exercise per Week: 5 days     Minutes of Exercise per Session: 40 min   Recent Concern: Physical Activity - Insufficiently Active (3/25/2024)    Exercise Vital Sign     Days of Exercise per Week: 3 days     Minutes of Exercise per Session: 30 min   Stress: No Stress Concern Present (4/18/2024)    [place] [facility] [employer] - Occupational Stress Questionnaire     Feeling of Stress : Only a little   Recent Concern: Stress - Stress Concern Present (3/25/2024)    [place] [facility] [employer] - Occupational Stress  Questionnaire     Feeling of Stress : To some extent   Housing Stability: Low Risk  (4/18/2024)    Housing Stability Vital Sign     Unable to Pay for Housing in the Last Year: No     Homeless in the Last Year: No     Past Surgical History:   Procedure Laterality Date    APPENDECTOMY      BLADDER SURGERY      polyp removed benign    COLONOSCOPY N/A 6/22/2016    Procedure: COLONOSCOPY;  Surgeon: Joaquin Francis MD;  Location: [facility] (4TH FLR);  Service: Endoscopy;  Laterality: N/A;    Thanks for letting us know.  I would approve him to hold coumadin x 3 days prior, without lovenox.  We will see him for an INR on 6/8 and will provide him with procedure instructions at that time., per [facility]    COLONOSCOPY N/A 5/15/2023    Procedure: COLONOSCOPY;  Surgeon: Noam Hollingsworth MD;  [facility]  Service: Endoscopy;  Laterality: N/A;  instr portal-tb-eliquis hold ok see te11/11/22  Ok to hold Eliquis- See t/e 3/30/23, instr via portal - [facility]  golytely  5/9 - precall done - [facility]    CYSTOSCOPY      benign bladder papilloma    FINGER SURGERY      HERNIA REPAIR Left 2016    inguinal    LITHOTRIPSY      TONSILLECTOMY, ADENOIDECTOMY       Patient Active Problem List   Diagnosis    Paroxysmal atrial fibrillation    PVD (posterior vitreous detachment)    High myopia    NS (nuclear sclerosis), bilateral    BPH with urinary obstruction    Long term current use of anticoagulant therapy    Aortic regurgitation    Hypertension, essential    Voice disturbance    Vocal fold atrophy    Glottic insufficiency    Hyperfunctional dysphonia    Mixed hyperlipidemia    PVC (premature ventricular contraction)    Mild aortic stenosis    Posterior vitreous detachment, bilateral    Chronic bilateral low back pain without sciatica    Aortic calcification    Elevated PSA     Review of Systems   Constitutional:  Negative for appetite change, chills, fatigue, fever and unexpected weight change.   HENT:  Negative for  "nosebleeds.    Respiratory:  Negative for shortness of breath and wheezing.    Cardiovascular:  Negative for chest pain, palpitations and leg swelling.   Gastrointestinal:  Negative for abdominal distention, abdominal pain, constipation, diarrhea, nausea and vomiting.   Genitourinary:  Positive for nocturia. Negative for dysuria and hematuria.   Musculoskeletal:  Negative for arthralgias and back pain.   Skin:  Negative for pallor.   Neurological:  Negative for dizziness, seizures and syncope.   Hematological:  Negative for adenopathy.   Psychiatric/Behavioral:  Negative for dysphoric mood.          Objective:      There were no vitals taken for this visit.  Estimated body mass index is 24.11 kg/m² as calculated from the following:    Height as of 4/18/24: 6' 1" (1.854 m).    Weight as of 5/13/24: 82.9 kg (182 lb 12.2 oz).  Physical Exam  Genitourinary:     Prostate: Enlarged.      Rectum: Normal. No mass, tenderness, external hemorrhoid or internal hemorrhoid.           Assessment and Plan:           Problem List Items Addressed This Visit       BPH with urinary obstruction - Primary    Overview     -followed in          Elevated PSA       Follow up:   1 year with PSA.    Livan Lowry          "

## 2024-07-02 ENCOUNTER — PATIENT MESSAGE (OUTPATIENT)
Dept: INTERNAL MEDICINE | Facility: CLINIC | Age: 74
End: 2024-07-02
Payer: MEDICARE

## 2024-07-10 ENCOUNTER — PATIENT MESSAGE (OUTPATIENT)
Dept: CARDIOLOGY | Facility: CLINIC | Age: 74
End: 2024-07-10
Payer: MEDICARE

## 2024-07-30 ENCOUNTER — OFFICE VISIT (OUTPATIENT)
Dept: CARDIOLOGY | Facility: CLINIC | Age: 74
End: 2024-07-30
Payer: MEDICARE

## 2024-07-30 VITALS
DIASTOLIC BLOOD PRESSURE: 64 MMHG | HEART RATE: 61 BPM | WEIGHT: 181.88 LBS | BODY MASS INDEX: 25.46 KG/M2 | HEIGHT: 71 IN | SYSTOLIC BLOOD PRESSURE: 124 MMHG | OXYGEN SATURATION: 97 %

## 2024-07-30 DIAGNOSIS — I35.1 NONRHEUMATIC AORTIC VALVE INSUFFICIENCY: ICD-10-CM

## 2024-07-30 DIAGNOSIS — I70.0 AORTIC CALCIFICATION: ICD-10-CM

## 2024-07-30 DIAGNOSIS — I10 HYPERTENSION, ESSENTIAL: ICD-10-CM

## 2024-07-30 DIAGNOSIS — I49.3 PVC (PREMATURE VENTRICULAR CONTRACTION): ICD-10-CM

## 2024-07-30 DIAGNOSIS — E78.2 MIXED HYPERLIPIDEMIA: ICD-10-CM

## 2024-07-30 DIAGNOSIS — I10 ESSENTIAL HYPERTENSION, BENIGN: ICD-10-CM

## 2024-07-30 DIAGNOSIS — I35.0 MILD AORTIC STENOSIS: ICD-10-CM

## 2024-07-30 DIAGNOSIS — I48.0 PAROXYSMAL ATRIAL FIBRILLATION: Primary | ICD-10-CM

## 2024-07-30 PROCEDURE — 99214 OFFICE O/P EST MOD 30 MIN: CPT | Mod: S$PBB,,, | Performed by: INTERNAL MEDICINE

## 2024-07-30 PROCEDURE — 99999 PR PBB SHADOW E&M-EST. PATIENT-LVL IV: CPT | Mod: PBBFAC,,, | Performed by: INTERNAL MEDICINE

## 2024-07-30 PROCEDURE — 99214 OFFICE O/P EST MOD 30 MIN: CPT | Mod: PBBFAC | Performed by: INTERNAL MEDICINE

## 2024-07-30 RX ORDER — CHLORTHALIDONE 25 MG/1
25 TABLET ORAL DAILY
Qty: 90 TABLET | Refills: 3 | Status: SHIPPED | OUTPATIENT
Start: 2024-07-30

## 2024-07-30 RX ORDER — AMLODIPINE BESYLATE 5 MG/1
5 TABLET ORAL DAILY
Qty: 90 TABLET | Refills: 3 | Status: SHIPPED | OUTPATIENT
Start: 2024-07-30

## 2024-07-31 NOTE — PROGRESS NOTES
Subjective:   Chief Complaint: Valvular Heart Disease  Last Clinic Visit: 8/31/2022     History of Present Illness: Anthony Reynaga is a 74 y.o. gentleman with functionally bicuspid aortic valve, mild AS/AR, paroxysmal atrial fibrillation, hypertension, possible hyperlipidemia, who presents to follow-up with cardiology.    Interval History: We also saw with fellow over the course the past 2 years.  He is doing well, back on Eliquis on a regular basis, and last episode of atrial fibrillation was January of 2023.  He has had some intermittent mild orthostasis which he attributed to an alpha-blocker he was taking for prostate issues, he has since stopped, and orthostasis has improved somewhat.  He reports a mild mucus in the morning, along with some heartburn which she has been taking Tums for and improving.  He does note lower than average resting heart rate, and heart rate does not get severely elevated with ambulation likely secondary to metoprolol.    8/31/2022  Over the past 1 year he retired in June, and has been spending some time in [place] as well as here.  Continuing to walk on a regular basis and occasional mild weight training before the pandemic, but has not exercise on a regular basis since the pandemic.  Reports eating a reasonably healthy diet, does endorse occasional lightheadedness, and dizziness, and acknowledges not drinking water throughout the day.  Occasional lightheadedness or working on the computer, reports blood pressure at home occasionally running on the low side he has had some bouts of arrhythmia normal ECG in November of 2021, he had an echocardiogram ordered in October of 2021 by outside provider and reported mild AI.  He reviewed recent echocardiograms bothered by the fact that his left atrial size has gone from severe to moderate to mild on serial echocardiograms.  Blood pressures at home in the 110s to 120s, heart rates in the 50s, and does have white coat hypertension home  blood pressure log scanned in to clinic.  Also on several different medications in interested in reducing what medications he can.  Occasional PVCs found on EKGs.  Also does not feel like his alpha-blocker is helping significantly with BPH.  We also switched to Eliquis from warfarin since we saw him last.  Noted be on chlorthalidone, with potassium supplementation.    5/26/2021  He was most recently followed by Dr. Mcclellan, and also follows with Dr. Gomez in the [facility].  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  He was first diagnosed with heart murmur in childhood, and then reports no significant issues for several years, in the 1990s he was diagnosed again with possible bicuspid aortic valve, as well as possible mitral valve prolapse, followed with several different cardiologist at [employer], and most recently here.  Denies any significant dyspnea on exertion, no orthopnea, no history of heart failure no history of rheumatic fever, and multiple serial echocardiograms showing relatively stable aortic valve disease.  Normal EF, preserved LV dimensions.  He also has a history of hypertension, and currently on chlorthalidone, brings in a log of his blood pressures blood pressures running in the 110-120 range at home, a history of white coat hypertension.  Blood pressure well controlled with chlorthalidone 25 mg, does have hypokalemia and on potassium for this.  He had a significant ACE-inhibitor induced cough, and thus is intolerant to this medication.  Also does report an enlarged prostate, and has some nocturia.  Does not snore does have a family history of sleep apnea.  In regards to atrial fibrillation he does have 2 recent episodes where flecainide was not effective, had some paroxysmal atrial fibrillation this past summer or a 30 day event monitor, but did not have any AFib during this time period currently on metoprolol 200 mg, flecainide pill in pocket strategy.  Pill in pocket has worked in the past.  Denies  any clear triggers.  Has been on Coumadin for many years, in the past hesitant to transition to DOAC given concern for no reversal agent.  Also on simvastatin for many years, most recent LDL is very well controlled in the 60s, unclear whether he had any significant hyperlipidemia prior to starting this medication.  Most recent CT non con does show very small calcifications in the LAD, as well as some mild aortic calcifications.  Has significant issues with sleep in regards to anxiety, and also nocturia.  Asked today about statins and aortic valve disease whether taking a statin prematurely could have precipitated his aortic valve atherosclerosis.    Dx:  Functionally bicuspid aortic valve  Mildly AS/AR  PAF, on flecainide pill in pocket, and metoprolol 200  Hypertension  ACE-inhibitor cough  Hyperlipidemia  History of iatrogenic hypokalemia    Medications:  Outpatient Encounter Medications as of 7/30/2024   Medication Sig Dispense Refill    ELIQUIS 5 mg Tab TAKE 1 TABLET BY MOUTH TWICE  DAILY 180 tablet 3    finasteride (PROSCAR) 5 mg tablet Take 1 tablet (5 mg total) by mouth once daily. 90 tablet 3    flecainide (TAMBOCOR) 150 MG Tab TAKE 2 TABLETS (300 mg total ) BY MOUTH AS NEEDED FOR AF. LIMIT ONE DOSE PER 24 HOURS. 30 tablet 3    metoprolol succinate (TOPROL-XL) 200 MG 24 hr tablet TAKE 1 TABLET BY MOUTH ONCE  DAILY 90 tablet 3    potassium chloride SA (K-DUR,KLOR-CON) 20 MEQ tablet TAKE 1 TABLET BY MOUTH TWICE  DAILY 180 tablet 3    simvastatin (ZOCOR) 20 MG tablet Take 1 tablet (20 mg total) by mouth every evening. 90 tablet 2    vitamin D 1000 units Tab Take 2,000 Units by mouth once daily.       [DISCONTINUED] amLODIPine (NORVASC) 5 MG tablet Take 0.5 tablets (2.5 mg total) by mouth once daily. 90 tablet 3    [DISCONTINUED] chlorthalidone (HYGROTEN) 25 MG Tab TAKE 1 TABLET BY MOUTH ONCE  DAILY 90 tablet 3    amLODIPine (NORVASC) 5 MG tablet Take 1 tablet (5 mg total) by mouth once daily. 90 tablet 3     "chlorthalidone (HYGROTEN) 25 MG Tab Take 1 tablet (25 mg total) by mouth once daily. 90 tablet 3    LORazepam (ATIVAN) 1 MG tablet Take 1 tablet (1 mg total) by mouth every evening. for 20 days (Patient taking differently: Take 1 mg by mouth daily as needed.) 20 tablet 0    [DISCONTINUED] busPIRone (BUSPAR) 5 MG Tab Take 1 tablet (5 mg total) by mouth 2 (two) times daily. (Patient not taking: Reported on 7/30/2024) 180 tablet 3     No facility-administered encounter medications on file as of 7/30/2024.     Social History:  Anthony reports that he has never smoked. He has never used smokeless tobacco. He reports current alcohol use of about 1.0 standard drink of alcohol per week. He reports that he does not use drugs.  A professor of neuroscience  At [employer]    Objective:   /64   Pulse 61   Ht 5' 11" (1.803 m)   Wt 82.5 kg (181 lb 14.1 oz)   SpO2 97%   BMI 25.37 kg/m²     Physical Exam   Constitutional: He does not appear ill. No distress.   HENT:   Head: Normocephalic and atraumatic.   Mouth/Throat: Mucous membranes are moist.   Cardiovascular: Normal rate, regular rhythm and normal pulses. Exam reveals no gallop and no friction rub.   Murmur heard.  2/6 crescendo decrescendo systolic murmur heard best at the left upper sternal border.   Pulmonary/Chest: Effort normal and breath sounds normal. No stridor. No respiratory distress. He has no wheezes. He has no rhonchi. He has no rales. He exhibits no tenderness.   Abdominal: Normal appearance.   Musculoskeletal:      Right lower leg: No edema.      Left lower leg: No edema.   Neurological: He is alert.   Skin: Skin is warm.      EKG:  My independent visualization of most recent EKG is normal sinus rhythm    TTE:  10/28/2021   The left ventricle is normal in size with normal systolic function.  Grade I left ventricular diastolic dysfunction.  Normal right ventricular size with normal right ventricular systolic function.  Mild aortic regurgitation.   "   08/13/2020  IMPRESSION:   The tricuspid valve appears normal in structure with mild tricuspid insufficiency at velocity suggesting right ventricular pressure 20 mmHg plus right atrial pressure.   Right ventricle appears normal in size with qualitatively good function area   The left atrial volume index is mildly enlarged, measuring 35.90 cc/m2.   Mitral valve appears normal in structure with mild insufficiency.   Normal left ventricular size and structure.  Qualitatively good left ventricular systolic function with SF= 36% and EF estimated 60 -65% from apical four chamber views.  Normal indices of left ventricular diastolic function.    Difficult to distinguish structure of aortic valve secondary to 1-1.5 cm relatively immobile calcification most closely associated with the non-coronary cusp - native structure is probably trileaflet with functionally bicuspid valve secondary to scarring and sclerosis.  Peak velocity across the aortic valve <2.1 m/s with mean gradient <11 mmHg and mild estimated regurgitant volume arising from small central jet.  (ascending aorta peak velocity 2.1 m/sec.).   Aortic dimensions:  Sinuses of Valsalva = 37 mm.  ST junction             = 30 mm.  Ascending aorta     = 38 mm.  Lipids:  Recent Labs   Lab 11/17/23  0943   LDL Cholesterol 74.4   HDL 47   Cholesterol 155      Renal:  Recent Labs   Lab 11/17/23  0943   Creatinine 1.1   Potassium 4.6   CO2 30 H   BUN 21     Liver:  Recent Labs   Lab 11/17/23  0943   AST 24   ALT 26     Assessment:     1. Paroxysmal atrial fibrillation    2. Hypertension, essential    3. Essential hypertension, benign    4. Mixed hyperlipidemia    5. Mild aortic stenosis    6. Nonrheumatic aortic valve insufficiency    7. Aortic calcification    8. PVC (premature ventricular contraction)      Plan:   1. Paroxysmal atrial fibrillation  No recent episodes, continue flecainide pill in pocket approach, continue Eliquis.  Continue standing metoprolol.  If he develops  any recurrent lightheadedness could consider down titration of metoprolol, but will continue at this current dose for now.    2. Hypertension, essential  Was noted to have elevated calcium recently, will repeat calcium as well as ionized calcium suspect related to chlorthalidone if persistently elevated given that  chlorthalidone is controlling his blood pressure well will rule out PTH if persistently elevated.  Given it is mild and ionized calcium within normal limits will continue chlorthalidone for the time being.  - Comprehensive Metabolic Panel; Future  - CALCIUM, IONIZED; Future  - CBC Auto Differential; Future  - amLODIPine (NORVASC) 5 MG tablet; Take 1 tablet (5 mg total) by mouth once daily.  Dispense: 90 tablet; Refill: 3    3. Essential hypertension, benign  As above continue current meds, he has been taking amlodipine 5 mg as opposed to 2.5 given that is splitting the tablet intermittently as typical, will continue 5 if he develops lightheadedness let us know.  - chlorthalidone (HYGROTEN) 25 MG Tab; Take 1 tablet (25 mg total) by mouth once daily.  Dispense: 90 tablet; Refill: 3    4. Mixed hyperlipidemia  Well-controlled continue current meds    5. Mild aortic stenosis  Repeating echocardiogram as below    6. Nonrheumatic aortic valve insufficiency    - Echo; Future    7. Aortic calcification      8. PVC (premature ventricular contraction)      Follow up in one year      Jean Nair MD [facility]

## 2024-08-27 ENCOUNTER — HOSPITAL ENCOUNTER (OUTPATIENT)
Dept: CARDIOLOGY | Facility: HOSPITAL | Age: 74
Discharge: HOME | End: 2024-08-27
Attending: INTERNAL MEDICINE
Payer: MEDICARE

## 2024-08-27 VITALS
HEIGHT: 71 IN | DIASTOLIC BLOOD PRESSURE: 64 MMHG | WEIGHT: 181 LBS | HEART RATE: 70 BPM | SYSTOLIC BLOOD PRESSURE: 124 MMHG | BODY MASS INDEX: 25.34 KG/M2

## 2024-08-27 DIAGNOSIS — I35.1 NONRHEUMATIC AORTIC VALVE INSUFFICIENCY: ICD-10-CM

## 2024-08-27 LAB
ASCENDING AORTA: 3.9 CM
AV INDEX (PROSTH): 0.44
AV MEAN GRADIENT: 13 MMHG
AV PEAK GRADIENT: 25 MMHG
AV VALVE AREA BY VELOCITY RATIO: 1.84 CM²
AV VALVE AREA: 2.45 CM²
AV VELOCITY RATIO: 0.33
BSA FOR ECHO PROCEDURE: 2.03 M2
CV ECHO LV RWT: 0.37 CM
DOP CALC AO PEAK VEL: 2.5 M/S
DOP CALC AO VTI: 53.5 CM
DOP CALC LVOT AREA: 5.6 CM2
DOP CALC LVOT DIAMETER: 2.66 CM
DOP CALC LVOT PEAK VEL: 0.83 M/S
DOP CALC LVOT STROKE VOLUME: 131.08 CM3
DOP CALCLVOT PEAK VEL VTI: 23.6 CM
E WAVE DECELERATION TIME: 213.38 MSEC
E/A RATIO: 0.48
E/E' RATIO: 6.88 M/S
ECHO LV POSTERIOR WALL: 0.89 CM (ref 0.6–1.1)
FRACTIONAL SHORTENING: 30 % (ref 28–44)
INTERVENTRICULAR SEPTUM: 0.97 CM (ref 0.6–1.1)
IVRT: 102.76 MSEC
LA MAJOR: 5.61 CM
LA MINOR: 5.61 CM
LA WIDTH: 4.2 CM
LEFT ATRIUM SIZE: 3.98 CM
LEFT ATRIUM VOLUME INDEX MOD: 41.8 ML/M2
LEFT ATRIUM VOLUME INDEX: 39.5 ML/M2
LEFT ATRIUM VOLUME MOD: 84.49 CM3
LEFT ATRIUM VOLUME: 79.71 CM3
LEFT INTERNAL DIMENSION IN SYSTOLE: 3.35 CM (ref 2.1–4)
LEFT VENTRICLE DIASTOLIC VOLUME INDEX: 52.27 ML/M2
LEFT VENTRICLE DIASTOLIC VOLUME: 105.59 ML
LEFT VENTRICLE MASS INDEX: 75 G/M2
LEFT VENTRICLE SYSTOLIC VOLUME INDEX: 22.7 ML/M2
LEFT VENTRICLE SYSTOLIC VOLUME: 45.84 ML
LEFT VENTRICULAR INTERNAL DIMENSION IN DIASTOLE: 4.76 CM (ref 3.5–6)
LEFT VENTRICULAR MASS: 152.25 G
LV LATERAL E/E' RATIO: 5.5 M/S
LV SEPTAL E/E' RATIO: 9.17 M/S
MV A" WAVE DURATION": 19.41 MSEC
MV PEAK A VEL: 1.15 M/S
MV PEAK E VEL: 0.55 M/S
MV STENOSIS PRESSURE HALF TIME: 61.88 MS
MV VALVE AREA P 1/2 METHOD: 3.56 CM2
PISA TR MAX VEL: 2.53 M/S
PULM VEIN S/D RATIO: 1.09
PV PEAK D VEL: 0.22 M/S
PV PEAK S VEL: 0.24 M/S
RA MAJOR: 4.74 CM
RA PRESSURE ESTIMATED: 3 MMHG
RA WIDTH: 3.49 CM
RIGHT VENTRICLE DIASTOLIC BASEL DIMENSION: 3.6 CM
RV TB RVSP: 6 MMHG
SINUS: 3.93 CM
STJ: 3.64 CM
TDI LATERAL: 0.1 M/S
TDI SEPTAL: 0.06 M/S
TDI: 0.08 M/S
TR MAX PG: 26 MMHG
TRICUSPID ANNULAR PLANE SYSTOLIC EXCURSION: 2.56 CM
TV REST PULMONARY ARTERY PRESSURE: 29 MMHG
Z-SCORE OF LEFT VENTRICULAR DIMENSION IN END DIASTOLE: -2.23
Z-SCORE OF LEFT VENTRICULAR DIMENSION IN END SYSTOLE: -0.68

## 2024-08-27 PROCEDURE — 93306 TTE W/DOPPLER COMPLETE: CPT

## 2024-08-27 PROCEDURE — 93306 TTE W/DOPPLER COMPLETE: CPT | Mod: 26,,, | Performed by: INTERNAL MEDICINE

## 2024-08-27 NOTE — ASSESSMENT & PLAN NOTE
Follow-up in 1 month with his cuff for calibration and recheck blood pressure.  His home readings are normal they are elevated at the office.  Consider initiation of valsartan.  Discussed various blood pressure options with him.  Even though he had an Ace cough, would suggest a trial of ARB   Subjective:       Patient ID: Richard Chavira is a 76 y.o. male.    Chief Complaint: Bladder Cancer      HPI:  76-year-old male with muscle invasive bladder cancer had a radical cystoprostatectomy in 2015 at [facility] he had apparently some obstruction of his ureters requiring nephrostomy tubes this was internalized into nephro ureteral stents which was changed Q 3 months by Interventional Radiology patient can no longer tolerate the drive to [place] and would like that done here    Past Medical History:   Past Medical History:   Diagnosis Date    Anemia     Bladder cancer     Cancer     bladder cancer followed by [facility]    Coronary artery disease involving native coronary artery of native heart without angina pectoris 12/01/2022     S/p LAD + RCA Drug-eluting stent placement    Diabetes mellitus, type 2     Essential hypertension     Hyperlipidemia     Personal history of colonic polyps     Pneumonia     Renal insufficiency 04/30/2018    ST elevation (STEMI) myocardial infarction of unspecified site 11/2022       Past Surgical Historical:   Past Surgical History:   Procedure Laterality Date    BACK SURGERY      BLADDER SURGERY      CARPAL TUNNEL RELEASE Bilateral     COLONOSCOPY      CORONARY STENT PLACEMENT  11/2022    EYE SURGERY      TRANSURETHRAL RESECTION OF PROSTATE          Medications:   Medication List with Changes/Refills   Current Medications    ADULT LOW DOSE ASPIRIN 81 MG EC TABLET    Take 1 tablet by mouth once daily.    ASPIRIN (ECOTRIN) 81 MG EC TABLET    Take 81 mg by mouth.    ATORVASTATIN (LIPITOR) 80 MG TABLET    Take 80 mg by mouth.    CHOLECALCIFEROL, VITAMIN D3, 1,250 MCG (50,000 UNIT) TAB    Take 2,000 Units by mouth.    CHOLECALCIFEROL, VITAMIN D3, 50,000 UNIT TAB    Take 4,000 Units by mouth.    EMPAGLIFLOZIN (JARDIANCE) 10 MG TABLET    Take 10 mg by mouth.    EZETIMIBE (ZETIA) 10 MG TABLET    Take 10 mg by mouth.    FUROSEMIDE (LASIX) 20 MG TABLET        GABAPENTIN (NEURONTIN) 300  MG CAPSULE    1 capsule 2 (two) times daily.    GLUCOSE 4 GM CHEWABLE TABLET    Take 4 g by mouth.    HYDROXYZINE HCL (ATARAX) 25 MG TABLET    Take 25 mg by mouth.    INSULIN ASPART U-100 (NOVOLOG) 100 UNIT/ML INPN PEN    Inject 8 Units into the skin 3 (three) times daily with meals.     INSULIN GLARGINE 100 UNITS/ML (3ML) SUBQ PEN    Inject 70 Units into the skin once daily.     INSULIN GLARGINE U-100, LANTUS, 100 UNIT/ML INJECTION    Inject 35 Units into the skin.    INSULIN GLARGINE-YFGN 100 UNIT/ML SOLN    Inject into the skin.    LOSARTAN (COZAAR) 25 MG TABLET    Take 1 tablet (25 mg total) by mouth once daily.    METOPROLOL TARTRATE (LOPRESSOR) 50 MG TABLET    Take 25 mg by mouth.    NITROGLYCERIN (NITROSTAT) 0.4 MG SL TABLET    As directed    NITROGLYCERIN (NITROSTAT) 0.4 MG SL TABLET    Place 0.4 mg under the tongue.    ONDANSETRON (ZOFRAN-ODT) 4 MG TBDL        TADALAFIL (CIALIS) 20 MG TAB    Take 20 mg by mouth.    TRAMADOL (ULTRAM) 50 MG TABLET    Take 1 tablet (50 mg total) by mouth every 6 (six) hours as needed for Pain.   Discontinued Medications    ATORVASTATIN (LIPITOR) 80 MG TABLET    TAKE 1 TABLET BY MOUTH ONCE DAILY    HYDROXYZINE HCL (ATARAX) 25 MG TABLET    1 tablet every evening.    METOPROLOL TARTRATE (LOPRESSOR) 25 MG TABLET    TAKE 1 TABLET BY MOUTH TWICE DAILY HOLD IF HEART RATE IS LESS THAN 55 BEATS A MINUTE OR SYSTOLIC BLOOD PRESSURE LESS THAN 90    PRASUGREL (EFFIENT) 10 MG TAB    Take 1 tablet by mouth once daily.        Past Social History:   Social History     Socioeconomic History    Marital status:      Spouse name: Payton Chavira    Number of children: 2    Highest education level: Bachelor's degree (e.g., BA, AB, BS)   Occupational History    Occupation: retired   Tobacco Use    Smoking status: Former     Current packs/day: 0.00     Types: Cigarettes     Quit date:      Years since quittin.6    Smokeless tobacco: Never   Substance and Sexual Activity    Alcohol use:  No    Drug use: No     Social Determinants of Health     Financial Resource Strain: Low Risk  (2/20/2023)    Overall Financial Resource Strain (CARDIA)     Difficulty of Paying Living Expenses: Not hard at all   Food Insecurity: No Food Insecurity (2/20/2023)    Hunger Vital Sign     Worried About Running Out of Food in the Last Year: Never true     Ran Out of Food in the Last Year: Never true   Transportation Needs: No Transportation Needs (2/20/2023)    PRAPARE - Transportation     Lack of Transportation (Medical): No     Lack of Transportation (Non-Medical): No   Physical Activity: Insufficiently Active (2/20/2023)    Exercise Vital Sign     Days of Exercise per Week: 3 days     Minutes of Exercise per Session: 20 min   Stress: Stress Concern Present (2/20/2023)    [place] [employer] - Occupational Stress Questionnaire     Feeling of Stress : To some extent   Housing Stability: Low Risk  (2/20/2023)    Housing Stability Vital Sign     Unable to Pay for Housing in the Last Year: No     Number of Places Lived in the Last Year: 1     Unstable Housing in the Last Year: No       Allergies:   Review of patient's allergies indicates:   Allergen Reactions    Sildenafil Other (See Comments)     Headache        Family History:   Family History   Problem Relation Name Age of Onset    Hepatitis Mother      Heart disease Father      Kidney disease Father      Diabetes Sister      Diabetes Brother      Heart disease Brother          Review of Systems:  Review of Systems   Constitutional:  Negative for activity change and appetite change.   HENT:  Negative for congestion and dental problem.    Eyes:  Negative for visual disturbance.   Respiratory:  Negative for chest tightness and shortness of breath.    Cardiovascular:  Negative for chest pain.   Gastrointestinal:  Negative for abdominal distention and abdominal pain.   Genitourinary:  Negative for decreased urine volume, difficulty urinating, dysuria,  enuresis, flank pain, frequency, genital sores, hematuria, penile discharge, penile pain, penile swelling, scrotal swelling, testicular pain and urgency.   Musculoskeletal:  Negative for back pain and neck pain.   Skin:  Negative for color change.   Neurological:  Negative for dizziness.   Hematological:  Negative for adenopathy.   Psychiatric/Behavioral:  Negative for agitation, behavioral problems and confusion.        Physical Exam:  Physical Exam  Constitutional:       General: He is not in acute distress.     Appearance: He is well-developed.   HENT:      Head: Normocephalic and atraumatic.      Nose: Nose normal.   Eyes:      General: No scleral icterus.     Conjunctiva/sclera: Conjunctivae normal.      Pupils: Pupils are equal, round, and reactive to light.   Neck:      Thyroid: No thyromegaly.      Trachea: No tracheal deviation.   Cardiovascular:      Rate and Rhythm: Normal rate and regular rhythm.      Heart sounds: Normal heart sounds.   Pulmonary:      Effort: Pulmonary effort is normal. No respiratory distress.      Breath sounds: Normal breath sounds. No wheezing or rales.   Abdominal:      General: Bowel sounds are normal. There is no distension.      Palpations: Abdomen is soft.      Tenderness: There is no abdominal tenderness. There is no guarding or rebound.   Genitourinary:     Penis: Normal. No tenderness.       Prostate: Normal.   Musculoskeletal:         General: No deformity. Normal range of motion.      Cervical back: Neck supple.   Lymphadenopathy:      Cervical: No cervical adenopathy.   Skin:     General: Skin is warm and dry.      Findings: No erythema or rash.   Neurological:      Mental Status: He is alert and oriented to person, place, and time.      Cranial Nerves: No cranial nerve deficit.   Psychiatric:         Behavior: Behavior normal.         Assessment/Plan:       Problem List Items Addressed This Visit    None  Visit Diagnoses       Malignant neoplasm of urinary bladder,  unspecified site    -  Primary               76-year-old male with a history of radical cystoprostatectomy in 20 15 he has done well from a cancer standpoint but he does apparently have some ureteral strictures or some obstruction and requires nephro ureteral stents he would like to his next exchange  done here in [place] as opposed to [place] we will contact Interventional Radiology for this

## 2024-08-30 ENCOUNTER — PATIENT MESSAGE (OUTPATIENT)
Dept: CARDIOLOGY | Facility: CLINIC | Age: 74
End: 2024-08-30
Payer: MEDICARE

## 2024-09-25 ENCOUNTER — LAB VISIT (OUTPATIENT)
Dept: LAB | Facility: HOSPITAL | Age: 74
End: 2024-09-25
Attending: INTERNAL MEDICINE
Payer: MEDICARE

## 2024-09-25 DIAGNOSIS — I10 HYPERTENSION, ESSENTIAL: ICD-10-CM

## 2024-09-25 LAB
ALBUMIN SERPL BCP-MCNC: 4 G/DL (ref 3.5–5.2)
ALP SERPL-CCNC: 45 U/L (ref 55–135)
ALT SERPL W/O P-5'-P-CCNC: 22 U/L (ref 10–44)
ANION GAP SERPL CALC-SCNC: 7 MMOL/L (ref 8–16)
AST SERPL-CCNC: 24 U/L (ref 10–40)
BASOPHILS # BLD AUTO: 0.09 K/UL (ref 0–0.2)
BASOPHILS NFR BLD: 1.2 % (ref 0–1.9)
BILIRUB SERPL-MCNC: 0.6 MG/DL (ref 0.1–1)
BUN SERPL-MCNC: 11 MG/DL (ref 8–23)
CA-I BLDV-SCNC: 1.27 MMOL/L (ref 1.06–1.42)
CALCIUM SERPL-MCNC: 10.6 MG/DL (ref 8.7–10.5)
CHLORIDE SERPL-SCNC: 108 MMOL/L (ref 95–110)
CO2 SERPL-SCNC: 26 MMOL/L (ref 23–29)
CREAT SERPL-MCNC: 0.9 MG/DL (ref 0.5–1.4)
DIFFERENTIAL METHOD BLD: ABNORMAL
EOSINOPHIL # BLD AUTO: 0.2 K/UL (ref 0–0.5)
EOSINOPHIL NFR BLD: 3.2 % (ref 0–8)
ERYTHROCYTE [DISTWIDTH] IN BLOOD BY AUTOMATED COUNT: 14.2 % (ref 11.5–14.5)
EST. GFR  (NO RACE VARIABLE): >60 ML/MIN/1.73 M^2
GLUCOSE SERPL-MCNC: 94 MG/DL (ref 70–110)
HCT VFR BLD AUTO: 47.2 % (ref 40–54)
HGB BLD-MCNC: 15.1 G/DL (ref 14–18)
IMM GRANULOCYTES # BLD AUTO: 0.07 K/UL (ref 0–0.04)
IMM GRANULOCYTES NFR BLD AUTO: 0.9 % (ref 0–0.5)
LYMPHOCYTES # BLD AUTO: 1.7 K/UL (ref 1–4.8)
LYMPHOCYTES NFR BLD: 22.6 % (ref 18–48)
MCH RBC QN AUTO: 27 PG (ref 27–31)
MCHC RBC AUTO-ENTMCNC: 32 G/DL (ref 32–36)
MCV RBC AUTO: 84 FL (ref 82–98)
MONOCYTES # BLD AUTO: 0.6 K/UL (ref 0.3–1)
MONOCYTES NFR BLD: 7.6 % (ref 4–15)
NEUTROPHILS # BLD AUTO: 4.8 K/UL (ref 1.8–7.7)
NEUTROPHILS NFR BLD: 64.5 % (ref 38–73)
NRBC BLD-RTO: 0 /100 WBC
PLATELET # BLD AUTO: 156 K/UL (ref 150–450)
PMV BLD AUTO: 12.7 FL (ref 9.2–12.9)
POTASSIUM SERPL-SCNC: 3.5 MMOL/L (ref 3.5–5.1)
PROT SERPL-MCNC: 7 G/DL (ref 6–8.4)
RBC # BLD AUTO: 5.59 M/UL (ref 4.6–6.2)
SODIUM SERPL-SCNC: 141 MMOL/L (ref 136–145)
WBC # BLD AUTO: 7.49 K/UL (ref 3.9–12.7)

## 2024-09-25 PROCEDURE — 80053 COMPREHEN METABOLIC PANEL: CPT | Performed by: INTERNAL MEDICINE

## 2024-09-25 PROCEDURE — 82330 ASSAY OF CALCIUM: CPT | Performed by: INTERNAL MEDICINE

## 2024-09-25 PROCEDURE — 85025 COMPLETE CBC W/AUTO DIFF WBC: CPT | Performed by: INTERNAL MEDICINE

## 2024-09-25 PROCEDURE — 36415 COLL VENOUS BLD VENIPUNCTURE: CPT | Performed by: INTERNAL MEDICINE

## 2024-10-01 ENCOUNTER — PATIENT MESSAGE (OUTPATIENT)
Dept: UROLOGY | Facility: CLINIC | Age: 74
End: 2024-10-01
Payer: MEDICARE

## 2024-10-01 DIAGNOSIS — N13.8 BPH WITH URINARY OBSTRUCTION: Primary | ICD-10-CM

## 2024-10-01 DIAGNOSIS — N40.1 BPH WITH URINARY OBSTRUCTION: Primary | ICD-10-CM

## 2024-10-01 RX ORDER — FINASTERIDE 5 MG/1
5 TABLET, FILM COATED ORAL DAILY
Qty: 90 TABLET | Refills: 3 | Status: SHIPPED | OUTPATIENT
Start: 2024-10-01 | End: 2025-10-01

## 2024-10-24 ENCOUNTER — PATIENT MESSAGE (OUTPATIENT)
Dept: CARDIOLOGY | Facility: CLINIC | Age: 74
End: 2024-10-24
Payer: MEDICARE

## 2024-11-06 DIAGNOSIS — E78.2 MIXED HYPERLIPIDEMIA: ICD-10-CM

## 2024-11-06 RX ORDER — SIMVASTATIN 20 MG/1
20 TABLET, FILM COATED ORAL NIGHTLY
Qty: 90 TABLET | Refills: 0 | Status: SHIPPED | OUTPATIENT
Start: 2024-11-06

## 2024-11-07 NOTE — TELEPHONE ENCOUNTER
Care Due:                  Date            Visit Type   Department     Provider  --------------------------------------------------------------------------------                                EP -                              PRIMARY      [facility] INTER[facility]  Last Visit: 11-      CARE ([facility])   MEDICINE       REINIER FRANK  Next Visit: None Scheduled  None         None Found                                                            Last  Test          Frequency    Reason                     Performed    Due Date  --------------------------------------------------------------------------------    Lipid Panel.  12 months..  simvastatin..............  11- 11-    [facility] Embedded Care Due Messages. Reference number: [id number].   11/06/2024 9:14:23 PM CST

## 2024-11-07 NOTE — TELEPHONE ENCOUNTER
Provider Staff:  Action required for this patient    Requires labs      Please see care gap opportunities below in Care Due Message.    Thanks!  [facility]     Appointments      Date Provider   Last Visit   11/17/2023 Hayes Ferrera MD   Next Visit   Visit date not found Hayes Ferrera MD     Refill Decision Note   Anthony Reynaga  is requesting a refill authorization.  Brief Assessment and Rationale for Refill:  Approve     Medication Therapy Plan:         Comments:     Note composed:9:38 PM 11/06/2024

## 2024-11-22 DIAGNOSIS — I10 HYPERTENSION, ESSENTIAL: ICD-10-CM

## 2024-11-22 RX ORDER — METOPROLOL SUCCINATE 200 MG/1
200 TABLET, EXTENDED RELEASE ORAL
Qty: 90 TABLET | Refills: 0 | Status: SHIPPED | OUTPATIENT
Start: 2024-11-22

## 2024-11-23 NOTE — TELEPHONE ENCOUNTER
Care Due:                  Date            Visit Type   Department     Provider  --------------------------------------------------------------------------------                                EP -                              PRIMARY      [facility] INTERNAL  Last Visit: 11-      [facility] ([facility])   MEDICINE       REINIER FRANK  Next Visit: None Scheduled  None         None Found                                                            Last  Test          Frequency    Reason                     Performed    Due Date  --------------------------------------------------------------------------------    Office Visit  15 months..  metoprolol, simvastatin..  11- 02-    [facility] Embedded Care Due Messages. Reference number: [id number].   11/22/2024 9:09:27 PM CST

## 2024-11-23 NOTE — TELEPHONE ENCOUNTER
Provider Staff:  Action required for this patient    Requires appointment      Please see care gap opportunities below in Care Due Message.    Thanks!  [facility]     Appointments      Date Provider   Last Visit   11/17/2023 Hayes Ferrera MD   Next Visit   Visit date not found Hayes Ferrera MD     Refill Decision Note   Anthony Reynaga  is requesting a refill authorization.  Brief Assessment and Rationale for Refill:  Approve     Medication Therapy Plan:         Comments:     Note composed:10:36 PM 11/22/2024

## 2024-12-06 DIAGNOSIS — I49.3 PVC (PREMATURE VENTRICULAR CONTRACTION): ICD-10-CM

## 2024-12-07 NOTE — TELEPHONE ENCOUNTER
No care due was identified.  [facility] Embedded Care Due Messages. Reference number: [id number].   12/06/2024 9:52:50 PM CST

## 2024-12-08 RX ORDER — POTASSIUM CHLORIDE 20 MEQ/1
TABLET, EXTENDED RELEASE ORAL
Qty: 180 TABLET | Refills: 0 | Status: SHIPPED | OUTPATIENT
Start: 2024-12-08

## 2024-12-08 NOTE — TELEPHONE ENCOUNTER
Refill Decision Note   Anthony Reynaga  is requesting a refill authorization.  Brief Assessment and Rationale for Refill:  Approve     Medication Therapy Plan:        Comments:     Note composed:12:10 PM 12/08/2024

## 2024-12-24 PROBLEM — J38.3 GLOTTIC INSUFFICIENCY: Status: RESOLVED | Noted: 2017-08-03 | Resolved: 2024-12-24

## 2024-12-24 PROBLEM — R49.9 VOICE DISTURBANCE: Status: RESOLVED | Noted: 2017-08-03 | Resolved: 2024-12-24

## 2024-12-24 PROBLEM — J38.3 VOCAL FOLD ATROPHY: Status: RESOLVED | Noted: 2017-08-03 | Resolved: 2024-12-24

## 2024-12-24 NOTE — PROGRESS NOTES
FAMILY MEDICINE  [facility]    Reason for visit:   Chief Complaint   Patient presents with    Establish Care         SUBJECTIVE: Anthony Reynaga is a 74 y.o. 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 Last PCP Hayes Richards MD    Cardiology: Jean Capone MD  Urology: Livan Blackwell MD  ENT Wilfredo Scott MD  Dermatology Hector Lebron MD       Anthony reports leg pain for approximately 1 year. Initially, he had pain in one leg, described as a burning sensation upon contact, particularly in the thigh area, which resolved with physical therapy. In November, he began having pain in the contralateral leg, primarily in the thigh and extending distally. The pain worsens with prolonged sitting or lying down, causing a limp when standing. He discovered that performing a squat or bending down alleviates the pain immediately when standing. He has utilized this technique 35-40 times in the past few weeks.    He also has knee pain, with discomfort migrating to different parts of his legs and back during ambulation. Occasionally, the pain affects his foot. He describes a sensation of improper function, causing other parts of his leg to compensate.    Anthony has chronic back problems since adolescence, partly due to poor posture and sports injuries. He has been diagnosed with anterior listhesis and spondylolisthesis, which may be contributing to nerve issues in the lumbosacral region.  He did complete physical therapy in 2023 which he reports was helpful.  He has been unable to maintain the physical therapy exercises.  He has not been exercising regularly except for walking however he plans to join the [employer] and hire a   to help him get stronger.    He reports cognitive impairment over the past 5 years, initially noticing difficulty recalling names, particularly of public figures. This issue has worsened over the last couple of years, and he now reports potential short-term memory problems. As a retired  and neuroscience, his reduced intellectual engagement since skilled nursing may be contributing to these cognitive changes.    Anthony mentions ongoing issues with taste and smell that began in March 2020. His smell function tested at the 50th percentile, but he has parosmia or phantosmia. His taste is largely absent, though he tested in the 90th percentile for basic tastes. He has adapted to eating based on food texture and memory of flavors.  He was evaluated at the [facility] by Dr. Alberto Wheat.     He reports an intermittent cough that has persisted for about 5 years. Dr. Moeller at [facility] found atrophy in his vocal folds, possibly due to aging. Anthony also has post-nasal drip, dry mouth, and occasional eustachian tube inflammation, causing a blocked ear sensation. The cough can be absent for months and then return, and it tends to worsen when supine, suggesting possible reflux involvement.  He has had a chest x-ray and evaluation.  He reports that it seems to be overall stable.  It seems to wax and wane.  He reports that is not daily.  He reports he will be times when he has no issues.  It does seem to be related with heartburn at times and does seem to improve with Tums.  He does not want to be on a PPI.    Anthony denies chronic numbness or tingling down his legs, weakness, frequent falls.    Anthony is on Eliquis as a anticoagulant. He is taking Metoprolol for rate control of atrial fibrillation and Flecainide as needed for AFib.  He is also on Chlorothalidone for blood pressure, Simvastatin for hyperlipidemia, and Finasteride for PSA levels.    Anthony has a history of atrial  fibrillation since , aortic insufficiency, hypertension, and elevated PSA. He experienced kidney stones in  and non-bacterial urethritis in the early . A bladder papilloma was removed in the early . Anthony has an untreated right-side inguinal   hernia, anterior listhesis, spondylolisthesis, and hyperlipidemia. Anthony received PCV13 in  and PPSV23 in 2018. He has also received 7 COVID-19 boosters.    Family history is significant for mother, who  from renal cancer in . His father had a papilloma removed.    1. Hypertension with atrial fibrillation  - BP goal < 130/80 as tolerated  - reactive HTN and monitors at home    Today Anthony Reynaga reports that he is doing lawn his blood pressure medications.  He takes flecainide only as needed.    Current medication treatment:   amLODIPine (NORVASC) 5 MG tablet, Take 1 tablet (5 mg total) by mouth once daily., Disp: 90 tablet, Rfl: 3  chlorthalidone (HYGROTEN) 25 MG Tab, Take 1 tablet (25 mg total) by mouth once daily., Disp: 90 tablet, Rfl: 3  ELIQUIS 5 mg Tab, TAKE 1 TABLET BY MOUTH TWICE  DAILY, Disp: 180 tablet, Rfl: 3  flecainide (TAMBOCOR) 150 MG Tab, TAKE 2 TABLETS (300 mg total ) BY MOUTH AS NEEDED FOR AF. LIMIT ONE DOSE PER 24 HOURS., Disp: 30 tablet, Rfl: 3  metoprolol succinate (TOPROL-XL) 200 MG 24 hr tablet, TAKE 1 TABLET BY MOUTH ONCE  DAILY, Disp: 90 tablet, Rfl: 0  potassium chloride SA (K-DUR,KLOR-CON) 20 MEQ tablet, TAKE 1 TABLET BY MOUTH TWICE  DAILY, Disp: 180 tablet, Rfl: 0dministered medications on file prior to visit.    Medication side effects: denies    Exercise regimen: rare    Home BP cuff: Yes  How often does patient monitoring BP? gage    2. Hyperlipidemia  - he reports he has never had severe hyperlipidemia and requested to be on a statin secondary to cardiac prevention.  Since then he has been stable on medication and has discussed with his cardiologist recommended that he continue his statin  - LDL goal <  80    Current treatment:  simvastatin (ZOCOR) 20 MG tablet, TAKE 1 TABLET BY MOUTH IN THE  EVENING, Disp: 90 tablet, Rfl: 0    Side effects from current treatment: none    Lab Results       Component                Value               Date                       CHOL                     155                 11/17/2023                 CHOL                     131                 10/06/2022                 CHOL                     121                 10/13/2021            Lab Results       Component                Value               Date                       HDL                      47                  11/17/2023                 HDL                      45                  10/06/2022                 HDL                      45                  10/13/2021            Lab Results       Component                Value               Date                       LDLCALC                  74.4                11/17/2023                 LDLCALC                  63.8                10/06/2022                 LDLCALC                  58.2 (L)            10/13/2021            Lab Results       Component                Value               Date                       TRIG                     168 (H)             11/17/2023                 TRIG                     111                 10/06/2022                 TRIG                     89                  10/13/2021            Lab Results       Component                Value               Date                       CHOLHDL                  30.3                11/17/2023                 CHOLHDL                  34.4                10/06/2022                 CHOLHDL                  37.2                10/13/2021                      Review of Systems   All other systems reviewed and are negative.      HEALTH MAINTENANCE:   Health Maintenance   Topic Date Due    PROSTATE-SPECIFIC ANTIGEN  10/30/2020    TETANUS VACCINE  05/26/2025    High Dose Statin  12/30/2025    Lipid Panel  11/17/2028     Colorectal Cancer Screening  05/15/2030    Hepatitis C Screening  Completed    Shingles Vaccine  Completed    Influenza Vaccine  Completed    COVID-19 Vaccine  Completed    RSV Vaccine (Age 60+ and Pregnant patients)  Completed    Pneumococcal Vaccines (Age 50+)  Completed     Health Maintenance Topics with due status: Not Due       Topic Last Completion Date    TETANUS VACCINE 05/26/2015    Colorectal Cancer Screening 05/15/2023    Lipid Panel 11/17/2023    High Dose Statin 12/30/2024     Health Maintenance Due   Topic Date Due    PROSTATE-SPECIFIC ANTIGEN  10/30/2020       HISTORY:   Past Medical History:   Diagnosis Date    Anticoagulant long-term use     Atrial fibrillation     Bilateral nephrolithiasis     Bladder papilloma     1990    BPH (benign prostatic hyperplasia)     Cataract     Elevated PSA     Floaters     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  I would approve him to hold coumadin x 3 days prior, without lovenox.  We will see him for an INR on 6/8 and will provide him with procedure instructions at that time., per [facility]    COLONOSCOPY N/A 05/15/2023    Procedure: COLONOSCOPY;  Surgeon: Noam Hollingsworth MD;  Location: [facility] (4TH FLR);  Service: Endoscopy;  Laterality: N/A;  instr portal-tb-eliquis hold ok see te11/11/22  Ok to hold Eliquis- See t/e 3/30/23, instr via portal - PC  golytely  5/9 - precall done - [facility]    CYSTOSCOPY      benign bladder papilloma    FINGER SURGERY      HERNIA REPAIR Left 2016     inguinal    LITHOTRIPSY  2015    Right wrist fracture Right 2019    Navicular fracture    TONSILLECTOMY, ADENOIDECTOMY         Family History   Problem Relation Name Age of Onset    Cancer Mother Victoria Reynaga         Terminal Renal Cancer    Hypertension Mother Victoria Reynaga     Kidney cancer Mother Victoria Reynaga     Dementia Father Darnell Reynaga     Hypertension Father Darnell Reynaga     Benign prostatic hyperplasia Neg Hx         Social History     Tobacco Use    Smoking status: Never    Smokeless tobacco: Never   Substance Use Topics    Alcohol use: Yes     Alcohol/week: 1.0 standard drink of alcohol     Types: 1 Cans of beer per week     Comment: 1-2 cans of beer 2-3 times per month    Drug use: No       Social History     Social History Narrative    Lives with long term partner who is also a [employer] Professor. 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 Walks and would like to start doing strength training. Nonsmoker. No children       ALLERGIES:   Review of patient's allergies indicates:   Allergen Reactions    Lisinopril Other (See Comments)     Cough    Uroxatral [alfuzosin] Other (See Comments)     orthostatic       MEDS:   Current Outpatient Medications on File Prior to Visit   Medication Sig Dispense Refill Last Dose/Taking    amLODIPine (NORVASC) 5 MG tablet Take 1 tablet (5 mg total) by mouth once daily. 90 tablet 3 Taking    chlorthalidone (HYGROTEN) 25 MG Tab Take 1 tablet (25 mg total) by mouth once daily. 90 tablet 3 Taking    ELIQUIS 5 mg Tab TAKE 1 TABLET BY MOUTH TWICE  DAILY 180 tablet 3 Taking    finasteride (PROSCAR) 5 mg tablet Take 1 tablet (5 mg total) by mouth once daily. 90 tablet 3 Taking    flecainide (TAMBOCOR) 150 MG Tab TAKE 2 TABLETS (300 mg total ) BY MOUTH AS NEEDED FOR AF. LIMIT ONE DOSE PER 24 HOURS. 30 tablet 3 Taking    potassium chloride SA (K-DUR,KLOR-CON) 20 MEQ tablet TAKE 1 TABLET BY MOUTH TWICE  DAILY 180 tablet 0 Taking    vitamin D 1000 units Tab Take 2,000  "Units by mouth once daily.    Taking    [DISCONTINUED] metoprolol succinate (TOPROL-XL) 200 MG 24 hr tablet TAKE 1 TABLET BY MOUTH ONCE  DAILY 90 tablet 0 Taking    [DISCONTINUED] simvastatin (ZOCOR) 20 MG tablet TAKE 1 TABLET BY MOUTH IN THE  EVENING 90 tablet 0 Taking    [DISCONTINUED] LORazepam (ATIVAN) 1 MG tablet Take 1 tablet (1 mg total) by mouth every evening. for 20 days (Patient taking differently: Take 1 mg by mouth daily as needed.) 20 tablet 0          Vital signs:   Vitals:    12/30/24 1352   BP: 121/62   Patient Position: Sitting   Pulse: 66   SpO2: 100%   Weight: 80.3 kg (177 lb 0.5 oz)   Height: 5' 11" (1.803 m)     Body mass index is 24.69 kg/m².    PHYSICAL EXAM:     Physical Exam  Vitals reviewed.   Constitutional:       General: He is not in acute distress.     Appearance: Normal appearance.   HENT:      Head: Normocephalic and atraumatic.      Right Ear: Tympanic membrane and ear canal normal.      Left Ear: Tympanic membrane and ear canal normal.      Nose: Nose normal.      Mouth/Throat:      Pharynx: Uvula midline.   Eyes:      General: No scleral icterus.     Conjunctiva/sclera: Conjunctivae normal.   Neck:      Thyroid: No thyromegaly.      Vascular: Normal carotid pulses. No carotid bruit or JVD.      Trachea: Trachea normal.   Cardiovascular:      Rate and Rhythm: Normal rate and regular rhythm.      Pulses: Normal pulses.      Heart sounds: Murmur heard.      Systolic murmur is present with a grade of 3/6.      No friction rub. No gallop.   Pulmonary:      Effort: Pulmonary effort is normal.      Breath sounds: Normal breath sounds. No decreased breath sounds, wheezing, rhonchi or rales.   Abdominal:      General: Bowel sounds are normal.      Palpations: Abdomen is soft. There is no hepatomegaly.      Tenderness: There is no abdominal tenderness. There is no guarding or rebound.      Hernia: No hernia is present.   Musculoskeletal:      Cervical back: Neck supple.      Lumbar back: " Negative right straight leg raise test and negative left straight leg raise test.      Right lower leg: No edema.      Left lower leg: No edema.   Lymphadenopathy:      Cervical: No cervical adenopathy.   Skin:     General: Skin is warm.      Capillary Refill: Capillary refill takes less than 2 seconds.      Nails: There is no clubbing.   Neurological:      Mental Status: He is alert and oriented to person, place, and time.             PERTINENT RESULTS:   No visits with results within 1 Week(s) from this visit.   Latest known visit with results is:   Lab Visit on 09/25/2024   Component Date Value Ref Range Status    Sodium 09/25/2024 141  136 - 145 mmol/L Final    Potassium 09/25/2024 3.5  3.5 - 5.1 mmol/L Final    Chloride 09/25/2024 108  95 - 110 mmol/L Final    CO2 09/25/2024 26  23 - 29 mmol/L Final    Glucose 09/25/2024 94  70 - 110 mg/dL Final    BUN 09/25/2024 11  8 - 23 mg/dL Final    Creatinine 09/25/2024 0.9  0.5 - 1.4 mg/dL Final    Calcium 09/25/2024 10.6 (H)  8.7 - 10.5 mg/dL Final    Total Protein 09/25/2024 7.0  6.0 - 8.4 g/dL Final    Albumin 09/25/2024 4.0  3.5 - 5.2 g/dL Final    Total Bilirubin 09/25/2024 0.6  0.1 - 1.0 mg/dL Final    Comment: For infants and newborns, interpretation of results should be based  on gestational age, weight and in agreement with clinical  observations.    Premature Infant recommended reference ranges:  Up to 24 hours.............<8.0 mg/dL  Up to 48 hours............<12.0 mg/dL  3-5 days..................<15.0 mg/dL  6-29 days.................<15.0 mg/dL      Alkaline Phosphatase 09/25/2024 45 (L)  55 - 135 U/L Final    AST 09/25/2024 24  10 - 40 U/L Final    ALT 09/25/2024 22  10 - 44 U/L Final    eGFR 09/25/2024 >60.0  >60 mL/min/1.73 m^2 Final    Anion Gap 09/25/2024 7 (L)  8 - 16 mmol/L Final    Ionized Calcium 09/25/2024 1.27  1.06 - 1.42 mmol/L Final    WBC 09/25/2024 7.49  3.90 - 12.70 K/uL Final    RBC 09/25/2024 5.59  4.60 - 6.20 M/uL Final    Hemoglobin  09/25/2024 15.1  14.0 - 18.0 g/dL Final    Hematocrit 09/25/2024 47.2  40.0 - 54.0 % Final    MCV 09/25/2024 84  82 - 98 fL Final    MCH 09/25/2024 27.0  27.0 - 31.0 pg Final    MCHC 09/25/2024 32.0  32.0 - 36.0 g/dL Final    RDW 09/25/2024 14.2  11.5 - 14.5 % Final    Platelets 09/25/2024 156  150 - 450 K/uL Final    MPV 09/25/2024 12.7  9.2 - 12.9 fL Final    Immature Granulocytes 09/25/2024 0.9 (H)  0.0 - 0.5 % Final    Gran # (ANC) 09/25/2024 4.8  1.8 - 7.7 K/uL Final    Immature Grans (Abs) 09/25/2024 0.07 (H)  0.00 - 0.04 K/uL Final    Comment: Mild elevation in immature granulocytes is non specific and   can be seen in a variety of conditions including stress response,   acute inflammation, trauma and pregnancy. Correlation with other   laboratory and clinical findings is essential.      Lymph # 09/25/2024 1.7  1.0 - 4.8 K/uL Final    Mono # 09/25/2024 0.6  0.3 - 1.0 K/uL Final    Eos # 09/25/2024 0.2  0.0 - 0.5 K/uL Final    Baso # 09/25/2024 0.09  0.00 - 0.20 K/uL Final    nRBC 09/25/2024 0  0 /100 WBC Final    Gran % 09/25/2024 64.5  38.0 - 73.0 % Final    Lymph % 09/25/2024 22.6  18.0 - 48.0 % Final    Mono % 09/25/2024 7.6  4.0 - 15.0 % Final    Eosinophil % 09/25/2024 3.2  0.0 - 8.0 % Final    Basophil % 09/25/2024 1.2  0.0 - 1.9 % Final    Differential Method 09/25/2024 Automated   Final     Reading Physician Reading Date Result Priority   Gonzalo Quintana MD  [phone] 11/3/2023 Routine     Narrative & Impression  EXAMINATION:  XR LUMBAR SPINE AP AND LAT WITH FLEX/EXT     CLINICAL HISTORY:  Other intervertebral disc degeneration, lumbar region     TECHNIQUE:  AP and lateral views as well as lateral flexion and extension images are performed through the lumbar spine.     COMPARISON:  November 5, 2015     FINDINGS:  Reconfirmed lumbar levocurvature.  No acute fractures.  Preserved vertebral body heights.  Some scattered tiny endplate osteophytes, most pronounced about the L2-L3 disc level.   Reconfirmed spondylolysis defects at L5-S1 and stable grade 1 anterolisthesis L5 on S1.  Flexion and extension views demonstrate no instability.  Intervally developed disc narrowing and some developing opposing endplate sclerosis L2-L3 level.  In the setting of reconfirmed severe disc narrowing at L5-S1 level, intervally developed vacuum phenomenon.  Other disc space levels preserved.  Facet arthropathic changes lower 3 levels.  Intact right and left SI joints and visualized hip joint spaces.  As before, there are felt to be bilateral renal calculi evident, at least a single 4 mm right midpole renal calculus and 4 mm left mid-lower pole renal calculus.     Impression:     1. No acute fractures.  2. Reconfirmed lumbar levocurvature and spondylolysis defects and grade 1 anterolisthesis L5 on S1 with flexion and extension views demonstrating no instability.  3. Interval progression in degenerative changes with respect to the lumbar spine as noted when compared to earlier exam.  4. Bilateral renal calculi.        Electronically signed by: Gonzalo Quintana  Date:                                            11/03/2023  Time:                                           08:38        Exam Ended: 11/02/23 13:36 CDT Last Resulted: 11/03/23 08:38 CDT          Reading Physician Reading Date Result Priority   Srinivas Hawley MD  [phone] 11/8/2019 Routine     Narrative & Impression  EXAMINATION:  CT CHEST WITHOUT CONTRAST     CLINICAL HISTORY:  abnormal chest xray; Abnormal findings on diagnostic imaging of other specified body structures     TECHNIQUE:  Low dose axial images, sagittal and coronal reformations were obtained from the thoracic inlet to the lung bases. Contrast was not administered.     COMPARISON:  03/27/2019     FINDINGS:  Limited evaluation of the base of the neck show no concerning masses or lymph nodes.  No axillary adenopathy.     The heart is normal in size without pericardial effusion.  Calcification of  the aortic annulus, possibly prior valve replacement.  No mediastinal or hilar adenopathy.     The lungs are clear without concerning masses, nodules, or consolidations.  No effusion or pneumothorax.  There is an azygos lobe with accessory azygos fissure and the azygos vein courses through this structure to join the superior portion of the SVC accounting for abnormality seen on chest radiograph.     Limited evaluation of the abdomen shows no acute or concerning findings.     Bones show no acute fractures or destructive lesions.  No concerning soft tissue masses.     Impression:     No acute or concerning findings in the chest.  There is an azygos fissure containing the azygos vein which drains into the SVC (anatomical variant) which accounts for the abnormality seen on radiograph.        Electronically signed by:Srinivas Hawley MD  Date:                                            11/08/2019  Time:                                           14:41        Exam Ended: 11/08/19 14:32 CST Last Resulted: 11/08/19 14:41 CST     Results for orders placed during the hospital encounter of 08/27/24    Echo    Interpretation Summary    Left Ventricle: The left ventricle is normal in size. Normal wall thickness. Normal wall motion. There is normal systolic function with a visually estimated ejection fraction of 60 - 65%. There is normal diastolic function.    Right Ventricle: Normal right ventricular cavity size. Wall thickness is normal. Systolic function is normal.    The left atrium is mildly dilated.    Aortic Valve: The aortic valve is probably bileaflet with fusion of the right and non-coronary cusp leaflets. There is moderate aortic valve sclerosis. Mildly restricted motion. There is no stenosis. Aortic valve peak velocity is 2.50 m/s. Mean gradient is 13 mmHg.  (LVOT diameter is near 27mm.) There is mild aortic regurgitation.    Tricuspid Valve: There is mild regurgitation.    Pulmonary Artery: The estimated pulmonary  artery systolic pressure is 29 mmHg.    IVC/SVC: Normal venous pressure at 3 mmHg.        ASSESSMENT/PLAN:    1. Chronic bilateral low back pain without sciatica  Overview:  -11/02/2023 x-ray lumbar spine: . Reconfirmed lumbar levocurvature and spondylolysis defects and grade 1 anterolisthesis L5 on S1 with flexion and extension views demonstrating no instability.. Interval progression in degenerative changes with respect to the lumbar spine as noted when compared to earlier exam.  - evaluated by orthopedics at the time   -completed physical therapy at the time which seemed to help   -I do recommend physical therapy    Orders:  -     Ambulatory Referral/Consult to Physical Therapy; Future; Expected date: 01/06/2025    2. Lumbar spondylolysis  Overview:  -11/02/2023 x-ray lumbar spine: . Reconfirmed lumbar levocurvature and spondylolysis defects and grade 1 anterolisthesis L5 on S1 with flexion and extension views demonstrating no instability.. Interval progression in degenerative changes with respect to the lumbar spine as noted when compared to earlier exam.      3. Pain in both lower extremities  Overview:  - 10/31/2023 x-ray hip, tib-fib and knee unremarkable   -11/02/2023 x-ray lumbar spine: . Reconfirmed lumbar levocurvature and spondylolysis defects and grade 1 anterolisthesis L5 on S1 with flexion and extension views demonstrating no instability.. Interval progression in degenerative changes with respect to the lumbar spine as noted when compared to earlier exam.  - evaluated by orthopedics at the time   -completed physical therapy at the time which seemed to help   -I do recommend physical therapy      4. Poor posture  -     Ambulatory Referral/Consult to Physical Therapy; Future; Expected date: 01/06/2025    5. Impaired memory  Overview:  - subjective and seems to be isolated to names, short-term events and dreams  - started after he retired in 2022 you may be related with decreased stimulation with his  daily activities   -discuss neuropsychology evaluation however he declined at this time and we will continue to monitor      6. Hypercalcemia  Overview:  Lab Results   Component Value Date    CALCIUM 10.6 (H) 09/25/2024    PHOS 2.7 08/23/2019     - chronic in his ionized calcium is normal   -continue to monitor with ionized calcium, PTH and vitamin-D level    Orders:  -     Vitamin D 25-Hydroxy; Future; Expected date: 12/30/2024  -     Calcium, Ionized; Future; Expected date: 12/30/2024  -     PTH, Intact; Future; Expected date: 12/30/2024    7. Hypertension, essential  Overview:  - with severe reactive hypertension  - home blood pressure cuff [employer]  - well controlled  - continue current management plan   - patient encouraged to notify me with any changes    Orders:  -     Comprehensive Metabolic Panel; Future; Expected date: 12/30/2024  -     CBC Auto Differential; Future; Expected date: 12/30/2024  -     metoprolol succinate (TOPROL-XL) 200 MG 24 hr tablet; Take 1 tablet (200 mg total) by mouth once daily.  Dispense: 90 tablet; Refill: 3  -     Cancel: Urinalysis, Reflex to Urine Culture Urine, Clean Catch  -     Urinalysis, Reflex to Urine Culture Urine, Clean Catch; Future; Expected date: 12/30/2024    8. Mixed hyperlipidemia  Overview:  Lab Results   Component Value Date    LDLCALC 74.4 11/17/2023     - cardio prevention  - well controlled  - continue current management plan   - patient encouraged to notify me with any changes    Orders:  -     Lipid Panel; Future; Expected date: 12/30/2024  -     Comprehensive Metabolic Panel; Future; Expected date: 12/30/2024  -     simvastatin (ZOCOR) 20 MG tablet; Take 1 tablet (20 mg total) by mouth every evening.  Dispense: 90 tablet; Refill: 3    9. Paroxysmal atrial fibrillation  Overview:  - diagnosed 12/13/1996  - well controlled  - followed by electrophysiology   - RRR today    Orders:  -     Comprehensive Metabolic Panel; Future; Expected date: 12/30/2024  -     CBC  Auto Differential; Future; Expected date: 12/30/2024    10. Mild aortic stenosis  Overview:  08/27/2024 echocardiogram: Aortic Valve: The aortic valve is probably bileaflet with fusion of the right and non-coronary cusp leaflets. There is moderate aortic valve sclerosis. Mildly restricted motion. There is no stenosis. Aortic valve peak velocity is 2.50 m/s. Mean gradient is 13 mmHg. (LVOT diameter is near 27mm.) There is mild aortic regurgitation.   -bi-leaflet with fusion of the right and non coronary cusp leaflets  - followed by cardiology  - stable and asymptomatic      11. Aortic calcification  Overview:  11/08/2019 CT chest: Calcification of the aortic annulus.    - No history of valve replacement.      12. Nonrheumatic aortic valve insufficiency  Overview:  - May 27, 2024 echocardiogram showed mild aortic regurgitation  -followed by cardiology      13. Chronic cough  Overview:  - 11/08/2019 CT chest unremarkable   -evaluated by ENT  - stable      14. Ageusia  Overview:  - evaluated the universal [place]   -likely secondary to COVID-19 infection      15. Anosmia  Overview:  - since 2020 dysosmias and phantosmias  - evaluated the [facility] I suspect it related to damage of the olfactory nor epithelium unlikely related with viral infection  - has not recovered        16. Elevated PSA  Overview:  - initial elevated PSA started in 2010  -biopsies negative  05/23/2024 PSA 2.5  - followed by urology he would like to avoid any invasive interventions        17. Benign prostatic hyperplasia with nocturia  Overview:  - no issues during the day however will have nocturia about 2-6 times at night   -currently on finasteride   -followed by urology      18. Nephrolithiasis  Overview:  - 2015 one 5 mm right ureteral stone removed via lithotripsy.  - Two 4 cm stable bilateral renal stone still present  - asymptomatic      19. 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 Greco  - father also had history of bout papillomas   -papilloma was removed and followed by Urology for several years  - recommend check UA      20. Long term current use of anticoagulant therapy  Overview:  - currently on Eliquis secondary to paroxysmal atrial fibrillation          ORDERS:   Orders Placed This Encounter    Lipid Panel    Hemoglobin A1C    Comprehensive Metabolic Panel    CBC Auto Differential    Vitamin D 25-Hydroxy    Calcium, Ionized    PTH, Intact    Urinalysis, Reflex to Urine Culture Urine, Clean Catch    Ambulatory Referral/Consult to Physical Therapy    metoprolol succinate (TOPROL-XL) 200 MG 24 hr tablet    simvastatin (ZOCOR) 20 MG tablet       Vaccines recommended:  COVID-19 update    Follow up in about 2 months (around 2/28/2025). or sooner with any concerns        This note is dictated using the M*Modal Fluency Direct word recognition program. There are word recognition mistakes that are occasionally missed on review.    Dr. Kamla Hoff D.O.   Family Medicine

## 2024-12-30 ENCOUNTER — OFFICE VISIT (OUTPATIENT)
Dept: PRIMARY CARE CLINIC | Facility: CLINIC | Age: 74
End: 2024-12-30
Attending: FAMILY MEDICINE
Payer: MEDICARE

## 2024-12-30 VITALS
HEART RATE: 66 BPM | SYSTOLIC BLOOD PRESSURE: 121 MMHG | HEIGHT: 71 IN | OXYGEN SATURATION: 100 % | WEIGHT: 177 LBS | BODY MASS INDEX: 24.78 KG/M2 | DIASTOLIC BLOOD PRESSURE: 62 MMHG

## 2024-12-30 DIAGNOSIS — I35.0 MILD AORTIC STENOSIS: ICD-10-CM

## 2024-12-30 DIAGNOSIS — M54.50 CHRONIC BILATERAL LOW BACK PAIN WITHOUT SCIATICA: Primary | ICD-10-CM

## 2024-12-30 DIAGNOSIS — R79.9 ABNORMAL BLOOD CHEMISTRY: ICD-10-CM

## 2024-12-30 DIAGNOSIS — N20.0 NEPHROLITHIASIS: ICD-10-CM

## 2024-12-30 DIAGNOSIS — R43.0 ANOSMIA: ICD-10-CM

## 2024-12-30 DIAGNOSIS — M79.605 PAIN IN BOTH LOWER EXTREMITIES: ICD-10-CM

## 2024-12-30 DIAGNOSIS — I48.0 PAROXYSMAL ATRIAL FIBRILLATION: ICD-10-CM

## 2024-12-30 DIAGNOSIS — I10 HYPERTENSION, ESSENTIAL: ICD-10-CM

## 2024-12-30 DIAGNOSIS — G89.29 CHRONIC BILATERAL LOW BACK PAIN WITHOUT SCIATICA: Primary | ICD-10-CM

## 2024-12-30 DIAGNOSIS — I70.0 AORTIC CALCIFICATION: ICD-10-CM

## 2024-12-30 DIAGNOSIS — M43.06 LUMBAR SPONDYLOLYSIS: ICD-10-CM

## 2024-12-30 DIAGNOSIS — R05.3 CHRONIC COUGH: ICD-10-CM

## 2024-12-30 DIAGNOSIS — R43.2 AGEUSIA: ICD-10-CM

## 2024-12-30 DIAGNOSIS — M79.604 PAIN IN BOTH LOWER EXTREMITIES: ICD-10-CM

## 2024-12-30 DIAGNOSIS — R35.1 BENIGN PROSTATIC HYPERPLASIA WITH NOCTURIA: ICD-10-CM

## 2024-12-30 DIAGNOSIS — R29.3 POOR POSTURE: ICD-10-CM

## 2024-12-30 DIAGNOSIS — E78.2 MIXED HYPERLIPIDEMIA: ICD-10-CM

## 2024-12-30 DIAGNOSIS — Z79.01 LONG TERM CURRENT USE OF ANTICOAGULANT THERAPY: ICD-10-CM

## 2024-12-30 DIAGNOSIS — I35.1 NONRHEUMATIC AORTIC VALVE INSUFFICIENCY: ICD-10-CM

## 2024-12-30 DIAGNOSIS — E83.52 HYPERCALCEMIA: ICD-10-CM

## 2024-12-30 DIAGNOSIS — R41.3 IMPAIRED MEMORY: ICD-10-CM

## 2024-12-30 DIAGNOSIS — N40.1 BENIGN PROSTATIC HYPERPLASIA WITH NOCTURIA: ICD-10-CM

## 2024-12-30 DIAGNOSIS — Z87.448: ICD-10-CM

## 2024-12-30 DIAGNOSIS — R97.20 ELEVATED PSA: ICD-10-CM

## 2024-12-30 PROBLEM — K40.90 RIGHT INGUINAL HERNIA: Status: ACTIVE | Noted: 2024-12-30

## 2024-12-30 PROCEDURE — 99214 OFFICE O/P EST MOD 30 MIN: CPT | Mod: PBBFAC,PN | Performed by: FAMILY MEDICINE

## 2024-12-30 PROCEDURE — G2211 COMPLEX E/M VISIT ADD ON: HCPCS | Mod: S$PBB,,, | Performed by: FAMILY MEDICINE

## 2024-12-30 PROCEDURE — 99999 PR PBB SHADOW E&M-EST. PATIENT-LVL IV: CPT | Mod: PBBFAC,,, | Performed by: FAMILY MEDICINE

## 2024-12-30 PROCEDURE — 99214 OFFICE O/P EST MOD 30 MIN: CPT | Mod: S$PBB,,, | Performed by: FAMILY MEDICINE

## 2024-12-30 RX ORDER — METOPROLOL SUCCINATE 200 MG/1
200 TABLET, EXTENDED RELEASE ORAL DAILY
Qty: 90 TABLET | Refills: 3 | Status: SHIPPED | OUTPATIENT
Start: 2024-12-30 | End: 2025-12-30

## 2024-12-30 RX ORDER — SIMVASTATIN 20 MG/1
20 TABLET, FILM COATED ORAL NIGHTLY
Qty: 90 TABLET | Refills: 3 | Status: SHIPPED | OUTPATIENT
Start: 2024-12-30 | End: 2025-12-30

## 2025-01-07 ENCOUNTER — CLINICAL SUPPORT (OUTPATIENT)
Dept: REHABILITATION | Facility: OTHER | Age: 75
End: 2025-01-07
Attending: FAMILY MEDICINE
Payer: MEDICARE

## 2025-01-07 DIAGNOSIS — M54.50 CHRONIC BILATERAL LOW BACK PAIN WITHOUT SCIATICA: ICD-10-CM

## 2025-01-07 DIAGNOSIS — G89.29 CHRONIC BILATERAL LOW BACK PAIN WITHOUT SCIATICA: ICD-10-CM

## 2025-01-07 DIAGNOSIS — R29.3 POOR POSTURE: ICD-10-CM

## 2025-01-07 DIAGNOSIS — R29.898 WEAKNESS OF BOTH LOWER EXTREMITIES: Primary | ICD-10-CM

## 2025-01-07 PROCEDURE — 97110 THERAPEUTIC EXERCISES: CPT | Mod: PN

## 2025-01-07 PROCEDURE — 97161 PT EVAL LOW COMPLEX 20 MIN: CPT | Mod: PN

## 2025-01-07 NOTE — PROGRESS NOTES
[facility]  Physical Therapy Initial Evaluation    Name: Anthony Reynaga  Clinic Number: [id number]    Therapy Diagnosis:   Encounter Diagnoses   Name Primary?    Poor posture     Chronic bilateral low back pain without sciatica     Weakness of both lower extremities Yes     Physician: Kamla Hoff DO    Physician Orders: Physical Therapy Evaluate and Treat  Medical Diagnosis from Referral: poor posture; chronic low back pain  Evaluation Date: 1/7/25  Authorization Period Expiration: 12/30/25  Plan of Care Expiration: 1/7/2025 to 4/7/25  Visit # / Visits authorized: 1/1 (pending additional authorization following initial evaluation)   FOTO: 0/3  on 1/7/25      Time In: 330p  Time Out: 400p  Total Billable Time: 30 minutes    Precautions: standard    Subjective     History of current condition - Anthony reports: 4-6 weeks ago right anterior thigh started to hurt. Pain started of insidious onset. Pain eventually went away. Pain migrated around right > left lower extremity over the course of 2 weeks.    Patient has noticed that squatting down alleviates right anterior thigh discomfort.    Standing up from sitting/lying down increases low back pain.     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    Medications:   Anthony has a current medication list which includes the following prescription(s): amlodipine, chlorthalidone, eliquis, finasteride, flecainide, metoprolol succinate, potassium chloride sa, simvastatin, and vitamin d.    Allergies:   Review of patient's allergies indicates:   Allergen Reactions    Lisinopril Other (See Comments)     Cough    Uroxatral [alfuzosin] Other (See Comments)     orthostatic        Imaging: FINDINGS:  Reconfirmed lumbar levocurvature.  No acute fractures.  Preserved vertebral body heights.  Some scattered tiny endplate osteophytes, most pronounced about the L2-L3 disc level.  Reconfirmed spondylolysis defects at L5-S1 and stable grade 1 anterolisthesis L5 on S1.  Flexion and extension views demonstrate no instability.  Intervally developed disc narrowing and some developing opposing endplate sclerosis L2-L3 level.  In the setting of reconfirmed severe disc narrowing at L5-S1 level, intervally developed vacuum phenomenon.  Other disc space levels preserved.  Facet arthropathic changes lower 3 levels.  Intact right and left SI joints and visualized hip joint spaces.  As before, there are felt to be bilateral renal calculi evident, at least a single 4 mm right midpole renal calculus and 4 mm left mid-lower pole renal calculus.     Impression:     1. No acute fractures.  2. Reconfirmed lumbar levocurvature and spondylolysis defects and grade 1 anterolisthesis L5 on S1 with flexion and extension views demonstrating no instability.  3. Interval progression in degenerative changes with respect to the lumbar spine as noted when compared to earlier exam.  4. Bilateral renal calculi.    Prior Therapy: yes it helped  Social History: 73 yo male  Occupation: retired  Prior Level of Function: occasional low back pain but no functional limitations  Current Level of Function: limited with recreational activity and household chores    Pain:  Current 2/10,  worst 3/10, best 0/10   Location: right anterior thigh  Description: Aching  Aggravating Factors: prolonged walking, lifting/bending  Easing Factors: rest    Pts goals: Pt would like to return to recreational activity / walking for exercise with no increase in low back pain and lower extremity pain.    Objective     WNL=within normal limits  WFL=within functional limits  NT=not tested  *=pain    Posture: Slight trunk flexion in standing  Palpation: tenderness to palpation at bilateral L4-5 multifidi/paraspinals  Sensation: intact  Deep tendon reflexes: NT    Lumbar Active range of motion (%) Pain/dysfunction with movement:   Flexion Min limited Increased right low back pain    Extension Moderately limited  Right low back pain    Right side bending Not limited    Left side bending Severely limited Right LBP   Right rotation Not limited    Left rotation Not limited          Right LE   Left LE      Iliopsoas:  L2 3+/5 Iliopsoas: L2 4+/5    Quadriceps:  L3 - femoral nerve 4/5 Quadriceps: L3 - femoral nerve 4+/5    Hip adduction:  L3 - obterator 4/5 Hip adduction: L3 - obterator 4/5    Hamstrings:  S2 4/5 Hamstrings: S2 4/5    Ankle DF/EV:  L4 4/5 Ankle DF/EV: L4 5/5    GT Ext:  L5 4/5 GT Ext L5 5/5    Hip Abduction:  L5-S1 - Superior gluteal nerve 3+/5  Hip Abduction: L5-S1 - Superior gluteal nerve 4/5    Hip extension:  L5-S2 - Inferior gluteal nerve 3+/5 Hip extension: L5-S2 - Inferior gluteal nerve 4/5    Ankle PF:   S1 - tibial nerve NT Ankle PF S1 - tibial nerve NT         Slump R: + for right hamstring pain  Slump L: + for left hamstring     SLR R: + for posterior upper leg pain  SLR L: + for posterior upper leg pain and low back pain     + sural nerve tension sign bilaterally    + femoral nerve tension bilaterally      Joint mobility:   Thoracic: hypomobility with PA segmental mobility assessment.  Lumbar: hypomobility with PA segmental mobility assessment.         Gait analysis: slight trunk flexion moment in  standing/walking         CMS Impairment/Limitation/Restriction for FOTO Survey    Therapist reviewed FOTO scores for Anthony Reynaga on 1/7/2025.   FOTO documents entered into [employer] - see Media section.    Limitation Score: 47%  Predicted Goal: 25%    Category: Mobility     TREATMENT     Treatment Time In: 350p  Treatment Time Out: 400p  Total Treatment time separate from Evaluation: 10 minutes    Patient was educated on home exercise program including:    Prone multifidi retraining            Home Exercises and Patient Education Provided:    Education provided:   - Findings; prognosis and plan of care (POC)  - Home exercise program (HEP)  - Modality options  - Therapist contact information    Written Home Exercises Provided: Yes  Exercises were reviewed and Anthony was able to demonstrate them prior to the end of the session.  Anthony demonstrated good understanding of the education provided.       Assessment     Anthony is a 74 y.o. male referred to outpatient Physical Therapy with a medical diagnosis of chronic low back pain without sciatica. Pt presents to PT with pain, decreased lumbar spine ROM, decreased strength and flexibility, poor posture, and functional deficits with prolonged walking. These deficits are negatively impacting this patient's ability to complete their work duties and activities of daily living.     Pt prognosis is Good.   Pt will benefit from skilled outpatient Physical Therapy to address the deficits stated above and in the chart below, provide pt/family education, and to maximize pt's level of independence.     Plan of care discussed with patient: Yes  Pt's spiritual, cultural and educational needs considered and pt agreeable to plan of care and goals as stated below:     Anticipated Barriers for therapy: None      Medical Necessity is demonstrated by the following  History  Co-morbidities and personal factors that may impact the plan of care Co-morbidities:   advanced age    Personal Factors:   no  deficits     low   Examination  Body Structures and Functions, activity limitations and participation restrictions that may impact the plan of care Body Regions:   back  lower extremities    Body Systems:    ROM  strength  motor control    Participation Restrictions:   Walking    Activity limitations:   Learning and applying knowledge  no deficits    General Tasks and Commands  No Deficits    Communication  No Deficits    Mobility  lifting and carrying objects  walking    Self care  no deficits    Domestic Life  No Deficits    Interactions/Relationships  No Deficits    Life Areas  No Deficits    Community and Social Life  No Deficits         low   Clinical Presentation stable and uncomplicated low   Decision Making/ Complexity Score: low     GOALS:  Short Term Goals (4 Weeks):  1. Patient will be compliant with home exercise program to supplement therapy in promoting functional mobility. (progressing, not met)    2. Patient will perform bending/lifting with good control to demonstrate improved core strength. (progressing, not met)    3. Patient will report no pain during thoracolumbar active range of motion to promote functional mobility.  (progressing, not met)    4. Patient will improve impaired lower extremity manual muscle tests  to >/=4/5 to improve strength for functional tasks. (progressing, not met)        Long Term Goals (6 Weeks):   1. Patient will improve FOTO score to </= 25% limited to decrease perceived limitation with maintaining/changing body position. (progressing, not met)    2. Patient will perform core strengthening and mobility routing with good control to demonstrate improved core strength.  (progressing, not met)    3. Patient will improve impaired lower extremity manual muscle tests to >/=4+/5 to improve strength for functional tasks.  (progressing, not met)    4. Patient will tolerate standing for 60 minutes with no increase in low back pain to return to PLOF.  (progressing, not met)           Plan     Plan of care Certification: 1/7/2025 to 4/7/25    Outpatient Physical Therapy 2 times weekly for 12 weeks to include the following interventions: Therapeutic Exercises, Manual Therapeutic Technique, Neuromuscular Re Education, Therapeutic Activities. Modalities, Kinesiotape prn, and Functional Dry Needling as needed.    Pramod Fulton, PT,  DPT, OCS

## 2025-01-08 PROBLEM — R29.898 LEG WEAKNESS: Status: ACTIVE | Noted: 2025-01-08

## 2025-01-10 ENCOUNTER — CLINICAL SUPPORT (OUTPATIENT)
Dept: REHABILITATION | Facility: OTHER | Age: 75
End: 2025-01-10
Payer: MEDICARE

## 2025-01-10 DIAGNOSIS — R29.898 WEAKNESS OF BOTH LOWER EXTREMITIES: Primary | ICD-10-CM

## 2025-01-10 PROCEDURE — 97112 NEUROMUSCULAR REEDUCATION: CPT | Mod: PN

## 2025-01-10 PROCEDURE — 97530 THERAPEUTIC ACTIVITIES: CPT | Mod: PN

## 2025-01-10 NOTE — PROGRESS NOTES
"      [facility]   Physical Therapy Treatment Note     Name: Anthony Reynaga  Clinic Number: [id number]    Therapy Diagnosis:   No diagnosis found.      Physician: Kamla Hoff DO    Visit Date: 1/10/2025    Physician: DALJIT Hook NP     Physician Orders: Physical Therapy Evaluate and Treat  Medical Diagnosis from Referral: poor posture; chronic low back pain  Evaluation Date: 1/7/25  Authorization Period Expiration: 12/30/25  Plan of Care Expiration: 1/7/2025 to 4/7/25  Visit # / Visits authorized: 1/20  FOTO: 0/3  on 1/7/25     Time In: 1250pm  Time Out: 155 pm  Total Billable Time: 55 minutes     Precautions: Standard      SUBJECTIVE     Pt reports: he had some difficulty with home exercise program. He felt like he fatigued really early in the program.    He was compliant with home exercise program.  Response to previous treatment: felt fine well, no issues  Functional change: improved tolerance to standing    Pain:  Current 2/10, worst 3/10, best 0/10   Location: right anterior thigh  Description: Aching  Aggravating Factors: prolonged walking, lifting/bending  Easing Factors: rest     Pts goals: Pt would like to return to recreational activity / walking for exercise with no increase in low back pain and lower extremity pain.    OBJECTIVE     Objective Measures updated at progress report unless specified.       TREATMENT   Charges based on 1:1 tx:     Anthony received the treatments listed below:        Neuromuscular re education for 10 minutes for improved balance and proprioception including:     Recumbent bike L4 x 8 minutes  TM ambulation 1.5 mph at 3% incline x5mins  Seated silver SB rollouts, 5x10" forward/bilateral sides  Supine LTR with green tball x3 minutes  PPT 3" hold x 20  Seated PPT on silver SB, 20x  +Seated marching w/ TrA activation on silver SB  x 20  Supine TrA contraction with 5 second hold 30x   Supine TrA contraction with marches 30x  DKTC with green tball " "x20  Supine TrA contraction with SLR 3x10 repetitions each   SL hip abduction 2x10  Sciatic nn glide on green ball 30x R/left  Seated sciatic nerve glide x20 right/left   Supine hamstring stretch with strap (3 way) 3x30"      Patient received therapeutic activities for 45 minutes for improved tolerance to functional activities including:    KB marches vs 15# x2 laps on turf  KB side stepping vs 15# x2 laps on turf  KB RDL to 12" step vs 15# 2x10- held today 2/2 increased symptoms  Rows vs black tube 30x5" hold on foam  TRX squat/row 3x10  TRX Bridge Row 2x10  Standing hip abd + abdominal bracing with red ball on mat, 5" hold x20 R/left  Step up 6" vs 10# KB 2x10  Goblet squat with 10# KB tap on 6" step 2x10  Dead lift 10# 3x10  Shuttle squat vs 75# 3x10  SL shuttle squat vs 50# 2x10  Shuttle kick back 25# 2x10  DL heel rise on wedge x20  Standing gastroc stretch 3x30"  Standing TrA pulldown vs black tube 30x5" hold on foam   Paloff press vs GTB 3x10 repetitions on foam  Side 1/2 plank 3x10"        PATIENT EDUCATION AND HOME EXERCISES     Home Exercises Provided and Patient Education Provided   - Findings; prognosis and plan of care (POC)  - Home exercise program (HEP)  - Modality options  - Therapist contact information     Written Home Exercises Provided: Yes  Exercises were reviewed and Anthony was able to demonstrate them prior to the end of the session.  Anthony demonstrated good understanding of the education provided.   Education provided:   PT educated pt on importance of compliance with their HEP this visit.     Written Home Exercises Provided: Patient instructed to cont prior HEP. Exercises were reviewed and Anthony was able to demonstrate them prior to the end of the session.  Anthony demonstrated good  understanding of the education provided. See EMR under Patient Instructions for exercises provided during therapy sessions    ASSESSMENT     Anthony tolerated  exercise well. We progressed functional strengthening and " core stabilization. He continues to have moderate tightness in HS and core weakness continues to be an obstacle at this time. Pt tolerated therapeutic interventions well with no complaint of increased pain. Patient reported relief after physical therapy treatment today.      Anthony is progressing well towards his goals.   Pt prognosis is Good.     Pt will continue to benefit from skilled outpatient physical therapy to address the deficits listed in the problem list box on initial evaluation, provide pt/family education and to maximize pt's level of independence in the home and community environment.     Pt's spiritual, cultural and educational needs considered and pt agreeable to plan of care and goals.     Anticipated barriers to physical therapy: None    GOALS:  Short Term Goals (4 Weeks):  1. Patient will be compliant with home exercise program to supplement therapy in promoting functional mobility. (progressing, not met)    2. Patient will perform bending/lifting with good control to demonstrate improved core strength. (progressing, not met)    3. Patient will report no pain during thoracolumbar active range of motion to promote functional mobility.  (progressing, not met)    4. Patient will improve impaired lower extremity manual muscle tests  to >/=4/5 to improve strength for functional tasks. (progressing, not met)          Long Term Goals (6 Weeks):   1. Patient will improve FOTO score to </= 25% limited to decrease perceived limitation with maintaining/changing body position. (progressing, not met)    2. Patient will perform core strengthening and mobility routing with good control to demonstrate improved core strength.  (progressing, not met)    3. Patient will improve impaired lower extremity manual muscle tests to >/=4+/5 to improve strength for functional tasks.  (progressing, not met)    4. Patient will tolerate standing for 60 minutes with no increase in low back pain to return to PLOF.  (progressing,  not met)            PLAN   Plan of care Certification: 1/7/2025 to 4/7/25     Outpatient Physical Therapy 2 times weekly for 12 weeks to include the following interventions: Therapeutic Exercises, Manual Therapeutic Technique, Neuromuscular Re Education, Therapeutic Activities. Modalities, Kinesiotape prn, and Functional Dry Needling as needed.      Pramod Fulton, PT

## 2025-01-13 ENCOUNTER — PATIENT MESSAGE (OUTPATIENT)
Dept: CARDIOLOGY | Facility: CLINIC | Age: 75
End: 2025-01-13
Payer: MEDICARE

## 2025-01-14 ENCOUNTER — CLINICAL SUPPORT (OUTPATIENT)
Dept: REHABILITATION | Facility: OTHER | Age: 75
End: 2025-01-14
Payer: MEDICARE

## 2025-01-14 DIAGNOSIS — R29.898 WEAKNESS OF BOTH LOWER EXTREMITIES: Primary | ICD-10-CM

## 2025-01-14 PROCEDURE — 97112 NEUROMUSCULAR REEDUCATION: CPT | Mod: PN

## 2025-01-14 PROCEDURE — 97530 THERAPEUTIC ACTIVITIES: CPT | Mod: PN

## 2025-01-14 NOTE — PROGRESS NOTES
"        [facility]   Physical Therapy Treatment Note     Name: Anthony Reynaga  Clinic Number: [id number]    Therapy Diagnosis:   Encounter Diagnosis   Name Primary?    Weakness of both lower extremities Yes         Physician: Kamla Hoff DO    Visit Date: 1/14/2025    Physician: DALJIT Hook NP     Physician Orders: Physical Therapy Evaluate and Treat  Medical Diagnosis from Referral: poor posture; chronic low back pain  Evaluation Date: 1/7/25  Authorization Period Expiration: 12/30/25  Plan of Care Expiration: 1/7/2025 to 4/7/25  Visit # / Visits authorized: 1/20  FOTO: 0/3  on 1/7/25     Time In: 300pm  Time Out: 400 pm  Total Billable Time: 55 minutes     Precautions: Standard      SUBJECTIVE     Pt reports: he felt fantastic after last treatment session, and he has been regular with home exercise program.    He was compliant with home exercise program.  Response to previous treatment: felt fine well, no issues  Functional change: improved tolerance to standing    Pain:  Current 2/10, worst 3/10, best 0/10   Location: right anterior thigh  Description: Aching  Aggravating Factors: prolonged walking, lifting/bending  Easing Factors: rest     Pts goals: Pt would like to return to recreational activity / walking for exercise with no increase in low back pain and lower extremity pain.    OBJECTIVE     Objective Measures updated at progress report unless specified.       TREATMENT   Charges based on 1:1 tx:     Anthony received the treatments listed below:        Neuromuscular re education for 10 minutes for improved balance and proprioception including:     Recumbent bike L4 x 8 minutes  TM ambulation 1.5 mph at 3% incline x8mins  Seated silver SB rollouts, 5x10" forward/bilateral sides  Supine LTR with green tball x3 minutes  PPT 3" hold x 20  Seated PPT on silver SB, 20x  +Seated marching w/ TrA activation on silver SB  x 20  Supine TrA contraction with 5 second hold 30x   Supine " "TrA contraction with marches 30x  DKTC with green tball x20  Supine TrA contraction with SLR 3x10 repetitions each   SL hip abduction 2x10  Sciatic nn glide on green ball 30x R/left  Seated sciatic nerve glide x20 right/left   Supine hamstring stretch with strap (3 way) 3x30"      Patient received therapeutic activities for 45 minutes for improved tolerance to functional activities including:    KB marches vs 15# x2 laps on turf  KB side stepping vs 15# x2 laps on turf  KB RDL to 12" step vs 15# 2x10- held today 2/2 increased symptoms  Rows vs black tube 30x5" hold on foam  TRX squat/row 3x10  TRX Bridge Row 2x10  Standing hip abd + abdominal bracing with red ball on mat, 5" hold x20 R/left  Step up 6" vs 10# KB 2x10  Step down with slow eccentric toe taps 2x10  Goblet squat with 10# KB tap on 24" step 2x10  Dead lift 10# 2x10  Shuttle squat vs 75# 3x10  SL shuttle squat vs 50# 2x10  Shuttle kick back 25# 2x10  DL heel rise on wedge x20  Standing gastroc stretch 3x30"  Standing TrA pulldown vs black tube 30x5" hold on foam   Paloff press vs 10# cable 2x10 in split stance  Side 1/2 plank 3x10"        PATIENT EDUCATION AND HOME EXERCISES     Home Exercises Provided and Patient Education Provided   - Findings; prognosis and plan of care (POC)  - Home exercise program (HEP)  - Modality options  - Therapist contact information     Written Home Exercises Provided: Yes  Exercises were reviewed and Anthony was able to demonstrate them prior to the end of the session.  Anthony demonstrated good understanding of the education provided.   Education provided:   PT educated pt on importance of compliance with their HEP this visit.     Written Home Exercises Provided: Patient instructed to cont prior HEP. Exercises were reviewed and Anthony was able to demonstrate them prior to the end of the session.  Anthony demonstrated good  understanding of the education provided. See EMR under Patient Instructions for exercises provided during therapy " sessions    ASSESSMENT     Anthony tolerated exercise well. We continue to progress functional strengthening and core stabilization. He continues to have moderate tightness in HS and but lower extremity weakness appears to be improving. Pt tolerated therapeutic interventions well with no complaint of increased pain. Patient reported relief after physical therapy treatment today.      Anthony is progressing well towards his goals.   Pt prognosis is Good.     Pt will continue to benefit from skilled outpatient physical therapy to address the deficits listed in the problem list box on initial evaluation, provide pt/family education and to maximize pt's level of independence in the home and community environment.     Pt's spiritual, cultural and educational needs considered and pt agreeable to plan of care and goals.     Anticipated barriers to physical therapy: None    GOALS:  Short Term Goals (4 Weeks):  1. Patient will be compliant with home exercise program to supplement therapy in promoting functional mobility. (progressing, not met)    2. Patient will perform bending/lifting with good control to demonstrate improved core strength. (progressing, not met)    3. Patient will report no pain during thoracolumbar active range of motion to promote functional mobility.  (progressing, not met)    4. Patient will improve impaired lower extremity manual muscle tests  to >/=4/5 to improve strength for functional tasks. (progressing, not met)          Long Term Goals (6 Weeks):   1. Patient will improve FOTO score to </= 25% limited to decrease perceived limitation with maintaining/changing body position. (progressing, not met)    2. Patient will perform core strengthening and mobility routing with good control to demonstrate improved core strength.  (progressing, not met)    3. Patient will improve impaired lower extremity manual muscle tests to >/=4+/5 to improve strength for functional tasks.  (progressing, not met)    4. Patient  will tolerate standing for 60 minutes with no increase in low back pain to return to PLOF.  (progressing, not met)            PLAN   Plan of care Certification: 1/7/2025 to 4/7/25     Outpatient Physical Therapy 2 times weekly for 12 weeks to include the following interventions: Therapeutic Exercises, Manual Therapeutic Technique, Neuromuscular Re Education, Therapeutic Activities. Modalities, Kinesiotape prn, and Functional Dry Needling as needed.      Pramod Fulton, PT

## 2025-01-17 ENCOUNTER — CLINICAL SUPPORT (OUTPATIENT)
Dept: REHABILITATION | Facility: OTHER | Age: 75
End: 2025-01-17
Payer: MEDICARE

## 2025-01-17 DIAGNOSIS — R29.898 WEAKNESS OF BOTH LOWER EXTREMITIES: Primary | ICD-10-CM

## 2025-01-17 PROCEDURE — 97530 THERAPEUTIC ACTIVITIES: CPT | Mod: PN

## 2025-01-17 PROCEDURE — 97112 NEUROMUSCULAR REEDUCATION: CPT | Mod: PN

## 2025-01-17 NOTE — PROGRESS NOTES
"    [facility]   Physical Therapy Treatment Note     Name: Anthony Reynaga  Clinic Number: [id number]    Therapy Diagnosis:   Encounter Diagnosis   Name Primary?    Weakness of both lower extremities Yes         Physician: Kamla Hoff DO    Visit Date: 1/17/2025    Physician: DALJIT Hook NP     Physician Orders: Physical Therapy Evaluate and Treat  Medical Diagnosis from Referral: poor posture; chronic low back pain  Evaluation Date: 1/7/25  Authorization Period Expiration: 12/30/25  Plan of Care Expiration: 1/7/2025 to 4/7/25  Visit # / Visits authorized: 3/20  FOTO: 0/3  on 1/7/25     Time In: 335pm  Time Out: 430 pm  Total Billable Time: 55 minutes     Precautions: Standard      SUBJECTIVE     Pt reports: he continues to get stronger.    He was compliant with home exercise program.  Response to previous treatment: felt fine well, no issues  Functional change: improved tolerance to standing    Pain:  Current 2/10, worst 3/10, best 0/10   Location: right anterior thigh  Description: Aching  Aggravating Factors: prolonged walking, lifting/bending  Easing Factors: rest     Pts goals: Pt would like to return to recreational activity / walking for exercise with no increase in low back pain and lower extremity pain.    OBJECTIVE     Objective Measures updated at progress report unless specified.       TREATMENT   Charges based on 1:1 tx:     Anthony received the treatments listed below:        Neuromuscular re education for 10 minutes for improved balance and proprioception including:     Recumbent bike L4 x 8 minutes  TM ambulation 1.5 mph at 3% incline x8mins  Seated silver SB rollouts, 5x10" forward/bilateral sides  Supine LTR with green tball x3 minutes  PPT 3" hold x 20  Seated PPT on silver SB, 20x  +Seated marching w/ TrA activation on silver SB  x 20  Supine TrA contraction with 5 second hold 30x   Supine TrA contraction with marches 30x  DKTC with green tball x20  Supine TrA " "contraction with SLR 3x10 repetitions each   SL hip abduction 2x10  Sciatic nn glide on green ball 30x R/left  Seated sciatic nerve glide x20 right/left   Supine hamstring stretch with strap (3 way) 3x30"      Patient received therapeutic activities for 45 minutes for improved tolerance to functional activities including:    KB marches vs 15# x2 laps on turf  KB side stepping vs 15# x2 laps on turf  KB RDL to 12" step vs 15# 2x10- held today 2/2 increased symptoms  Rows vs black tube 30x5" hold on foam  TRX squat/row 3x10  TRX Bridge Row 2x10  Standing hip abd + abdominal bracing with red ball on mat, 5" hold x20 R/left  Step up 6" vs 10# KB 2x10  Step down with slow eccentric toe taps 2x10  Goblet squat with 10# KB tap on 24" step 2x10  Dead lift 10# 2x10  Shuttle squat vs 75# 3x10  SL shuttle squat vs 50# 2x10  Shuttle kick back 25# 2x10  DL heel rise on wedge x20  Standing gastroc stretch 3x30"  Standing TrA pulldown vs black tube 30x5" hold on foam   Paloff press vs 10# cable 2x10 in split stance  Side 1/2 plank 3x10"        PATIENT EDUCATION AND HOME EXERCISES     Home Exercises Provided and Patient Education Provided   - Findings; prognosis and plan of care (POC)  - Home exercise program (HEP)  - Modality options  - Therapist contact information     Written Home Exercises Provided: Yes  Exercises were reviewed and Anthony was able to demonstrate them prior to the end of the session.  Anthony demonstrated good understanding of the education provided.   Education provided:   PT educated pt on importance of compliance with their HEP this visit.     Written Home Exercises Provided: Patient instructed to cont prior HEP. Exercises were reviewed and Anthony was able to demonstrate them prior to the end of the session.  Anthony demonstrated good  understanding of the education provided. See EMR under Patient Instructions for exercises provided during therapy sessions    ASSESSMENT     Anthony performed therapeutic exercise more " efficiently today requiring less rest   Breaks. He continues to have left > right lower extremity weakness and difficulty performing high level activity without lower extremity fatigue/weakness. We will continue to progress as tolerated.    Anthony is progressing well towards his goals.   Pt prognosis is Good.     Pt will continue to benefit from skilled outpatient physical therapy to address the deficits listed in the problem list box on initial evaluation, provide pt/family education and to maximize pt's level of independence in the home and community environment.     Pt's spiritual, cultural and educational needs considered and pt agreeable to plan of care and goals.     Anticipated barriers to physical therapy: None    GOALS:  Short Term Goals (4 Weeks):  1. Patient will be compliant with home exercise program to supplement therapy in promoting functional mobility. (progressing, not met)    2. Patient will perform bending/lifting with good control to demonstrate improved core strength. (progressing, not met)    3. Patient will report no pain during thoracolumbar active range of motion to promote functional mobility.  (progressing, not met)    4. Patient will improve impaired lower extremity manual muscle tests  to >/=4/5 to improve strength for functional tasks. (progressing, not met)          Long Term Goals (6 Weeks):   1. Patient will improve FOTO score to </= 25% limited to decrease perceived limitation with maintaining/changing body position. (progressing, not met)    2. Patient will perform core strengthening and mobility routing with good control to demonstrate improved core strength.  (progressing, not met)    3. Patient will improve impaired lower extremity manual muscle tests to >/=4+/5 to improve strength for functional tasks.  (progressing, not met)    4. Patient will tolerate standing for 60 minutes with no increase in low back pain to return to PLOF.  (progressing, not met)            PLAN   Plan of  care Certification: 1/7/2025 to 4/7/25     Outpatient Physical Therapy 2 times weekly for 12 weeks to include the following interventions: Therapeutic Exercises, Manual Therapeutic Technique, Neuromuscular Re Education, Therapeutic Activities. Modalities, Kinesiotape prn, and Functional Dry Needling as needed.      Pramod Fulton, PT

## 2025-01-28 ENCOUNTER — CLINICAL SUPPORT (OUTPATIENT)
Dept: REHABILITATION | Facility: OTHER | Age: 75
End: 2025-01-28
Payer: MEDICARE

## 2025-01-28 DIAGNOSIS — R29.898 WEAKNESS OF BOTH LOWER EXTREMITIES: Primary | ICD-10-CM

## 2025-01-28 PROCEDURE — 97112 NEUROMUSCULAR REEDUCATION: CPT | Mod: PN

## 2025-01-28 PROCEDURE — 97530 THERAPEUTIC ACTIVITIES: CPT | Mod: PN

## 2025-01-29 NOTE — PROGRESS NOTES
"    [facility]   Physical Therapy Treatment Note     Name: Anthony Reynaga  Clinic Number: [id number]    Therapy Diagnosis:   Encounter Diagnosis   Name Primary?    Weakness of both lower extremities Yes         Physician: Kamla Hoff DO    Visit Date: 1/28/2025    Physician: DALJIT Hook NP     Physician Orders: Physical Therapy Evaluate and Treat  Medical Diagnosis from Referral: poor posture; chronic low back pain  Evaluation Date: 1/7/25  Authorization Period Expiration: 12/30/25  Plan of Care Expiration: 1/7/2025 to 4/7/25  Visit # / Visits authorized: 4/20  FOTO: 0/3  on 1/7/25     Time In: 330 pm  Time Out: 430 pm  Total Billable Time: 60 minutes     Precautions: Standard      SUBJECTIVE     Pt reports: he doesn't notice his leg pain, and his low back has been feeling better.    He was compliant with home exercise program.  Response to previous treatment: felt fine well, no issues  Functional change: improved tolerance to standing    Pain:  Current 2/10, worst 3/10, best 0/10   Location: right anterior thigh  Description: Aching  Aggravating Factors: prolonged walking, lifting/bending  Easing Factors: rest     Pts goals: Pt would like to return to recreational activity / walking for exercise with no increase in low back pain and lower extremity pain.    OBJECTIVE     Objective Measures updated at progress report unless specified.       TREATMENT   Charges based on 1:1 tx:     Anthony received the treatments listed below:        Neuromuscular re education for 15 minutes for improved balance and proprioception including:     Recumbent bike L4 x 8 minutes  TM ambulation 1.5 mph at 3% incline x8mins  Seated silver SB rollouts, 5x10" forward/bilateral sides  Supine LTR with green tball x3 minutes  PPT 3" hold x 20  Seated PPT on silver SB, 20x  +Seated marching w/ TrA activation on silver SB  x 20  Supine TrA contraction with 5 second hold 30x   Supine TrA contraction with marches " "30x  DKTC with green tball x20  Supine TrA contraction with SLR 3x10 repetitions each   SL hip abduction 2x10  Sciatic nn glide on green ball 30x R/left  Seated sciatic nerve glide x20 right/left   Supine hamstring stretch with strap (3 way) 3x30"      Patient received therapeutic activities for 40 minutes for improved tolerance to functional activities including:    KB marches vs 15# x2 laps on turf  KB side stepping vs 15# x2 laps on turf  KB RDL to 12" step vs 15# 2x10- held today 2/2 increased symptoms  Rows vs black tube 30x5" hold on foam  TRX squat/row 3x10  TRX Bridge Row 2x10  Standing hip abd + abdominal bracing with red ball on mat, 5" hold x20 R/left  Step up 6" vs 10# KB 2x10  Step down with slow eccentric toe taps 2x10  Goblet squat with 10# KB tap on 24" step 2x10  Dead lift 10# 2x10  Shuttle squat vs 75# 3x10  SL shuttle squat vs 50# 2x10  Shuttle kick back 25# 2x10  DL heel rise on wedge x20  Standing gastroc stretch 3x30"  Standing TrA pulldown vs black tube 30x5" hold on foam   Paloff press vs 10# cable 2x10 in split stance  Side 1/2 plank 3x10"        PATIENT EDUCATION AND HOME EXERCISES     Home Exercises Provided and Patient Education Provided   - Findings; prognosis and plan of care (POC)  - Home exercise program (HEP)  - Modality options  - Therapist contact information     Written Home Exercises Provided: Yes  Exercises were reviewed and Anthony was able to demonstrate them prior to the end of the session.  Anthony demonstrated good understanding of the education provided.   Education provided:   PT educated pt on importance of compliance with their HEP this visit.     Written Home Exercises Provided: Patient instructed to cont prior HEP. Exercises were reviewed and Anthony was able to demonstrate them prior to the end of the session.  Anthony demonstrated good  understanding of the education provided. See EMR under Patient Instructions for exercises provided during therapy sessions    ASSESSMENT "     Anthony performed treatment well today. He reports less fatigue and discomfort with strenuous activity, and posturally he is ambulating more upright. We will continue to progress as tolerated.    Anthony is progressing well towards his goals.   Pt prognosis is Good.     Pt will continue to benefit from skilled outpatient physical therapy to address the deficits listed in the problem list box on initial evaluation, provide pt/family education and to maximize pt's level of independence in the home and community environment.     Pt's spiritual, cultural and educational needs considered and pt agreeable to plan of care and goals.     Anticipated barriers to physical therapy: None    GOALS:  Short Term Goals (4 Weeks):  1. Patient will be compliant with home exercise program to supplement therapy in promoting functional mobility. (progressing, not met)    2. Patient will perform bending/lifting with good control to demonstrate improved core strength. (progressing, not met)    3. Patient will report no pain during thoracolumbar active range of motion to promote functional mobility.  (progressing, not met)    4. Patient will improve impaired lower extremity manual muscle tests  to >/=4/5 to improve strength for functional tasks. (progressing, not met)          Long Term Goals (6 Weeks):   1. Patient will improve FOTO score to </= 25% limited to decrease perceived limitation with maintaining/changing body position. (progressing, not met)    2. Patient will perform core strengthening and mobility routing with good control to demonstrate improved core strength.  (progressing, not met)    3. Patient will improve impaired lower extremity manual muscle tests to >/=4+/5 to improve strength for functional tasks.  (progressing, not met)    4. Patient will tolerate standing for 60 minutes with no increase in low back pain to return to PLOF.  (progressing, not met)            PLAN   Plan of care Certification: 1/7/2025 to 4/7/25      Outpatient Physical Therapy 2 times weekly for 12 weeks to include the following interventions: Therapeutic Exercises, Manual Therapeutic Technique, Neuromuscular Re Education, Therapeutic Activities. Modalities, Kinesiotape prn, and Functional Dry Needling as needed.      Pramod Fulton, PT

## 2025-01-31 ENCOUNTER — CLINICAL SUPPORT (OUTPATIENT)
Dept: REHABILITATION | Facility: OTHER | Age: 75
End: 2025-01-31
Payer: MEDICARE

## 2025-01-31 DIAGNOSIS — R29.898 WEAKNESS OF BOTH LOWER EXTREMITIES: Primary | ICD-10-CM

## 2025-01-31 PROCEDURE — 97530 THERAPEUTIC ACTIVITIES: CPT | Mod: PN

## 2025-01-31 PROCEDURE — 97112 NEUROMUSCULAR REEDUCATION: CPT | Mod: PN

## 2025-01-31 NOTE — PROGRESS NOTES
"      [facility]   Physical Therapy Treatment Note     Name: Anthony Reynaga  Clinic Number: [id number]    Therapy Diagnosis:   Encounter Diagnosis   Name Primary?    Weakness of both lower extremities Yes         Physician: Kamla Hoff DO    Visit Date: 1/31/2025    Physician: DALJIT Hook NP     Physician Orders: Physical Therapy Evaluate and Treat  Medical Diagnosis from Referral: poor posture; chronic low back pain  Evaluation Date: 1/7/25  Authorization Period Expiration: 12/30/25  Plan of Care Expiration: 1/7/2025 to 4/7/25  Visit # / Visits authorized: 5/20  FOTO: 0/3  on 1/7/25     Time In: 330 pm  Time Out: 430 pm  Total Billable Time: 60 minutes     Precautions: Standard      SUBJECTIVE     Pt reports: he continues to feel stronger.    He was compliant with home exercise program.  Response to previous treatment: felt fine well, no issues  Functional change: improved tolerance to standing    Pain:  Current 2/10, worst 3/10, best 0/10   Location: right anterior thigh  Description: Aching  Aggravating Factors: prolonged walking, lifting/bending  Easing Factors: rest     Pts goals: Pt would like to return to recreational activity / walking for exercise with no increase in low back pain and lower extremity pain.    OBJECTIVE     Objective Measures updated at progress report unless specified.       TREATMENT   Charges based on 1:1 tx:     Anthony received the treatments listed below:        Neuromuscular re education for 15 minutes for improved balance and proprioception including:     Recumbent bike L4 x 8 minutes  TM ambulation 1.5 mph at 3% incline x8mins  Seated silver SB rollouts, 5x10" forward/bilateral sides  Supine LTR with green tball x3 minutes  PPT 3" hold x 20  Seated PPT on silver SB, 20x  +Seated marching w/ TrA activation on silver SB  x 20  Supine TrA contraction with 5 second hold 30x   Supine TrA contraction with marches 30x  DKTC with green tball x20  Supine TrA " "contraction with SLR 3x10 repetitions each   SL hip abduction 2x10  Sciatic nn glide on green ball 30x R/left  Seated sciatic nerve glide x20 right/left   Supine hamstring stretch with strap (3 way) 3x30"      Patient received therapeutic activities for 40 minutes for improved tolerance to functional activities including:    KB marches vs 15# x2 laps on turf  KB side stepping vs 15# x2 laps on turf  KB RDL to 12" step vs 15# 2x10- held today 2/2 increased symptoms  Rows vs black tube 30x5" hold on foam  TRX squat/row 3x10  TRX Bridge Row 2x10  Standing hip abd + abdominal bracing with red ball on mat, 5" hold x20 R/left  Step up 6" vs 10# KB 2x10  Step down with slow eccentric toe taps 2x10  Goblet squat with 10# KB tap on 24" step 2x10  Dead lift 10# 2x10  Shuttle squat vs 100# 3x10  SL shuttle squat vs 50# 2x10  Shuttle kick back 25# 2x10  DL heel rise on wedge x20  Standing gastroc stretch 3x30"  Standing TrA pulldown vs black tube 30x5" hold on foam   Paloff press vs 10# cable 2x10 in split stance  Side 1/2 plank 3x10"        PATIENT EDUCATION AND HOME EXERCISES     Home Exercises Provided and Patient Education Provided   - Findings; prognosis and plan of care (POC)  - Home exercise program (HEP)  - Modality options  - Therapist contact information     Written Home Exercises Provided: Yes  Exercises were reviewed and Anthony was able to demonstrate them prior to the end of the session.  Anthony demonstrated good understanding of the education provided.   Education provided:   PT educated pt on importance of compliance with their HEP this visit.     Written Home Exercises Provided: Patient instructed to cont prior HEP. Exercises were reviewed and Anthony was able to demonstrate them prior to the end of the session.  Anthony demonstrated good  understanding of the education provided. See EMR under Patient Instructions for exercises provided during therapy sessions    ASSESSMENT     Anthony performed treatment well today. He " continues to tolerate exercise progressions well. We will continue to progress as tolerated.    Anthony is progressing well towards his goals.   Pt prognosis is Good.     Pt will continue to benefit from skilled outpatient physical therapy to address the deficits listed in the problem list box on initial evaluation, provide pt/family education and to maximize pt's level of independence in the home and community environment.     Pt's spiritual, cultural and educational needs considered and pt agreeable to plan of care and goals.     Anticipated barriers to physical therapy: None    GOALS:  Short Term Goals (4 Weeks):  1. Patient will be compliant with home exercise program to supplement therapy in promoting functional mobility. (progressing, not met)    2. Patient will perform bending/lifting with good control to demonstrate improved core strength. (progressing, not met)    3. Patient will report no pain during thoracolumbar active range of motion to promote functional mobility.  (progressing, not met)    4. Patient will improve impaired lower extremity manual muscle tests  to >/=4/5 to improve strength for functional tasks. (progressing, not met)          Long Term Goals (6 Weeks):   1. Patient will improve FOTO score to </= 25% limited to decrease perceived limitation with maintaining/changing body position. (progressing, not met)    2. Patient will perform core strengthening and mobility routing with good control to demonstrate improved core strength.  (progressing, not met)    3. Patient will improve impaired lower extremity manual muscle tests to >/=4+/5 to improve strength for functional tasks.  (progressing, not met)    4. Patient will tolerate standing for 60 minutes with no increase in low back pain to return to PLOF.  (progressing, not met)            PLAN   Plan of care Certification: 1/7/2025 to 4/7/25     Outpatient Physical Therapy 2 times weekly for 12 weeks to include the following interventions:  Therapeutic Exercises, Manual Therapeutic Technique, Neuromuscular Re Education, Therapeutic Activities. Modalities, Kinesiotape prn, and Functional Dry Needling as needed.      Pramod Fulton, PT

## 2025-02-04 ENCOUNTER — CLINICAL SUPPORT (OUTPATIENT)
Dept: REHABILITATION | Facility: OTHER | Age: 75
End: 2025-02-04
Payer: MEDICARE

## 2025-02-04 DIAGNOSIS — R29.898 WEAKNESS OF BOTH LOWER EXTREMITIES: Primary | ICD-10-CM

## 2025-02-04 PROCEDURE — 97112 NEUROMUSCULAR REEDUCATION: CPT | Mod: PN

## 2025-02-04 PROCEDURE — 97530 THERAPEUTIC ACTIVITIES: CPT | Mod: PN

## 2025-02-04 NOTE — PROGRESS NOTES
"  [facility]   Physical Therapy Treatment Note     Name: Anthony Reynaga  Clinic Number: [id number]    Therapy Diagnosis:   Encounter Diagnosis   Name Primary?    Weakness of both lower extremities Yes         Physician: Kamla Hoff DO    Visit Date: 2/4/2025    Physician: DALJIT Hook NP     Physician Orders: Physical Therapy Evaluate and Treat  Medical Diagnosis from Referral: poor posture; chronic low back pain  Evaluation Date: 1/7/25  Authorization Period Expiration: 12/30/25  Plan of Care Expiration: 1/7/2025 to 4/7/25  Visit # / Visits authorized: 6/20  FOTO: 0/3  on 1/7/25     Time In: 325 pm  Time Out: 420 pm  Total Billable Time: 55 minutes     Precautions: Standard      SUBJECTIVE     Pt reports: he continues to feel like he's on track.    He was compliant with home exercise program.  Response to previous treatment: felt fine well, no issues  Functional change: improved tolerance to standing    Pain:  Current 2/10, worst 3/10, best 0/10   Location: right anterior thigh  Description: Aching  Aggravating Factors: prolonged walking, lifting/bending  Easing Factors: rest     Pts goals: Pt would like to return to recreational activity / walking for exercise with no increase in low back pain and lower extremity pain.    OBJECTIVE     Objective Measures updated at progress report unless specified.       TREATMENT   Charges based on 1:1 tx:     Anthony received the treatments listed below:        Neuromuscular re education for 15 minutes for improved balance and proprioception including:     Recumbent bike L4 x 8 minutes  TM ambulation 2.0 mph at 4% incline [id number]  Seated silver SB rollouts, 5x10" forward/bilateral sides  Supine LTR with green tball x3 minutes  PPT 3" hold x 20  Seated PPT on silver SB, 20x  +Seated marching w/ TrA activation on silver SB  x 20  Supine TrA contraction with 5 second hold 30x   Supine TrA contraction with marches 30x  DKTC with green tball " "x20  Supine TrA contraction with SLR 3x10 repetitions each   SL hip abduction 2x10  Sciatic nn glide on green ball 30x R/left  Seated sciatic nerve glide x20 right/left   Supine hamstring stretch with strap (3 way) 3x30"      Patient received therapeutic activities for 38 minutes for improved tolerance to functional activities including:    KB hesitation marches vs 15# x2 laps on turf  KB side stepping vs 15# x2 laps on turf  KB RDL to 12" step vs 15# 2x10- held today 2/2 increased symptoms  Rows vs black tube 30x5" hold on foam  TRX squat/row 3x10  TRX Bridge Row 2x10  Standing hip abd + abdominal bracing with red ball on mat, 5" hold x20 R/left  Step up 6" vs 15# KB 2x10  Step down with slow eccentric toe taps 2x10  Goblet squat with 15# KB tap on 24" step 2x10  Dead lift 15# 2x10  Shuttle squat vs 100# 3x10  SL shuttle squat vs 50# 2x10  Shuttle kick back 25# 2x10  DL heel rise on wedge x20  Standing gastroc stretch 3x30"  Standing TrA pulldown vs black tube 30x5" hold on foam   Paloff press vs 10# cable 2x10 in split stance  Side 1/2 plank 3x10"        PATIENT EDUCATION AND HOME EXERCISES     Home Exercises Provided and Patient Education Provided   - Findings; prognosis and plan of care (POC)  - Home exercise program (HEP)  - Modality options  - Therapist contact information     Written Home Exercises Provided: Yes  Exercises were reviewed and Anthony was able to demonstrate them prior to the end of the session.  Anthony demonstrated good understanding of the education provided.   Education provided:   PT educated pt on importance of compliance with their HEP this visit.     Written Home Exercises Provided: Patient instructed to cont prior HEP. Exercises were reviewed and Anthony was able to demonstrate them prior to the end of the session.  Anthony demonstrated good  understanding of the education provided. See EMR under Patient Instructions for exercises provided during therapy sessions    ASSESSMENT     Anthony performed " treatment well today. We progressed gait speed today on TM and progressed resistance with functional strengthening with good tolerance. We will continue to progress as tolerated.    Anthony is progressing well towards his goals.   Pt prognosis is Good.     Pt will continue to benefit from skilled outpatient physical therapy to address the deficits listed in the problem list box on initial evaluation, provide pt/family education and to maximize pt's level of independence in the home and community environment.     Pt's spiritual, cultural and educational needs considered and pt agreeable to plan of care and goals.     Anticipated barriers to physical therapy: None    GOALS:  Short Term Goals (4 Weeks):  1. Patient will be compliant with home exercise program to supplement therapy in promoting functional mobility. (progressing, not met)    2. Patient will perform bending/lifting with good control to demonstrate improved core strength. (progressing, not met)    3. Patient will report no pain during thoracolumbar active range of motion to promote functional mobility.  (progressing, not met)    4. Patient will improve impaired lower extremity manual muscle tests  to >/=4/5 to improve strength for functional tasks. (progressing, not met)          Long Term Goals (6 Weeks):   1. Patient will improve FOTO score to </= 25% limited to decrease perceived limitation with maintaining/changing body position. (progressing, not met)    2. Patient will perform core strengthening and mobility routing with good control to demonstrate improved core strength.  (progressing, not met)    3. Patient will improve impaired lower extremity manual muscle tests to >/=4+/5 to improve strength for functional tasks.  (progressing, not met)    4. Patient will tolerate standing for 60 minutes with no increase in low back pain to return to PLOF.  (progressing, not met)            PLAN   Plan of care Certification: 1/7/2025 to 4/7/25     Outpatient  Physical Therapy 2 times weekly for 12 weeks to include the following interventions: Therapeutic Exercises, Manual Therapeutic Technique, Neuromuscular Re Education, Therapeutic Activities. Modalities, Kinesiotape prn, and Functional Dry Needling as needed.      Pramod Fulton, PT

## 2025-02-11 ENCOUNTER — CLINICAL SUPPORT (OUTPATIENT)
Dept: REHABILITATION | Facility: OTHER | Age: 75
End: 2025-02-11
Payer: MEDICARE

## 2025-02-11 DIAGNOSIS — R29.898 WEAKNESS OF BOTH LOWER EXTREMITIES: Primary | ICD-10-CM

## 2025-02-11 PROCEDURE — 97530 THERAPEUTIC ACTIVITIES: CPT | Mod: KX,PN

## 2025-02-11 PROCEDURE — 97112 NEUROMUSCULAR REEDUCATION: CPT | Mod: KX,PN

## 2025-02-11 NOTE — PROGRESS NOTES
"  [facility]   Physical Therapy Treatment Note     Name: Anthony Reynaga  Clinic Number: [id number]    Therapy Diagnosis:   Encounter Diagnosis   Name Primary?    Weakness of both lower extremities Yes         Physician: Kamla Hoff DO    Visit Date: 2/11/2025    Physician: DALJIT Hook NP     Physician Orders: Physical Therapy Evaluate and Treat  Medical Diagnosis from Referral: poor posture; chronic low back pain  Evaluation Date: 1/7/25  Authorization Period Expiration: 12/30/25  Plan of Care Expiration: 1/7/2025 to 4/7/25  Visit # / Visits authorized: 7/20  FOTO: 0/3  on 1/7/25     Time In: 325 pm  Time Out: 420 pm  Total Billable Time: 55 minutes     Precautions: Standard      SUBJECTIVE     Pt reports: he has been waking up in the morning without low back pain.    He was compliant with home exercise program.  Response to previous treatment: felt fine well, no issues  Functional change: improved tolerance to standing    Pain:  Current 2/10, worst 3/10, best 0/10   Location: right anterior thigh  Description: Aching  Aggravating Factors: prolonged walking, lifting/bending  Easing Factors: rest     Pts goals: Pt would like to return to recreational activity / walking for exercise with no increase in low back pain and lower extremity pain.    OBJECTIVE     Objective Measures updated at progress report unless specified.       TREATMENT   Charges based on 1:1 tx:     Anthony received the treatments listed below:        Neuromuscular re education for 15 minutes for improved balance and proprioception including:     Recumbent bike L4 x 8 minutes  TM ambulation 2.0 mph at 4% incline [id number]  Seated silver SB rollouts, 5x10" forward/bilateral sides  Supine LTR with green tball x3 minutes  PPT 3" hold x 20  Seated PPT on silver SB, 20x  +Seated marching w/ TrA activation on silver SB  x 20  Supine TrA contraction with 5 second hold 30x   Supine TrA contraction with marches 30x  DKTC with " "green tball x20  Supine TrA contraction with SLR 3x10 repetitions each   SL hip abduction 2x10  Sciatic nn glide on green ball 30x R/left  Seated sciatic nerve glide x20 right/left   Supine hamstring stretch with strap (3 way) 3x30"      Patient received therapeutic activities for 40 minutes for improved tolerance to functional activities including:    KB hesitation marches vs 15# x2 laps on turf  KB side stepping vs 15# x2 laps on turf  KB RDL to 12" step vs 15# 2x10- held today 2/2 increased symptoms  Rows vs black tube 30x5" hold on foam  TRX squat/row 3x10  TRX Bridge Row 2x10  Standing hip abd + abdominal bracing with red ball on mat, 5" hold x20 R/left  Step up 6" vs 15# KB 2x10  Step down with slow eccentric toe taps 2x10  Goblet squat with 15# KB tap on 24" step 2x10  Dead lift 15# 2x10  Shuttle squat vs 100# 3x10  SL shuttle squat vs 50# 2x10  Shuttle kick back 25# 2x10  DL heel rise on wedge x20  Standing gastroc stretch 3x30"  Standing TrA pulldown vs black tube 30x5" hold on foam   Paloff press vs 10# cable 2x10 in split stance  Side 1/2 plank 3x10"  +walking heel rise 4x30" vs 15# KB  +SLS with ball toss 2x20 each side        PATIENT EDUCATION AND HOME EXERCISES     Home Exercises Provided and Patient Education Provided   - Findings; prognosis and plan of care (POC)  - Home exercise program (HEP)  - Modality options  - Therapist contact information     Written Home Exercises Provided: Yes  Exercises were reviewed and Anthony was able to demonstrate them prior to the end of the session.  Anthony demonstrated good understanding of the education provided.   Education provided:   PT educated pt on importance of compliance with their HEP this visit.     Written Home Exercises Provided: Patient instructed to cont prior HEP. Exercises were reviewed and Anthony was able to demonstrate them prior to the end of the session.  Anthony demonstrated good  understanding of the education provided. See EMR under Patient " Instructions for exercises provided during therapy sessions    ASSESSMENT     Anthony performed treatment well today. We progress static/dynamic balance exercise today with good tolerance. We will continue to progress as tolerated.    Anthony is progressing well towards his goals.   Pt prognosis is Good.     Pt will continue to benefit from skilled outpatient physical therapy to address the deficits listed in the problem list box on initial evaluation, provide pt/family education and to maximize pt's level of independence in the home and community environment.     Pt's spiritual, cultural and educational needs considered and pt agreeable to plan of care and goals.     Anticipated barriers to physical therapy: None    GOALS:  Short Term Goals (4 Weeks):  1. Patient will be compliant with home exercise program to supplement therapy in promoting functional mobility. (progressing, not met)    2. Patient will perform bending/lifting with good control to demonstrate improved core strength. (progressing, not met)    3. Patient will report no pain during thoracolumbar active range of motion to promote functional mobility.  (progressing, not met)    4. Patient will improve impaired lower extremity manual muscle tests  to >/=4/5 to improve strength for functional tasks. (progressing, not met)          Long Term Goals (6 Weeks):   1. Patient will improve FOTO score to </= 25% limited to decrease perceived limitation with maintaining/changing body position. (progressing, not met)    2. Patient will perform core strengthening and mobility routing with good control to demonstrate improved core strength.  (progressing, not met)    3. Patient will improve impaired lower extremity manual muscle tests to >/=4+/5 to improve strength for functional tasks.  (progressing, not met)    4. Patient will tolerate standing for 60 minutes with no increase in low back pain to return to PLOF.  (progressing, not met)            PLAN   Plan of care  Certification: 1/7/2025 to 4/7/25     Outpatient Physical Therapy 2 times weekly for 12 weeks to include the following interventions: Therapeutic Exercises, Manual Therapeutic Technique, Neuromuscular Re Education, Therapeutic Activities. Modalities, Kinesiotape prn, and Functional Dry Needling as needed.      Pramod Fulton, PT

## 2025-02-18 ENCOUNTER — CLINICAL SUPPORT (OUTPATIENT)
Dept: REHABILITATION | Facility: OTHER | Age: 75
End: 2025-02-18
Payer: MEDICARE

## 2025-02-18 DIAGNOSIS — R29.898 WEAKNESS OF BOTH LOWER EXTREMITIES: Primary | ICD-10-CM

## 2025-02-18 PROCEDURE — 97530 THERAPEUTIC ACTIVITIES: CPT | Mod: KX,PN

## 2025-02-18 PROCEDURE — 97112 NEUROMUSCULAR REEDUCATION: CPT | Mod: KX,PN

## 2025-02-18 NOTE — PROGRESS NOTES
"  [facility]   Physical Therapy Treatment Note     Name: Anthony Reynaga  Clinic Number: [id number]    Therapy Diagnosis:   Encounter Diagnosis   Name Primary?    Weakness of both lower extremities Yes         Physician: Kamla Hoff DO    Visit Date: 2/18/2025    Physician: DALJIT Hook NP     Physician Orders: Physical Therapy Evaluate and Treat  Medical Diagnosis from Referral: poor posture; chronic low back pain  Evaluation Date: 1/7/25  Authorization Period Expiration: 12/30/25  Plan of Care Expiration: 1/7/2025 to 4/7/25  Visit # / Visits authorized: 8/20  FOTO: 0/3  on 1/7/25     Time In: 335 pm  Time Out: 430 pm  Total Billable Time: 55 minutes     Precautions: Standard      SUBJECTIVE     Pt reports: he had a backslide over the weekend. He had increased low back pain and left lower extremity radic after lifting an office chair.    He was compliant with home exercise program.  Response to previous treatment: felt fine well, no issues  Functional change: improved tolerance to standing    Pain:  Current 2/10, worst 3/10, best 0/10   Location: right anterior thigh  Description: Aching  Aggravating Factors: prolonged walking, lifting/bending  Easing Factors: rest     Pts goals: Pt would like to return to recreational activity / walking for exercise with no increase in low back pain and lower extremity pain.    OBJECTIVE     Objective Measures updated at progress report unless specified.       TREATMENT   Charges based on 1:1 tx:     Anthony received the treatments listed below:        Neuromuscular re education for 15 minutes for improved balance and proprioception including:     Recumbent bike L4 x 8 minutes  TM ambulation 2.0 mph at 4% incline [id number]  Seated silver SB rollouts, 5x10" forward/bilateral sides  Supine LTR with green tball x3 minutes  PPT 3" hold x 20  Seated PPT on silver SB, 20x  +Seated marching w/ TrA activation on silver SB  x 20  Supine TrA contraction with 5 " "second hold 30x   Supine TrA contraction with marches 30x  DKTC with green tball x20  Supine TrA contraction with SLR 3x10 repetitions each   SL hip abduction 2x10  Sciatic nn glide on green ball 30x R/left  Seated sciatic nerve glide x20 right/left   Supine hamstring stretch with strap (3 way) 3x30"      Patient received therapeutic activities for 40 minutes for improved tolerance to functional activities including:    KB hesitation marches vs 15# x2 laps on turf  KB side stepping vs 15# x2 laps on turf  KB RDL to 12" step vs 15# 2x10- held today 2/2 increased symptoms  Rows vs black tube 30x5" hold on foam  TRX squat/row 3x10  TRX Bridge Row 2x10  Standing hip abd + abdominal bracing with red ball on mat, 5" hold x20 R/left  Step up 6" vs 15# KB 2x10  Step down with slow eccentric toe taps 2x10  Goblet squat with 15# KB tap on 24" step 2x10  Dead lift 15# 2x10  Shuttle squat vs 100# 3x10  SL shuttle squat vs 50# 2x10  Shuttle kick back 25# 2x10  DL heel rise on wedge x20  Standing gastroc stretch 3x30"  Standing TrA pulldown vs black tube 30x5" hold on foam   Paloff press vs 10# cable 2x10 in split stance  Side 1/2 plank 3x10"  walking heel rise 4x30" vs 15# KB  SLS with ball toss 2x20 each side        PATIENT EDUCATION AND HOME EXERCISES     Home Exercises Provided and Patient Education Provided   - Findings; prognosis and plan of care (POC)  - Home exercise program (HEP)  - Modality options  - Therapist contact information     Written Home Exercises Provided: Yes  Exercises were reviewed and Anthony was able to demonstrate them prior to the end of the session.  Anthony demonstrated good understanding of the education provided.   Education provided:   PT educated pt on importance of compliance with their HEP this visit.     Written Home Exercises Provided: Patient instructed to cont prior HEP. Exercises were reviewed and Anthony was able to demonstrate them prior to the end of the session.  Anthony demonstrated good  " understanding of the education provided. See EMR under Patient Instructions for exercises provided during therapy sessions    ASSESSMENT     Anthony performed treatment well today. No pain or limitation with therapeutic exercise today. We will continue to progress as tolerated.    Anthony is progressing well towards his goals.   Pt prognosis is Good.     Pt will continue to benefit from skilled outpatient physical therapy to address the deficits listed in the problem list box on initial evaluation, provide pt/family education and to maximize pt's level of independence in the home and community environment.     Pt's spiritual, cultural and educational needs considered and pt agreeable to plan of care and goals.     Anticipated barriers to physical therapy: None    GOALS:  Short Term Goals (4 Weeks):  1. Patient will be compliant with home exercise program to supplement therapy in promoting functional mobility. (progressing, not met)    2. Patient will perform bending/lifting with good control to demonstrate improved core strength. (progressing, not met)    3. Patient will report no pain during thoracolumbar active range of motion to promote functional mobility.  (progressing, not met)    4. Patient will improve impaired lower extremity manual muscle tests  to >/=4/5 to improve strength for functional tasks. (progressing, not met)          Long Term Goals (6 Weeks):   1. Patient will improve FOTO score to </= 25% limited to decrease perceived limitation with maintaining/changing body position. (progressing, not met)    2. Patient will perform core strengthening and mobility routing with good control to demonstrate improved core strength.  (progressing, not met)    3. Patient will improve impaired lower extremity manual muscle tests to >/=4+/5 to improve strength for functional tasks.  (progressing, not met)    4. Patient will tolerate standing for 60 minutes with no increase in low back pain to return to PLOF.   (progressing, not met)            PLAN   Plan of care Certification: 1/7/2025 to 4/7/25     Outpatient Physical Therapy 2 times weekly for 12 weeks to include the following interventions: Therapeutic Exercises, Manual Therapeutic Technique, Neuromuscular Re Education, Therapeutic Activities. Modalities, Kinesiotape prn, and Functional Dry Needling as needed.      Pramod Fulton, PT

## 2025-02-20 ENCOUNTER — LAB VISIT (OUTPATIENT)
Dept: LAB | Facility: HOSPITAL | Age: 75
End: 2025-02-20
Attending: FAMILY MEDICINE
Payer: MEDICARE

## 2025-02-20 ENCOUNTER — RESULTS FOLLOW-UP (OUTPATIENT)
Dept: PRIMARY CARE CLINIC | Facility: CLINIC | Age: 75
End: 2025-02-20

## 2025-02-20 DIAGNOSIS — E83.52 HYPERCALCEMIA: ICD-10-CM

## 2025-02-20 DIAGNOSIS — E78.2 MIXED HYPERLIPIDEMIA: ICD-10-CM

## 2025-02-20 DIAGNOSIS — I10 HYPERTENSION, ESSENTIAL: ICD-10-CM

## 2025-02-20 DIAGNOSIS — R79.9 ABNORMAL BLOOD CHEMISTRY: ICD-10-CM

## 2025-02-20 DIAGNOSIS — I48.0 PAROXYSMAL ATRIAL FIBRILLATION: ICD-10-CM

## 2025-02-20 LAB
25(OH)D3+25(OH)D2 SERPL-MCNC: 39 NG/ML (ref 30–96)
ALBUMIN SERPL BCP-MCNC: 4.1 G/DL (ref 3.5–5.2)
ALP SERPL-CCNC: 46 U/L (ref 40–150)
ALT SERPL W/O P-5'-P-CCNC: 19 U/L (ref 10–44)
ANION GAP SERPL CALC-SCNC: 8 MMOL/L (ref 8–16)
AST SERPL-CCNC: 25 U/L (ref 10–40)
BASOPHILS # BLD AUTO: 0.09 K/UL (ref 0–0.2)
BASOPHILS NFR BLD: 1.3 % (ref 0–1.9)
BILIRUB SERPL-MCNC: 0.8 MG/DL (ref 0.1–1)
BUN SERPL-MCNC: 12 MG/DL (ref 8–23)
CA-I BLDV-SCNC: 1.28 MMOL/L (ref 1.06–1.42)
CALCIUM SERPL-MCNC: 10.5 MG/DL (ref 8.7–10.5)
CHLORIDE SERPL-SCNC: 106 MMOL/L (ref 95–110)
CHOLEST SERPL-MCNC: 139 MG/DL (ref 120–199)
CHOLEST/HDLC SERPL: 3 {RATIO} (ref 2–5)
CO2 SERPL-SCNC: 27 MMOL/L (ref 23–29)
CREAT SERPL-MCNC: 0.8 MG/DL (ref 0.5–1.4)
DIFFERENTIAL METHOD BLD: NORMAL
EOSINOPHIL # BLD AUTO: 0.2 K/UL (ref 0–0.5)
EOSINOPHIL NFR BLD: 3.1 % (ref 0–8)
ERYTHROCYTE [DISTWIDTH] IN BLOOD BY AUTOMATED COUNT: 13.6 % (ref 11.5–14.5)
EST. GFR  (NO RACE VARIABLE): >60 ML/MIN/1.73 M^2
ESTIMATED AVG GLUCOSE: 105 MG/DL (ref 68–131)
GLUCOSE SERPL-MCNC: 92 MG/DL (ref 70–110)
HBA1C MFR BLD: 5.3 % (ref 4–5.6)
HCT VFR BLD AUTO: 48 % (ref 40–54)
HDLC SERPL-MCNC: 47 MG/DL (ref 40–75)
HDLC SERPL: 33.8 % (ref 20–50)
HGB BLD-MCNC: 15.6 G/DL (ref 14–18)
IMM GRANULOCYTES # BLD AUTO: 0.02 K/UL (ref 0–0.04)
IMM GRANULOCYTES NFR BLD AUTO: 0.3 % (ref 0–0.5)
LDLC SERPL CALC-MCNC: 68.6 MG/DL (ref 63–159)
LYMPHOCYTES # BLD AUTO: 1.7 K/UL (ref 1–4.8)
LYMPHOCYTES NFR BLD: 24.9 % (ref 18–48)
MCH RBC QN AUTO: 27.7 PG (ref 27–31)
MCHC RBC AUTO-ENTMCNC: 32.5 G/DL (ref 32–36)
MCV RBC AUTO: 85 FL (ref 82–98)
MONOCYTES # BLD AUTO: 0.6 K/UL (ref 0.3–1)
MONOCYTES NFR BLD: 8.5 % (ref 4–15)
NEUTROPHILS # BLD AUTO: 4.2 K/UL (ref 1.8–7.7)
NEUTROPHILS NFR BLD: 61.9 % (ref 38–73)
NONHDLC SERPL-MCNC: 92 MG/DL
NRBC BLD-RTO: 0 /100 WBC
PLATELET # BLD AUTO: 164 K/UL (ref 150–450)
PMV BLD AUTO: 11.9 FL (ref 9.2–12.9)
POTASSIUM SERPL-SCNC: 4 MMOL/L (ref 3.5–5.1)
PROT SERPL-MCNC: 7.2 G/DL (ref 6–8.4)
PTH-INTACT SERPL-MCNC: 119.6 PG/ML (ref 9–77)
RBC # BLD AUTO: 5.64 M/UL (ref 4.6–6.2)
SODIUM SERPL-SCNC: 141 MMOL/L (ref 136–145)
TRIGL SERPL-MCNC: 117 MG/DL (ref 30–150)
WBC # BLD AUTO: 6.82 K/UL (ref 3.9–12.7)

## 2025-02-20 PROCEDURE — 83970 ASSAY OF PARATHORMONE: CPT | Performed by: FAMILY MEDICINE

## 2025-02-20 PROCEDURE — 82306 VITAMIN D 25 HYDROXY: CPT | Performed by: FAMILY MEDICINE

## 2025-02-20 PROCEDURE — 36415 COLL VENOUS BLD VENIPUNCTURE: CPT | Performed by: FAMILY MEDICINE

## 2025-02-20 PROCEDURE — 85025 COMPLETE CBC W/AUTO DIFF WBC: CPT | Performed by: FAMILY MEDICINE

## 2025-02-20 PROCEDURE — 83036 HEMOGLOBIN GLYCOSYLATED A1C: CPT | Performed by: FAMILY MEDICINE

## 2025-02-20 PROCEDURE — 80053 COMPREHEN METABOLIC PANEL: CPT | Performed by: FAMILY MEDICINE

## 2025-02-20 PROCEDURE — 80061 LIPID PANEL: CPT | Performed by: FAMILY MEDICINE

## 2025-02-20 PROCEDURE — 82330 ASSAY OF CALCIUM: CPT | Performed by: FAMILY MEDICINE

## 2025-02-21 DIAGNOSIS — I49.3 PVC (PREMATURE VENTRICULAR CONTRACTION): ICD-10-CM

## 2025-02-22 NOTE — TELEPHONE ENCOUNTER
Refill Routing Note   Medication(s) are not appropriate for processing by [facility] for the following reason(s):        No active prescription written by provider    [facility] action(s):  Defer        Medication Therapy Plan: Last ordered: 2 months ago (12/8/2024) by Hayes Ferrera MD      Appointments  past 12m or future 3m with PCP    Date Provider   Last Visit   12/30/2024 Kamla Hoff, DO   Next Visit   2/27/2025 Kamla Hoff, DO   ED visits in past 90 days: 0        Note composed:11:34 PM 02/21/2025

## 2025-02-22 NOTE — TELEPHONE ENCOUNTER
No care due was identified.  [facility] Embedded Care Due Messages. Reference number: [id number].   2/21/2025 9:42:13 PM CST

## 2025-02-24 RX ORDER — POTASSIUM CHLORIDE 20 MEQ/1
20 TABLET, EXTENDED RELEASE ORAL 2 TIMES DAILY
Qty: 180 TABLET | Refills: 3 | Status: SHIPPED | OUTPATIENT
Start: 2025-02-24

## 2025-02-25 ENCOUNTER — CLINICAL SUPPORT (OUTPATIENT)
Dept: REHABILITATION | Facility: OTHER | Age: 75
End: 2025-02-25
Payer: MEDICARE

## 2025-02-25 DIAGNOSIS — R29.898 WEAKNESS OF BOTH LOWER EXTREMITIES: Primary | ICD-10-CM

## 2025-02-25 PROCEDURE — 97530 THERAPEUTIC ACTIVITIES: CPT | Mod: KX,PN

## 2025-02-25 PROCEDURE — 97112 NEUROMUSCULAR REEDUCATION: CPT | Mod: KX,PN

## 2025-02-25 NOTE — PROGRESS NOTES
[facility]   Physical Therapy Discharge Note     Name: Anthony Reynaga  Clinic Number: [id number]    Therapy Diagnosis:   Encounter Diagnosis   Name Primary?    Weakness of both lower extremities Yes       Physician: Kamla Hoff DO    Visit Date: 2/25/2025    Physician: DALJIT Hook NP     Physician Orders: Physical Therapy Evaluate and Treat  Medical Diagnosis from Referral: poor posture; chronic low back pain  Evaluation Date: 1/7/25  Authorization Period Expiration: 12/30/25  Plan of Care Expiration: 1/7/2025 to 4/7/25  Visit # / Visits authorized: 9/20  FOTO: 0/3  on 1/7/25     Time In: 1200 pm  Time Out: 100 pm  Total Billable Time: 55 minutes     Precautions: Standard      SUBJECTIVE     Pt reports: he reports minimal low back pain and lower extremity radiculopathy. He is ready for DC.    He was compliant with home exercise program.  Response to previous treatment: felt fine well, no issues  Functional change: improved tolerance to standing    Pain:  Current 0/10, worst 3/10, best 0/10   Location: right anterior thigh  Description: Aching  Aggravating Factors: prolonged walking, lifting/bending  Easing Factors: rest     Pts goals: Pt would like to return to recreational activity / walking for exercise with no increase in low back pain and lower extremity pain.    OBJECTIVE     2/25/25    WNL=within normal limits  WFL=within functional limits  NT=not tested  *=pain     Posture: Slight trunk flexion in standing  Palpation: tenderness to palpation at bilateral L4-5 multifidi/paraspinals  Sensation: intact  Deep tendon reflexes: NT     Lumbar Active range of motion (%) Pain/dysfunction with movement:   Flexion Not limited    Extension Mildly limited     Right side bending Not limited     Left side bending Minimally limited    Right rotation Not limited     Left rotation Not limited              Right LE     Left LE        Iliopsoas:  L2 4/5 Iliopsoas: L2 4+/5    Quadriceps:  L3  "- femoral nerve 4+/5 Quadriceps: L3 - femoral nerve 4+/5    Hip adduction:  L3 - obterator 4+/5 Hip adduction: L3 - obterator 4+/5    Hamstrings:  S2 4/5 Hamstrings: S2 4/5    Ankle DF/EV:  L4 4+/5 Ankle DF/EV: L4 5/5    GT Ext:  L5 4+/5 GT Ext L5 5/5    Hip Abduction:  L5-S1 - Superior gluteal nerve 4/5  Hip Abduction: L5-S1 - Superior gluteal nerve 4/5    Hip extension:  L5-S2 - Inferior gluteal nerve 4/5 Hip extension: L5-S2 - Inferior gluteal nerve 4/5    Ankle PF:   S1 - tibial nerve NT Ankle PF S1 - tibial nerve NT            Slump R: negative  Slump L: negative     SLR R: negative  SLR L: negative     (--) sural nerve tension sign bilaterally     (--) femoral nerve tension bilaterally        Joint mobility:   Thoracic: hypomobility with PA segmental mobility assessment.  Lumbar: hypomobility with PA segmental mobility assessment.            Gait analysis: slight trunk flexion moment in standing/walking           CMS Impairment/Limitation/Restriction for FOTO Survey     Therapist reviewed FOTO scores for Anthony Reynaga on 1/7/2025.   FOTO documents entered into [employer] - see Media section.     Limitation Score: 47%  Predicted Goal: 25%     Category: Mobility         TREATMENT   Charges based on 1:1 tx:     Anthony received the treatments listed below:        Neuromuscular re education for 15 minutes for improved balance and proprioception including:     Recumbent bike L4 x 8 minutes  TM ambulation 2.0 mph at 5% incline [id number]  Seated silver SB rollouts, 5x10" forward/bilateral sides  Supine LTR with green tball x3 minutes  PPT 3" hold x 20  Seated PPT on silver SB, 20x  +Seated marching w/ TrA activation on silver SB  x 20  Supine TrA contraction with 5 second hold 30x   Supine TrA contraction with marches 30x  DKTC with green tball x20  Supine TrA contraction with SLR 3x10 repetitions each   SL hip abduction 2x10  Sciatic nn glide on green ball 30x R/left  Seated sciatic nerve glide x20 right/left   Supine " "hamstring stretch with strap (3 way) 3x30"      Patient received therapeutic activities for 40 minutes for improved tolerance to functional activities including:    KB hesitation marches vs 15# x2 laps on turf  KB side stepping vs 15# x2 laps on turf  KB RDL to 12" step vs 15# 2x10- held today 2/2 increased symptoms  Rows vs black tube 30x5" hold on foam  TRX squat/row 3x10  TRX Bridge Row 2x10  Standing hip abd + abdominal bracing with red ball on mat, 5" hold x20 R/left  Step up 6" vs 15# KB 2x10  Step down with slow eccentric toe taps 2x10  Goblet squat with 15# KB tap on 24" step 2x10  Dead lift 15# 2x10  Shuttle squat vs 100# 3x10  SL shuttle squat vs 50# 2x10  Shuttle kick back 25# 2x10  DL heel rise on wedge x20  Standing gastroc stretch 3x30"  Standing TrA pulldown vs black tube 30x5" hold on foam   Paloff press vs 10# cable 2x10 in split stance  Side 1/2 plank 3x10"  walking heel rise 4x30" vs 15# KB  SLS with ball toss 2x20 each side        PATIENT EDUCATION AND HOME EXERCISES     Home Exercises Provided and Patient Education Provided   - Findings; prognosis and plan of care (POC)  - Home exercise program (HEP)  - Modality options  - Therapist contact information     Written Home Exercises Provided: Yes  Exercises were reviewed and Anthony was able to demonstrate them prior to the end of the session.  Anthony demonstrated good understanding of the education provided.   Education provided:   PT educated pt on importance of compliance with their HEP this visit.     Written Home Exercises Provided: Patient instructed to cont prior HEP. Exercises were reviewed and Anthony was able to demonstrate them prior to the end of the session.  Anthony demonstrated good  understanding of the education provided. See EMR under Patient Instructions for exercises provided during therapy sessions    ASSESSMENT     Anthony has made significant improvement since starting physical therapy treatment. He demonstrates improved lower extremity " strength, decreased pain per VAS, and improved lower extremity nerve mobility. He is being DC with home exercise program.    Anthony is progressing well towards his goals.   Pt prognosis is Good.     Pt will continue to benefit from skilled outpatient physical therapy to address the deficits listed in the problem list box on initial evaluation, provide pt/family education and to maximize pt's level of independence in the home and community environment.     Pt's spiritual, cultural and educational needs considered and pt agreeable to plan of care and goals.     Anticipated barriers to physical therapy: None    GOALS:  Short Term Goals (4 Weeks):  1. Patient will be compliant with home exercise program to supplement therapy in promoting functional mobility. (met)    2. Patient will perform bending/lifting with good control to demonstrate improved core strength. (met)    3. Patient will report no pain during thoracolumbar active range of motion to promote functional mobility.  (met)    4. Patient will improve impaired lower extremity manual muscle tests  to >/=4/5 to improve strength for functional tasks. (met)          Long Term Goals (6 Weeks):   1. Patient will improve FOTO score to </= 25% limited to decrease perceived limitation with maintaining/changing body position. (not met - NT)    2. Patient will perform core strengthening and mobility routing with good control to demonstrate improved core strength.  (met)    3. Patient will improve impaired lower extremity manual muscle tests to >/=4+/5 to improve strength for functional tasks.  (not met)    4. Patient will tolerate standing for 60 minutes with no increase in low back pain to return to PLOF.  (Partially met)            PLAN     DC with home exercise program       Pramod Fulton, PT

## 2025-02-26 PROBLEM — E21.3 HYPERPARATHYROIDISM: Status: ACTIVE | Noted: 2025-02-26

## 2025-02-27 ENCOUNTER — TELEPHONE (OUTPATIENT)
Dept: PRIMARY CARE CLINIC | Facility: CLINIC | Age: 75
End: 2025-02-27

## 2025-02-27 ENCOUNTER — OFFICE VISIT (OUTPATIENT)
Dept: PRIMARY CARE CLINIC | Facility: CLINIC | Age: 75
End: 2025-02-27
Attending: FAMILY MEDICINE
Payer: MEDICARE

## 2025-02-27 VITALS — DIASTOLIC BLOOD PRESSURE: 62 MMHG | HEART RATE: 66 BPM | SYSTOLIC BLOOD PRESSURE: 121 MMHG

## 2025-02-27 DIAGNOSIS — I70.0 AORTIC CALCIFICATION: ICD-10-CM

## 2025-02-27 DIAGNOSIS — M65.342 TRIGGER RING FINGER OF LEFT HAND: ICD-10-CM

## 2025-02-27 DIAGNOSIS — I35.0 MILD AORTIC STENOSIS: ICD-10-CM

## 2025-02-27 DIAGNOSIS — Z87.448: ICD-10-CM

## 2025-02-27 DIAGNOSIS — E21.3 HYPERPARATHYROIDISM: Primary | ICD-10-CM

## 2025-02-27 DIAGNOSIS — E83.52 HYPERCALCEMIA: ICD-10-CM

## 2025-02-27 DIAGNOSIS — N20.0 NEPHROLITHIASIS: ICD-10-CM

## 2025-02-27 DIAGNOSIS — E78.2 MIXED HYPERLIPIDEMIA: ICD-10-CM

## 2025-02-27 DIAGNOSIS — I48.0 PAROXYSMAL ATRIAL FIBRILLATION: ICD-10-CM

## 2025-02-27 DIAGNOSIS — R80.9 PROTEINURIA, UNSPECIFIED TYPE: ICD-10-CM

## 2025-02-27 DIAGNOSIS — I10 HYPERTENSION, ESSENTIAL: ICD-10-CM

## 2025-02-27 NOTE — TELEPHONE ENCOUNTER
Patient contacted in regards to scheduling of 3 month in person with labs prior. Appointment with MD and lab appointment have been scheduled with patient knowledge. Appointment details provided and reviewed. No further comments.

## 2025-02-27 NOTE — TELEPHONE ENCOUNTER
----- Message from Kamla Hoff DO sent at 2/27/2025 10:19 AM CST -----  Please schedule 3 month in person with labs prior. He is leaving the counter 5/26/25 so before that

## 2025-02-27 NOTE — PROGRESS NOTES
VIRTUAL/TELEMEDICINE VISIT   FAMILY MEDICINE  [facility]    The patient location is: [place]  The chief complaint leading to consultation is:   Chief Complaint   Patient presents with    Parathyroid    Hyperlipidemia    Trigger finger    Nephrolithiasis     Visit type: Virtual visit with synchronous audio and video   Total time spent: 30 minute  Each patient to whom he or she provides medical services by telemedicine is:  (1) informed of the relationship between the physician and patient and the respective role of any other health care provider with respect to management of the patient; and (2) notified that he or she may decline to receive medical services by telemedicine and may withdraw from such care at any time.    Reason for visit:   Chief Complaint   Patient presents with    Parathyroid    Hyperlipidemia    Trigger finger    Nephrolithiasis       SUBJECTIVE: Anthony Reynaga is a 74 y.o. 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    Anthony reports GI symptoms over the past 6-8 weeks, which have since resolved. Symptoms included bloating, indigestion, and occasional diarrhea. He had nausea and indigestion when fasting, with symptoms generally improving after meals, unless the meal was large, in which case bloating persisted. He attempted to identify dietary triggers, including dairy and spicy foods, but was unable to pinpoint any specific  cause. These symptoms have largely subsided over the past 7 days.    Lab results showed elevated parathyroid hormone levels, though calcium levels remain within normal range. He has been taking vitamin D supplements for 15-20 years, currently at a dose of 2,000 IU daily, after previous lab results showed deficiency. Vitamin D levels remain in the low-normal range at 39 on 2000 IU    He mentions a history of bladder papilloma removed in the early 1990s, with the last cystoscopy performed in 2010 showing no recurrence. He reports having kidney stones, with a 5mm stone removed in 2015 and currently has two 4mm stones, one on each side.  They have remained stable for several years.  His last imaging was in 2023.  He does have a urologist.  He denies symptoms.      He does reports concerns for trigger fingers of his left ring finger.  He has been on and off for several years.  He has been evaluated by hand ortho in the past. Occasionally locks up and can be painful in the mornings. This condition was evaluated by a specialist a few years ago but no significant intervention was recommended at that time.    He mentions a history of a scaphoid fracture in the right wrist at age 19 or 20, which was discovered incidentally a year after the injury. This has resulted in limited extension of the right hand and wrist.  Reports that he has seen orthopedic hand in the past and no intervention was recommended since it was a healed fracture and follow up surgery would be more complicated.    Anthony Reynaga that started physical therapy since visit.  He reports it has been successful in helping to improve the pain in his leg.    1. Hypertension with atrial fibrillation  - BP goal < 130/80 as tolerated  - reactive HTN and monitors at home    Current medication treatment:   amLODIPine (NORVASC) 5 MG tablet, Take 1 tablet (5 mg total) by mouth once daily., Disp: 90 tablet, Rfl: 3  chlorthalidone (HYGROTEN) 25 MG Tab, Take 1 tablet (25  mg total) by mouth once daily., Disp: 90 tablet, Rfl: 3  ELIQUIS 5 mg Tab, TAKE 1 TABLET BY MOUTH TWICE  DAILY, Disp: 180 tablet, Rfl: 3  flecainide (TAMBOCOR) 150 MG Tab, TAKE 2 TABLETS (300 mg total ) BY MOUTH AS NEEDED FOR AF. LIMIT ONE DOSE PER 24 HOURS., Disp: 30 tablet, Rfl: 3  metoprolol succinate (TOPROL-XL) 200 MG 24 hr tablet, TAKE 1 TABLET BY MOUTH ONCE  DAILY, Disp: 90 tablet, Rfl: 0  potassium chloride SA (K-DUR,KLOR-CON) 20 MEQ tablet, TAKE 1 TABLET BY MOUTH TWICE  DAILY, Disp: 180 tablet, Rfl: 0dministered medications on file prior to visit.    Medication side effects: denies    Exercise regimen: rare    Home BP cuff: Yes  How often does patient monitoring BP? gage    2. Hyperlipidemia  - he reports he has never had severe hyperlipidemia and requested to be on a statin secondary to cardiac prevention.  Since then he has been stable on medication and has discussed with his cardiologist recommended that he continue his statin  - LDL goal < 80    Current treatment:  simvastatin (ZOCOR) 20 MG tablet, TAKE 1 TABLET BY MOUTH IN THE  EVENING, Disp: 90 tablet, Rfl: 0    Side effects from current treatment: none    Lab Results       Component                Value               Date                       CHOL                     139                 02/20/2025                 CHOL                     155                 11/17/2023                 CHOL                     131                 10/06/2022            Lab Results       Component                Value               Date                       HDL                      47                  02/20/2025                 HDL                      47                  11/17/2023                 HDL                      45                  10/06/2022            Lab Results       Component                Value               Date                       LDLCALC                  68.6                02/20/2025                 LDLCALC                  74.4                 11/17/2023                 LDLCALC                  63.8                10/06/2022            Lab Results       Component                Value               Date                       TRIG                     117                 02/20/2025                 TRIG                     168 (H)             11/17/2023                 TRIG                     111                 10/06/2022              Lab Results       Component                Value               Date                       CHOLHDL                  33.8                02/20/2025                 CHOLHDL                  30.3                11/17/2023                 CHOLHDL                  34.4                10/06/2022          Review of Systems   HENT:  Negative for hearing loss.    Eyes:  Negative for discharge.   Respiratory:  Negative for wheezing.    Cardiovascular:  Negative for chest pain and palpitations.   Gastrointestinal:  Negative for blood in stool, constipation, diarrhea and vomiting.   Genitourinary:  Negative for hematuria and urgency.   Musculoskeletal:  Negative for neck pain.   Neurological:  Negative for weakness and headaches.   Endo/Heme/Allergies:  Negative for polydipsia.   All other systems reviewed and are negative.        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  I would approve him to hold coumadin x 3 days prior, without lovenox.  We will see him for an INR on 6/8 and will provide him with procedure instructions at that time., per [facility]    COLONOSCOPY N/A 05/15/2023    Procedure: COLONOSCOPY;  Surgeon: Noam Hollingsworth MD;  Location: [facility] ([address] [place] [address]);  Service: Endoscopy;  Laterality: N/A;  instr portal-tb-eliquis hold ok see te11/11/22  Ok to hold Eliquis- See t/e 3/30/23, instr via portal - PC  golytely  5/9 - precall done - [facility]    CYSTOSCOPY      benign bladder papilloma    FINGER SURGERY      HERNIA REPAIR Left 2016    inguinal    LITHOTRIPSY  2015    Right wrist fracture Right 2019    Navicular fracture    TONSILLECTOMY, ADENOIDECTOMY         Family History   Problem Relation Name Age of Onset    Cancer Mother Victoria Reynaga         Terminal Renal Cancer    Hypertension Mother Victoria Reynaga     Kidney cancer Mother Victoria Reynaga     Dementia Father Darnell Reynaga     Hypertension Father Darnell Reynaga     Benign prostatic hyperplasia Neg Hx         Social History[1]    Social History     Social History Narrative    Lives with long term partner who is also a [employer] Professor. 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 Walks and would like to start doing strength training. Nonsmoker. No children       ALLERGIES:   Review of patient's allergies indicates:   Allergen Reactions    Lisinopril Other (See Comments)     Cough    Uroxatral [alfuzosin] Other (See Comments)     orthostatic       MEDS:   Current Outpatient Medications on File Prior to Visit   Medication Sig Dispense Refill Last Dose/Taking    amLODIPine (NORVASC) 5 MG tablet Take 1 tablet (5 mg total) by mouth once daily. 90 tablet 3     chlorthalidone (HYGROTEN) 25 MG Tab Take 1 tablet (25 mg total) by mouth once daily. 90 tablet 3     ELIQUIS 5 mg Tab TAKE 1 TABLET BY MOUTH TWICE  DAILY 180 tablet 3     finasteride (PROSCAR) 5 mg tablet  Take 1 tablet (5 mg total) by mouth once daily. 90 tablet 3     flecainide (TAMBOCOR) 150 MG Tab TAKE 2 TABLETS (300 mg total ) BY MOUTH AS NEEDED FOR AF. LIMIT ONE DOSE PER 24 HOURS. 30 tablet 3     metoprolol succinate (TOPROL-XL) 200 MG 24 hr tablet Take 1 tablet (200 mg total) by mouth once daily. 90 tablet 3     potassium chloride SA (K-DUR,KLOR-CON) 20 MEQ tablet TAKE 1 TABLET BY MOUTH TWICE  DAILY 180 tablet 3     simvastatin (ZOCOR) 20 MG tablet Take 1 tablet (20 mg total) by mouth every evening. 90 tablet 3     vitamin D 1000 units Tab Take 2,000 Units by mouth once daily.           Vital signs:   Vitals:    02/27/25 1036   BP: 121/62   Pulse: 66     There is no height or weight on file to calculate BMI.    PHYSICAL EXAM:     Physical Exam  Constitutional:       General: He is not in acute distress.  Pulmonary:      Effort: Pulmonary effort is normal.   Neurological:      Mental Status: He is alert.   Psychiatric:         Speech: Speech normal.           PERTINENT RESULTS:   No visits with results within 1 Week(s) from this visit.   Latest known visit with results is:   Lab Visit on 02/20/2025   Component Date Value Ref Range Status    Cholesterol 02/20/2025 139  120 - 199 mg/dL Final    Comment: The National Cholesterol Education Program (NCEP) has set the  following guidelines (reference ranges) for Cholesterol:  Optimal.....................<200 mg/dL  Borderline High.............200-239 mg/dL  High........................> or = 240 mg/dL      Triglycerides 02/20/2025 117  30 - 150 mg/dL Final    Comment: The National Cholesterol Education Program (NCEP) has set the  following guidelines (reference values) for triglycerides:  Normal......................<150 mg/dL  Borderline High.............150-199 mg/dL  High........................200-499 mg/dL      HDL 02/20/2025 47  40 - 75 mg/dL Final    Comment: The National Cholesterol Education Program (NCEP) has set the  following guidelines (reference values)  for HDL Cholesterol:  Low...............<40 mg/dL  Optimal...........>60 mg/dL      LDL Cholesterol 02/20/2025 68.6  63.0 - 159.0 mg/dL Final    Comment: The National Cholesterol Education Program (NCEP) has set the  following guidelines (reference values) for LDL Cholesterol:  Optimal.......................<130 mg/dL  Borderline High...............130-159 mg/dL  High..........................160-189 mg/dL  Very High.....................>190 mg/dL      HDL/Cholesterol Ratio 02/20/2025 33.8  20.0 - 50.0 % Final    Total Cholesterol/HDL Ratio 02/20/2025 3.0  2.0 - 5.0 Final    Non-HDL Cholesterol 02/20/2025 92  mg/dL Final    Comment: Risk category and Non-HDL cholesterol goals:  Coronary heart disease (CHD)or equivalent (10-year risk of CHD >20%):  Non-HDL cholesterol goal     <130 mg/dL  Two or more CHD risk factors and 10-year risk of CHD <= 20%:  Non-HDL cholesterol goal     <160 mg/dL  0 to 1 CHD risk factor:  Non-HDL cholesterol goal     <190 mg/dL      Hemoglobin A1C 02/20/2025 5.3  4.0 - 5.6 % Final    Comment: ADA Screening Guidelines:  5.7-6.4%  Consistent with prediabetes  >or=6.5%  Consistent with diabetes    High levels of fetal hemoglobin interfere with the HbA1C  assay. Heterozygous hemoglobin variants (HbS, HgC, etc)do  not significantly interfere with this assay.   However, presence of multiple variants may affect accuracy.      Estimated Avg Glucose 02/20/2025 105  68 - 131 mg/dL Final    Sodium 02/20/2025 141  136 - 145 mmol/L Final    Potassium 02/20/2025 4.0  3.5 - 5.1 mmol/L Final    Chloride 02/20/2025 106  95 - 110 mmol/L Final    CO2 02/20/2025 27  23 - 29 mmol/L Final    Glucose 02/20/2025 92  70 - 110 mg/dL Final    BUN 02/20/2025 12  8 - 23 mg/dL Final    Creatinine 02/20/2025 0.8  0.5 - 1.4 mg/dL Final    Calcium 02/20/2025 10.5  8.7 - 10.5 mg/dL Final    Total Protein 02/20/2025 7.2  6.0 - 8.4 g/dL Final    Albumin 02/20/2025 4.1  3.5 - 5.2 g/dL Final    Total Bilirubin 02/20/2025 0.8   0.1 - 1.0 mg/dL Final    Comment: For infants and newborns, interpretation of results should be based  on gestational age, weight and in agreement with clinical  observations.    Premature Infant recommended reference ranges:  Up to 24 hours.............<8.0 mg/dL  Up to 48 hours............<12.0 mg/dL  3-5 days..................<15.0 mg/dL  6-29 days.................<15.0 mg/dL      Alkaline Phosphatase 02/20/2025 46  40 - 150 U/L Final    AST 02/20/2025 25  10 - 40 U/L Final    ALT 02/20/2025 19  10 - 44 U/L Final    eGFR 02/20/2025 >60.0  >60 mL/min/1.73 m^2 Final    Anion Gap 02/20/2025 8  8 - 16 mmol/L Final    WBC 02/20/2025 6.82  3.90 - 12.70 K/uL Final    RBC 02/20/2025 5.64  4.60 - 6.20 M/uL Final    Hemoglobin 02/20/2025 15.6  14.0 - 18.0 g/dL Final    Hematocrit 02/20/2025 48.0  40.0 - 54.0 % Final    MCV 02/20/2025 85  82 - 98 fL Final    MCH 02/20/2025 27.7  27.0 - 31.0 pg Final    MCHC 02/20/2025 32.5  32.0 - 36.0 g/dL Final    RDW 02/20/2025 13.6  11.5 - 14.5 % Final    Platelets 02/20/2025 164  150 - 450 K/uL Final    MPV 02/20/2025 11.9  9.2 - 12.9 fL Final    Immature Granulocytes 02/20/2025 0.3  0.0 - 0.5 % Final    Gran # (ANC) 02/20/2025 4.2  1.8 - 7.7 K/uL Final    Immature Grans (Abs) 02/20/2025 0.02  0.00 - 0.04 K/uL Final    Comment: Mild elevation in immature granulocytes is non specific and   can be seen in a variety of conditions including stress response,   acute inflammation, trauma and pregnancy. Correlation with other   laboratory and clinical findings is essential.      Lymph # 02/20/2025 1.7  1.0 - 4.8 K/uL Final    Mono # 02/20/2025 0.6  0.3 - 1.0 K/uL Final    Eos # 02/20/2025 0.2  0.0 - 0.5 K/uL Final    Baso # 02/20/2025 0.09  0.00 - 0.20 K/uL Final    nRBC 02/20/2025 0  0 /100 WBC Final    Gran % 02/20/2025 61.9  38.0 - 73.0 % Final    Lymph % 02/20/2025 24.9  18.0 - 48.0 % Final    Mono % 02/20/2025 8.5  4.0 - 15.0 % Final    Eosinophil % 02/20/2025 3.1  0.0 - 8.0 % Final     Basophil % 02/20/2025 1.3  0.0 - 1.9 % Final    Differential Method 02/20/2025 Automated   Final    Vit D, 25-Hydroxy 02/20/2025 39  30 - 96 ng/mL Final    Comment: Vitamin D deficiency.........<10 ng/mL                              Vitamin D insufficiency......10-29 ng/mL       Vitamin D sufficiency........> or equal to 30 ng/mL  Vitamin D toxicity............>100 ng/mL      Ionized Calcium 02/20/2025 1.28  1.06 - 1.42 mmol/L Final    PTH, Intact 02/20/2025 119.6 (H)  9.0 - 77.0 pg/mL Final       ASSESSMENT/PLAN:    1. Hyperparathyroidism  Overview:  Lab Results   Component Value Date    .6 (H) 02/20/2025    CALCIUM 10.5 02/20/2025    CAION 1.28 02/20/2025    PHOS 2.7 08/23/2019     - increase vitamin D3 2000 IU daily to 4000 IU  - Calcium level normal  -recommend continue to monitor    Orders:  -     Vitamin D 25-Hydroxy; Future; Expected date: 05/27/2025  -     Calcium, Ionized; Future; Expected date: 05/27/2025  -     PTH, Intact; Future; Expected date: 05/27/2025    2. Trigger ring finger of left hand  Overview:  - discuss management of trigger finger   -recommend rest and splinting with over-the-counter splint around 3-4 weeks.  He can also attend occupational therapy  -he reports issues chronic      3. Hypercalcemia  Overview:  Lab Results   Component Value Date    CALCIUM 10.5 02/20/2025    PHOS 2.7 08/23/2019     Lab Results   Component Value Date    .6 (H) 02/20/2025    CALCIUM 10.5 02/20/2025    CAION 1.28 02/20/2025    PHOS 2.7 08/23/2019       - chronic in his ionized calcium is normal   -continue to monitor with ionized calcium, PTH and vitamin-D level  - improved and calcium normal    Orders:  -     Vitamin D 25-Hydroxy; Future; Expected date: 05/27/2025  -     Calcium, Ionized; Future; Expected date: 05/27/2025  -     PTH, Intact; Future; Expected date: 05/27/2025    4. Nephrolithiasis  Overview:  - 2015 one 5 mm right ureteral stone removed via lithotripsy.  - Two 4 mm stable bilateral  renal stone still present 11/2023 Xray Lumbar   - asymptomatic  - recommend discuss with Urology      5. Proteinuria, unspecified type  Overview:  - 2/2025 urinalysis showed trace protein   -repeat in 3 months    Orders:  -     Cancel: Urinalysis, Reflex to Urine Culture Urine, Clean Catch; Future; Expected date: 05/27/2025  -     Urinalysis, Reflex to Urine Culture Urine, Clean Catch; Future; Expected date: 05/27/2025    6. 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 Hypertension, essential  Overview:  - with severe reactive hypertension  - home blood pressure cuff Omron  - well controlled  - continue current management plan   - patient encouraged to notify me with any changes      8. Paroxysmal atrial fibrillation  Overview:  - diagnosed 12/13/1996  - stable  - followed by electrophysiology       9. Mixed hyperlipidemia  Overview:  Lab Results   Component Value Date    LDLCALC 68.6 02/20/2025     - cardio prevention  - well controlled  - continue current management plan   - patient encouraged to notify me with any changes      10. Mild aortic stenosis  Overview:  08/27/2024 echocardiogram: Aortic Valve: The aortic valve is probably bileaflet with fusion of the right and non-coronary cusp leaflets. There is moderate aortic valve sclerosis. Mildly restricted motion. There is no stenosis. Aortic valve peak velocity is 2.50 m/s. Mean gradient is 13 mmHg. (LVOT diameter is near 27mm.) There is mild aortic regurgitation.   -bi-leaflet with fusion of the right and non coronary cusp leaflets  - followed by cardiology  -  stable and asymptomatic      11. Aortic calcification  Overview:  11/08/2019 CT chest: Calcification of the aortic annulus.    - No history of valve replacement.  - on statin for secondary prevention              ORDERS:   Orders Placed This Encounter    Vitamin D 25-Hydroxy    Calcium, Ionized    PTH, Intact    Urinalysis, Reflex to Urine Culture Urine, Clean Catch       Vaccines recommended: up to date    Follow up in about 3 months (around 5/27/2025) for Labs. or sooner with any concerns      This note is dictated using the M*Modal Fluency Direct word recognition program. There are word recognition mistakes that are occasionally missed on review.    Dr. Kamla Hoff D.O.   Family Medicine         [1]   Social History  Tobacco Use    Smoking status: Never    Smokeless tobacco: Never   Substance Use Topics    Alcohol use: Yes     Alcohol/week: 1.0 standard drink of alcohol     Types: 1 Cans of beer per week     Comment: 1-2 cans of beer 2-3 times per month    Drug use: No

## 2025-03-02 DIAGNOSIS — I48.0 PAROXYSMAL ATRIAL FIBRILLATION: ICD-10-CM

## 2025-03-05 RX ORDER — APIXABAN 5 MG/1
5 TABLET, FILM COATED ORAL 2 TIMES DAILY
Qty: 180 TABLET | Refills: 3 | Status: SHIPPED | OUTPATIENT
Start: 2025-03-05

## 2025-03-07 ENCOUNTER — PATIENT MESSAGE (OUTPATIENT)
Dept: PRIMARY CARE CLINIC | Facility: CLINIC | Age: 75
End: 2025-03-07
Payer: MEDICARE

## 2025-03-11 ENCOUNTER — PATIENT MESSAGE (OUTPATIENT)
Dept: PRIMARY CARE CLINIC | Facility: CLINIC | Age: 75
End: 2025-03-11
Payer: MEDICARE

## 2025-03-18 ENCOUNTER — PATIENT MESSAGE (OUTPATIENT)
Dept: PRIMARY CARE CLINIC | Facility: CLINIC | Age: 75
End: 2025-03-18
Payer: MEDICARE

## 2025-03-18 DIAGNOSIS — Z78.9 MEASLES, MUMPS, RUBELLA (MMR) VACCINATION STATUS UNKNOWN: ICD-10-CM

## 2025-03-18 DIAGNOSIS — Z00.01 ENCOUNTER FOR GENERAL ADULT MEDICAL EXAMINATION WITH ABNORMAL FINDINGS: Primary | ICD-10-CM

## 2025-03-20 ENCOUNTER — LAB VISIT (OUTPATIENT)
Dept: LAB | Facility: HOSPITAL | Age: 75
End: 2025-03-20
Payer: MEDICARE

## 2025-03-20 DIAGNOSIS — Z78.9 MEASLES, MUMPS, RUBELLA (MMR) VACCINATION STATUS UNKNOWN: ICD-10-CM

## 2025-03-20 PROCEDURE — 86762 RUBELLA ANTIBODY: CPT | Performed by: FAMILY MEDICINE

## 2025-03-20 PROCEDURE — 36415 COLL VENOUS BLD VENIPUNCTURE: CPT | Performed by: FAMILY MEDICINE

## 2025-03-20 PROCEDURE — 86765 RUBEOLA ANTIBODY: CPT | Performed by: FAMILY MEDICINE

## 2025-03-20 PROCEDURE — 86735 MUMPS ANTIBODY: CPT | Performed by: FAMILY MEDICINE

## 2025-03-21 LAB
MUMPS IGG INTERPRETATION: POSITIVE
MUMPS IGG SCREEN: 84.3 AU/ML
RUBEOLA IGG ANTIBODY: >300 AU/ML
RUBEOLA INTERPRETATION: POSITIVE
RUBV IGG SER-ACNC: <5 IU/ML
RUBV IGG SER-IMP: ABNORMAL

## 2025-03-24 ENCOUNTER — RESULTS FOLLOW-UP (OUTPATIENT)
Dept: PRIMARY CARE CLINIC | Facility: CLINIC | Age: 75
End: 2025-03-24
Payer: MEDICARE

## 2025-03-24 DIAGNOSIS — Z00.00 ENCOUNTER FOR MEDICARE ANNUAL WELLNESS EXAM: ICD-10-CM

## 2025-04-20 ENCOUNTER — PATIENT MESSAGE (OUTPATIENT)
Dept: PRIMARY CARE CLINIC | Facility: CLINIC | Age: 75
End: 2025-04-20
Payer: MEDICARE

## 2025-04-20 DIAGNOSIS — I10 HYPERTENSION, ESSENTIAL: ICD-10-CM

## 2025-04-20 DIAGNOSIS — E78.2 MIXED HYPERLIPIDEMIA: ICD-10-CM

## 2025-04-21 RX ORDER — METOPROLOL SUCCINATE 200 MG/1
200 TABLET, EXTENDED RELEASE ORAL DAILY
Qty: 90 TABLET | Refills: 3 | Status: SHIPPED | OUTPATIENT
Start: 2025-04-21 | End: 2026-04-21

## 2025-04-21 RX ORDER — SIMVASTATIN 20 MG/1
20 TABLET, FILM COATED ORAL NIGHTLY
Qty: 90 TABLET | Refills: 3 | Status: SHIPPED | OUTPATIENT
Start: 2025-04-21 | End: 2026-04-21

## 2025-04-21 NOTE — TELEPHONE ENCOUNTER
Refill Encounter    PCP Visits: Recent Visits  Date Type Provider Dept   02/27/25 Office Visit Kamla Hoff,  [facility]   12/30/24 Office Visit Kamla Hoff DO [facility]   Showing recent visits within past 360 days and meeting all other requirements  Future Appointments  Date Type Provider Dept   05/22/25 Appointment Kamla Hoff DO [facility]   Showing future appointments within next 720 days and meeting all other requirements      Last 3 Blood Pressure:   BP Readings from Last 3 Encounters:   02/27/25 121/62   12/30/24 121/62   08/27/24 124/64     Preferred Pharmacy:   [id number] - [facility] [address] [facility]  [address] [facility] [address]  Phone: [phone] Fax: [phone]    [facility] - [place] [address] [address]  Phone: [phone] Fax: [phone]    [facility] - [facility] [address]  [address] [address]  Phone: [phone] Fax: [phone]    Requested RX:  Requested Prescriptions     Pending Prescriptions Disp Refills    metoprolol succinate (TOPROL-XL) 200 MG 24 hr tablet 90 tablet 3     Sig: Take 1 tablet (200 mg total) by mouth once daily.    simvastatin (ZOCOR) 20 MG tablet 90 tablet 3     Sig: Take 1 tablet (20 mg total) by mouth every evening.      RX Route: Normal

## 2025-04-21 NOTE — TELEPHONE ENCOUNTER
No care due was identified.  [facility] Embedded Care Due Messages. Reference number: [id number].   4/21/2025 8:40:30 AM CDT

## 2025-05-07 ENCOUNTER — PATIENT MESSAGE (OUTPATIENT)
Dept: PRIMARY CARE CLINIC | Facility: CLINIC | Age: 75
End: 2025-05-07
Payer: MEDICARE

## 2025-05-10 NOTE — PROGRESS NOTES
Subjective:     Patient ID: Anthony Reynaga is a 73 y.o. male.    Chief Complaint: Pain of the Left Lower Leg    Patient is a 73-year-old male with a past medical history of BPH, proximal atrial fibrillation, vocal cord disturbance, hypertension, aortic regurgitation, hyperfunctional dysphonia, PVCs, aortic stenosis, history of posterior vitreous detachment bilaterally who comes in to Orthopedics for evaluation of pain involving his left lower leg.  According to the patient he has been having pain in his left leg for approximately 3 months.  He describes the pain to the lateral aspect just below the knee which he describes as a burning pain.  He noted that his ankle was bruise approximately 1 week ago but then resolved on its own.  He denies any recent trauma or injuries.  His other complaints involved left knee and left hip pain for the past 3 months.  He is known to have chronic back pain and underwent CT and MRI approximately 10 years ago at [facility] and was told that he had lumbar disc disease.  He denies any numbness or tingling sensation down his lower leg.  Denies any groin pain.  He describes the pain as a burning like pain but rates the pain at a 0/10.  He does endorse falling while walking in a park as he tripped over roots sticking out of the ground several weeks ago but other than that no other trauma.        Review of Systems   Musculoskeletal:  Positive for arthritis, back pain, joint pain, muscle weakness and myalgias.   All other systems reviewed and are negative.      Objective:       General    Vitals reviewed.  Constitutional: He is oriented to person, place, and time. He appears well-developed and well-nourished. No distress.   HENT:   Head: Normocephalic and atraumatic.   Eyes: Conjunctivae are normal. Pupils are equal, round, and reactive to light.   Neck: Neck supple.   Cardiovascular:  Intact distal pulses.            Pulmonary/Chest: Effort normal.   Neurological: He is alert and  oriented to person, place, and time. He has normal reflexes.   Psychiatric: He has a normal mood and affect. His behavior is normal. Judgment and thought content normal.     General Musculoskeletal Exam   Gait: normal       Right Knee Exam   Right knee exam is normal.    Left Knee Exam   Left knee exam is normal.    Comments:  Ruiz test is negative.    Right Hip Exam   Right hip exam is normal.   Left Hip Exam   Left hip exam is normal.            Physical Exam  Vitals reviewed.   Constitutional:       General: He is not in acute distress.     Appearance: He is well-developed and well-nourished. He is not diaphoretic.   HENT:      Head: Normocephalic and atraumatic.   Eyes:      Conjunctiva/sclera: Conjunctivae normal.      Pupils: Pupils are equal, round, and reactive to light.   Cardiovascular:      Pulses: Intact distal pulses.   Pulmonary:      Effort: Pulmonary effort is normal.   Musculoskeletal:      Cervical back: Neck supple.   Neurological:      Mental Status: He is alert and oriented to person, place, and time.      Deep Tendon Reflexes: Reflexes are normal and symmetric.   Psychiatric:         Mood and Affect: Mood and affect normal.         Behavior: Behavior normal.         Thought Content: Thought content normal.         Judgment: Judgment normal.     RADS:  X-ray of his left tib-fib, left knee, left hip were obtained and personally reviewed by me.  No acute fracture seen in any of his x-rays obtained today.  Left hip x-ray does show degenerative changes of his bilateral sacroiliac joints as well as his pubic symphysis.  He also appears to have some lumbar stenosis.    Assessment:     Encounter Diagnoses   Name Primary?    Pain of left knee and lower leg Yes    Left hip pain        Plan:      Anthony was seen today for pain.    Diagnoses and all orders for this visit:    Pain of left knee and lower leg    Left hip pain      73-year-old male who presents to Orthopedics for evaluation of left lower leg  pain.  He is also having chronic pain involving his left knee and left hip.  I believe his symptoms are more associated with his lower back.  Offered to send the patient who physical therapy for his left hip and left knee but he will defer this until seen by back and spine.    Future Appointments   Date Time Provider Department Center   11/9/2023  1:00 PM Hector Haider MD [facility] DERM [address]   11/13/2023  2:00 PM DALJIT Hook, NP [facility] SPINE [address]   11/17/2023  8:00 AM Hayes Ferrera MD [facility] IM [address]   6/17/2024  9:00 AM LAB, HEMONC CANCER BLDG [facility] LAB [address]   6/20/2024  1:00 PM Livan Lowry MD [facility] UROLOG [address]            10-May-2025 16:18

## 2025-05-11 ENCOUNTER — PATIENT MESSAGE (OUTPATIENT)
Dept: PRIMARY CARE CLINIC | Facility: CLINIC | Age: 75
End: 2025-05-11
Payer: MEDICARE

## 2025-05-12 ENCOUNTER — OFFICE VISIT (OUTPATIENT)
Dept: PRIMARY CARE CLINIC | Facility: CLINIC | Age: 75
End: 2025-05-12
Payer: MEDICARE

## 2025-05-12 VITALS — WEIGHT: 174.06 LBS | BODY MASS INDEX: 24.28 KG/M2

## 2025-05-12 DIAGNOSIS — M54.50 CHRONIC BILATERAL LOW BACK PAIN WITHOUT SCIATICA: Primary | ICD-10-CM

## 2025-05-12 DIAGNOSIS — G89.29 CHRONIC BILATERAL LOW BACK PAIN WITHOUT SCIATICA: Primary | ICD-10-CM

## 2025-05-12 PROCEDURE — 99213 OFFICE O/P EST LOW 20 MIN: CPT | Mod: PBBFAC,PN

## 2025-05-12 PROCEDURE — 99999 PR PBB SHADOW E&M-EST. PATIENT-LVL III: CPT | Mod: PBBFAC,,,

## 2025-05-12 PROCEDURE — 99214 OFFICE O/P EST MOD 30 MIN: CPT | Mod: S$PBB,,,

## 2025-05-12 RX ORDER — TIZANIDINE 4 MG/1
4 TABLET ORAL NIGHTLY PRN
Qty: 20 TABLET | Refills: 0 | Status: SHIPPED | OUTPATIENT
Start: 2025-05-12 | End: 2025-06-01

## 2025-05-12 RX ORDER — METHYLPREDNISOLONE 4 MG/1
TABLET ORAL
Qty: 1 EACH | Refills: 0 | Status: SHIPPED | OUTPATIENT
Start: 2025-05-12
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
## 2025-05-13 ENCOUNTER — CLINICAL SUPPORT (OUTPATIENT)
Dept: REHABILITATION | Facility: OTHER | Age: 75
End: 2025-05-13
Payer: MEDICARE

## 2025-05-13 ENCOUNTER — PATIENT MESSAGE (OUTPATIENT)
Dept: PRIMARY CARE CLINIC | Facility: CLINIC | Age: 75
End: 2025-05-13
Payer: MEDICARE

## 2025-05-13 DIAGNOSIS — Z74.09 DECREASED FUNCTIONAL MOBILITY AND ENDURANCE: Primary | ICD-10-CM

## 2025-05-13 PROCEDURE — 97110 THERAPEUTIC EXERCISES: CPT | Mod: PN

## 2025-05-13 PROCEDURE — 97161 PT EVAL LOW COMPLEX 20 MIN: CPT | Mod: PN
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
## 2025-05-16 ENCOUNTER — LAB VISIT (OUTPATIENT)
Dept: LAB | Facility: OTHER | Age: 75
End: 2025-05-16
Attending: FAMILY MEDICINE
Payer: MEDICARE

## 2025-05-16 ENCOUNTER — CLINICAL SUPPORT (OUTPATIENT)
Dept: REHABILITATION | Facility: OTHER | Age: 75
End: 2025-05-16
Attending: PHYSICAL THERAPIST
Payer: MEDICARE

## 2025-05-16 DIAGNOSIS — Z74.09 DECREASED FUNCTIONAL MOBILITY AND ENDURANCE: Primary | ICD-10-CM

## 2025-05-16 DIAGNOSIS — E83.52 HYPERCALCEMIA: ICD-10-CM

## 2025-05-16 DIAGNOSIS — E21.3 HYPERPARATHYROIDISM: ICD-10-CM

## 2025-05-16 LAB
25(OH)D3+25(OH)D2 SERPL-MCNC: 52 NG/ML (ref 30–96)
CA-I BLD-SCNC: 1.35 MMOL/L (ref 1.06–1.42)
PTH-INTACT SERPL-MCNC: 104.1 PG/ML (ref 9–77)

## 2025-05-16 PROCEDURE — 97112 NEUROMUSCULAR REEDUCATION: CPT | Mod: PN | Performed by: PHYSICAL THERAPIST

## 2025-05-16 PROCEDURE — 82330 ASSAY OF CALCIUM: CPT

## 2025-05-16 PROCEDURE — 36415 COLL VENOUS BLD VENIPUNCTURE: CPT

## 2025-05-16 PROCEDURE — 82306 VITAMIN D 25 HYDROXY: CPT

## 2025-05-16 PROCEDURE — 97140 MANUAL THERAPY 1/> REGIONS: CPT | Mod: PN | Performed by: PHYSICAL THERAPIST

## 2025-05-16 PROCEDURE — 83970 ASSAY OF PARATHORMONE: CPT

## 2025-05-19 ENCOUNTER — RESULTS FOLLOW-UP (OUTPATIENT)
Dept: PRIMARY CARE CLINIC | Facility: CLINIC | Age: 75
End: 2025-05-19

## 2025-05-19 PROBLEM — Z74.09 DECREASED FUNCTIONAL MOBILITY AND ENDURANCE: Status: ACTIVE | Noted: 2025-05-19

## 2025-05-19 NOTE — PROGRESS NOTES
Outpatient Rehab    Physical Therapy Visit    Patient Name: Anthony Reynaga  MRN: [id number]  YOB: 1950  Encounter Date: 5/16/2025    Therapy Diagnosis:   Encounter Diagnosis   Name Primary?    Decreased functional mobility and endurance Yes     Physician: Kamla Hoff DO    Physician Orders: Eval and Treat  Medical Diagnosis: Poor posture  Chronic bilateral low back pain without sciatica    Visit # / Visits Authorized:  10 / 20  Insurance Authorization Period: 1/7/2025 to 11/30/2025  Date of Evaluation: 5/13/2025  Plan of Care Certification: 5/13/2025 to 8/13/2025        Time In: 1001   Time Out: 1100  Total Time (in minutes): 59   Total Billable Time (in minutes): 38         Subjective   Isn't sure about some of the stuff he is doing. Wants to makes sure he is performing correctly as he continues to have pain..  Pain reported as 4/10. low back    Objective          Right lumbar extension rotation movement pattern.     Treatment:  Manual Therapy  MT 1: L3/4 sidelying gapping mobilization grade 3  MT 2: Breaking bread grade 3  MT 3: pt education  Balance/Neuromuscular Re-Education  NMR 1: TrA hooklying trainind 10x10  NMR 2: dead bug 2x20  NMR 3: Bridging with cuing on TrA and slight pelvic tilt to improve pain. 3x10  NMR 4: Bird dog legs only c cuing on maintaining core activation 2x10 B  NMR 5: FMP lumbar flexion in chair 2x10  NMR 6: Pallof Press with cuing on how to perform at home 2x10 B GTB      Time Entry(in minutes):  Manual Therapy Time Entry: 15  Neuromuscular Re-Education Time Entry: 40    Assessment & Plan   Assessment: Anthony presents to PT today with continued pain. He demonstrates a R rotation extension movement pattern which may be continuing to contribute to dysfunction. He required cuing throughout treatment on proper core activation.       Patient will continue to benefit from skilled outpatient physical therapy to address the deficits listed in the problem list box on initial  evaluation, provide pt/family education and to maximize pt's level of independence in the home and community environment.     Patient's spiritual, cultural, and educational needs considered and patient agreeable to plan of care and goals.     Education  Education was done with Patient.           - pt education on HEP, PLAN OF CARE, and signs and symptoms as they relate to current dysfunction       Plan: Continue to assess response to tx, core control, lumbar mobility    Goals:   Active       LTG/STG       -       Start:  05/19/25    Expected End:  07/14/25       1.Report decreased lumbar pain  < / =  4/10  to increase tolerance for adls  2. Increase ROM by 10 degrees where limited in order to perform ADLs without difficulty.  3. Increase strength by 1/3 MMT grade in hip abd  to increase tolerance for ADL and work activities.  4. Pt to tolerate HEP to improve ROM and independence with ADL's    1.Report decreased lumbar pain < / = 2/10  to increase tolerance for adls  2.Patient goal: go to trip and have no pain with walking  3.Increase strength to 4+/5 in  hip ER bilaterally  to increase tolerance for ADL and work activities.  4. Pt will report at predicted goal on FOTO  to demonstrate increase in LE function with every day tasks.                Emilie Brizuela, PT, DPT

## 2025-05-19 NOTE — PROGRESS NOTES
FAMILY MEDICINE  [facility]    Reason for visit:   Chief Complaint   Patient presents with    Follow-up    Hypertension    parathyroid    Back Pain         SUBJECTIVE: Anthony Reynaga is a 75 y.o. 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  Thyroid labs, and back pain    Cardiology: Jean Capone MD  Urology: Dr. Lowry, Livan GRANADOS MD  ENT Wilfredo Scott MD  Dermatology Hector Lebron MD   EP pending with Dr. Oliva 5/23/25    Anthony Reynaga saw NP 5/12/25 and I reviewed note.  Reports since his last visit he completed physical therapy for his back and he was doing great however prior to his visit with the nurse practitioner recently he reports that he had stoop down to  something and had sudden flare of his low back pain.  He denies any sciatica.  He denies any radiation down his legs, numbness or tingling.  He has an upcoming trip and was hoping that is a medication would be helpful.  Unfortunately he was unable to tolerate the Medrol Dosepak secondary to episodes of tachycardia.  He did try the cyclobenzaprine half tab and reports that it helped him sleep.  He has resume physical therapy and is hopeful that it will be helpful again      1. Hypertension with atrial fibrillation  - BP goal < 130/80 as tolerated  - reactive HTN and monitors at home    Current medication treatment:   amLODIPine (NORVASC) 5 MG tablet, Take 1 tablet (5 mg total) by mouth once daily., Disp: 90 tablet, Rfl: 3  chlorthalidone (HYGROTEN) 25 MG Tab, Take 1 tablet (25 mg total) by mouth once daily., Disp: 90 tablet, Rfl: 3  ELIQUIS 5 mg Tab, TAKE 1 TABLET BY MOUTH TWICE  DAILY, Disp: 180 tablet, Rfl: 3  flecainide (TAMBOCOR) 150 MG Tab, TAKE 2 TABLETS (300 mg  total ) BY MOUTH AS NEEDED FOR AF. LIMIT ONE DOSE PER 24 HOURS., Disp: 30 tablet, Rfl: 3  metoprolol succinate (TOPROL-XL) 200 MG 24 hr tablet, TAKE 1 TABLET BY MOUTH ONCE  DAILY, Disp: 90 tablet, Rfl: 0  potassium chloride SA (K-DUR,KLOR-CON) 20 MEQ tablet, TAKE 1 TABLET BY MOUTH TWICE  DAILY, Disp: 180 tablet, Rfl: 0    Lab Results       Component                Value               Date                       K                        4.0                 02/20/2025              Medication side effects: denies    Exercise regimen: rare    Home BP cuff: Yes  How often does patient monitoring BP? gage    2. Hyperparathyroidism    Anthony Reynaga was noted to have elevated calcium levels on his 11/27/2023 labs prior provider and again for me on 02/20/2025.  I would follow up testing to check his ionized calcium, parathyroid and vitamin-D level.  At that time his parathyroid was elevated.  His ionized calcium was normal.  His vitamin-D was 39.    He has been asymptomatic.  He has not had a bone density screening.  Since his last visit he increased his vitamin D3 2000 IU daily to 4000 IU daily.  He is tolerating it well.      Lab Results       Component                Value               Date                       PTH                      104.1 (H)           05/16/2025                 CALCIUM                  10.5                02/20/2025                 CAION                    1.35                05/16/2025                 PHOS                     2.7                 08/23/2019              Vitamin D level 52 - taking vitamin D3 4000 IU daily        3. Hyperlipidemia  - he reports he has never had severe hyperlipidemia and requested to be on a statin secondary to cardiac prevention.  Since then he has been stable on medication and has discussed with his cardiologist recommended that he continue his statin  - LDL goal < 80    Current treatment:  simvastatin (ZOCOR) 20 MG tablet, TAKE 1 TABLET BY MOUTH IN THE  EVENING,  Disp: 90 tablet, Rfl: 0    Side effects from current treatment: none    Lab Results       Component                Value               Date                       CHOL                     139                 02/20/2025                 CHOL                     155                 11/17/2023                 CHOL                     131                 10/06/2022             Lab Results       Component                Value               Date                       HDL                      47                  02/20/2025                 HDL                      47                  11/17/2023                 HDL                      45                  10/06/2022            Lab Results       Component                Value               Date                       LDLCALC                  68.6                02/20/2025                 LDLCALC                  74.4                11/17/2023                 LDLCALC                  63.8                10/06/2022             Lab Results       Component                Value               Date                       TRIG                     117                 02/20/2025                 TRIG                     168 (H)             11/17/2023                 TRIG                     111                 10/06/2022            Lab Results       Component                Value               Date                       TOTALCHOLEST             3.0                 02/20/2025                 TOTALCHOLEST             3.3                 11/17/2023                 TOTALCHOLEST             2.9                 10/06/2022            Lab Results       Component                Value               Date                       NONHDLCHOL               92                  02/20/2025                 NONHDLCHOL               108                 11/17/2023                 NONHDLCHOL               86                  10/06/2022            Lab Results       Component                Value               Date                        CHOLHDL                  33.8                02/20/2025                 CHOLHDL                  30.3                11/17/2023                 CHOLHDL                  34.4                10/06/2022                      Review of Systems   All other systems reviewed and are negative.      HEALTH MAINTENANCE:   Health Maintenance   Topic Date Due    PROSTATE-SPECIFIC ANTIGEN  10/30/2020    TETANUS VACCINE  05/26/2025    High Dose Statin  05/12/2026    Lipid Panel  02/20/2030    Colorectal Cancer Screening  05/15/2030    Hepatitis C Screening  Completed    Shingles Vaccine  Completed    Influenza Vaccine  Completed    COVID-19 Vaccine  Completed    RSV Vaccine (Age 60+ and Pregnant patients)  Completed    Pneumococcal Vaccines (Age 50+)  Completed     Health Maintenance Topics with due status: Not Due       Topic Last Completion Date    TETANUS VACCINE 05/26/2015    Colorectal Cancer Screening 05/15/2023    Lipid Panel 02/20/2025    High Dose Statin 05/12/2025     Health Maintenance Due   Topic Date Due    PROSTATE-SPECIFIC ANTIGEN  10/30/2020       HISTORY:   Past Medical History:   Diagnosis Date    Anticoagulant long-term use     Atrial fibrillation     Bilateral nephrolithiasis     Bladder papilloma     1990    BPH (benign prostatic hyperplasia)     Cataract     Elevated PSA     Floaters     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  I would approve him to hold coumadin x 3 days prior, without lovenox.  We will see him for  an INR on 6/8 and will provide him with procedure instructions at that time., per [facility]    COLONOSCOPY N/A 05/15/2023    Procedure: COLONOSCOPY;  Surgeon: Noam Hollingsworth MD;  Location: [facility] ([address] [place] [address]);  Service: Endoscopy;  Laterality: N/A;  instr portal-tb-eliquis hold ok see te11/11/22  Ok to hold Eliquis- See t/e 3/30/23, instr via portal - PC  golytely  5/9 - precall done - [facility]    CYSTOSCOPY      benign bladder papilloma    FINGER SURGERY      HERNIA REPAIR Left 2016    inguinal    LITHOTRIPSY  2015    Right wrist fracture Right 2019    Navicular fracture    TONSILLECTOMY, ADENOIDECTOMY         Family History   Problem Relation Name Age of Onset    Cancer Mother Victoria Reynaga         Terminal Renal Cancer    Hypertension Mother Victoria Reynaga     Kidney cancer Mother Victoria Reynaga     Dementia Father Darnell Reynaga     Hypertension Father Darnell Reynaga     Benign prostatic hyperplasia Neg Hx         Social History[1]    Social History     Social History Narrative    Lives with long term partner who is also a [employer] Professor. 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 Walks and would like to start doing strength training. Nonsmoker. No children       ALLERGIES:   Review of patient's allergies indicates:   Allergen Reactions    Lisinopril Other (See Comments)     Cough    Uroxatral [alfuzosin] Other (See Comments)     orthostatic       MEDS:   Current Outpatient Medications on File Prior to Visit   Medication Sig Dispense Refill Last Dose/Taking    amLODIPine (NORVASC) 5 MG tablet Take 1 tablet (5 mg total) by mouth once daily. 90 tablet 3     chlorthalidone (HYGROTEN) 25 MG Tab Take 1 tablet (25 mg total) by mouth once daily. 90 tablet 3     ELIQUIS 5 mg Tab TAKE 1 TABLET BY MOUTH TWICE  DAILY 180 tablet 3     finasteride (PROSCAR) 5 mg tablet Take 1 tablet (5 mg total) by mouth once daily. 90 tablet 3     flecainide (TAMBOCOR) 150 MG Tab TAKE 2 TABLETS (300 mg total ) BY MOUTH AS  "NEEDED FOR AF. LIMIT ONE DOSE PER 24 HOURS. 30 tablet 3     metoprolol succinate (TOPROL-XL) 200 MG 24 hr tablet Take 1 tablet (200 mg total) by mouth once daily. 90 tablet 3     potassium chloride SA (K-DUR,KLOR-CON) 20 MEQ tablet TAKE 1 TABLET BY MOUTH TWICE  DAILY 180 tablet 3     simvastatin (ZOCOR) 20 MG tablet Take 1 tablet (20 mg total) by mouth every evening. 90 tablet 3     tiZANidine (ZANAFLEX) 4 MG tablet Take 1 tablet (4 mg total) by mouth nightly as needed (muscle spasm). 20 tablet 0     vitamin D 1000 units Tab Take 2,000 Units by mouth once daily.        [DISCONTINUED] methylPREDNISolone (MEDROL DOSEPACK) 4 mg tablet use as directed 1 each 0          Vital signs:   Vitals:    05/22/25 0902   BP: (!) 124/59   Pulse: 62   Resp: 18   Weight: 78 kg (171 lb 15.3 oz)   Height: 5' 11" (1.803 m)     Body mass index is 23.98 kg/m².    PHYSICAL EXAM:     Physical Exam  Constitutional:       General: He is not in acute distress.  HENT:      Right Ear: Tympanic membrane and ear canal normal.      Left Ear: Tympanic membrane and ear canal normal.   Cardiovascular:      Rate and Rhythm: Normal rate and regular rhythm.      Pulses: Normal pulses.      Heart sounds: Murmur heard.      No friction rub. No gallop.   Pulmonary:      Effort: Pulmonary effort is normal.      Breath sounds: Normal breath sounds. No wheezing, rhonchi or rales.   Skin:     General: Skin is warm.   Neurological:      Mental Status: He is alert.             PERTINENT RESULTS:   Lab Visit on 05/16/2025   Component Date Value Ref Range Status    Color, UA 05/16/2025 Yellow  Straw, Nisreen, Yellow, Light-Orange Final    Appearance, UA 05/16/2025 Clear  Clear Final    pH, UA 05/16/2025 7.0  5.0 - 8.0 Final    Spec Grav UA 05/16/2025 1.015  1.005 - 1.030 Final    Protein, UA 05/16/2025 Negative  Negative Final    Recommend a 24 hour urine protein or a urine protein/creatinine ratio if globulin induced proteinuria is clinically suspected.    Glucose, " UA 05/16/2025 Negative  Negative Final    Ketones, UA 05/16/2025 Negative  Negative Final    Bilirubin, UA 05/16/2025 Negative  Negative Final    Blood, UA 05/16/2025 Negative  Negative Final    Nitrites, UA 05/16/2025 Negative  Negative Final    Urobilinogen, UA 05/16/2025 Negative  <2.0 EU/dL Final    Leukocyte Esterase, UA 05/16/2025 Negative  Negative Final    Extra Tube 05/16/2025 Hold for add-ons.   Final    Auto resulted.      Lab Visit on 05/16/2025   Component Date Value Ref Range Status    Vitamin D 05/16/2025 52  30 - 96 ng/mL Final    Vitamin D deficiency.........<10 ng/mL                              Vitamin D insufficiency......10-29 ng/mL       Vitamin D sufficiency........> or equal to 30 ng/mL  Vitamin D toxicity............>100 ng/mL      Calcium Level Ionized 05/16/2025 1.35  1.06 - 1.42 mmol/L Final    PTH Intact 05/16/2025 104.1 (H)  9.0 - 77.0 pg/mL Final       ASSESSMENT/PLAN:    1. Hyperparathyroidism  Overview:  Lab Results   Component Value Date    .1 (H) 05/16/2025    CALCIUM 10.5 02/20/2025    CAION 1.35 05/16/2025    PHOS 2.7 08/23/2019     - improved   - continue vitamin D3 4000 IU daily (Vitamin D 52)  - Calcium level normal  -recommend continue to monitor  - rec DEXA    Orders:  -     Vitamin D 25-Hydroxy; Future; Expected date: 11/19/2025  -     PTH, Intact; Future; Expected date: 11/19/2025  -     Calcium, Ionized; Future; Expected date: 11/19/2025  -     DXA Bone Density Axial Skeleton 1 or more sites; Future; Expected date: 05/22/2025    2. Chronic bilateral low back pain without sciatica  Overview:  -11/02/2023 x-ray lumbar spine: . Reconfirmed lumbar levocurvature and spondylolysis defects and grade 1 anterolisthesis L5 on S1 with flexion and extension views demonstrating no instability.. Interval progression in degenerative changes with respect to the lumbar spine as noted when compared to earlier exam.  - evaluated by orthopedics at the time   - acute on chronic  -  reviewed NP note 5/12/25  - continue PT      3. Abnormal results of thyroid function studies  Overview:  Lab Results   Component Value Date    TSH 0.378 (L) 10/13/2021    FREET4 0.97 10/13/2021     - episodes of tachycardia recently  - repeat TSH and FT4    Orders:  -     T4, Free; Future; Expected date: 05/22/2025  -     TSH; Future; Expected date: 05/22/2025    4. Hypertension, essential  Overview:  - with severe reactive hypertension  - home blood pressure cuff Omron  - well controlled  - continue current management plan   - patient encouraged to notify me with any changes    Orders:  -     CBC Auto Differential; Future; Expected date: 11/19/2025  -     Comprehensive Metabolic Panel; Future; Expected date: 11/19/2025    5. Mixed hyperlipidemia  Overview:  Lab Results   Component Value Date    LDLCALC 68.6 02/20/2025     - cardio prevention  - well controlled  - continue current management plan   - patient encouraged to notify me with any changes    Orders:  -     Lipid Panel; Future; Expected date: 11/19/2025    6. Paroxysmal atrial fibrillation  Overview:  - diagnosed 12/13/1996  - RRR today  - followed by electrophysiology       7. Mild aortic stenosis  Overview:  08/27/2024 echocardiogram: Aortic Valve: The aortic valve is probably bileaflet with fusion of the right and non-coronary cusp leaflets. There is moderate aortic valve sclerosis. Mildly restricted motion. There is no stenosis. Aortic valve peak velocity is 2.50 m/s. Mean gradient is 13 mmHg. (LVOT diameter is near 27mm.) There is mild aortic regurgitation.   -bi-leaflet with fusion of the right and non coronary cusp leaflets  - followed by cardiology  - stable and asymptomatic      8. Proteinuria, unspecified type  Overview:  - 2/2025 urinalysis showed trace protein    5/20/25 urinalysis negative for protein   -resolved            ORDERS:   Orders Placed This Encounter    DXA Bone Density Axial Skeleton 1 or more sites    CBC Auto Differential     Comprehensive Metabolic Panel    Lipid Panel    Vitamin D 25-Hydroxy    PTH, Intact    Calcium, Ionized    T4, Free    TSH       Vaccines recommended: up to date    Follow up in about 6 months (around 11/22/2025) for blood pressure, Labs. or sooner with any concerns      This encounter was dictated and transcribed using DeepScribe and [employer], please excuse any typographical or grammatical errors.    Dr. Kamla Hoff D.O.   Family Medicine         [1]   Social History  Tobacco Use    Smoking status: Never    Smokeless tobacco: Never   Substance Use Topics    Alcohol use: Yes     Alcohol/week: 1.0 standard drink of alcohol     Types: 1 Cans of beer per week     Comment: 1-2 cans of beer 2-3 times per month    Drug use: No

## 2025-05-20 ENCOUNTER — CLINICAL SUPPORT (OUTPATIENT)
Dept: REHABILITATION | Facility: OTHER | Age: 75
End: 2025-05-20
Payer: MEDICARE

## 2025-05-20 DIAGNOSIS — Z74.09 DECREASED FUNCTIONAL MOBILITY AND ENDURANCE: Primary | ICD-10-CM

## 2025-05-20 PROCEDURE — 97112 NEUROMUSCULAR REEDUCATION: CPT | Mod: PN

## 2025-05-20 NOTE — PROGRESS NOTES
Outpatient Rehab    Physical Therapy Visit    Patient Name: Anthony Reynaga  MRN: [id number]  YOB: 1950  Encounter Date: 5/20/2025    Therapy Diagnosis:   Encounter Diagnosis   Name Primary?    Decreased functional mobility and endurance Yes     Physician: Kamla Hoff DO    Physician Orders: Eval and Treat  Medical Diagnosis: Poor posture  Chronic bilateral low back pain without sciatica    Visit # / Visits Authorized:  11 / 20  Insurance Authorization Period: 1/7/2025 to 11/30/2025  Date of Evaluation: 5/13/2025  Plan of Care Certification: 5/19/2025 to 8/13/2025      PT/PTA:     Number of PTA visits since last PT visit:   Time In: 1100   Time Out: 1200  Total Time (in minutes): 60   Total Billable Time (in minutes): 30    FOTO:  Intake Score:  %  Survey Score 2:  %  Survey Score 3:  %    Precautions:       Subjective   Isn't sure about some of the stuff he is doing. Wants to makes sure he is performing correctly as he continues to have pain..         Objective            Treatment:  Manual Therapy  MT 1: lumbar gapping at L2/L3  MT 2: hip long axis distraction- hvlat  Balance/Neuromuscular Re-Education  NMR 1: Cat cows- 30x  NMR 2: dead bug 2x20  NMR 3: Thoracic extensions- 30x  NMR 5: FMP lumbar flexion in chair 2x10    Time Entry(in minutes):       Assessment & Plan   Assessment: Pt arrives to PT today continuing to explain pain on R lower lumbar spine. Pt continuing to demonstrate stiffness in segments above lumbar hypermobile segment. Pt will benefit from continued stabilization training at dysfunctional segment. PT provided updated HEP of cat cows, hip rock backs, bridges, deadbugs, and flexion fmp's.  Evaluation/Treatment Tolerance: Patient tolerated treatment well    Patient will continue to benefit from skilled outpatient physical therapy to address the deficits listed in the problem list box on initial evaluation, provide pt/family education and to maximize pt's level of independence in  the home and community environment.     Patient's spiritual, cultural, and educational needs considered and patient agreeable to plan of care and goals.           Plan: Continue to assess response to tx, core control, lumbar mobility    Goals:   Active       LTG/STG       -       Start:  05/19/25    Expected End:  07/14/25       1.Report decreased lumbar pain  < / =  4/10  to increase tolerance for adls  2. Increase ROM by 10 degrees where limited in order to perform ADLs without difficulty.  3. Increase strength by 1/3 MMT grade in hip abd  to increase tolerance for ADL and work activities.  4. Pt to tolerate HEP to improve ROM and independence with ADL's    1.Report decreased lumbar pain < / = 2/10  to increase tolerance for adls  2.Patient goal: go to trip and have no pain with walking  3.Increase strength to 4+/5 in  hip ER bilaterally  to increase tolerance for ADL and work activities.  4. Pt will report at predicted goal on FOTO  to demonstrate increase in LE function with every day tasks.                Bret Holley, PT

## 2025-05-22 ENCOUNTER — OFFICE VISIT (OUTPATIENT)
Dept: PRIMARY CARE CLINIC | Facility: CLINIC | Age: 75
End: 2025-05-22
Attending: FAMILY MEDICINE
Payer: MEDICARE

## 2025-05-22 ENCOUNTER — CLINICAL SUPPORT (OUTPATIENT)
Dept: REHABILITATION | Facility: OTHER | Age: 75
End: 2025-05-22
Payer: MEDICARE

## 2025-05-22 VITALS
WEIGHT: 171.94 LBS | HEIGHT: 71 IN | DIASTOLIC BLOOD PRESSURE: 59 MMHG | RESPIRATION RATE: 18 BRPM | BODY MASS INDEX: 24.07 KG/M2 | HEART RATE: 62 BPM | SYSTOLIC BLOOD PRESSURE: 124 MMHG

## 2025-05-22 DIAGNOSIS — R94.6 ABNORMAL RESULTS OF THYROID FUNCTION STUDIES: ICD-10-CM

## 2025-05-22 DIAGNOSIS — I48.0 PAROXYSMAL ATRIAL FIBRILLATION: ICD-10-CM

## 2025-05-22 DIAGNOSIS — I35.0 MILD AORTIC STENOSIS: ICD-10-CM

## 2025-05-22 DIAGNOSIS — R80.9 PROTEINURIA, UNSPECIFIED TYPE: ICD-10-CM

## 2025-05-22 DIAGNOSIS — I10 HYPERTENSION, ESSENTIAL: ICD-10-CM

## 2025-05-22 DIAGNOSIS — M54.50 CHRONIC BILATERAL LOW BACK PAIN WITHOUT SCIATICA: ICD-10-CM

## 2025-05-22 DIAGNOSIS — E78.2 MIXED HYPERLIPIDEMIA: ICD-10-CM

## 2025-05-22 DIAGNOSIS — G89.29 CHRONIC BILATERAL LOW BACK PAIN WITHOUT SCIATICA: ICD-10-CM

## 2025-05-22 DIAGNOSIS — E21.3 HYPERPARATHYROIDISM: Primary | ICD-10-CM

## 2025-05-22 DIAGNOSIS — Z74.09 DECREASED FUNCTIONAL MOBILITY AND ENDURANCE: Primary | ICD-10-CM

## 2025-05-22 PROBLEM — E83.52 HYPERCALCEMIA: Status: RESOLVED | Noted: 2024-12-30 | Resolved: 2025-05-22

## 2025-05-22 PROCEDURE — 99214 OFFICE O/P EST MOD 30 MIN: CPT | Mod: S$PBB,,, | Performed by: FAMILY MEDICINE

## 2025-05-22 PROCEDURE — 99213 OFFICE O/P EST LOW 20 MIN: CPT | Mod: PBBFAC,PN | Performed by: FAMILY MEDICINE

## 2025-05-22 PROCEDURE — 97112 NEUROMUSCULAR REEDUCATION: CPT | Mod: PN

## 2025-05-22 PROCEDURE — 99999 PR PBB SHADOW E&M-EST. PATIENT-LVL III: CPT | Mod: PBBFAC,,, | Performed by: FAMILY MEDICINE

## 2025-05-22 PROCEDURE — 97140 MANUAL THERAPY 1/> REGIONS: CPT | Mod: PN

## 2025-05-22 NOTE — PROGRESS NOTES
Outpatient Rehab    Physical Therapy Visit    Patient Name: Anthony Reynaga  MRN: [id number]  YOB: 1950  Encounter Date: 5/22/2025    Therapy Diagnosis:   Encounter Diagnosis   Name Primary?    Decreased functional mobility and endurance Yes     Physician: Kamla Hoff DO    Physician Orders: Eval and Treat  Medical Diagnosis: Poor posture  Chronic bilateral low back pain without sciatica    Visit # / Visits Authorized:  12 / 20  Insurance Authorization Period: 1/7/2025 to 11/30/2025  Date of Evaluation: 5/13/2025  Plan of Care Certification: 5/19/2025 to 8/13/2025      PT/PTA:     Number of PTA visits since last PT visit:   Time In:     Time Out:    Total Time (in minutes):     Total Billable Time (in minutes):      FOTO:  Intake Score:  %  Survey Score 2:  %  Survey Score 3:  %    Precautions:       Subjective   pain when he is getting up.  Pain reported as 3/10.      Objective            Treatment:  Therapeutic Exercise  TE 1: Flexion FMP- 5 reps  TE 2: Bridges- 3x8  TE 3: Side lying ER- 3x8  TE 4: Hip rock backs- 30 times  Manual Therapy  MT 1: lumbar gapping at L2/L3  MT 2: hip long axis distraction- hvlat  MT 3: hip lateral distraction with movement- 5 reps  Balance/Neuromuscular Re-Education  NMR 1: Cat cows- 30x  NMR 2: dead bug 2x20  NMR 3: Thoracic extensions- 30x  NMR 4: Bird dog legs only c cuing on maintaining core activation 2x10 B  NMR 5: FMP lumbar flexion in chair 2x10  NMR 6: Pallof Press with cuing on how to perform at home 2x10 B GTB  NMR 7: lumbar flexion, bridge, dead bug, cat cow, hip  NMR 8: lumbar push ups with therapist over pressure at upper lumbar- 30 reps    Time Entry(in minutes):       Assessment & Plan   Assessment:         Patient will continue to benefit from skilled outpatient physical therapy to address the deficits listed in the problem list box on initial evaluation, provide pt/family education and to maximize pt's level of independence in the home and community  environment.     Patient's spiritual, cultural, and educational needs considered and patient agreeable to plan of care and goals.           Plan:      Goals:   Active       LTG/STG       -       Start:  05/19/25    Expected End:  07/14/25       1.Report decreased lumbar pain  < / =  4/10  to increase tolerance for adls  2. Increase ROM by 10 degrees where limited in order to perform ADLs without difficulty.  3. Increase strength by 1/3 MMT grade in hip abd  to increase tolerance for ADL and work activities.  4. Pt to tolerate HEP to improve ROM and independence with ADL's    1.Report decreased lumbar pain < / = 2/10  to increase tolerance for adls  2.Patient goal: go to trip and have no pain with walking  3.Increase strength to 4+/5 in  hip ER bilaterally  to increase tolerance for ADL and work activities.  4. Pt will report at predicted goal on FOTO  to demonstrate increase in LE function with every day tasks.                Bret Holley, PT

## 2025-05-23 ENCOUNTER — OFFICE VISIT (OUTPATIENT)
Dept: CARDIOLOGY | Facility: CLINIC | Age: 75
End: 2025-05-23
Payer: MEDICARE

## 2025-05-23 VITALS
DIASTOLIC BLOOD PRESSURE: 59 MMHG | HEIGHT: 71 IN | WEIGHT: 170 LBS | HEART RATE: 64 BPM | BODY MASS INDEX: 23.8 KG/M2 | SYSTOLIC BLOOD PRESSURE: 118 MMHG

## 2025-05-23 DIAGNOSIS — I10 HYPERTENSION, ESSENTIAL: ICD-10-CM

## 2025-05-23 DIAGNOSIS — I48.0 PAROXYSMAL ATRIAL FIBRILLATION: Primary | ICD-10-CM

## 2025-05-23 PROCEDURE — 99213 OFFICE O/P EST LOW 20 MIN: CPT | Mod: PBBFAC,PN | Performed by: INTERNAL MEDICINE

## 2025-05-23 PROCEDURE — 99999 PR PBB SHADOW E&M-EST. PATIENT-LVL III: CPT | Mod: PBBFAC,,, | Performed by: INTERNAL MEDICINE

## 2025-05-23 NOTE — PROGRESS NOTES
Subjective   Patient ID:  Anthony Reynaga is a 75 y.o. 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 Reports he was diagnosed with pAF in 1996. Treated with pill-in-pocket therapy with flecainide. Last seen in March of 2024 and noted he hadn't had AF in a year and a half. He reports to me today his last AF episode was in January of 2023. Sometime flecainide would work and other times it doesn't work (~50% success rate). Feels alcohol is a trigger. Does not drink much any longer. Recently had 2 episodes of tachycardia for a few hours after getting a COVID injection and after taking prednisone for back pain.    ECHO 8/2024: normal LVEF, normal mitral valve, mild AI with probable bileaflet AV    I reviewed available ECGs in [place] and my personal interpretation summary is either sinus rhythm/bradycardia or AF    My interpretation of today's in-clinic ECG is sinus rhythm with a rate of 57 bpm, narrow QRS    Review of Systems   Constitutional: Negative for fever and malaise/fatigue.   HENT:  Negative for congestion and sore throat.    Eyes:  Negative for blurred vision and visual disturbance.   Cardiovascular:  Positive for palpitations. Negative for chest pain, dyspnea on exertion, irregular heartbeat, near-syncope and syncope.   Respiratory:  Negative for cough and shortness of breath.    Hematologic/Lymphatic: Negative for bleeding problem. Does not bruise/bleed easily.   Skin: Negative.    Musculoskeletal:  Positive for back pain.   Gastrointestinal:  Negative for bloating, abdominal pain, hematochezia and melena.   Neurological:  Negative for focal weakness and weakness.   Psychiatric/Behavioral: Negative.             Objective     Physical Exam  Vitals reviewed.   Constitutional:       General: He is not in acute distress.     Appearance: He is well-developed. He is not diaphoretic.   HENT:      Head: Normocephalic and atraumatic.   Eyes:      General:         Right eye: No discharge.         Left eye: No discharge.      Conjunctiva/sclera: Conjunctivae normal.   Cardiovascular:      Rate and Rhythm: Normal rate and regular rhythm.      Heart sounds: Murmur heard.      Harsh midsystolic murmur is present with a grade of 2/6 at the upper right sternal border radiating to the neck.      No friction rub. No gallop.   Pulmonary:      Effort: Pulmonary effort is normal. No respiratory distress.      Breath sounds: Normal breath sounds. No wheezing or rales.   Abdominal:      General: Bowel sounds are normal. There is no distension.      Palpations: Abdomen is soft.      Tenderness: There is no abdominal tenderness.   Musculoskeletal:      Cervical back: Neck supple.   Skin:     General: Skin is warm and dry.   Neurological:      Mental Status: He is alert and oriented to person, place, and time.   Psychiatric:         Behavior: Behavior normal.         Thought Content: Thought content normal.         Judgment: Judgment normal.            Assessment and Plan     1. Paroxysmal atrial fibrillation    2. 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 Has utilized PIP strategy for over 10 years. Continue eliquis for stroke risk reduction. Has good sense of triggers and practices trigger avoidance.    RTC in 1 year    Thank you for allowing me to participate in the care of this patient. Please do not hesitate to call me with any questions or concerns.    Pramod Oliva MD, PhD  Cardiac Electrophysiology

## 2025-05-26 DIAGNOSIS — I48.0 PAROXYSMAL ATRIAL FIBRILLATION: Primary | ICD-10-CM

## 2025-06-13 ENCOUNTER — LAB VISIT (OUTPATIENT)
Dept: LAB | Facility: HOSPITAL | Age: 75
End: 2025-06-13
Attending: UROLOGY
Payer: MEDICARE

## 2025-06-13 DIAGNOSIS — R97.20 ELEVATED PSA: ICD-10-CM

## 2025-06-13 DIAGNOSIS — N13.8 BPH WITH URINARY OBSTRUCTION: ICD-10-CM

## 2025-06-13 DIAGNOSIS — R94.6 ABNORMAL RESULTS OF THYROID FUNCTION STUDIES: ICD-10-CM

## 2025-06-13 DIAGNOSIS — N40.1 BPH WITH URINARY OBSTRUCTION: ICD-10-CM

## 2025-06-13 LAB
PSA SERPL-MCNC: 2.1 NG/ML
T4 FREE SERPL-MCNC: 1.05 NG/DL (ref 0.71–1.51)
TSH SERPL-ACNC: 0.51 UIU/ML (ref 0.4–4)

## 2025-06-13 PROCEDURE — 84439 ASSAY OF FREE THYROXINE: CPT

## 2025-06-13 PROCEDURE — 84153 ASSAY OF PSA TOTAL: CPT

## 2025-06-13 PROCEDURE — 84443 ASSAY THYROID STIM HORMONE: CPT

## 2025-06-13 PROCEDURE — 36415 COLL VENOUS BLD VENIPUNCTURE: CPT

## 2025-06-16 ENCOUNTER — PATIENT MESSAGE (OUTPATIENT)
Dept: REHABILITATION | Facility: OTHER | Age: 75
End: 2025-06-16
Payer: MEDICARE

## 2025-06-16 ENCOUNTER — PATIENT MESSAGE (OUTPATIENT)
Dept: OTOLARYNGOLOGY | Facility: CLINIC | Age: 75
End: 2025-06-16
Payer: MEDICARE

## 2025-06-16 DIAGNOSIS — I10 HYPERTENSION, ESSENTIAL: ICD-10-CM

## 2025-06-16 RX ORDER — AMLODIPINE BESYLATE 5 MG/1
5 TABLET ORAL
Qty: 90 TABLET | Refills: 0 | Status: SHIPPED | OUTPATIENT
Start: 2025-06-16

## 2025-06-17 ENCOUNTER — RESULTS FOLLOW-UP (OUTPATIENT)
Dept: PRIMARY CARE CLINIC | Facility: CLINIC | Age: 75
End: 2025-06-17

## 2025-06-17 NOTE — TELEPHONE ENCOUNTER
Refill Decision Note   Anthony Reynaga  is requesting a refill authorization.  Brief Assessment and Rationale for Refill:  Approve     Medication Therapy Plan:         Comments:     Note composed:10:23 PM 06/16/2025

## 2025-06-19 ENCOUNTER — OFFICE VISIT (OUTPATIENT)
Dept: UROLOGY | Facility: CLINIC | Age: 75
End: 2025-06-19
Payer: MEDICARE

## 2025-06-19 VITALS — WEIGHT: 172.63 LBS | BODY MASS INDEX: 24.17 KG/M2 | HEIGHT: 71 IN

## 2025-06-19 DIAGNOSIS — R97.20 ELEVATED PSA: Primary | ICD-10-CM

## 2025-06-19 DIAGNOSIS — R35.1 NOCTURIA MORE THAN TWICE PER NIGHT: ICD-10-CM

## 2025-06-19 PROCEDURE — G2211 COMPLEX E/M VISIT ADD ON: HCPCS | Mod: ,,, | Performed by: UROLOGY

## 2025-06-19 PROCEDURE — 99213 OFFICE O/P EST LOW 20 MIN: CPT | Mod: PBBFAC | Performed by: UROLOGY

## 2025-06-19 PROCEDURE — 99214 OFFICE O/P EST MOD 30 MIN: CPT | Mod: S$PBB,,, | Performed by: UROLOGY

## 2025-06-19 PROCEDURE — 99999 PR PBB SHADOW E&M-EST. PATIENT-LVL III: CPT | Mod: PBBFAC,,, | Performed by: UROLOGY

## 2025-06-19 NOTE — LETTER
June 19, 2025        Kamla Hoff,   [address] [facility]  [facility] [address]             [facility] - Urology 2nd Fl  [address]  [place] [address]  Phone: [phone]   Patient: Anthony Reynaga   MR Number: [id number]   YOB: 1950   Date of Visit: 6/19/2025       Dear Dr. Hoff:    Thank you for referring Anthony eRynaga to me for evaluation. Attached you will find relevant portions of my assessment and plan of care.    If you have questions, please do not hesitate to call me. I look forward to following Anthony Reynaga along with you.    Sincerely,      Livan Lowry MD            CC  No Recipients    Enclosure

## 2025-06-24 ENCOUNTER — HOSPITAL ENCOUNTER (OUTPATIENT)
Dept: RADIOLOGY | Facility: CLINIC | Age: 75
Discharge: HOME | End: 2025-06-24
Attending: FAMILY MEDICINE
Payer: MEDICARE

## 2025-06-24 DIAGNOSIS — E21.3 HYPERPARATHYROIDISM: ICD-10-CM

## 2025-06-24 PROCEDURE — 77092 TBS I&R FX RSK QHP: CPT | Mod: S$GLB,,, | Performed by: INTERNAL MEDICINE

## 2025-06-24 PROCEDURE — 77080 DXA BONE DENSITY AXIAL: CPT | Mod: TC

## 2025-06-24 PROCEDURE — 77080 DXA BONE DENSITY AXIAL: CPT | Mod: 26,,, | Performed by: INTERNAL MEDICINE

## 2025-06-24 PROCEDURE — 77081 DXA BONE DENSITY APPENDICULR: CPT | Mod: TC

## 2025-06-26 ENCOUNTER — OFFICE VISIT (OUTPATIENT)
Dept: OTOLARYNGOLOGY | Facility: CLINIC | Age: 75
End: 2025-06-26
Payer: MEDICARE

## 2025-06-26 ENCOUNTER — RESULTS FOLLOW-UP (OUTPATIENT)
Dept: PRIMARY CARE CLINIC | Facility: CLINIC | Age: 75
End: 2025-06-26
Payer: MEDICARE

## 2025-06-26 VITALS — HEIGHT: 71 IN | BODY MASS INDEX: 24.19 KG/M2 | WEIGHT: 172.81 LBS

## 2025-06-26 DIAGNOSIS — M81.0 AGE-RELATED OSTEOPOROSIS WITHOUT CURRENT PATHOLOGICAL FRACTURE: Primary | ICD-10-CM

## 2025-06-26 DIAGNOSIS — H91.90 DECREASED HEARING, UNSPECIFIED LATERALITY: ICD-10-CM

## 2025-06-26 DIAGNOSIS — H68.003 SALPINGITIS OF BOTH EUSTACHIAN TUBES: Primary | ICD-10-CM

## 2025-06-26 DIAGNOSIS — J31.0 NONALLERGIC RHINITIS: ICD-10-CM

## 2025-06-26 PROCEDURE — 99213 OFFICE O/P EST LOW 20 MIN: CPT | Mod: PBBFAC | Performed by: OTOLARYNGOLOGY

## 2025-06-26 RX ORDER — BECLOMETHASONE DIPROPIONATE 80 UG/1
160 AEROSOL, METERED NASAL DAILY
Qty: 8.7 G | Refills: 2 | Status: SHIPPED | OUTPATIENT
Start: 2025-06-26 | End: 2025-07-26

## 2025-06-26 NOTE — PROGRESS NOTES
Tympanogram    Right Ear Left Ear   Volume (mL) 1.42 1.70   Compliance (mL) 0.36 0.59   Pressure (daPa) 3 -16

## 2025-06-26 NOTE — PROGRESS NOTES
Subjective:      Anthony is a 75 y.o. male who comes for follow-up of aural fullness. His last visit with me was on 3/23/2023.    History of Present Illness    CHIEF COMPLAINT:  Patient presents with concerns about recurrent ear blockage, particularly when talking for extended periods or being exposed to loud environments.    HPI:  Patient reports a chronic problem with his ears that has been ongoing for approximately 20 years. He has intermittent blockage of the Eustachian tubes, typically affecting one ear at a time, though occasionally both ears are affected. The episodes usually last for about an hour, sometimes up to 2 hours, before resolving spontaneously. The frequency of these episodes has increased in recent months.    The primary trigger for these episodes appears to be prolonged talking or exposure to loud environments. 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 He also mentions attending [facility] for 7 out of 8 days, during which he had ear problems later in the afternoons.    He describes the sensation as an unusual feeling in one ear, which he attributes to a blocked Eustachian tube. The problem does not necessarily occur with changes in altitude, as he did not have issues during recent flights to and from [place].    He expresses concern about an upcoming teaching position starting in August, worried that the ear blockage might affect his voice and be distracting during lectures.    In addition to the ear issues, he reports some mild nasal congestion with post-nasal drip, particularly in the mornings, with mucus in his throat. He also has occasional itchy eyes and mild sinus pain, though these symptoms are described as very mild.    He had his ears cleaned by another ENT in April due to serum buildup before a trip. He has been using Flonase as recommended by another ENT, though he has not used it much in the past few weeks and has not had many  "problems.    Regarding his sense of smell, he mentions a previous evaluation at the [facility], where he scored in the 50th percentile for smell tests and 90th percentile for taste tests. He also reports having unilateral paraosmia, though he has adapted to these sensory changes.    He denies having any identified allergies or significant hearing loss, though he mentions some difficulty hearing words clearly.    MEDICATIONS:  Patient is on Flonase nasal spray for nasal congestion. He mentions using it recently but not consistently.    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 For over 20 years, he has experienced chronic serum buildup in his ears and intermittent Eustachian tube blockage. Patient also has a deviated septum.    TEST RESULTS:  Patient completed a smell test at the [facility], scoring in the 50th percentile for his age. He also underwent a taste test at the same institution, scoring in the 90th percentile. During the visit, a middle ear pressure test was conducted. The results were essentially normal, indicating that the pressures in the patient's ears are balanced.      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    A detailed review of systems was obtained with pertinent positives as per the above HPI, and otherwise negative.        Objective:     Ht 5' 11" (1.803 m)   Wt 78.4 kg (172 lb 13.5 oz)   BMI 24.11 kg/m²        Constitutional:   He appears well-developed. He is cooperative. Normal speech.  No hoarse voice.      Head:  Normocephalic. Salivary glands " normal.  Facial strength is normal.      Ears:    Right Ear: No drainage or tenderness. Tympanic membrane is not perforated. Tympanic membrane mobility is normal. No middle ear effusion. No decreased hearing is noted.   Left Ear: No drainage or tenderness. Tympanic membrane is not perforated. Tympanic membrane mobility is normal.  No middle ear effusion. No decreased hearing is noted.     Nose:  Septal deviation present. No mucosal edema, rhinorrhea or polyps. No epistaxis. Turbinates normal, no turbinate masses and no turbinate hypertrophy.  Right sinus exhibits no maxillary sinus tenderness and no frontal sinus tenderness. Left sinus exhibits no maxillary sinus tenderness and no frontal sinus tenderness.     Mouth/Throat  Oropharynx clear and moist without lesions or asymmetry and normal uvula midline. He does not have dentures. Normal dentition. No oral lesions or mucous membrane lesions. No oropharyngeal exudate or posterior oropharyngeal erythema. Mirror exam not performed due to patient tolerance.  Mirror exam not performed due to patient tolerance.      Neck:  Neck normal without thyromegaly masses, asymmetry, normal tracheal structure, crepitus, and tenderness, thyroid normal, trachea normal and no adenopathy. Normal range of motion present.     He has no cervical adenopathy.     Cardiovascular:    Regular rhythm.              Pulmonary/Chest:   Effort normal.     Psychiatric:   He has a normal mood and affect. His speech is normal and behavior is normal.     Neurological:   No cranial nerve deficit.     Skin:   No rash noted.       Procedure    Nasal endoscopy performed.  See procedure note.        Data Reviewed    WBC (K/uL)   Date Value   02/20/2025 6.82     Eosinophil % (%)   Date Value   02/20/2025 3.1     Eos # (K/uL)   Date Value   02/20/2025 0.2     Platelets (K/uL)   Date Value   02/20/2025 164     Glucose (mg/dL)   Date Value   02/20/2025 92     Total IgE (IU/mL)   Date Value   07/26/2021 <35        No sinus imaging available.      Assessment/Plan:     1. Salpingitis of both eustachian tubes    2. Nonallergic rhinitis    3. Decreased hearing, unspecified laterality        Assessment & Plan    IMPRESSION:  - Assessed chronic eustachian tube dysfunction, noting intermittent blockage likely due to inflammation rather than physical obstruction.  - Considered local mucosal allergy as potential cause despite negative systemic allergy tests, and explained its potential role in eustachian tube dysfunction.  - Performed nasal endoscopy, revealing mild inflammation of eustachian tubes.  - Conducted middle ear pressure test, which showed normal equalization function.  - Recommend topical steroid treatment with nebulized/aerosolized version for better targeted delivery to eustachian tube area, as current Flonase spray has limitations in reaching the affected area deep in the nasal cavity.  - Discussed potential use of specialty pharmacy nebulizer as alternative treatment option if prescribed medications are not effective or affordable.    EUSTACHIAN TUBE DISORDER:  - Started nebulized steroid medication (specific medication to be determined based on insurance coverage and availability).  - Collected swab from eustachian tube area for local allergy study as part of research study.  - Contact office if unable to obtain prescribed nebulized steroid medication for alternative treatment plan.  - Referred to audiologist for hearing test.    FOLLOW-UP:  - Follow up in a few weeks to discuss results of local allergy study.         Follow up for test results.    This note was generated with the assistance of ambient listening technology. Verbal consent was obtained by the patient and accompanying visitor(s) for the recording of patient appointment to facilitate this note. I attest to having reviewed and edited the generated note for accuracy, though some syntax or spelling errors may persist. Please contact the author of this  note for any clarification.

## 2025-06-26 NOTE — RESEARCH
Study Title: Mucosal Allergy of the Eustachian Tube  Sponsor: [facility]  IRB/Protocol #: 2021.111  Principle Investigator: Wilfredo Hendrix MD, MPH, FACS  Study Site: [facility]    Informed Consent Process  Present for discussion: Nelly Anaya & Anthony Ronnell   Was the consent done electronically: No  Is LAR Consenting for Subject: No    Prior to the Informed Consent (IC) being signed, or any protocol required testing, procedure, or intervention being performed, the following was done or discussed:  Purpose of the Study, Qualifications to Participate: Yes  Study Design, Schedule and Procedures: Yes  Risks, Benefits, Alternative Treatments, Compensation and Costs: Yes  Confidentiality and HIPAA Authorization for Release of Medical Records for the research trial/subject's right/study related injury: Yes  Study related contact information: Yes  Voluntary Participation and Withdrawal from the research trial at any time: Yes  Patient has been offered the opportunity to ask questions regarding the study and all questions were answered satisfactorily: Yes  CRC and PI contact information given to patient: Yes  Signed copy given to patient: Yes  Copy in patient's chart and original uploaded to Epic: Yes    Patient able to adequately summarize: the purpose of the study, the risks associated with the study, and all procedures, testing, and follow-ups associated with the study: Yes        NAME@ signed the current IRB approved ICF. Each portion of the consent form was reviewed with him and all questions answered satisfactorily. He then signed the consent form, which was countersigned by the CRC on this day. A copy of the fully executed ICF was then provided to the subject via paper copy. The original consent was electronically saved and uploaded to the [facility] EMR ([employer]) and filed appropriately.     Person Obtaining Consent: Nelly Anaya      These procedures were completed:     Screening Visit  Informed Consent  Eligibility  evaluation  Medical history  Tympanometry  Outpatient office visit   Nasal Endoscopy   Nasopharyngeal brush biopsy  Adverse Event review

## 2025-06-27 NOTE — TELEPHONE ENCOUNTER
Reached out to patient to let him no that someone from Endocrinology Department will reach out to him to schedule that appointment

## 2025-06-30 ENCOUNTER — E-CONSULT (OUTPATIENT)
Dept: ENDOCRINOLOGY | Facility: CLINIC | Age: 75
End: 2025-06-30
Payer: MEDICARE

## 2025-06-30 DIAGNOSIS — E21.3 HYPERPARATHYROIDISM: Primary | ICD-10-CM

## 2025-06-30 PROCEDURE — 99451 NTRPROF PH1/NTRNET/EHR 5/>: CPT | Mod: S$PBB,,, | Performed by: STUDENT IN AN ORGANIZED HEALTH CARE EDUCATION/TRAINING PROGRAM

## 2025-06-30 NOTE — CONSULTS
[facility]  Response for E-Consult     Patient Name: Anthony Reynaga  MRN: [id number]  Primary Care Provider: Kamla Hoff DO   Requesting Provider: Kamla Hoff DO  E-Consult to Endocrinology  Consult performed by: Ariel Thurman DO  Consult ordered by: Kamla Hoff DO  Reason for consult: Hyperparathyroidism  Assessment/Recommendations: See Note      I have reviewed the question, chart, and labs. This patients elevated PTH with normal-to-intermittently elevated calcium levels raises concern for possible primary hyperparathyroidism (PHPT).    Key considerations:  The patient is currently on chlorthalidone (thiazide), which can artificially elevate calcium levels and interfere with accurate parathyroid evaluation. Ideally, the thiazide should be stopped for several weeks to allow for more accurate follow-up labs.    Next steps:  Hold thiazide for at least 4 weeks.  After that, repeat PTH, calcium, albumin, and renal panel.  After also perform a 24-hour urine calcium (if feasible) to help distinguish low dietary calcium intake from possible familial hypocalciuric hypercalcemia (FHH).    If corrected calcium remains >=10.0 mg/dL with persistent PTH elevation, this would support concern for PHPT, and endocrine referral would be appropriate.  Vitamin D is currently normal and does not appear to be a contributing factor.  A referral to endocrine could be placed now if desired, but thiazide discontinuation is still essential before further evaluation.    *If PHPT is diagnosed surgical indications are already met with osteoporosis and history of nephrolithiasis as the chart suggests.     Reach out if questions    Total time of Consultation: 10 minute    I did not speak to the requesting provider verbally about this.     *This eConsult is based on the clinical data available to me and is furnished without benefit of a physical examination. The eConsult will need to be interpreted in light of  any clinical issues or changes in patient status not available to me at the time of filing this eConsults. Significant changes in patient condition or level of acuity should result in immediate formal consultation and reevaluation. Please alert me if you have further questions.    Thank you for this eConsult referral.     DO Ryan Munoz phuong - [facility]

## 2025-07-01 ENCOUNTER — CLINICAL SUPPORT (OUTPATIENT)
Dept: AUDIOLOGY | Facility: CLINIC | Age: 75
End: 2025-07-01
Payer: MEDICARE

## 2025-07-01 DIAGNOSIS — H91.90 DECREASED HEARING, UNSPECIFIED LATERALITY: ICD-10-CM

## 2025-07-01 DIAGNOSIS — I10 ESSENTIAL HYPERTENSION, BENIGN: ICD-10-CM

## 2025-07-01 DIAGNOSIS — H90.3 SENSORINEURAL HEARING LOSS, BILATERAL: Primary | ICD-10-CM

## 2025-07-01 DIAGNOSIS — H69.92 DYSFUNCTION OF LEFT EUSTACHIAN TUBE: ICD-10-CM

## 2025-07-01 PROCEDURE — 92557 COMPREHENSIVE HEARING TEST: CPT | Mod: PBBFAC | Performed by: AUDIOLOGIST

## 2025-07-01 PROCEDURE — 99211 OFF/OP EST MAY X REQ PHY/QHP: CPT | Mod: PBBFAC | Performed by: AUDIOLOGIST

## 2025-07-01 PROCEDURE — 92567 TYMPANOMETRY: CPT | Mod: PBBFAC | Performed by: AUDIOLOGIST

## 2025-07-01 PROCEDURE — 99999 PR PBB SHADOW E&M-EST. PATIENT-LVL I: CPT | Mod: PBBFAC,,, | Performed by: AUDIOLOGIST

## 2025-07-01 NOTE — Clinical Note
Delfina Hendrix,  I was able to complete testing with this patient today.  Nothing notable on his audio.    [place]

## 2025-07-02 ENCOUNTER — OFFICE VISIT (OUTPATIENT)
Dept: UROLOGY | Facility: CLINIC | Age: 75
End: 2025-07-02
Payer: MEDICARE

## 2025-07-02 ENCOUNTER — OFFICE VISIT (OUTPATIENT)
Dept: PRIMARY CARE CLINIC | Facility: CLINIC | Age: 75
End: 2025-07-02
Attending: FAMILY MEDICINE
Payer: MEDICARE

## 2025-07-02 ENCOUNTER — PATIENT MESSAGE (OUTPATIENT)
Dept: PRIMARY CARE CLINIC | Facility: CLINIC | Age: 75
End: 2025-07-02
Payer: MEDICARE

## 2025-07-02 VITALS
SYSTOLIC BLOOD PRESSURE: 124 MMHG | BODY MASS INDEX: 24.32 KG/M2 | WEIGHT: 174.38 LBS | HEART RATE: 55 BPM | DIASTOLIC BLOOD PRESSURE: 61 MMHG

## 2025-07-02 VITALS — RESPIRATION RATE: 20 BRPM | BODY MASS INDEX: 24.28 KG/M2 | WEIGHT: 174.06 LBS

## 2025-07-02 DIAGNOSIS — R35.1 NOCTURIA MORE THAN TWICE PER NIGHT: Primary | ICD-10-CM

## 2025-07-02 DIAGNOSIS — E21.3 HYPERPARATHYROIDISM: Primary | ICD-10-CM

## 2025-07-02 DIAGNOSIS — M81.0 AGE-RELATED OSTEOPOROSIS WITHOUT CURRENT PATHOLOGICAL FRACTURE: ICD-10-CM

## 2025-07-02 DIAGNOSIS — N13.8 BPH WITH URINARY OBSTRUCTION: ICD-10-CM

## 2025-07-02 DIAGNOSIS — N40.1 BPH WITH URINARY OBSTRUCTION: ICD-10-CM

## 2025-07-02 DIAGNOSIS — I10 HYPERTENSION, ESSENTIAL: ICD-10-CM

## 2025-07-02 PROBLEM — R41.3 IMPAIRED MEMORY: Status: RESOLVED | Noted: 2024-12-30 | Resolved: 2025-07-02

## 2025-07-02 PROCEDURE — 99214 OFFICE O/P EST MOD 30 MIN: CPT | Mod: S$PBB,,, | Performed by: FAMILY MEDICINE

## 2025-07-02 PROCEDURE — 99999 PR PBB SHADOW E&M-EST. PATIENT-LVL III: CPT | Mod: PBBFAC,,, | Performed by: UROLOGY

## 2025-07-02 PROCEDURE — 99213 OFFICE O/P EST LOW 20 MIN: CPT | Mod: PBBFAC,PN | Performed by: FAMILY MEDICINE

## 2025-07-02 PROCEDURE — 99999 PR PBB SHADOW E&M-EST. PATIENT-LVL III: CPT | Mod: PBBFAC,,, | Performed by: FAMILY MEDICINE

## 2025-07-02 PROCEDURE — 99213 OFFICE O/P EST LOW 20 MIN: CPT | Mod: PBBFAC,27 | Performed by: UROLOGY

## 2025-07-02 RX ORDER — LIDOCAINE HYDROCHLORIDE 20 MG/ML
JELLY TOPICAL ONCE
OUTPATIENT
Start: 2025-07-02 | End: 2025-07-02

## 2025-07-02 RX ORDER — CHLORTHALIDONE 25 MG/1
25 TABLET ORAL DAILY
Qty: 90 TABLET | Refills: 0 | Status: SHIPPED | OUTPATIENT
Start: 2025-07-02 | End: 2025-07-02

## 2025-07-02 NOTE — PROGRESS NOTES
FAMILY MEDICINE  [facility]    Reason for visit:   Chief Complaint   Patient presents with    Results       HPI: Anthony Reynaga is a 75 y.o. 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 Oliva     Anthony Reynaga reports that he is overall feeling well and back pain has improved.     On his lab work since last year he has had elevated calcium as well as elevated parathyroid.  He had a bone density scan that showed increased fracture risk with a femur T-score of -1.9.  Placed a referral to endocrinology that time for further recommendations    Key considerations:  · The patient is currently on chlorthalidone (thiazide), which can artificially elevate calcium levels and interfere with accurate parathyroid evaluation. Ideally, the thiazide should be stopped for several weeks to allow for more accurate follow-up labs.     · Next steps:  º Hold thiazide for at least 4 weeks.  º After that, repeat PTH, calcium, albumin, and renal panel.  º After also perform a 24-hour urine calcium (if feasible) to help distinguish low dietary calcium intake from possible familial hypocalciuric hypercalcemia (FHH).     · If corrected calcium remains >=10.0 mg/dL with persistent PTH elevation, this would support concern for PHPT, and endocrine referral would be appropriate.  · Vitamin D is currently normal and does not appear to be a contributing factor.  · A referral to endocrine could be placed now if desired, but thiazide discontinuation is still essential before further evaluation.     *If  PHPT is diagnosed surgical indications are already met with osteoporosis and history of nephrolithiasis as the chart suggests.     1. Hypertension with paroxysmal atrial fibrillation    Current medication treatment:   amLODIPine (NORVASC) 5 MG tablet, TAKE 1 TABLET BY MOUTH ONCE  DAILY, Disp: 90 tablet, Rfl: 0  metoprolol succinate (TOPROL-XL) 200 MG 24 hr tablet, Take 1 tablet (200 mg total) by mouth once daily., Disp: 90 tablet, Rfl: 3  chlorthalidone (HYGROTEN) 25 MG Tab, Take 1 tablet (25 mg total) by mouth once daily., Disp: 90 tablet, Rfl: 3    Medication side effects:  Increased urination    Exercise regimen: yes    Home BP cuff: Yes  How often does patient monitoring BP? PRN                Review of Systems   All other systems reviewed and are negative.      Social History     Social History Narrative    Lives with long term partner who is also a [employer] Professor. 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 Walks and would like to start doing strength training. Nonsmoker. No children         ALLERGIES:   Review of patient's allergies indicates:   Allergen Reactions    Lisinopril Other (See Comments)     Cough    Uroxatral [alfuzosin] Other (See Comments)     orthostatic       MEDS:   Current Outpatient Medications on File Prior to Visit   Medication Sig Dispense Refill Last Dose/Taking    amLODIPine (NORVASC) 5 MG tablet TAKE 1 TABLET BY MOUTH ONCE  DAILY 90 tablet 0 Taking    ELIQUIS 5 mg Tab TAKE 1 TABLET BY MOUTH TWICE  DAILY 180 tablet 3 Taking    finasteride (PROSCAR) 5 mg tablet Take 1 tablet (5 mg total) by mouth once daily. 90 tablet 3 Taking    flecainide (TAMBOCOR) 150 MG Tab TAKE 2 TABLETS (300 mg total ) BY MOUTH AS NEEDED FOR AF. LIMIT ONE DOSE PER 24 HOURS. 30 tablet 3 Taking    metoprolol succinate (TOPROL-XL) 200 MG 24 hr tablet Take 1 tablet (200 mg total) by mouth once daily. 90 tablet 3 Taking    simvastatin (ZOCOR) 20 MG tablet Take 1 tablet (20 mg total) by mouth every evening. 90  tablet 3 Taking    vitamin D 1000 units Tab Take 2,000 Units by mouth once daily.    Taking    [DISCONTINUED] chlorthalidone (HYGROTEN) 25 MG Tab Take 1 tablet (25 mg total) by mouth once daily. 90 tablet 0 Taking    [DISCONTINUED] potassium chloride SA (K-DUR,KLOR-CON) 20 MEQ tablet TAKE 1 TABLET BY MOUTH TWICE  DAILY 180 tablet 3 Taking    beclomethasone dipropionate (QNASL) 80 mcg/actuation HFAA 160 mcg by Nasal route once daily. 8.7 g 2     [DISCONTINUED] chlorthalidone (HYGROTEN) 25 MG Tab Take 1 tablet (25 mg total) by mouth once daily. 90 tablet 3        Vital signs:   Vitals:    07/02/25 1400   Resp: 20   Weight: 78.9 kg (174 lb 0.9 oz)     Body mass index is 24.28 kg/m².    PHYSICAL EXAM:     Physical Exam  Constitutional:       General: He is not in acute distress.  Pulmonary:      Effort: Pulmonary effort is normal. No respiratory distress.   Neurological:      Mental Status: He is alert.   Psychiatric:         Speech: Speech normal.           PERTINENT RESULTS:   No visits with results within 1 Week(s) from this visit.   Latest known visit with results is:   Lab Visit on 06/13/2025   Component Date Value Ref Range Status    Prostate Specific Antigen 06/13/2025 2.10  <=4.00 ng/mL Final    PSA Expected levels:  Hormonal therapy: < 0.05 ng/mL   Prostatectomy: < 0.01 ng/mL   Radiation therapy: < 1.00 ng/mL      Free T4 06/13/2025 1.05  0.71 - 1.51 ng/dL Final    TSH 06/13/2025 0.506  0.400 - 4.000 uIU/mL Final       ASSESSMENT/PLAN:    1. Hyperparathyroidism  Overview:  Lab Results   Component Value Date    .1 (H) 05/16/2025    CALCIUM 10.5 02/20/2025    CAION 1.35 05/16/2025    PHOS 2.7 08/23/2019     - improved   - continue vitamin D3 4000 IU daily (Vitamin D 52)  - 06/2025 bone density shows osteopenia with increased fracture risk   -eConsult endocrinology reviewed  -endocrinology recommend hold chlorthalidone for 4 weeks and then repeat blood work if still abnormal they recommend evaluation by  endocrinology    Orders:  -     Cancel: Vitamin D 25-Hydroxy; Future; Expected date: 08/02/2025  -     PTH, Intact; Future; Expected date: 08/02/2025  -     Calcium, Timed Urine [facility]; 24; Future; Expected date: 08/02/2025  -     Calcium, Ionized; Future; Expected date: 08/02/2025  -     Basic Metabolic Panel; Future; Expected date: 08/02/2025  -     Albumin; Future; Expected date: 08/02/2025    2. Hypertension, essential  Overview:  - with severe reactive hypertension  - home blood pressure cuff Omron  - well controlled  - discontinue chlorthalidone 4 weeks but we may continue off of this medication since it causes side effects of increased urination  -continue amlodipine and metoprolol  -continue Arb if blood pressure worsens offered medication the currently his blood pressure is well controlled.  He will notify me if blood pressure remains greater than >140/90      3. Age-related osteoporosis without current pathological fracture  Overview:  06/24/2025 bone density: Osteopenia  7.8% risk of a major osteoporotic fracture in the next 10 years.  8.4% risk of hip fracture in the next 10 years.  - with increased fracture risk   -normal radial bone density   -see endocrinology eConsult          Follow up for As previously scheduled. or sooner with any concerns    This note is dictated using the M*Modal Fluency Direct word recognition program. There are word recognition mistakes that are occasionally missed on review.    Dr. Kamla Hoff D.O.   Family Medicine

## 2025-07-02 NOTE — TELEPHONE ENCOUNTER
Anthony Reynaga  is requesting a refill authorization.  Brief Assessment and Rationale for Refill:  Approve     Medication Therapy Plan:         Comments:     Note composed:12:03 PM 07/02/2025

## 2025-07-02 NOTE — PROGRESS NOTES
[facility]      New Nocturia Note    7/2/2025    Referred by:  Livan Lowry MD    The patient has not been previously seen by a specialist board-certified in Female Urology/Urogynecology (Health Care Provider Taxonomy code [id number]) in our system previously and is meeting with me today for specialty care.     CHIEF COMPLAINT:  Patient presents with complaints of nocturia and concerns about its impact on his sleep and overall health.    HPI:  Patient reports a history of nocturia since around 2010, with nighttime awakenings to urinate ranging from 2 to 8 times per night, averaging 4 to 6 times. During these nighttime voids, he typically produces a small volume of urine. He denies significant problems with hesitancy but mentions intermittency in his urinary stream.    During the day, he reports normal urinary function and can go 3-4 hours without urinating, sometimes even all day. He acknowledges potential conditioning, as he tends to use the bathroom more frequently when one is available, such as at home or work.    He has a history of BPH and was previously on finasteride, which he stopped about 4 years ago but has been back on for approximately 2 years. He was also previously prescribed Uroxatral (alfuzosin) but is not currently taking it due to issues with orthostatic hypotension.    He reports having had a problem with his pelvic muscle a few years ago, for which he was evaluated by Dr. Camejo, but it resolved spontaneously. He also mentions a history of urethral irritation and frequent urination throughout his life, particularly exacerbated by alcohol. Currently, he does not drink alcohol regularly, but when he does have a beer or two, it seems to trigger increased urinary frequency that night.    He expresses concern about the impact of nocturia on his sleep quality. He reports anxiety and often wakes up at night, unsure if it is due to anxiety or the urge to urinate. He is worried  about the risk of falling during these nighttime bathroom trips, especially in light of a recent diagnosis of hyperparathyroidism and potential lower bone density.    Regarding fluid intake, he admits to being under-hydrated and not enjoying water. He typically has his last fluid intake around 7:00 or 8:00 PM with dinner, usually juice or a similar beverage. He reports going to bed and falling asleep between 9:00 and 10:00 PM.    He was referred to Dr. Camejo for a potential Rezum procedure but was reluctant to undergo the treatment. He expresses concerns about potential sexual side effects of any urological procedures, noting that he has never had problems with erections and wishes to maintain his sexual function.    He denies lower extremity swelling, snoring, and having been diagnosed with obstructive sleep apnea.    MEDICATIONS:  Patient is on Finasteride for a few years to manage BPH. He takes Parilidone (likely Perindopril) in the morning for blood pressure control. He discontinued Uroxatral (alfuzosin) due to orthostatic hypotension. Finasteride was restarted a few years ago after a previous discontinuation.    MEDICAL HISTORY:  Patient has a history of BPH, which started around 2010. He also has a history of urolithiasis, prostatitis, and hypertension. He was recently diagnosed with hyperthyroidism and parathyroidism. In 1992, he had a very tiny bladder papil  torri. Patient also experienced urethritis around that time.    FAMILY HISTORY:  Family history is significant for father with obstructive sleep apnea and BPH.    SURGICAL HISTORY:  In 1992, the patient underwent a procedure to remove a very tiny bladder papilloma.    TEST RESULTS:  Patient's PSA level is elevated, as mentioned in the context of his medical history.    SOCIAL HISTORY:  Alcohol: Does not drink anymore. Previously, would urinate frequently when drinking alcohol. Currently, if he has 1 or 2 beers, it triggers urinary symptoms. Occupation:  "Employed          A review of 10+ systems was conducted with pertinent positive and negative findings documented in HPI with all other systems reviewed and negative.    Past medical, family, surgical and social history reviewed as documented in chart with pertinent positive medical, family, surgical and social history detailed in HPI.    Exam Findings:    Physical Exam    General: No acute distress. Nontoxic appearing.  HENT: Normocephalic. Atraumatic.  Respiratory: Normal respiratory effort. No conversational dyspnea. No audible wheezing.  Abdomen: No obvious distension.  Skin: No visible abnormalities.  Extremities: No edema upper extremities. No edema lower extremities.  Neurological: Alert and oriented x3. Normal speech.  Psychiatric: Normal mood. Normal affect. No evidence of SI.          Assessment/Plan:    Reviewed history of BPH, elevated PSA, urolithiasis, and prostatitis.  Noted complaint of nocturia (2-6 times per night) and previous medication history.  Considered potential bladder outlet obstruction as cause of symptoms.  Determined need for objective measurement of urine production and evaluation of bladder outlet obstruction.  Assessed current medication regimen, confirming finasteride but not alpha blockers.  Decided against resuming alpha blocker due to previous side effects.    NOCTURIA MORE THAN TWICE PER NIGHT:  1. Patient to complete a 3-day 24-hour volume-based voiding diary using provided measuring device ("hat") to record urine volumes.  2. Each 24-hour period should start at the beginning of the night or morning (e.g., 9 AM to 9 AM).  3. Days do not need to be consecutive (e.g., can be 2 Saturdays and a Thursday).    BLADDER OUTLET OBSTRUCTION  1. Continued finasteride at current dose.  2. Ordered urodynamic evaluation (pressure flow study).  3. Ordered cystoscopy.  4. Follow up to schedule urodynamic study and cystoscopy with office.      PLAN SUMMARY:  1. Complete 3-day 24-hour " volume-based voiding diary using provided measuring device  2. Continue finasteride at current dose  3. Ordered urodynamic evaluation (pressure flow study)  4. Ordered cystoscopy  5. Follow up to schedule urodynamic study and cystoscopy with office              Visit complexity today is associated with medical care services that are part of the ongoing care related to the single serious and/or complex condition of Nocturia. A longitudinal relationship exists or is being developed between the patient and this practitioner for the care of this condition.

## 2025-07-07 ENCOUNTER — PATIENT MESSAGE (OUTPATIENT)
Dept: OTOLARYNGOLOGY | Facility: CLINIC | Age: 75
End: 2025-07-07
Payer: MEDICARE

## 2025-07-14 ENCOUNTER — PATIENT MESSAGE (OUTPATIENT)
Dept: UROLOGY | Facility: CLINIC | Age: 75
End: 2025-07-14
Payer: MEDICARE

## 2025-08-18 DIAGNOSIS — I10 HYPERTENSION, ESSENTIAL: ICD-10-CM

## 2025-08-18 RX ORDER — AMLODIPINE BESYLATE 5 MG/1
5 TABLET ORAL
Qty: 90 TABLET | Refills: 0 | Status: SHIPPED | OUTPATIENT
Start: 2025-08-18

## 2025-08-25 ENCOUNTER — LAB VISIT (OUTPATIENT)
Dept: LAB | Facility: HOSPITAL | Age: 75
End: 2025-08-25
Attending: FAMILY MEDICINE
Payer: MEDICARE

## 2025-08-25 ENCOUNTER — RESULTS FOLLOW-UP (OUTPATIENT)
Dept: PRIMARY CARE CLINIC | Facility: CLINIC | Age: 75
End: 2025-08-25
Payer: MEDICARE

## 2025-08-25 DIAGNOSIS — E21.3 HYPERPARATHYROIDISM: ICD-10-CM

## 2025-08-25 DIAGNOSIS — E21.3 HYPERPARATHYROIDISM: Primary | ICD-10-CM

## 2025-08-25 LAB
ALBUMIN SERPL BCP-MCNC: 4.4 G/DL (ref 3.5–5.2)
ANION GAP (OHS): 10 MMOL/L (ref 8–16)
BUN SERPL-MCNC: 18 MG/DL (ref 8–23)
CA-I BLD-SCNC: 1.3 MMOL/L (ref 1.06–1.42)
CALCIUM 24H UR-MRATE: 16 MG/HR (ref 4–12)
CALCIUM SERPL-MCNC: 10.2 MG/DL (ref 8.7–10.5)
CALCIUM UR-MCNC: 19 MG/DL
CHLORIDE SERPL-SCNC: 108 MMOL/L (ref 95–110)
CO2 SERPL-SCNC: 23 MMOL/L (ref 23–29)
CREAT SERPL-MCNC: 0.9 MG/DL (ref 0.5–1.4)
GFR SERPLBLD CREATININE-BSD FMLA CKD-EPI: >60 ML/MIN/1.73/M2
GLUCOSE SERPL-MCNC: 104 MG/DL (ref 70–110)
POTASSIUM SERPL-SCNC: 4.5 MMOL/L (ref 3.5–5.1)
PTH-INTACT SERPL-MCNC: 106 PG/ML (ref 9–77)
SODIUM SERPL-SCNC: 141 MMOL/L (ref 136–145)
TOTAL HOURS OF COLLECTION (OHS): 24 HR
TOTAL VOLUME  (OHS): 2000 ML
URINE CALCIUM ABSOLUTE (OHS): 380 MG/SPEC

## 2025-08-25 PROCEDURE — 82330 ASSAY OF CALCIUM: CPT

## 2025-08-25 PROCEDURE — 82040 ASSAY OF SERUM ALBUMIN: CPT

## 2025-08-25 PROCEDURE — 82340 ASSAY OF CALCIUM IN URINE: CPT

## 2025-08-25 PROCEDURE — 83970 ASSAY OF PARATHORMONE: CPT

## 2025-08-25 PROCEDURE — 36415 COLL VENOUS BLD VENIPUNCTURE: CPT | Mod: PN

## 2025-08-25 PROCEDURE — 80048 BASIC METABOLIC PNL TOTAL CA: CPT

## 2025-09-02 ENCOUNTER — TELEPHONE (OUTPATIENT)
Facility: CLINIC | Age: 75
End: 2025-09-02
Payer: MEDICARE

## 2025-09-03 ENCOUNTER — PROCEDURE VISIT (OUTPATIENT)
Facility: CLINIC | Age: 75
End: 2025-09-03
Payer: MEDICARE

## 2025-09-03 ENCOUNTER — HOSPITAL ENCOUNTER (OUTPATIENT)
Dept: RADIOLOGY | Facility: HOSPITAL | Age: 75
Discharge: HOME | End: 2025-09-03
Attending: UROLOGY
Payer: MEDICARE

## 2025-09-03 VITALS
RESPIRATION RATE: 18 BRPM | BODY MASS INDEX: 24.97 KG/M2 | WEIGHT: 179 LBS | DIASTOLIC BLOOD PRESSURE: 65 MMHG | TEMPERATURE: 98 F | SYSTOLIC BLOOD PRESSURE: 125 MMHG | HEART RATE: 87 BPM

## 2025-09-03 DIAGNOSIS — N40.1 BPH WITH URINARY OBSTRUCTION: Primary | ICD-10-CM

## 2025-09-03 DIAGNOSIS — R35.0 URINARY FREQUENCY: ICD-10-CM

## 2025-09-03 DIAGNOSIS — N13.8 BPH WITH URINARY OBSTRUCTION: Primary | ICD-10-CM

## 2025-09-03 DIAGNOSIS — R35.1 NOCTURIA MORE THAN TWICE PER NIGHT: ICD-10-CM

## 2025-09-03 PROCEDURE — 51797 INTRAABDOMINAL PRESSURE TEST: CPT | Mod: PBBFAC | Performed by: UROLOGY

## 2025-09-03 PROCEDURE — 51729 CYSTOMETROGRAM W/VP&UP: CPT | Mod: PBBFAC | Performed by: UROLOGY

## 2025-09-03 PROCEDURE — 74450 X-RAY URETHRA/BLADDER: CPT | Mod: TC

## 2025-09-03 PROCEDURE — 51741 ELECTRO-UROFLOWMETRY FIRST: CPT | Mod: PBBFAC | Performed by: UROLOGY

## 2025-09-03 PROCEDURE — 52000 CYSTOURETHROSCOPY: CPT | Mod: PBBFAC | Performed by: UROLOGY

## 2025-09-03 PROCEDURE — 99999PBSHW PR PBB SHADOW TECHNICAL ONLY FILED TO HB: Mod: PBBFAC,,,

## 2025-09-03 PROCEDURE — 51600 INJECTION FOR BLADDER X-RAY: CPT | Mod: PBBFAC | Performed by: UROLOGY

## 2025-09-03 PROCEDURE — 51784 ANAL/URINARY MUSCLE STUDY: CPT | Mod: PBBFAC | Performed by: UROLOGY

## 2025-09-03 RX ORDER — LIDOCAINE HYDROCHLORIDE 20 MG/ML
JELLY TOPICAL
Status: COMPLETED | OUTPATIENT
Start: 2025-09-03 | End: 2025-09-03

## 2025-09-03 RX ADMIN — LIDOCAINE HYDROCHLORIDE: 20 JELLY TOPICAL at 12:09

## 2025-09-03 RX ADMIN — IOTHALAMATE MEGLUMINE 250 ML: 172 INJECTION URETERAL at 01:09
